# Patient Record
Sex: FEMALE | Race: WHITE | NOT HISPANIC OR LATINO | Employment: OTHER | URBAN - METROPOLITAN AREA
[De-identification: names, ages, dates, MRNs, and addresses within clinical notes are randomized per-mention and may not be internally consistent; named-entity substitution may affect disease eponyms.]

---

## 2017-01-16 ENCOUNTER — GENERIC CONVERSION - ENCOUNTER (OUTPATIENT)
Dept: OTHER | Facility: OTHER | Age: 67
End: 2017-01-16

## 2017-01-20 ENCOUNTER — GENERIC CONVERSION - ENCOUNTER (OUTPATIENT)
Dept: OTHER | Facility: OTHER | Age: 67
End: 2017-01-20

## 2017-02-02 ENCOUNTER — TRANSCRIBE ORDERS (OUTPATIENT)
Dept: ADMINISTRATIVE | Facility: HOSPITAL | Age: 67
End: 2017-02-02

## 2017-02-02 DIAGNOSIS — M81.8 IDIOPATHIC OSTEOPOROSIS: ICD-10-CM

## 2017-02-02 DIAGNOSIS — E04.1 NONTOXIC UNINODULAR GOITER: Primary | ICD-10-CM

## 2017-02-02 DIAGNOSIS — E83.50 UNSPECIFIED DISORDERS OF CALCIUM METABOLISM: ICD-10-CM

## 2017-02-03 ENCOUNTER — HOSPITAL ENCOUNTER (OUTPATIENT)
Dept: GASTROENTEROLOGY | Facility: HOSPITAL | Age: 67
Discharge: HOME | End: 2017-02-03
Payer: MEDICARE

## 2017-02-03 VITALS
TEMPERATURE: 97.7 F | RESPIRATION RATE: 20 BRPM | SYSTOLIC BLOOD PRESSURE: 118 MMHG | BODY MASS INDEX: 28.7 KG/M2 | HEART RATE: 63 BPM | HEIGHT: 63 IN | WEIGHT: 162 LBS | OXYGEN SATURATION: 99 % | DIASTOLIC BLOOD PRESSURE: 72 MMHG

## 2017-02-03 PROCEDURE — 91122 HB ANAL PRESSURE RECORD: CPT

## 2017-02-03 RX ORDER — SODIUM PHOSPHATE, DIBASIC AND SODIUM PHOSPHATE, MONOBASIC 7; 19 G/133ML; G/133ML
1 ENEMA RECTAL ONCE
Status: COMPLETED | OUTPATIENT
Start: 2017-02-03 | End: 2017-02-03

## 2017-02-03 RX ADMIN — SODIUM PHOSPHATE 1 ENEMA: 7; 19 ENEMA RECTAL at 14:00

## 2017-02-06 ENCOUNTER — HOSPITAL ENCOUNTER (OUTPATIENT)
Dept: RADIOLOGY | Age: 67
Discharge: HOME | End: 2017-02-06
Payer: MEDICARE

## 2017-02-06 DIAGNOSIS — M81.8 IDIOPATHIC OSTEOPOROSIS: ICD-10-CM

## 2017-02-06 DIAGNOSIS — E04.1 NONTOXIC UNINODULAR GOITER: ICD-10-CM

## 2017-02-06 DIAGNOSIS — E83.50 UNSPECIFIED DISORDERS OF CALCIUM METABOLISM: ICD-10-CM

## 2017-02-06 PROCEDURE — 76536 US EXAM OF HEAD AND NECK: CPT

## 2017-02-07 ENCOUNTER — TRANSCRIBE ORDERS (OUTPATIENT)
Dept: ADMINISTRATIVE | Age: 67
End: 2017-02-07

## 2017-02-08 ENCOUNTER — APPOINTMENT (OUTPATIENT)
Dept: LAB | Age: 67
End: 2017-02-08
Payer: MEDICARE

## 2017-02-08 ENCOUNTER — TRANSCRIBE ORDERS (OUTPATIENT)
Dept: ADMINISTRATIVE | Age: 67
End: 2017-02-08

## 2017-02-08 DIAGNOSIS — E83.50 UNSPECIFIED DISORDERS OF CALCIUM METABOLISM: ICD-10-CM

## 2017-02-08 DIAGNOSIS — E04.1 NONTOXIC UNINODULAR GOITER: ICD-10-CM

## 2017-02-08 DIAGNOSIS — E04.1 NONTOXIC UNINODULAR GOITER: Primary | ICD-10-CM

## 2017-02-08 DIAGNOSIS — M81.8 IDIOPATHIC OSTEOPOROSIS: ICD-10-CM

## 2017-02-08 LAB
ALBUMIN SERPL BCP-MCNC: 3.3 G/DL (ref 3.5–5)
ALP SERPL-CCNC: 101 U/L (ref 46–116)
ALT SERPL W P-5'-P-CCNC: 22 U/L (ref 12–78)
ANION GAP SERPL CALCULATED.3IONS-SCNC: 5 MMOL/L (ref 4–13)
AST SERPL W P-5'-P-CCNC: 18 U/L (ref 5–45)
BACTERIA UR QL AUTO: NORMAL /HPF
BASOPHILS # BLD AUTO: 0.02 THOUSANDS/ΜL (ref 0–0.1)
BASOPHILS NFR BLD AUTO: 0 % (ref 0–1)
BILIRUB SERPL-MCNC: 0.36 MG/DL (ref 0.2–1)
BILIRUB UR QL STRIP: NEGATIVE
BUN SERPL-MCNC: 18 MG/DL (ref 5–25)
CALCIUM SERPL-MCNC: 8.7 MG/DL (ref 8.3–10.1)
CHLORIDE SERPL-SCNC: 110 MMOL/L (ref 100–108)
CLARITY UR: CLEAR
CO2 SERPL-SCNC: 29 MMOL/L (ref 21–32)
COLOR UR: YELLOW
CORTIS AM PEAK SERPL-MCNC: 18.3 UG/ML (ref 4.2–22.4)
CREAT SERPL-MCNC: 1.12 MG/DL (ref 0.6–1.3)
CREAT UR-MCNC: 100 MG/DL
EOSINOPHIL # BLD AUTO: 0.23 THOUSAND/ΜL (ref 0–0.61)
EOSINOPHIL NFR BLD AUTO: 4 % (ref 0–6)
ERYTHROCYTE [DISTWIDTH] IN BLOOD BY AUTOMATED COUNT: 12.2 % (ref 11.6–15.1)
EST. AVERAGE GLUCOSE BLD GHB EST-MCNC: 103 MG/DL
GFR SERPL CREATININE-BSD FRML MDRD: 48.2 ML/MIN/1.73SQ M
GLUCOSE SERPL-MCNC: 89 MG/DL (ref 65–140)
GLUCOSE UR STRIP-MCNC: NEGATIVE MG/DL
HBA1C MFR BLD: 5.2 % (ref 4.2–6.3)
HCT VFR BLD AUTO: 37.9 % (ref 34.8–46.1)
HGB BLD-MCNC: 12.4 G/DL (ref 11.5–15.4)
HGB UR QL STRIP.AUTO: NEGATIVE
HYALINE CASTS #/AREA URNS LPF: NORMAL /LPF
KETONES UR STRIP-MCNC: NEGATIVE MG/DL
LDH SERPL-CCNC: 162 U/L (ref 81–234)
LEUKOCYTE ESTERASE UR QL STRIP: NEGATIVE
LYMPHOCYTES # BLD AUTO: 2.41 THOUSANDS/ΜL (ref 0.6–4.47)
LYMPHOCYTES NFR BLD AUTO: 39 % (ref 14–44)
MAGNESIUM SERPL-MCNC: 2.2 MG/DL (ref 1.6–2.6)
MCH RBC QN AUTO: 31.6 PG (ref 26.8–34.3)
MCHC RBC AUTO-ENTMCNC: 32.7 G/DL (ref 31.4–37.4)
MCV RBC AUTO: 96 FL (ref 82–98)
MICROALBUMIN UR-MCNC: <5 MG/L (ref 0–20)
MICROALBUMIN/CREAT 24H UR: <5 MG/G CREATININE (ref 0–30)
MONOCYTES # BLD AUTO: 0.5 THOUSAND/ΜL (ref 0.17–1.22)
MONOCYTES NFR BLD AUTO: 8 % (ref 4–12)
NEUTROPHILS # BLD AUTO: 3.09 THOUSANDS/ΜL (ref 1.85–7.62)
NEUTS SEG NFR BLD AUTO: 49 % (ref 43–75)
NITRITE UR QL STRIP: NEGATIVE
NON-SQ EPI CELLS URNS QL MICRO: NORMAL /HPF
NRBC BLD AUTO-RTO: 0 /100 WBCS
PH UR STRIP.AUTO: 6.5 [PH] (ref 4.5–8)
PHOSPHATE SERPL-MCNC: 2.6 MG/DL (ref 2.3–4.1)
PLATELET # BLD AUTO: 238 THOUSANDS/UL (ref 149–390)
PMV BLD AUTO: 10.4 FL (ref 8.9–12.7)
POTASSIUM SERPL-SCNC: 4.4 MMOL/L (ref 3.5–5.3)
PROT SERPL-MCNC: 7.1 G/DL (ref 6.4–8.2)
PROT UR STRIP-MCNC: ABNORMAL MG/DL
PTH-INTACT SERPL-MCNC: 194.1 PG/ML (ref 14–72)
RBC # BLD AUTO: 3.93 MILLION/UL (ref 3.81–5.12)
RBC #/AREA URNS AUTO: NORMAL /HPF
SODIUM SERPL-SCNC: 144 MMOL/L (ref 136–145)
SP GR UR STRIP.AUTO: 1.02 (ref 1–1.03)
T4 FREE SERPL-MCNC: 0.78 NG/DL (ref 0.76–1.46)
TSH SERPL DL<=0.05 MIU/L-ACNC: 3.36 UIU/ML (ref 0.36–3.74)
UROBILINOGEN UR QL STRIP.AUTO: 1 E.U./DL
WBC # BLD AUTO: 6.26 THOUSAND/UL (ref 4.31–10.16)
WBC #/AREA URNS AUTO: NORMAL /HPF

## 2017-02-08 PROCEDURE — 82308 ASSAY OF CALCITONIN: CPT

## 2017-02-08 PROCEDURE — 85025 COMPLETE CBC W/AUTO DIFF WBC: CPT

## 2017-02-08 PROCEDURE — 82024 ASSAY OF ACTH: CPT

## 2017-02-08 PROCEDURE — 84439 ASSAY OF FREE THYROXINE: CPT

## 2017-02-08 PROCEDURE — 83835 ASSAY OF METANEPHRINES: CPT

## 2017-02-08 PROCEDURE — 81001 URINALYSIS AUTO W/SCOPE: CPT | Performed by: SPECIALIST

## 2017-02-08 PROCEDURE — 36415 COLL VENOUS BLD VENIPUNCTURE: CPT

## 2017-02-08 PROCEDURE — 84100 ASSAY OF PHOSPHORUS: CPT

## 2017-02-08 PROCEDURE — 80053 COMPREHEN METABOLIC PANEL: CPT

## 2017-02-08 PROCEDURE — 83735 ASSAY OF MAGNESIUM: CPT

## 2017-02-08 PROCEDURE — 83970 ASSAY OF PARATHORMONE: CPT

## 2017-02-08 PROCEDURE — 82533 TOTAL CORTISOL: CPT

## 2017-02-08 PROCEDURE — 82043 UR ALBUMIN QUANTITATIVE: CPT | Performed by: SPECIALIST

## 2017-02-08 PROCEDURE — 82570 ASSAY OF URINE CREATININE: CPT | Performed by: SPECIALIST

## 2017-02-08 PROCEDURE — 83615 LACTATE (LD) (LDH) ENZYME: CPT

## 2017-02-08 PROCEDURE — 83036 HEMOGLOBIN GLYCOSYLATED A1C: CPT

## 2017-02-08 PROCEDURE — 84443 ASSAY THYROID STIM HORMONE: CPT

## 2017-02-10 LAB
ACTH PLAS-MCNC: 20.1 PG/ML (ref 7.2–63.3)
CALCIT SERPL-MCNC: <2 PG/ML (ref 0–5)

## 2017-02-11 LAB
METANEPH FREE SERPL-MCNC: 19 PG/ML (ref 0–62)
NORMETANEPHRINE SERPL-MCNC: 113 PG/ML (ref 0–145)

## 2017-02-24 ENCOUNTER — GENERIC CONVERSION - ENCOUNTER (OUTPATIENT)
Dept: OTHER | Facility: OTHER | Age: 67
End: 2017-02-24

## 2017-02-24 ENCOUNTER — ANESTHESIA (OUTPATIENT)
Dept: GASTROENTEROLOGY | Facility: HOSPITAL | Age: 67
End: 2017-02-24
Payer: MEDICARE

## 2017-02-24 ENCOUNTER — HOSPITAL ENCOUNTER (OUTPATIENT)
Facility: HOSPITAL | Age: 67
Setting detail: OUTPATIENT SURGERY
Discharge: HOME | End: 2017-02-24
Attending: COLON & RECTAL SURGERY | Admitting: COLON & RECTAL SURGERY
Payer: MEDICARE

## 2017-02-24 ENCOUNTER — ANESTHESIA EVENT (OUTPATIENT)
Dept: GASTROENTEROLOGY | Facility: HOSPITAL | Age: 67
End: 2017-02-24
Payer: MEDICARE

## 2017-02-24 VITALS
TEMPERATURE: 97.4 F | RESPIRATION RATE: 18 BRPM | HEIGHT: 63 IN | OXYGEN SATURATION: 99 % | WEIGHT: 162 LBS | BODY MASS INDEX: 28.7 KG/M2 | SYSTOLIC BLOOD PRESSURE: 123 MMHG | HEART RATE: 53 BPM | DIASTOLIC BLOOD PRESSURE: 54 MMHG

## 2017-02-24 DIAGNOSIS — R15.9 FECAL INCONTINENCE: ICD-10-CM

## 2017-02-24 DIAGNOSIS — R19.7 DIARRHEA: ICD-10-CM

## 2017-02-24 PROCEDURE — 88305 TISSUE EXAM BY PATHOLOGIST: CPT | Performed by: INTERNAL MEDICINE

## 2017-02-24 RX ORDER — SODIUM CHLORIDE 9 MG/ML
50 INJECTION, SOLUTION INTRAVENOUS CONTINUOUS
Status: DISCONTINUED | OUTPATIENT
Start: 2017-02-24 | End: 2017-02-24 | Stop reason: HOSPADM

## 2017-02-24 RX ORDER — PROPOFOL 10 MG/ML
INJECTION, EMULSION INTRAVENOUS AS NEEDED
Status: DISCONTINUED | OUTPATIENT
Start: 2017-02-24 | End: 2017-02-24 | Stop reason: SURG

## 2017-02-24 RX ORDER — LIDOCAINE HYDROCHLORIDE 10 MG/ML
INJECTION, SOLUTION INFILTRATION; PERINEURAL AS NEEDED
Status: DISCONTINUED | OUTPATIENT
Start: 2017-02-24 | End: 2017-02-24 | Stop reason: SURG

## 2017-02-24 RX ORDER — PROPOFOL 10 MG/ML
INJECTION, EMULSION INTRAVENOUS CONTINUOUS PRN
Status: DISCONTINUED | OUTPATIENT
Start: 2017-02-24 | End: 2017-02-24 | Stop reason: SURG

## 2017-02-24 RX ADMIN — PROPOFOL 100 MG: 10 INJECTION, EMULSION INTRAVENOUS at 11:34

## 2017-02-24 RX ADMIN — PROPOFOL 80 MCG/KG/MIN: 10 INJECTION, EMULSION INTRAVENOUS at 11:34

## 2017-02-24 RX ADMIN — LIDOCAINE HYDROCHLORIDE 50 MG: 10 INJECTION, SOLUTION INFILTRATION; PERINEURAL at 11:34

## 2017-02-24 RX ADMIN — SODIUM CHLORIDE: 0.9 INJECTION, SOLUTION INTRAVENOUS at 11:34

## 2017-02-24 RX ADMIN — PROPOFOL 30 MG: 10 INJECTION, EMULSION INTRAVENOUS at 11:38

## 2017-02-24 RX ADMIN — SODIUM CHLORIDE 50 ML/HR: 0.9 INJECTION, SOLUTION INTRAVENOUS at 10:57

## 2017-03-06 ENCOUNTER — GENERIC CONVERSION - ENCOUNTER (OUTPATIENT)
Dept: OTHER | Facility: OTHER | Age: 67
End: 2017-03-06

## 2017-05-22 ENCOUNTER — TRANSCRIBE ORDERS (OUTPATIENT)
Dept: ADMINISTRATIVE | Age: 67
End: 2017-05-22

## 2017-05-22 ENCOUNTER — APPOINTMENT (OUTPATIENT)
Dept: LAB | Age: 67
End: 2017-05-22
Payer: MEDICARE

## 2017-05-22 DIAGNOSIS — M81.0 SENILE OSTEOPOROSIS: ICD-10-CM

## 2017-05-22 DIAGNOSIS — I10 ESSENTIAL HYPERTENSION, MALIGNANT: ICD-10-CM

## 2017-05-22 DIAGNOSIS — E78.5 OTHER AND UNSPECIFIED HYPERLIPIDEMIA: ICD-10-CM

## 2017-05-22 DIAGNOSIS — M81.8 IDIOPATHIC OSTEOPOROSIS: ICD-10-CM

## 2017-05-22 DIAGNOSIS — M81.0 SENILE OSTEOPOROSIS: Primary | ICD-10-CM

## 2017-05-22 DIAGNOSIS — E83.50 UNSPECIFIED DISORDERS OF CALCIUM METABOLISM: ICD-10-CM

## 2017-05-22 DIAGNOSIS — E04.0 GOITER, SPECIFIED AS SIMPLE: Primary | ICD-10-CM

## 2017-05-22 DIAGNOSIS — E78.5 OTHER AND UNSPECIFIED HYPERLIPIDEMIA: Primary | ICD-10-CM

## 2017-05-22 LAB
25(OH)D3 SERPL-MCNC: 34.7 NG/ML (ref 30–100)
ALBUMIN SERPL BCP-MCNC: 3.6 G/DL (ref 3.5–5)
ALP SERPL-CCNC: 115 U/L (ref 46–116)
ALT SERPL W P-5'-P-CCNC: 26 U/L (ref 12–78)
ANION GAP SERPL CALCULATED.3IONS-SCNC: 8 MMOL/L (ref 4–13)
AST SERPL W P-5'-P-CCNC: 25 U/L (ref 5–45)
BACTERIA UR QL AUTO: ABNORMAL /HPF
BILIRUB SERPL-MCNC: 0.33 MG/DL (ref 0.2–1)
BILIRUB UR QL STRIP: ABNORMAL
BUN SERPL-MCNC: 29 MG/DL (ref 5–25)
CALCIUM SERPL-MCNC: 9.6 MG/DL (ref 8.3–10.1)
CHLORIDE SERPL-SCNC: 109 MMOL/L (ref 100–108)
CHOLEST SERPL-MCNC: 173 MG/DL (ref 50–200)
CLARITY UR: CLEAR
CO2 SERPL-SCNC: 26 MMOL/L (ref 21–32)
COLOR UR: YELLOW
CREAT SERPL-MCNC: 1.22 MG/DL (ref 0.6–1.3)
CREAT UR-MCNC: 217 MG/DL
ERYTHROCYTE [DISTWIDTH] IN BLOOD BY AUTOMATED COUNT: 12.5 % (ref 11.6–15.1)
GFR SERPL CREATININE-BSD FRML MDRD: 43.7 ML/MIN/1.73SQ M
GLUCOSE P FAST SERPL-MCNC: 74 MG/DL (ref 65–99)
GLUCOSE UR STRIP-MCNC: NEGATIVE MG/DL
HCT VFR BLD AUTO: 39.3 % (ref 34.8–46.1)
HDLC SERPL-MCNC: 63 MG/DL (ref 40–60)
HGB BLD-MCNC: 12.8 G/DL (ref 11.5–15.4)
HGB UR QL STRIP.AUTO: NEGATIVE
HYALINE CASTS #/AREA URNS LPF: ABNORMAL /LPF
KETONES UR STRIP-MCNC: NEGATIVE MG/DL
LDLC SERPL DIRECT ASSAY-MCNC: 92 MG/DL (ref 0–100)
LEUKOCYTE ESTERASE UR QL STRIP: NEGATIVE
MAGNESIUM SERPL-MCNC: 2.4 MG/DL (ref 1.6–2.6)
MCH RBC QN AUTO: 31.4 PG (ref 26.8–34.3)
MCHC RBC AUTO-ENTMCNC: 32.6 G/DL (ref 31.4–37.4)
MCV RBC AUTO: 97 FL (ref 82–98)
MICROALBUMIN UR-MCNC: 141 MG/L (ref 0–20)
MICROALBUMIN/CREAT 24H UR: 65 MG/G CREATININE (ref 0–30)
NITRITE UR QL STRIP: NEGATIVE
NON-SQ EPI CELLS URNS QL MICRO: ABNORMAL /HPF
PH UR STRIP.AUTO: 6 [PH] (ref 4.5–8)
PHOSPHATE SERPL-MCNC: 3.3 MG/DL (ref 2.3–4.1)
PLATELET # BLD AUTO: 211 THOUSANDS/UL (ref 149–390)
PMV BLD AUTO: 10.6 FL (ref 8.9–12.7)
POTASSIUM SERPL-SCNC: 4.1 MMOL/L (ref 3.5–5.3)
PROT SERPL-MCNC: 7.5 G/DL (ref 6.4–8.2)
PROT UR STRIP-MCNC: ABNORMAL MG/DL
PTH-INTACT SERPL-MCNC: 61.9 PG/ML (ref 14–72)
RBC # BLD AUTO: 4.07 MILLION/UL (ref 3.81–5.12)
RBC #/AREA URNS AUTO: ABNORMAL /HPF
SODIUM SERPL-SCNC: 143 MMOL/L (ref 136–145)
SP GR UR STRIP.AUTO: 1.03 (ref 1–1.03)
T4 FREE SERPL-MCNC: 0.78 NG/DL (ref 0.76–1.46)
TRIGL SERPL-MCNC: 90 MG/DL
TSH SERPL DL<=0.05 MIU/L-ACNC: 1.71 UIU/ML (ref 0.36–3.74)
UROBILINOGEN UR QL STRIP.AUTO: 0.2 E.U./DL
WBC # BLD AUTO: 6.69 THOUSAND/UL (ref 4.31–10.16)
WBC #/AREA URNS AUTO: ABNORMAL /HPF

## 2017-05-22 PROCEDURE — 84443 ASSAY THYROID STIM HORMONE: CPT

## 2017-05-22 PROCEDURE — 80053 COMPREHEN METABOLIC PANEL: CPT

## 2017-05-22 PROCEDURE — 82306 VITAMIN D 25 HYDROXY: CPT

## 2017-05-22 PROCEDURE — 85027 COMPLETE CBC AUTOMATED: CPT

## 2017-05-22 PROCEDURE — 80061 LIPID PANEL: CPT | Performed by: INTERNAL MEDICINE

## 2017-05-22 PROCEDURE — 84165 PROTEIN E-PHORESIS SERUM: CPT

## 2017-05-22 PROCEDURE — 83735 ASSAY OF MAGNESIUM: CPT

## 2017-05-22 PROCEDURE — 82043 UR ALBUMIN QUANTITATIVE: CPT | Performed by: SPECIALIST

## 2017-05-22 PROCEDURE — 83721 ASSAY OF BLOOD LIPOPROTEIN: CPT

## 2017-05-22 PROCEDURE — 83970 ASSAY OF PARATHORMONE: CPT

## 2017-05-22 PROCEDURE — 84100 ASSAY OF PHOSPHORUS: CPT

## 2017-05-22 PROCEDURE — 82570 ASSAY OF URINE CREATININE: CPT | Performed by: SPECIALIST

## 2017-05-22 PROCEDURE — 36415 COLL VENOUS BLD VENIPUNCTURE: CPT

## 2017-05-22 PROCEDURE — 83883 ASSAY NEPHELOMETRY NOT SPEC: CPT

## 2017-05-22 PROCEDURE — 81001 URINALYSIS AUTO W/SCOPE: CPT | Performed by: SPECIALIST

## 2017-05-22 PROCEDURE — 84439 ASSAY OF FREE THYROXINE: CPT

## 2017-05-24 ENCOUNTER — APPOINTMENT (OUTPATIENT)
Dept: LAB | Age: 67
End: 2017-05-24
Payer: MEDICARE

## 2017-05-24 DIAGNOSIS — E83.50 UNSPECIFIED DISORDERS OF CALCIUM METABOLISM: ICD-10-CM

## 2017-05-24 DIAGNOSIS — E04.0 GOITER, SPECIFIED AS SIMPLE: ICD-10-CM

## 2017-05-24 DIAGNOSIS — M81.8 IDIOPATHIC OSTEOPOROSIS: ICD-10-CM

## 2017-05-24 LAB
ALBUMIN SERPL ELPH-MCNC: 3.71 G/DL (ref 3.5–5)
ALBUMIN SERPL ELPH-MCNC: 52.3 % (ref 52–65)
ALPHA1 GLOB SERPL ELPH-MCNC: 0.32 G/DL (ref 0.1–0.4)
ALPHA1 GLOB SERPL ELPH-MCNC: 4.5 % (ref 2.5–5)
ALPHA2 GLOB SERPL ELPH-MCNC: 0.87 G/DL (ref 0.4–1.2)
ALPHA2 GLOB SERPL ELPH-MCNC: 12.3 % (ref 7–13)
BETA GLOB ABNORMAL SERPL ELPH-MCNC: 0.44 G/DL (ref 0.4–0.8)
BETA1 GLOB SERPL ELPH-MCNC: 6.2 % (ref 5–13)
BETA2 GLOB SERPL ELPH-MCNC: 7.3 % (ref 2–8)
BETA2+GAMMA GLOB SERPL ELPH-MCNC: 0.52 G/DL (ref 0.2–0.5)
CALCIUM 24H UR-MCNC: 106.95 MG/24 HRS (ref 42–353)
CREAT 24H UR-MRATE: 0.7 G/24HR (ref 0.6–1.8)
CREAT CL 24H UR+SERPL-VRATE: 40.96 ML/MIN (ref 75–115)
CREAT UR-MCNC: 96.6 MG/DL
GAMMA GLOB ABNORMAL SERPL ELPH-MCNC: 1.24 G/DL (ref 0.5–1.6)
GAMMA GLOB SERPL ELPH-MCNC: 17.4 % (ref 12–22)
IGG/ALB SER: 1.1 {RATIO} (ref 1.1–1.8)
KAPPA LC FREE SER-MCNC: 42.25 MG/L (ref 3.3–19.4)
KAPPA LC FREE/LAMBDA FREE SER: 1.31 {RATIO} (ref 0.26–1.65)
LAMBDA LC FREE SERPL-MCNC: 32.32 MG/L (ref 5.71–26.3)
PROT PATTERN SERPL ELPH-IMP: ABNORMAL
PROT SERPL-MCNC: 7.1 G/DL (ref 6.4–8.2)
SPECIMEN VOL UR: 775 ML
SPECIMEN VOL UR: 775 ML

## 2017-05-24 PROCEDURE — 82340 ASSAY OF CALCIUM IN URINE: CPT

## 2017-05-24 PROCEDURE — 82575 CREATININE CLEARANCE TEST: CPT

## 2017-05-25 ENCOUNTER — HOSPITAL ENCOUNTER (OUTPATIENT)
Dept: INFUSION CENTER | Facility: CLINIC | Age: 67
Discharge: HOME | End: 2017-05-25
Payer: MEDICARE

## 2017-05-25 VITALS — TEMPERATURE: 97.4 F

## 2017-05-25 PROCEDURE — 96402 CHEMO HORMON ANTINEOPL SQ/IM: CPT

## 2017-05-25 RX ADMIN — DENOSUMAB 60 MG: 60 INJECTION SUBCUTANEOUS at 14:03

## 2017-05-25 NOTE — PROGRESS NOTES
Pt tolerated prolia injection well in right arm  pt confirmed taking calcium supplements at home  Labs reviewed and on chart  Pt refused AVS, new appointment made for 6 months

## 2017-05-30 ENCOUNTER — TRANSCRIBE ORDERS (OUTPATIENT)
Dept: ADMINISTRATIVE | Facility: HOSPITAL | Age: 67
End: 2017-05-30

## 2017-05-30 DIAGNOSIS — E83.50 UNSPECIFIED DISORDER OF CALCIUM METABOLISM: Primary | ICD-10-CM

## 2017-06-05 ENCOUNTER — HOSPITAL ENCOUNTER (OUTPATIENT)
Dept: RADIOLOGY | Age: 67
Discharge: HOME | End: 2017-06-05
Payer: MEDICARE

## 2017-06-05 DIAGNOSIS — E83.50 UNSPECIFIED DISORDER OF CALCIUM METABOLISM: ICD-10-CM

## 2017-06-05 PROCEDURE — 76770 US EXAM ABDO BACK WALL COMP: CPT

## 2017-11-13 ENCOUNTER — TRANSCRIBE ORDERS (OUTPATIENT)
Dept: ADMINISTRATIVE | Age: 67
End: 2017-11-13

## 2017-11-13 ENCOUNTER — APPOINTMENT (OUTPATIENT)
Dept: LAB | Age: 67
End: 2017-11-13
Payer: MEDICARE

## 2017-11-13 DIAGNOSIS — E04.1 NONTOXIC UNINODULAR GOITER: ICD-10-CM

## 2017-11-13 DIAGNOSIS — M81.8 IDIOPATHIC OSTEOPOROSIS: ICD-10-CM

## 2017-11-13 DIAGNOSIS — E78.49 FAMILIAL COMBINED HYPERLIPIDEMIA: ICD-10-CM

## 2017-11-13 DIAGNOSIS — E55.9 AVITAMINOSIS D: ICD-10-CM

## 2017-11-13 DIAGNOSIS — E21.2 OTHER HYPERPARATHYROIDISM (HCC): ICD-10-CM

## 2017-11-13 DIAGNOSIS — I10 ESSENTIAL HYPERTENSION, MALIGNANT: ICD-10-CM

## 2017-11-13 DIAGNOSIS — R53.83 FATIGUE, UNSPECIFIED TYPE: ICD-10-CM

## 2017-11-13 DIAGNOSIS — I10 ESSENTIAL HYPERTENSION, MALIGNANT: Primary | ICD-10-CM

## 2017-11-13 DIAGNOSIS — E21.2 OTHER HYPERPARATHYROIDISM (HCC): Primary | ICD-10-CM

## 2017-11-13 LAB
ALBUMIN SERPL BCP-MCNC: 3.2 G/DL (ref 3.5–5)
ALP SERPL-CCNC: 116 U/L (ref 46–116)
ALT SERPL W P-5'-P-CCNC: 27 U/L (ref 12–78)
ANION GAP SERPL CALCULATED.3IONS-SCNC: 6 MMOL/L (ref 4–13)
AST SERPL W P-5'-P-CCNC: 26 U/L (ref 5–45)
BACTERIA UR QL AUTO: ABNORMAL /HPF
BASOPHILS # BLD AUTO: 0.03 THOUSANDS/ΜL (ref 0–0.1)
BASOPHILS NFR BLD AUTO: 0 % (ref 0–1)
BILIRUB SERPL-MCNC: 0.3 MG/DL (ref 0.2–1)
BILIRUB UR QL STRIP: NEGATIVE
BUN SERPL-MCNC: 19 MG/DL (ref 5–25)
CALCIUM SERPL-MCNC: 9.4 MG/DL (ref 8.3–10.1)
CHLORIDE SERPL-SCNC: 107 MMOL/L (ref 100–108)
CHOLEST SERPL-MCNC: 195 MG/DL (ref 50–200)
CLARITY UR: CLEAR
CO2 SERPL-SCNC: 27 MMOL/L (ref 21–32)
COLOR UR: YELLOW
CREAT SERPL-MCNC: 1.2 MG/DL (ref 0.6–1.3)
EOSINOPHIL # BLD AUTO: 0.17 THOUSAND/ΜL (ref 0–0.61)
EOSINOPHIL NFR BLD AUTO: 3 % (ref 0–6)
ERYTHROCYTE [DISTWIDTH] IN BLOOD BY AUTOMATED COUNT: 12.5 % (ref 11.6–15.1)
EST. AVERAGE GLUCOSE BLD GHB EST-MCNC: 105 MG/DL
GFR SERPL CREATININE-BSD FRML MDRD: 46 ML/MIN/1.73SQ M
GLUCOSE P FAST SERPL-MCNC: 79 MG/DL (ref 65–99)
GLUCOSE UR STRIP-MCNC: NEGATIVE MG/DL
HBA1C MFR BLD: 5.3 % (ref 4.2–6.3)
HCT VFR BLD AUTO: 40.5 % (ref 34.8–46.1)
HDLC SERPL-MCNC: 64 MG/DL (ref 40–60)
HGB BLD-MCNC: 13.4 G/DL (ref 11.5–15.4)
HGB UR QL STRIP.AUTO: NEGATIVE
HYALINE CASTS #/AREA URNS LPF: ABNORMAL /LPF
KETONES UR STRIP-MCNC: NEGATIVE MG/DL
LDLC SERPL CALC-MCNC: 111 MG/DL (ref 0–100)
LEUKOCYTE ESTERASE UR QL STRIP: NEGATIVE
LYMPHOCYTES # BLD AUTO: 1.58 THOUSANDS/ΜL (ref 0.6–4.47)
LYMPHOCYTES NFR BLD AUTO: 24 % (ref 14–44)
MAGNESIUM SERPL-MCNC: 2.3 MG/DL (ref 1.6–2.6)
MCH RBC QN AUTO: 31.8 PG (ref 26.8–34.3)
MCHC RBC AUTO-ENTMCNC: 33.1 G/DL (ref 31.4–37.4)
MCV RBC AUTO: 96 FL (ref 82–98)
MONOCYTES # BLD AUTO: 0.38 THOUSAND/ΜL (ref 0.17–1.22)
MONOCYTES NFR BLD AUTO: 6 % (ref 4–12)
NEUTROPHILS # BLD AUTO: 4.53 THOUSANDS/ΜL (ref 1.85–7.62)
NEUTS SEG NFR BLD AUTO: 67 % (ref 43–75)
NITRITE UR QL STRIP: NEGATIVE
NON-SQ EPI CELLS URNS QL MICRO: ABNORMAL /HPF
NRBC BLD AUTO-RTO: 0 /100 WBCS
PH UR STRIP.AUTO: 6 [PH] (ref 4.5–8)
PHOSPHATE SERPL-MCNC: 3.3 MG/DL (ref 2.3–4.1)
PLATELET # BLD AUTO: 238 THOUSANDS/UL (ref 149–390)
PMV BLD AUTO: 11.1 FL (ref 8.9–12.7)
POTASSIUM SERPL-SCNC: 4.5 MMOL/L (ref 3.5–5.3)
PROT SERPL-MCNC: 7.7 G/DL (ref 6.4–8.2)
PROT UR STRIP-MCNC: ABNORMAL MG/DL
PTH-INTACT SERPL-MCNC: 71.1 PG/ML (ref 14–72)
RBC # BLD AUTO: 4.21 MILLION/UL (ref 3.81–5.12)
RBC #/AREA URNS AUTO: ABNORMAL /HPF
SODIUM SERPL-SCNC: 140 MMOL/L (ref 136–145)
SP GR UR STRIP.AUTO: 1.03 (ref 1–1.03)
T3 SERPL-MCNC: 0.8 NG/ML (ref 0.6–1.8)
T4 FREE SERPL-MCNC: 0.77 NG/DL (ref 0.76–1.46)
TRIGL SERPL-MCNC: 99 MG/DL
TSH SERPL DL<=0.05 MIU/L-ACNC: 1.54 UIU/ML (ref 0.36–3.74)
UROBILINOGEN UR QL STRIP.AUTO: 0.2 E.U./DL
WBC # BLD AUTO: 6.7 THOUSAND/UL (ref 4.31–10.16)
WBC #/AREA URNS AUTO: ABNORMAL /HPF

## 2017-11-13 PROCEDURE — 80053 COMPREHEN METABOLIC PANEL: CPT

## 2017-11-13 PROCEDURE — 85025 COMPLETE CBC W/AUTO DIFF WBC: CPT

## 2017-11-13 PROCEDURE — 36415 COLL VENOUS BLD VENIPUNCTURE: CPT

## 2017-11-13 PROCEDURE — 83735 ASSAY OF MAGNESIUM: CPT

## 2017-11-13 PROCEDURE — 84439 ASSAY OF FREE THYROXINE: CPT

## 2017-11-13 PROCEDURE — 83036 HEMOGLOBIN GLYCOSYLATED A1C: CPT

## 2017-11-13 PROCEDURE — 84443 ASSAY THYROID STIM HORMONE: CPT

## 2017-11-13 PROCEDURE — 84100 ASSAY OF PHOSPHORUS: CPT

## 2017-11-13 PROCEDURE — 84480 ASSAY TRIIODOTHYRONINE (T3): CPT

## 2017-11-13 PROCEDURE — 83970 ASSAY OF PARATHORMONE: CPT

## 2017-11-13 PROCEDURE — 80061 LIPID PANEL: CPT

## 2017-11-13 PROCEDURE — 81001 URINALYSIS AUTO W/SCOPE: CPT | Performed by: INTERNAL MEDICINE

## 2017-11-27 ENCOUNTER — HOSPITAL ENCOUNTER (OUTPATIENT)
Dept: INFUSION CENTER | Facility: CLINIC | Age: 67
Discharge: HOME | End: 2017-11-27
Payer: MEDICARE

## 2017-12-21 ENCOUNTER — ALLSCRIPTS OFFICE VISIT (OUTPATIENT)
Dept: OTHER | Facility: OTHER | Age: 67
End: 2017-12-21

## 2017-12-21 DIAGNOSIS — R76.8 OTHER SPECIFIED ABNORMAL IMMUNOLOGICAL FINDINGS IN SERUM: ICD-10-CM

## 2017-12-22 ENCOUNTER — GENERIC CONVERSION - ENCOUNTER (OUTPATIENT)
Dept: OTHER | Facility: OTHER | Age: 67
End: 2017-12-22

## 2017-12-22 ENCOUNTER — HOSPITAL ENCOUNTER (OUTPATIENT)
Dept: RADIOLOGY | Age: 67
Discharge: HOME | End: 2017-12-22
Payer: MEDICARE

## 2017-12-22 DIAGNOSIS — Z12.31 ENCOUNTER FOR SCREENING MAMMOGRAM FOR MALIGNANT NEOPLASM OF BREAST: ICD-10-CM

## 2017-12-22 PROCEDURE — G0202 SCR MAMMO BI INCL CAD: HCPCS

## 2017-12-25 NOTE — PROGRESS NOTES
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)   4  Elevated serum immunoglobulin free light chains (795 79) (R76 8)    Plan   Elevated serum immunoglobulin free light chains    · (1) B-2 MICROGLOBULIN; Status:Active; Requested for:84Rbg9241;    Perform:[facility]; IUK:81BRD2413; Ordered;For:Elevated serum immunoglobulin free light chains; Ordered By:Proothi, Shin;   · (1) FREE LIGHT CHAINS, SERUM; Status:Active; Requested for:14Eji9887;    Perform:[facility]; ISY:56QCX9034; Ordered;For:Elevated serum immunoglobulin free light chains; Ordered By:Proothi, Shin;   · (1) FREE LIGHT CHAINS, URINE; Status:Active; Requested for:34Knp0404;    Perform:[facility]; BVX:08RIU6253; Ordered;For:Elevated serum immunoglobulin free light chains; Ordered By:Proothi, Shin;   · (1) PROTEIN ELECTRO, SERUM; Status:Active; Requested for:36Cfx9061;    Perform:[facility]; PZC:71QRW2385; Ordered;For:Elevated serum immunoglobulin free light chains; Ordered By:Proothi, Shin;   · (1) PROTEIN ELECTRO, URINE; Status:Active; Requested for:33Sjb2006;    Perform:[facility]; CLX:57JBD9714; Ordered;For:Elevated serum immunoglobulin free light chains; Ordered By:Proothi, Shin;   · Follow-up visit in 1 month Evaluation and Treatment  Follow-up  Status: Complete  Done:    17OOL8418 01:45PM   Ordered; For: Elevated serum immunoglobulin free light chains; Ordered By: Uriel Story Performed:  Due: 47HDJ0244; Last Updated By: Amelia Austin; 12/21/2017 4:09:39 PM    Discussion/Summary   Discussion Summary:    Patient is 69 year of age  In 1999 patient was diagnosed to have hormone receptor negative stage I left breast cancer  We were not checking HER-2 at that time   Patient had left breast mastectomy and 4 cycles of adjuvant Adriamycin/Cytoxan followed by reconstruction surgery and mammoplasty of the right breast   She has remained in remission from breast cancer  of ITP in remission  medical problems like polymyalgia rheumatica, restless leg syndrome, pacemaker ,   Guillain-[place] syndrome, Sjogren's syndrome and osteoporosis  She follows with her primary physician, endocrinologist, breast surgeon and rheumatologist  Patient's endocrinologist noticed abnormal light chains, kappa light chain 42 25 and lambda light chain 32 32  Normal free light chain ratio  Increased microalbumin in the urine, 141  endocrinologist referred her back to our office for abnormal light chains  was being followed by our group member Dr Karie Jordan previously  feels tired  She states she sleeps 12 hour at night and still feels tired  examination and test results are as recorded and discussed  Free light chains are slightly high but ratio is normal probably of uncertain significance are no significance  These will be repeated and also she will have serum and urine protein electrophoresis and beta 2 microglobulin  has been in remission from breast cancer and she follows with her breast surgeon for examination of breasts and mammography  follows with her primary physician and other consultants for other medical problems  Patient states she is up-to-date with her medical checkups and health screening tests  and plan discussed  Questions answered  Counseling Documentation With Imm: The patient was counseled regarding diagnostic results,-- patient and family education,-- impressions  total time of encounter was 65 minutes-- and-- 35 minutes was spent counseling  Goals and Barriers: The patient has the current Goals: Cure from breast cancer  Workup for elevated free light chains  The patent has the current Barriers: No barriers  Patient's Capacity to Self-Care: Patient is able to Self-Care  Medication SE Review and Pt Understands Tx:  The treatment plan was reviewed with the patient/guardian  The patient/guardian understands and agrees with the treatment plan    Self Referrals:    Self Referrals: No      Chief Complaint   Chief Complaint Free Text Note Form: Consultation for elevated free light chain  History of Present Illness   Free Text HPI: Patient is 69 year of age  In 1999 patient was diagnosed to have hormone receptor negative stage I left breast cancer  We were not checking HER-2 at that time  Patient had left breast mastectomy and 4 cycles of adjuvant Adriamycin/Cytoxan followed by reconstruction surgery and mammoplasty of the right breast   She has remained in remission from breast cancer  of ITP in remission  medical problems like polymyalgia rheumatica, restless leg syndrome, pacemaker ,   Guillain-[place] syndrome, Sjogren's syndrome and osteoporosis  She follows with her primary physician, endocrinologist, breast surgeon and rheumatologist  Patient's endocrinologist noticed abnormal light chains, kappa light chain 42 25 and lambda light chain 32 32  Normal free light chain ratio  Increased microalbumin in the urine, 141  endocrinologist referred her back to our office for abnormal light chains  was being followed by our group member Dr Shant Marte previously  feels tired  She states she sleeps 12 hour at night and still feels tired  Review of Systems        No headaches, seizures, diplopia, dysphagia, hoarseness, angina pain, chest pain, palpitations, shortness of breath, cough, hemoptysis, abdominal pain, melena, or hematuria  No fever, chills, bleeding, bone pains, skin rash, weight loss, nausea, vomiting and no change in bowel habits  Appetite is fair  No night sweats  Patient has minor arthritic symptoms  No leg cramps  No swelling of the ankles  No swollen glands  Anxious  Not unusually sensitive to heat or cold  No recent or frequent infections  No GYN symptoms  Other symptoms as in HPI  Reviewed 14 systems  ROS Reviewed:    ROS reviewed        Active Problems   1  Anxiety (300 00) (F41 9)   2  Arthritis (716 90) (M19 90)   3  Atypical chest pain (786 59) (R07 89)   4  Benign familial tremor (333 1) (G25 0)   5  Complication Of Cardiac Pacemaker (996 72)   6  Depression (311) (F32 9)   7  Diarrhea (787 91) (R19 7)   8  Dry eye syndrome (375 15) (H04 129)   9  Encounter for other plastic or reconstructive surgery following medical procedure or     healed injury (V51 8) (Z42 8)   10  Fecal incontinence (787 60) (R15 9)   11  Fibromyalgia (729 1) (M79 7)   12  H/O left breast implant (V43 82) (Z98 82)   13  Heartburn (787 1) (R12)   14  History of breast cancer (V10 3) (Z85 3)   15  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)   16  Hypertension (401 9) (I10)   17  Idiopathic thrombocytopenic purpura (287 31) (D69 3)   18  Insomnia (780 52) (G47 00)   19  Irritable bowel syndrome with both constipation and diarrhea (564 1) (K58 2)   20  Irritable bowel syndrome with diarrhea (564 1) (K58 0)   21  Lymphedema (457 1) (I89 0)   22  Malignant neoplasm without specification of site (199 1) (C80 1)   23  Percutaneous Placement Of Gastrostomy Tube   24  Restless legs syndrome (333 94) (G25 81)   25  Sick sinus syndrome (427 81) (I49 5)   26  Sjogren's syndrome (710 2) (M35 00)   27  Tricuspid Valve Endocarditis (421 0)      Reviewed  Past Medical History   1  History of Cellulitis (682 9) (L03 90)   2  History of Closed Fracture Of The Distal End Of The Radius (813 42)   3  History of Closed Fracture Of The Radius (813 81)   4  Encounter for other plastic or reconstructive surgery following medical procedure or     healed injury (V51 8) (Z42 8)   5  History of adenomatous polyp of colon (V12 72) (Z86 010)   6  History of breast cancer (V10 3) (Z85 3)      Reviewed  Surgical History   1  History of Appendectomy   2  History of Breast Surgery Mastectomy   3  History of Breast Surgery Reconstruction   4  History of  Section   5   History of Cholecystectomy   6  History of Pacemaker Placement   7  Percutaneous Placement Of Gastrostomy Tube  Surgical History Reviewed: The surgical history was reviewed and updated today  Family History   Mother    1  Family history of Hypertension (V17 49)   2  Family history of Stroke Syndrome (V17 1)  Father    3  Family history of Benign Polyps Of The Large Intestine (V18 51)   4  Family history of Esophageal Cancer (V16 0)   5  Family history of Heart Disease (V17 49)  Family History    6  Family history of Cancer  Family History Reviewed: The family history was reviewed and updated today  Social History    · Alcohol Use (History)   · Caffeine Use   · Daily Cola Consumption (1  Cans/Day)   · Never A Smoker   · Occupation: Retired  Social History Reviewed: The social history was reviewed and updated today  Current Meds    1  Ambien TABS; TAKE 1 TABLET AT  BEDTIME AS NEEDED FOR INSOMNIA; Therapy: (Recorded:17Jun2016) to Recorded   2  Aspirin 325 MG Oral Tablet; TAKE 1 TABLET DAILY; Therapy: (Recorded:17Jun2016) to Recorded   3  Atenolol 25 MG Oral Tablet; Take 1 tablet daily; Therapy: (Allan Elvia) to Recorded   4  Benazepril HCl - 20 MG Oral Tablet; TAKE 1 TABLET DAILY; Therapy: (Allan Elvia) to Recorded   5  Dicyclomine HCl - 10 MG Oral Capsule; TAKE 1 CAPSULE EVERY 6 HOURS AS     NEEDED; Therapy: 85DCB5239 to (Evaluate:24Jan2017)  Requested for: 26Oct2016; Last     Rx:26Oct2016 Ordered   6  Diphenoxylate-Atropine 2 5-0 025 MG Oral Tablet; TAKE 1 TABLET 4 TIMES DAILY AS     NEEDED FOR DIARRHEA; Therapy: 69FDB3756 to (Evaluate:39Xpa8852); Last Rx:26Oct2016 Ordered   7  Diphenoxylate-Atropine 2 5-0 025 MG Oral Tablet; TAKE 1 TABLET 4 TIMES DAILY AS     NEEDED FOR DIARRHEA; Therapy: 74Fyf4084 to (Evaluate:60Axs7752); Last Rx:43Snk0784 Ordered   8  Evoxac 30 MG Oral Capsule; TAKE 1 CAPSULE DAILY; Therapy: (Recorded:11Nov2013) to Recorded   9   Imodium A-D 2 MG Oral Tablet; TAKE 2 TABLETS INITIALLY, FOLLOWED BY 1 TABLET     AFTER EACH LOOSE BOWEL MOVEMENT  DO NOT EXCEED 8 TABLETS/DAY; Therapy: 47UQD5868 to (Evaluate:05Nov2016); Last Rx:26Oct2016 Ordered   10  MiraLax Oral Powder; MIX 17 GM IN 8 OUNCES OF LIQUID AND DRINK 1 TO 2 TIMES      DAILY FOR CONSTIPATION; Therapy: 43LRQ3505 to (Evaluate:04Vwv9333); Last Rx:26Oct2016 Ordered   11  Multi Vitamin Daily TABS; TAKE 1 TABLET DAILY; Therapy: (Wesley Roberts) to Recorded   12  OLANZapine 5 MG Oral Tablet; TAKE 1 TABLET DAILY; Therapy: (Wesley Roberts) to Recorded   13  Plaquenil 200 MG Oral Tablet; TAKE 1 TABLET TWICE DAILY; Therapy: (Recorded:17Jun2016) to Recorded   14  Probiotic Daily Oral Capsule; USE AS DIRECTED; Therapy: 84MZY2512 to (Evaluate:25Nov2016); Last Rx:26Oct2016 Ordered   15  Restasis 0 05 % Ophthalmic Emulsion; USE AS DIRECTED; Therapy: (Recorded:17Jun2016) to Recorded   16  ROPINIRole HCl - 0 5 MG Oral Tablet; Take 1 tablet daily; Therapy: (Recorded:17Jun2016) to Recorded   17  Venlafaxine HCl ER 75 MG Oral Capsule Extended Release 24 Hour; TAKE 3 CAPSULES      DAILY; Therapy: (Wesley Roberts) to Recorded   18  Viberzi 75 MG Oral Tablet; Take 1 tablet twice daily; Therapy: 65OHP0413 to (Evaluate:01Mzn0550); Last Rx:26Oct2016 Ordered   19  Vitamin D 1000 UNIT Oral Tablet; TAKE 1 TABLET DAILY; Therapy: (Recorded:17Jun2016) to Recorded   20  Vytorin 10-20 MG Oral Tablet; TAKE 1 TABLET DAILY AS DIRECTED; Therapy: (Recorded:17Jun2016) to Recorded   21  Xanax 0 5 MG Oral Tablet; TAKE 1 TABLET DAILY AS NEEDED; Therapy: (Recorded:17Jun2016) to Recorded   22  Xifaxan 550 MG Oral Tablet; TAKE 1 TABLET 3 times daily; Therapy: 57GLY7276 to (Evaluate:33Yse7178)  Requested for: 82UFG4856; Last      Rx:17Jun2016 Ordered  Medication List Reviewed: The medication list was reviewed and updated today  Allergies   1   No Known Drug Allergies      Reviewed       Vitals   Vital Signs    Recorded: 09Nby4062 03:26PM   Temperature 98 1 F   Heart Rate 61   Respiration 16   Systolic 202   Diastolic 66   Height 5 ft 2 5 in   Weight 170 lb 8 oz   BMI Calculated 30 69   BSA Calculated 1 8   O2 Saturation 97   Pain Scale 0       Reviewed       Physical Exam        Patient is alert and oriented and not in any acute distress     Stable vital signs  No icterus  No oral thrush  No palpable neck mass  Lung fields are clear to percussion and auscultation  Heart rate is regular  Abdomen soft and nontender  No palpable abdominal mass  No ascites  No edema of ankles  No calf tenderness  No focal neurological deficit  No skin rash  No palpable lymphadenopathy  Good arterial pulses  No clubbing  Anxious  Performance status one  ECOG 1       Results/Data   Results Form:    Results      Lab Results See HPI  (1) CBC/PLT/DIFF 59ZRP0139 01:18PM EPIC, Provider   Test ordered by: Tavon Flores      Test Name Result Flag Reference   WBC COUNT 6 70 Thousand/uL  4 31-10 16   RBC COUNT 4 21 Million/uL  3 81-5 12   HEMOGLOBIN 13 4 g/dL  11 5-15 4   HEMATOCRIT 40 5 %  34 8-46  1   MCV 96 fL  82-98   MCH 31 8 pg  26 8-34 3   MCHC 33 1 g/dL  31 4-37 4   RDW 12 5 %  11 6-15 1   MPV 11 1 fL  8 9-12 7   PLATELET COUNT 486 Thousands/uL  149-390   nRBC AUTOMATED 0 /100 WBCs     NEUTROPHILS RELATIVE PERCENT 67 %  43-75   LYMPHOCYTES RELATIVE PERCENT 24 %  14-44   MONOCYTES RELATIVE PERCENT 6 %  4-12   EOSINOPHILS RELATIVE PERCENT 3 %  0-6   BASOPHILS RELATIVE PERCENT 0 %  0-1   NEUTROPHILS ABSOLUTE COUNT 4 53 Thousands/? ??L  1 85-7 62   LYMPHOCYTES ABSOLUTE COUNT 1 58 Thousands/? ??L  0 60-4 47   MONOCYTES ABSOLUTE COUNT 0 38 Thousand/? ??L  0 17-1 22   EOSINOPHILS ABSOLUTE COUNT 0 17 Thousand/? ??L  0 00-0 61   BASOPHILS ABSOLUTE COUNT 0 03 Thousands/? ??L  0 00-0 10   This is a patient instruction: This test is non-fasting   Please drink two glasses of water morning of bloodwork  (1) T4, FREE 88ACH3546 01:18PM EPIC, Provider   Test ordered by: Karishma Peacock      Test Name Result Flag Reference   T4,FREE 0 77 ng/dL  0 76-1 46   Specimen collection should occur prior to Sulfasalazine administration due to the potential for falsely elevated results  (1) COMPREHENSIVE METABOLIC PANEL 34LRF5440 14:96[facility], Provider   Test ordered by: Karishma Peacock      Test Name Result Flag Reference   SODIUM 140 mmol/L  136-145   POTASSIUM 4 5 mmol/L  3 5-5 3   Slightly Hemolyzed; Results May be Affected   CHLORIDE 107 mmol/L  100-108   CARBON DIOXIDE 27 mmol/L  21-32   ANION GAP (CALC) 6 mmol/L  4-13   BLOOD UREA NITROGEN 19 mg/dL  5-25   CREATININE 1 20 mg/dL  0 60-1 30   Standardized to IDMS reference method   CALCIUM 9 4 mg/dL  8 3-10 1   BILI, TOTAL 0 30 mg/dL  0 20-1 00   ALK PHOSPHATAS 116 U/L     ALT (SGPT) 27 U/L  12-78   Specimen collection should occur prior to Sulfasalazine and/or Sulfapyridine administration due to the potential for falsely depressed results  AST(SGOT) 26 U/L  5-45   Slightly Hemolyzed; Results May be Affected     Specimen collection should occur prior to Sulfasalazine administration due to the potential for falsely depressed results  ALBUMIN 3 2 g/dL L 3 5-5 0   TOTAL PROTEIN 7 7 g/dL  6 4-8 2   eGFR 46 ml/min/1 73sq m     [facility] Disease Education Program recommendations are as follows:     GFR calculation is accurate only with a steady state creatinine     Chronic Kidney disease less than 60 ml/min/1 73 sq  meters     Kidney failure less than 15 ml/min/1 73 sq  meters  GLUCOSE FASTING 79 mg/dL  65-99   Specimen collection should occur prior to Sulfasalazine administration due to the potential for falsely depressed results  Specimen collection should occur prior to Sulfapyridine administration due to the potential for falsely elevated results        (1) TSH 60XLG2925 01:18PM EPIC, Provider   Test ordered by: Karishma Peacock      Test Name Result Flag Reference   TSH 1 540 uIU/mL  0 358-3 740   This is a patient instruction: This test is non-fasting  Please drink two glasses of water morning of bloodwork  Patients undergoing fluorescein dye angiography may retain small amounts of fluorescein in the body for 48-72 hours post procedure  Samples containing fluorescein can produce falsely depressed TSH values  If the patient had this procedure,a specimen should be resubmitted post fluorescein clearance  The recommended reference ranges for TSH during pregnancy are as follows:     First trimester 0 1 to 2 5 uIU/mL     Second trimester  0 2 to 3 0 uIU/mL     Third trimester 0 3 to 3 0 uIU/m      (1) T3 TOTAL [id number] 01:18PM EPIC, Provider   Test ordered by: Johnson Holm      Test Name Result Flag Reference   T3 0 80 ng/mL  0 60-1 80      Health Management   Health Maintenance   COLONOSCOPY; every 5 years; Last 62OUP4732; Next Due: 92Jhz5944;  Active    Future Appointments      Date/Time Provider Specialty Site   01/25/2018 01:45 PM Ed MD [facility]     Signatures    Electronically signed by : Shara Everett MD; Dec 24 2017  1:38PM EST                       (Author)

## 2017-12-26 ENCOUNTER — GENERIC CONVERSION - ENCOUNTER (OUTPATIENT)
Dept: HEMATOLOGY ONCOLOGY | Facility: CLINIC | Age: 67
End: 2017-12-26

## 2018-01-09 NOTE — RESULT NOTES
Verified Results  (1) CELIAC DISEASE AB PROFILE [id number] 02:20PM Armando Villatoro Order Number: [id number]     Test Name Result Flag Reference   tTG IGG <2 U/mL  0 - 5   Negative        0 - 5                              Weak Positive   6 - 9                              Positive           >9   tTG IGA <2 U/mL  0 - 3   Negative        0 -  3                              Weak Positive   4 - 10                              Positive           >10 Tissue Transglutaminase (tTG) has been identified as the endomysial antigen  Studies have demonstr- ated that endomysial IgA antibodies have over 99% specificity for gluten sensitive enteropathy     GLIADA 8 units  0 - 19   Negative                   0 - 19                   Weak Positive             20 - 30                   Moderate to Strong Positive   >30   GLIADG 1 units  0 - 19   Negative                   0 - 19                   Weak Positive             20 - 30                   Moderate to Strong Positive   >30   ENDOMYSIAL AB IGA Negative  Negative   Performed at:  [address] [place] [address]  [id number]  : Linda Teran MD, Phone:  [phone]    mg/dL H 02 - 330

## 2018-01-11 NOTE — RESULT NOTES
Message   Please let patient know thyroid blood work was normal and stool test was negative  Thank you  Verified Results  (1) TSH WITH FT4 REFLEX 47Wze6009 02:20PM [employer] Order Number: [id number]   Order Number: [id number]     Test Name Result Flag Reference   TSH 2 110 uIU/mL  0 358-3 740   Patients undergoing fluorescein dye angiography may retain small amounts of fluorescein in the body for 48-72 hours post procedure  Samples containing fluorescein can produce falsely depressed TSH values  If the patient had this procedure,a specimen should be resubmitted post fluorescein clearance            The recommended reference ranges for TSH during pregnancy are as follows:  First trimester 0 1 to 2 5 uIU/mL  Second trimester  0 2 to 3 0 uIU/mL  Third trimester 0 3 to 3 0 uIU/m     (1) OVA AND PARASITES EXAM 81PPL1090 10:31AM [employer] Order Number: [id number]     Test Name Result Flag Reference   O&P CONC  EXAM      SEE WRITTEN REPORT FROM Halle Fernandes

## 2018-01-15 NOTE — RESULT NOTES
Verified Results  (1) TISSUE EXAM [id number] 11:46AM Davie Carson     Test Name Result Flag Reference   LAB AP CASE REPORT (Report)     Surgical Pathology Report             Case: [id number]                   Authorizing Provider: Adelfo Flowers MD       Collected:      02/24/2017 1146        Ordering Location:   [address] [place] [address]   Received:      02/24/2017 [facility]                               Pathologist:      Caterina Martinez DO                               Specimens:  A) - Colon, Random colon bx, r/o microscopic colitis                          B) - Polyp, Colorectal, Cold snare, transverse colon polyp   LAB AP FINAL DIAGNOSIS (Report)     A  Colon, random, biopsy:  - Colonic mucosa with no significant pathologic abnormalities  - No changes of chronic, active or microscopic colitis identified    - No dysplasia identified  B  Colon, transverse polyp, biopsy:  - Tubular adenoma  - Negative for high-grade dysplasia  Interpretation performed at [facility], [place] [address]  Electronically signed by Caterina Martinez DO on 2/28/2017 at 8:49 AM   LAB AP SURGICAL ADDITIONAL INFORMATION (Report)     These tests were developed and their performance characteristics   determined by [facility] or [facility]  They may not be cleared or approved by the [place]  Food and   Drug Administration  The FDA has determined that such clearance or   approval is not necessary  These tests are used for clinical purposes  They should not be regarded as investigational or for research  This   laboratory has been approved by CLIA 88, designated as a high-complexity   laboratory and is qualified to perform these tests  LAB AP GROSS DESCRIPTION (Report)     A   The specimen is received in formalin, labeled with the patient's name   and hospital number, and is designated random colon biopsy, rule out   microscopic colitis, are multiple irregularly shaped fragments of tan   soft tissue measuring 0 8 x 0 8 x 0 1 cm in aggregate  Entirely submitted  One cassette  B  The specimen is received in formalin, labeled with the patient's name   and hospital number, and is designated cold snare, transverse colon   polyp, is a single irregularly shaped fragment of tan soft tissue   measuring 0 3 cm in greatest dimension  Entirely submitted  One cassette  Note: The estimated total formalin fixation time based upon information   provided by the submitting clinician and the standard processing schedule   is 16 75 hours  RLR       Discussion/Summary   Colon biopsies were unremarkable  No evidence of inflammation was seen  There was a benign polyp called a tubular adenoma which is a precancerous polyp  This was removed  We would like to repeat the colonosocpy in 5 years

## 2018-01-15 NOTE — MISCELLANEOUS
Message  GI Reminder Recall [facility]:   Date: 01/18/2017   Dear Amanda Torres:     Review of our records shows you are due for the following: Follow Up Visit  Please call the following office to schedule your appointment:   [address], [address], [address] [facility] [phone]  We look forward to hearing from you!      Sincerely,     [facility]      Signatures   Electronically signed by : Jose Angel Watkins, ; Jan 18 2017 10:08AM EST                       (Author)

## 2018-01-16 ENCOUNTER — TRANSCRIBE ORDERS (OUTPATIENT)
Dept: ADMINISTRATIVE | Age: 68
End: 2018-01-16

## 2018-01-16 ENCOUNTER — APPOINTMENT (OUTPATIENT)
Dept: LAB | Age: 68
End: 2018-01-16
Payer: MEDICARE

## 2018-01-16 DIAGNOSIS — Z79.899 NEED FOR PROPHYLACTIC CHEMOTHERAPY: ICD-10-CM

## 2018-01-16 DIAGNOSIS — E78.5 HYPERLIPIDEMIA, UNSPECIFIED HYPERLIPIDEMIA TYPE: ICD-10-CM

## 2018-01-16 DIAGNOSIS — I25.119 ATHEROSCLEROSIS OF NATIVE CORONARY ARTERY OF NATIVE HEART WITH ANGINA PECTORIS (HCC): ICD-10-CM

## 2018-01-16 DIAGNOSIS — Z79.01 LONG-TERM (CURRENT) USE OF ANTICOAGULANTS: ICD-10-CM

## 2018-01-16 DIAGNOSIS — I25.119 ATHEROSCLEROSIS OF NATIVE CORONARY ARTERY OF NATIVE HEART WITH ANGINA PECTORIS (HCC): Primary | ICD-10-CM

## 2018-01-16 DIAGNOSIS — I10 ESSENTIAL HYPERTENSION, MALIGNANT: ICD-10-CM

## 2018-01-16 DIAGNOSIS — R76.8 OTHER SPECIFIED ABNORMAL IMMUNOLOGICAL FINDINGS IN SERUM: ICD-10-CM

## 2018-01-16 LAB
ALT SERPL W P-5'-P-CCNC: 24 U/L (ref 12–78)
ANION GAP SERPL CALCULATED.3IONS-SCNC: 7 MMOL/L (ref 4–13)
AST SERPL W P-5'-P-CCNC: 22 U/L (ref 5–45)
BUN SERPL-MCNC: 17 MG/DL (ref 5–25)
CALCIUM SERPL-MCNC: 9 MG/DL (ref 8.3–10.1)
CHLORIDE SERPL-SCNC: 108 MMOL/L (ref 100–108)
CHOLEST SERPL-MCNC: 189 MG/DL (ref 50–200)
CO2 SERPL-SCNC: 25 MMOL/L (ref 21–32)
CREAT SERPL-MCNC: 1.06 MG/DL (ref 0.6–1.3)
ERYTHROCYTE [DISTWIDTH] IN BLOOD BY AUTOMATED COUNT: 12.4 % (ref 11.6–15.1)
GFR SERPL CREATININE-BSD FRML MDRD: 54 ML/MIN/1.73SQ M
GLUCOSE P FAST SERPL-MCNC: 81 MG/DL (ref 65–99)
HCT VFR BLD AUTO: 38.4 % (ref 34.8–46.1)
HDLC SERPL-MCNC: 69 MG/DL (ref 40–60)
HGB BLD-MCNC: 12.6 G/DL (ref 11.5–15.4)
LDLC SERPL DIRECT ASSAY-MCNC: 122 MG/DL (ref 0–100)
MCH RBC QN AUTO: 31.6 PG (ref 26.8–34.3)
MCHC RBC AUTO-ENTMCNC: 32.8 G/DL (ref 31.4–37.4)
MCV RBC AUTO: 96 FL (ref 82–98)
PLATELET # BLD AUTO: 227 THOUSANDS/UL (ref 149–390)
PMV BLD AUTO: 10.3 FL (ref 8.9–12.7)
POTASSIUM SERPL-SCNC: 4.2 MMOL/L (ref 3.5–5.3)
RBC # BLD AUTO: 3.99 MILLION/UL (ref 3.81–5.12)
SODIUM SERPL-SCNC: 140 MMOL/L (ref 136–145)
TRIGL SERPL-MCNC: 83 MG/DL
WBC # BLD AUTO: 5.66 THOUSAND/UL (ref 4.31–10.16)

## 2018-01-16 PROCEDURE — 80061 LIPID PANEL: CPT

## 2018-01-16 PROCEDURE — 84450 TRANSFERASE (AST) (SGOT): CPT

## 2018-01-16 PROCEDURE — 85027 COMPLETE CBC AUTOMATED: CPT

## 2018-01-16 PROCEDURE — 84460 ALANINE AMINO (ALT) (SGPT): CPT

## 2018-01-16 PROCEDURE — 83721 ASSAY OF BLOOD LIPOPROTEIN: CPT

## 2018-01-16 PROCEDURE — 84166 PROTEIN E-PHORESIS/URINE/CSF: CPT

## 2018-01-16 PROCEDURE — 36415 COLL VENOUS BLD VENIPUNCTURE: CPT

## 2018-01-16 PROCEDURE — 83883 ASSAY NEPHELOMETRY NOT SPEC: CPT

## 2018-01-16 PROCEDURE — 84165 PROTEIN E-PHORESIS SERUM: CPT

## 2018-01-16 PROCEDURE — 80048 BASIC METABOLIC PNL TOTAL CA: CPT

## 2018-01-16 PROCEDURE — 82232 ASSAY OF BETA-2 PROTEIN: CPT

## 2018-01-17 LAB
ALBUMIN SERPL ELPH-MCNC: 3.64 G/DL (ref 3.5–5)
ALBUMIN SERPL ELPH-MCNC: 53.5 % (ref 52–65)
ALPHA1 GLOB SERPL ELPH-MCNC: 0.29 G/DL (ref 0.1–0.4)
ALPHA1 GLOB SERPL ELPH-MCNC: 4.3 % (ref 2.5–5)
ALPHA2 GLOB SERPL ELPH-MCNC: 0.83 G/DL (ref 0.4–1.2)
ALPHA2 GLOB SERPL ELPH-MCNC: 12.2 % (ref 7–13)
BETA GLOB ABNORMAL SERPL ELPH-MCNC: 0.41 G/DL (ref 0.4–0.8)
BETA1 GLOB SERPL ELPH-MCNC: 6.1 % (ref 5–13)
BETA2 GLOB SERPL ELPH-MCNC: 8 % (ref 2–8)
BETA2+GAMMA GLOB SERPL ELPH-MCNC: 0.54 G/DL (ref 0.2–0.5)
GAMMA GLOB ABNORMAL SERPL ELPH-MCNC: 1.08 G/DL (ref 0.5–1.6)
GAMMA GLOB SERPL ELPH-MCNC: 15.9 % (ref 12–22)
IGG/ALB SER: 1.15 {RATIO} (ref 1.1–1.8)
KAPPA LC FREE SER-MCNC: 33.1 MG/L (ref 3.3–19.4)
KAPPA LC FREE/LAMBDA FREE SER: 1.02 {RATIO} (ref 0.26–1.65)
LAMBDA LC FREE SERPL-MCNC: 32.3 MG/L (ref 5.7–26.3)
PROT PATTERN SERPL ELPH-IMP: ABNORMAL
PROT SERPL-MCNC: 6.8 G/DL (ref 6.4–8.2)

## 2018-01-18 LAB — B2 MICROGLOB SERPL-MCNC: 2.4 MG/L (ref 0.6–2.4)

## 2018-01-19 LAB
ALBUMIN UR ELPH-MCNC: 29.9 %
ALPHA1 GLOB MFR UR ELPH: 15.7 %
ALPHA2 GLOB MFR UR ELPH: 15.6 %
B-GLOBULIN MFR UR ELPH: 14.1 %
GAMMA GLOB MFR UR ELPH: 24.7 %
PROT PATTERN UR ELPH-IMP: NORMAL
PROT UR-MCNC: 6 MG/DL

## 2018-01-23 VITALS
WEIGHT: 170.5 LBS | DIASTOLIC BLOOD PRESSURE: 66 MMHG | HEART RATE: 61 BPM | OXYGEN SATURATION: 97 % | TEMPERATURE: 98.1 F | BODY MASS INDEX: 30.21 KG/M2 | RESPIRATION RATE: 16 BRPM | SYSTOLIC BLOOD PRESSURE: 112 MMHG | HEIGHT: 63 IN

## 2018-01-23 LAB
KAPPA LC UR-MCNC: 15 MG/L (ref 1.35–24.19)
KAPPA LC/LAMBDA UR: 13.89 {RATIO} (ref 2.04–10.37)
LAMBDA LC UR-MCNC: 1.08 MG/L (ref 0.24–6.66)

## 2018-01-25 ENCOUNTER — OFFICE VISIT (OUTPATIENT)
Dept: HEMATOLOGY ONCOLOGY | Facility: CLINIC | Age: 68
End: 2018-01-25
Payer: MEDICARE

## 2018-01-25 VITALS
BODY MASS INDEX: 31.73 KG/M2 | SYSTOLIC BLOOD PRESSURE: 116 MMHG | DIASTOLIC BLOOD PRESSURE: 72 MMHG | WEIGHT: 172.4 LBS | RESPIRATION RATE: 16 BRPM | HEIGHT: 62 IN | OXYGEN SATURATION: 97 % | TEMPERATURE: 96.2 F | HEART RATE: 54 BPM

## 2018-01-25 DIAGNOSIS — Z86.2 HISTORY OF ITP: ICD-10-CM

## 2018-01-25 DIAGNOSIS — Z85.3 PERSONAL HISTORY OF MALIGNANT NEOPLASM OF BREAST: ICD-10-CM

## 2018-01-25 DIAGNOSIS — D89.89 LIGHT CHAIN DISEASE (HCC): Primary | ICD-10-CM

## 2018-01-25 PROBLEM — D80.8 LIGHT CHAIN DISEASE (HCC): Status: ACTIVE | Noted: 2018-01-25

## 2018-01-25 PROCEDURE — 99214 OFFICE O/P EST MOD 30 MIN: CPT | Performed by: INTERNAL MEDICINE

## 2018-01-25 NOTE — PATIENT INSTRUCTIONS
Please keep yourself hydrated  Advised self-breast examination, eating healthy foods and exercises as tolerated  Please get blood and urine tests 2 weeks prior to your next visit

## 2018-01-25 NOTE — PROGRESS NOTES
HPI:   Mercy Yoder is a 69 y o  female with   Abnormal free light chain ratio, kappa lambda ratio in the urine but not in blood and no monoclonal spike on serum protein electrophoresis   and urine protein electrophoresis     Patient is under surveillance  In 1999 patient was diagnosed to have hormone receptor negative stage I left breast cancer  We were not checking HER-2 at that time  Patient had left breast mastectomy and 4 cycles of adjuvant Adriamycin/Cytoxan followed by reconstruction surgery and mammoplasty of the right breast   She has remained in remission from breast cancer  Lajean Cassette History of ITP in remission     Patient has other medical problems like polymyalgia rheumatica, restless leg syndrome, pacemaker ,   Guillain-[place] syndrome, Sjogren's syndrome and osteoporosis  She follows with her primary physician, endocrinologist, breast surgeon and rheumatologist  Patient's endocrinologist noticed abnormal light chains, kappa light chain 42 25 and lambda light chain 32 32  Normal free light chain ratio  Increased microalbumin in the urine, 141  's endocrinologist referred her back to our office for abnormal light chains     She feels tired  She states she sleeps 12 hour at night and still feels tired     She has arthritic symptoms especially in her hips   Meds/Allergies   Reviewed    ROS:  01/24/18 Reviewed 14 systems:  Presently no headaches, seizures, dizziness, diplopia, dysphagia, hoarseness, chest pain, palpitations, shortness of breath, cough, hemoptysis, abdominal pain, nausea, vomiting, change in bowel habits, melena, hematuria, fever, chills, bleeding, bone pains, skin rash, weight loss,  numbness,  weakness, claudication and gait problem  No frequent infections  No hot flashes  Not unusually sensitive to heat or cold  Patient is anxious     No GYN symptoms      Physical Exam:  Alert, oriented, not in distress, no icterus, no oral thrush, no palpable neck mass, clear lung foods, regular heart rate,   reconstructed left breast, no palpable mass in right breast, no lymphedema, abdomen  soft and non tender, no palpable abdominal mass, no ascites, no edema of ankles, no calf tenderness, no focal neurological deficit, no skin rash, no palpable lymphadenopathy, good arterial pulses, no clubbing  Patient is anxious  Performance status 1  IMAGING:   Not applicable  No orders to display       Lab Results  Reviewed with patient  Recent Results (from the past 672 hour(s))   Beta 2 microglobuline, serum    Collection Time: 01/16/18 12:30 PM   Result Value Ref Range    Beta-2 Microglobulin 2 4 0 6 - 2 4 mg/L   Immunoglobulin free LT chains blood    Collection Time: 01/16/18 12:30 PM   Result Value Ref Range    Ig Kappa Free Light Chain 33 1 (H) 3 3 - 19 4 mg/L    Ig Lambda Free Light Chain 32 3 (H) 5 7 - 26 3 mg/L    Kappa/Lambda FluidC Ratio 1 02 0 26 - 1 65   Protein electrophoresis, urine    Collection Time: 01/16/18 12:30 PM   Result Value Ref Range    Urine Protein 6 0 2 0 - 17 5 mg/dL    Albumin ELP, Urine 29 9 %    Alpha 1, Urine 15 7 %    Alpha 2, Urine 15 6 %    Beta, Urine 14 1 %    Gamma Globulin, Urine 24 7 %    UPEP Interp       The urine total protein and electrophoresis are within normal limits  No monoclonal bands noted  Reviewed by Ollie Leiva, ([id number])  **Electronic Signature**   Protein electrophoresis, serum    Collection Time: 01/16/18 12:30 PM   Result Value Ref Range    A/G Ratio 1 15 1 10 - 1 80    Albumin Electrophoresis 53 5 52 0 - 65 0 %    Albumin CONC 3 64 3 50 - 5 00 g/dl    Alpha 1 4 3 2 5 - 5 0 %    ALPHA 1 CONC 0 29 0 10 - 0 40 g/dL    Alpha 2 12 2 7 0 - 13 0 %    ALPHA 2 CONC 0 83 0 40 - 1 20 g/dL    Beta-1 6 1 5 0 - 13 0 %    BETA 1 CONC 0 41 0 40 - 0 80 g/dL    Beta-2 8 0 2 0 - 8 0 %    BETA 2 CONC 0 54 (H) 0 20 - 0 50 g/dL    Gamma Globulin 15 9 12 0 - 22 0 %    GAMMA CONC 1 08 0 50 - 1 60 g/dL    SPEP Interpretation       The serum total protein and albumin are within normal limits  The serum protein electrophoresis shows an increased beta-2 region  No monoclonal bands noted  Reviewed by: Parish Smallwood MD ([id number]) **Electronic Signature**    Total Protein 6 8 6 4 - 8 2 g/dL   Basic metabolic panel    Collection Time: 01/16/18 12:30 PM   Result Value Ref Range    Sodium 140 136 - 145 mmol/L    Potassium 4 2 3 5 - 5 3 mmol/L    Chloride 108 100 - 108 mmol/L    CO2 25 21 - 32 mmol/L    Anion Gap 7 4 - 13 mmol/L    BUN 17 5 - 25 mg/dL    Creatinine 1 06 0 60 - 1 30 mg/dL    Glucose, Fasting 81 65 - 99 mg/dL    Calcium 9 0 8 3 - 10 1 mg/dL    eGFR 54 ml/min/1 73sq m   Cholesterol, total    Collection Time: 01/16/18 12:30 PM   Result Value Ref Range    Cholesterol 189 50 - 200 mg/dL   LDL cholesterol, direct    Collection Time: 01/16/18 12:30 PM   Result Value Ref Range    LDL Direct 122 (H) 0 - 100 mg/dl   HDL cholesterol    Collection Time: 01/16/18 12:30 PM   Result Value Ref Range    HDL, Direct 69 (H) 40 - 60 mg/dL   ALT    Collection Time: 01/16/18 12:30 PM   Result Value Ref Range    ALT 24 12 - 78 U/L   AST    Collection Time: 01/16/18 12:30 PM   Result Value Ref Range    AST 22 5 - 45 U/L   CBC and Platelet    Collection Time: 01/16/18 12:30 PM   Result Value Ref Range    WBC 5 66 4 31 - 10 16 Thousand/uL    RBC 3 99 3 81 - 5 12 Million/uL    Hemoglobin 12 6 11 5 - 15 4 g/dL    Hematocrit 38 4 34 8 - 46 1 %    MCV 96 82 - 98 fL    MCH 31 6 26 8 - 34 3 pg    MCHC 32 8 31 4 - 37 4 g/dL    RDW 12 4 11 6 - 15 1 %    Platelets 428 996 - 768 Thousands/uL    MPV 10 3 8 9 - 12 7 fL   Triglycerides    Collection Time: 01/16/18 12:30 PM   Result Value Ref Range    Triglycerides 83 <=150 mg/dL   Kappa / lambda light chains, urine, 24 hour    Collection Time: 01/16/18  1:02 PM   Result Value Ref Range    Free [place] Lt Chains,Ur 15 00 1 35 - 24 19 mg/L    Free Lambda Lt Chains,Ur 1 08 0 24 - 6 66 mg/L    Kappa/Lambda Ratio,U 13 89 (H) 2 04 - 10 37         Assessment and plan:  Vandana Hawley is a 69 y o  female with  Abnormal free right chain ratio in the urine and no other parameters of a gammopathy  She remains under surveillance for this problem  Previous history of breast cancer as above and she has been in remission  She follows with her breast surgeon and goes for mammography on regular basis  Goal is cure from breast cancer and monitoring of free light chain ratio in the urine and renal functions  Discussed with patient and explained  Questions answered  Also discussed the importance of self-breast examination, eating healthy foods and exercises as tolerated  Patient voiced understanding and agreement in the discussion  He/She is aware to contact us with questions/symptoms in the interim  Counseling / Coordination of Care  Total floor / unit time spent today 30 minutes  Greater than 50% of total time was spent with the patient and / or family counseling and / or coordination of care

## 2018-01-25 NOTE — LETTER
January 25, 2018     Nasir Duarte MD  [place] [id number] [address] [facility]    Patient: Vivienne Leija   YOB: 1950   Date of Visit: 1/25/2018       Dear Dr Shira Norton: Thank you for referring Vivienne Leija to me for evaluation  Below are my notes for this consultation  If you have questions, please do not hesitate to call me  I look forward to following your patient along with you  Sincerely,        Jeannette Cha MD        CC: No Recipients  Jeannette Cha MD  1/24/2018 10:05 PM  Sign at close encounter    HPI:   Vivienne Leija is a 69 y o  female with Patient is 69 year of age  In 1999 patient was diagnosed to have hormone receptor negative stage I left breast cancer  We were not checking HER-2 at that time  Patient had left breast mastectomy and 4 cycles of adjuvant Adriamycin/Cytoxan followed by reconstruction surgery and mammoplasty of the right breast   She has remained in remission from breast cancer  of ITP in remission  medical problems like polymyalgia rheumatica, restless leg syndrome, pacemaker ,   Guillain-[place] syndrome, Sjogren's syndrome and osteoporosis  She follows with her primary physician, endocrinologist, breast surgeon and rheumatologist  Patient's endocrinologist noticed abnormal light chains, kappa light chain 42 25 and lambda light chain 32 32  Normal free light chain ratio  Increased microalbumin in the urine, 141  's endocrinologist referred her back to our office for abnormal light chains  was being followed by our group member Dr Bashir Hill previously  feels tired  She states she sleeps 12 hour at night and still feels tired         Meds/Allergies     ROS:  01/24/18 Reviewed 13 systems:  Presently no headaches, seizures, dizziness, diplopia, dysphagia, hoarseness, chest pain, palpitations, shortness of breath, cough, hemoptysis, abdominal pain, nausea, vomiting, change in bowel habits, melena, hematuria, fever, chills, bleeding, bone pains, skin rash, weight loss, arthritic symptoms, numbness, tiredness , weakness, claudication and gait problem  No frequent infections  No hot flashes  Not unusually sensitive to heat or cold  Patient is anxious  ***    Physical Exam:  There were no vitals filed for this visit  Alert, oriented, not in distress, no icterus, no oral thrush, no palpable neck mass, clear lung foods, regular heart rate, abdomen  soft and non tender, no palpable abdominal mass, no ascites, no edema of ankles, no calf tenderness, no focal neurological deficit, no skin rash, no palpable lymphadenopathy, good arterial pulses, no clubbing  Patient is anxious  Performance status 1  IMAGING:  No orders to display       Lab Results  Recent Results (from the past 672 hour(s))   Beta 2 microglobuline, serum    Collection Time: 01/16/18 12:30 PM   Result Value Ref Range    Beta-2 Microglobulin 2 4 0 6 - 2 4 mg/L   Immunoglobulin free LT chains blood    Collection Time: 01/16/18 12:30 PM   Result Value Ref Range    Ig Kappa Free Light Chain 33 1 (H) 3 3 - 19 4 mg/L    Ig Lambda Free Light Chain 32 3 (H) 5 7 - 26 3 mg/L    Kappa/Lambda FluidC Ratio 1 02 0 26 - 1 65   Protein electrophoresis, urine    Collection Time: 01/16/18 12:30 PM   Result Value Ref Range    Urine Protein 6 0 2 0 - 17 5 mg/dL    Albumin ELP, Urine 29 9 %    Alpha 1, Urine 15 7 %    Alpha 2, Urine 15 6 %    Beta, Urine 14 1 %    Gamma Globulin, Urine 24 7 %    UPEP Interp       The urine total protein and electrophoresis are within normal limits  No monoclonal bands noted  Reviewed by Mary Kay Leiva, ([id number])  **Electronic Signature**   Protein electrophoresis, serum    Collection Time: 01/16/18 12:30 PM   Result Value Ref Range    A/G Ratio 1 15 1 10 - 1 80    Albumin Electrophoresis 53 5 52 0 - 65 0 %    Albumin CONC 3 64 3 50 - 5 00 g/dl    Alpha 1 4 3 2 5 - 5 0 %    ALPHA 1 CONC 0 29 0 10 - 0 40 g/dL    Alpha 2 12 2 7 0 - 13 0 %    ALPHA 2 CONC 0 83 0 40 - 1 20 g/dL    Beta-1 6 1 5 0 - 13 0 %    BETA 1 CONC 0 41 0 40 - 0 80 g/dL    Beta-2 8 0 2 0 - 8 0 %    BETA 2 CONC 0 54 (H) 0 20 - 0 50 g/dL    Gamma Globulin 15 9 12 0 - 22 0 %    GAMMA CONC 1 08 0 50 - 1 60 g/dL    SPEP Interpretation       The serum total protein and albumin are within normal limits  The serum protein electrophoresis shows an increased beta-2 region  No monoclonal bands noted  Reviewed by: Bernice Higuera MD ([id number]) **Electronic Signature**    Total Protein 6 8 6 4 - 8 2 g/dL   Basic metabolic panel    Collection Time: 01/16/18 12:30 PM   Result Value Ref Range    Sodium 140 136 - 145 mmol/L    Potassium 4 2 3 5 - 5 3 mmol/L    Chloride 108 100 - 108 mmol/L    CO2 25 21 - 32 mmol/L    Anion Gap 7 4 - 13 mmol/L    BUN 17 5 - 25 mg/dL    Creatinine 1 06 0 60 - 1 30 mg/dL    Glucose, Fasting 81 65 - 99 mg/dL    Calcium 9 0 8 3 - 10 1 mg/dL    eGFR 54 ml/min/1 73sq m   Cholesterol, total    Collection Time: 01/16/18 12:30 PM   Result Value Ref Range    Cholesterol 189 50 - 200 mg/dL   LDL cholesterol, direct    Collection Time: 01/16/18 12:30 PM   Result Value Ref Range    LDL Direct 122 (H) 0 - 100 mg/dl   HDL cholesterol    Collection Time: 01/16/18 12:30 PM   Result Value Ref Range    HDL, Direct 69 (H) 40 - 60 mg/dL   ALT    Collection Time: 01/16/18 12:30 PM   Result Value Ref Range    ALT 24 12 - 78 U/L   AST    Collection Time: 01/16/18 12:30 PM   Result Value Ref Range    AST 22 5 - 45 U/L   CBC and Platelet    Collection Time: 01/16/18 12:30 PM   Result Value Ref Range    WBC 5 66 4 31 - 10 16 Thousand/uL    RBC 3 99 3 81 - 5 12 Million/uL    Hemoglobin 12 6 11 5 - 15 4 g/dL    Hematocrit 38 4 34 8 - 46 1 %    MCV 96 82 - 98 fL    MCH 31 6 26 8 - 34 3 pg    MCHC 32 8 31 4 - 37 4 g/dL    RDW 12 4 11 6 - 15 1 %    Platelets 587 959 - 305 Thousands/uL    MPV 10 3 8 9 - 12 7 fL   Triglycerides    Collection Time: 01/16/18 12:30 PM   Result Value Ref Range    Triglycerides 83 <=150 mg/dL   Kappa / lambda light chains, urine, 24 hour Collection Time: 01/16/18  1:02 PM   Result Value Ref Range    Free [place] Lt Chains,Ur 15 00 1 35 - 24 19 mg/L    Free Lambda Lt Chains,Ur 1 08 0 24 - 6 66 mg/L    Kappa/Lambda Ratio,U 13 89 (H) 2 04 - 10 37         Assessment and plan:  Heladio Johnson is a 69 y o  female with ***          Patient voiced understanding and agreement in the discussion  He/She is aware to contact us with questions/symptoms in the interim  [unfilled]      Counseling / Coordination of Care  Total floor / unit time spent today *** minutes  Greater than 50% of total time was spent with the patient and / or family counseling and / or coordination of care

## 2018-01-25 NOTE — LETTER
January 25, 2018     Kristal Paniagua MD  [place] [id number] [address] [facility]    Patient: Nyla Brock   YOB: 1950   Date of Visit: 1/25/2018       Dear Dr Masoud Marroquin: Thank you for referring Nyla Brock to me for evaluation  Below are my notes for this consultation  If you have questions, please do not hesitate to call me  I look forward to following your patient along with you  Sincerely,        Gena Treviño MD        CC: No Recipients  Gena Treviño MD  1/25/2018  7:12 PM  Sign at close encounter    HPI:   Nyla Brock is a 69 y o  female with   Abnormal free light chain ratio, kappa lambda ratio in the urine but not in blood and no monoclonal spike on serum protein electrophoresis   and urine protein electrophoresis     Patient is under surveillance  In 1999 patient was diagnosed to have hormone receptor negative stage I left breast cancer  We were not checking HER-2 at that time  Patient had left breast mastectomy and 4 cycles of adjuvant Adriamycin/Cytoxan followed by reconstruction surgery and mammoplasty of the right breast   She has remained in remission from breast cancer  Vanessa Leal History of ITP in remission     Patient has other medical problems like polymyalgia rheumatica, restless leg syndrome, pacemaker ,   Guillain-[place] syndrome, Sjogren's syndrome and osteoporosis  She follows with her primary physician, endocrinologist, breast surgeon and rheumatologist  Patient's endocrinologist noticed abnormal light chains, kappa light chain 42 25 and lambda light chain 32 32  Normal free light chain ratio  Increased microalbumin in the urine, 141  's endocrinologist referred her back to our office for abnormal light chains     She feels tired  She states she sleeps 12 hour at night and still feels tired     She has arthritic symptoms especially in her hips   Meds/Allergies     Reviewed    ROS:  01/24/18 Reviewed 14 systems:  Presently no headaches, seizures, dizziness, diplopia, dysphagia, hoarseness, chest pain, palpitations, shortness of breath, cough, hemoptysis, abdominal pain, nausea, vomiting, change in bowel habits, melena, hematuria, fever, chills, bleeding, bone pains, skin rash, weight loss,  numbness,  weakness, claudication and gait problem  No frequent infections  No hot flashes  Not unusually sensitive to heat or cold  Patient is anxious     No GYN symptoms  Physical Exam:  Alert, oriented, not in distress, no icterus, no oral thrush, no palpable neck mass, clear lung foods, regular heart rate,   reconstructed left breast, no palpable mass in right breast, no lymphedema, abdomen  soft and non tender, no palpable abdominal mass, no ascites, no edema of ankles, no calf tenderness, no focal neurological deficit, no skin rash, no palpable lymphadenopathy, good arterial pulses, no clubbing  Patient is anxious  Performance status 1  IMAGING:   Not applicable  No orders to display       Lab Results  Reviewed with patient  Recent Results (from the past 672 hour(s))   Beta 2 microglobuline, serum    Collection Time: 01/16/18 12:30 PM   Result Value Ref Range    Beta-2 Microglobulin 2 4 0 6 - 2 4 mg/L   Immunoglobulin free LT chains blood    Collection Time: 01/16/18 12:30 PM   Result Value Ref Range    Ig Kappa Free Light Chain 33 1 (H) 3 3 - 19 4 mg/L    Ig Lambda Free Light Chain 32 3 (H) 5 7 - 26 3 mg/L    Kappa/Lambda FluidC Ratio 1 02 0 26 - 1 65   Protein electrophoresis, urine    Collection Time: 01/16/18 12:30 PM   Result Value Ref Range    Urine Protein 6 0 2 0 - 17 5 mg/dL    Albumin ELP, Urine 29 9 %    Alpha 1, Urine 15 7 %    Alpha 2, Urine 15 6 %    Beta, Urine 14 1 %    Gamma Globulin, Urine 24 7 %    UPEP Interp       The urine total protein and electrophoresis are within normal limits  No monoclonal bands noted  Reviewed by Emerson Leiva, ([id number])  **Electronic Signature**   Protein electrophoresis, serum    Collection Time: 01/16/18 12:30 PM Result Value Ref Range    A/G Ratio 1 15 1 10 - 1 80    Albumin Electrophoresis 53 5 52 0 - 65 0 %    Albumin CONC 3 64 3 50 - 5 00 g/dl    Alpha 1 4 3 2 5 - 5 0 %    ALPHA 1 CONC 0 29 0 10 - 0 40 g/dL    Alpha 2 12 2 7 0 - 13 0 %    ALPHA 2 CONC 0 83 0 40 - 1 20 g/dL    Beta-1 6 1 5 0 - 13 0 %    BETA 1 CONC 0 41 0 40 - 0 80 g/dL    Beta-2 8 0 2 0 - 8 0 %    BETA 2 CONC 0 54 (H) 0 20 - 0 50 g/dL    Gamma Globulin 15 9 12 0 - 22 0 %    GAMMA CONC 1 08 0 50 - 1 60 g/dL    SPEP Interpretation       The serum total protein and albumin are within normal limits  The serum protein electrophoresis shows an increased beta-2 region  No monoclonal bands noted  Reviewed by: Dina Joyner MD ([id number]) **Electronic Signature**    Total Protein 6 8 6 4 - 8 2 g/dL   Basic metabolic panel    Collection Time: 01/16/18 12:30 PM   Result Value Ref Range    Sodium 140 136 - 145 mmol/L    Potassium 4 2 3 5 - 5 3 mmol/L    Chloride 108 100 - 108 mmol/L    CO2 25 21 - 32 mmol/L    Anion Gap 7 4 - 13 mmol/L    BUN 17 5 - 25 mg/dL    Creatinine 1 06 0 60 - 1 30 mg/dL    Glucose, Fasting 81 65 - 99 mg/dL    Calcium 9 0 8 3 - 10 1 mg/dL    eGFR 54 ml/min/1 73sq m   Cholesterol, total    Collection Time: 01/16/18 12:30 PM   Result Value Ref Range    Cholesterol 189 50 - 200 mg/dL   LDL cholesterol, direct    Collection Time: 01/16/18 12:30 PM   Result Value Ref Range    LDL Direct 122 (H) 0 - 100 mg/dl   HDL cholesterol    Collection Time: 01/16/18 12:30 PM   Result Value Ref Range    HDL, Direct 69 (H) 40 - 60 mg/dL   ALT    Collection Time: 01/16/18 12:30 PM   Result Value Ref Range    ALT 24 12 - 78 U/L   AST    Collection Time: 01/16/18 12:30 PM   Result Value Ref Range    AST 22 5 - 45 U/L   CBC and Platelet    Collection Time: 01/16/18 12:30 PM   Result Value Ref Range    WBC 5 66 4 31 - 10 16 Thousand/uL    RBC 3 99 3 81 - 5 12 Million/uL    Hemoglobin 12 6 11 5 - 15 4 g/dL    Hematocrit 38 4 34 8 - 46 1 %    MCV 96 82 - 98 fL MCH 31 6 26 8 - 34 3 pg    MCHC 32 8 31 4 - 37 4 g/dL    RDW 12 4 11 6 - 15 1 %    Platelets 718 691 - 469 Thousands/uL    MPV 10 3 8 9 - 12 7 fL   Triglycerides    Collection Time: 01/16/18 12:30 PM   Result Value Ref Range    Triglycerides 83 <=150 mg/dL   Kappa / lambda light chains, urine, 24 hour    Collection Time: 01/16/18  1:02 PM   Result Value Ref Range    Free [place] Lt Chains,Ur 15 00 1 35 - 24 19 mg/L    Free Lambda Lt Chains,Ur 1 08 0 24 - 6 66 mg/L    Kappa/Lambda Ratio,U 13 89 (H) 2 04 - 10 37         Assessment and plan:  Estelle Esquivel is a 69 y o  female with  Abnormal free right chain ratio in the urine and no other parameters of a gammopathy  She remains under surveillance for this problem  Previous history of breast cancer as above and she has been in remission  She follows with her breast surgeon and goes for mammography on regular basis  Goal is cure from breast cancer and monitoring of free light chain ratio in the urine and renal functions  Discussed with patient and explained  Questions answered  Also discussed the importance of self-breast examination, eating healthy foods and exercises as tolerated  Patient voiced understanding and agreement in the discussion  He/She is aware to contact us with questions/symptoms in the interim  Counseling / Coordination of Care  Total floor / unit time spent today 30 minutes  Greater than 50% of total time was spent with the patient and / or family counseling and / or coordination of care

## 2018-02-05 ENCOUNTER — HOSPITAL ENCOUNTER (OUTPATIENT)
Dept: INFUSION CENTER | Facility: CLINIC | Age: 68
Discharge: HOME | End: 2018-02-05
Payer: MEDICARE

## 2018-02-05 PROCEDURE — 96401 CHEMO ANTI-NEOPL SQ/IM: CPT

## 2018-02-05 RX ADMIN — DENOSUMAB 60 MG: 60 INJECTION SUBCUTANEOUS at 11:14

## 2018-05-08 ENCOUNTER — TRANSCRIBE ORDERS (OUTPATIENT)
Dept: ADMINISTRATIVE | Age: 68
End: 2018-05-08

## 2018-05-08 ENCOUNTER — APPOINTMENT (OUTPATIENT)
Dept: LAB | Age: 68
End: 2018-05-08
Payer: MEDICARE

## 2018-05-08 DIAGNOSIS — M81.8 IDIOPATHIC OSTEOPOROSIS: ICD-10-CM

## 2018-05-08 DIAGNOSIS — E21.0 PRIMARY HYPERPARATHYROIDISM (HCC): ICD-10-CM

## 2018-05-08 DIAGNOSIS — E04.1 NONTOXIC UNINODULAR GOITER: ICD-10-CM

## 2018-05-08 DIAGNOSIS — D89.89 LIGHT CHAIN DISEASE (HCC): ICD-10-CM

## 2018-05-08 DIAGNOSIS — E83.50 DISORDER OF CALCIUM METABOLISM: ICD-10-CM

## 2018-05-08 DIAGNOSIS — E83.50 DISORDER OF CALCIUM METABOLISM: Primary | ICD-10-CM

## 2018-05-08 LAB
ALBUMIN SERPL BCP-MCNC: 3.4 G/DL (ref 3.5–5)
ALP SERPL-CCNC: 114 U/L (ref 46–116)
ALT SERPL W P-5'-P-CCNC: 27 U/L (ref 12–78)
ANION GAP SERPL CALCULATED.3IONS-SCNC: 9 MMOL/L (ref 4–13)
AST SERPL W P-5'-P-CCNC: 25 U/L (ref 5–45)
BASOPHILS # BLD AUTO: 0.02 THOUSANDS/ΜL (ref 0–0.1)
BASOPHILS NFR BLD AUTO: 0 % (ref 0–1)
BILIRUB SERPL-MCNC: 0.53 MG/DL (ref 0.2–1)
BUN SERPL-MCNC: 17 MG/DL (ref 5–25)
CALCIUM SERPL-MCNC: 9.2 MG/DL (ref 8.3–10.1)
CHLORIDE SERPL-SCNC: 103 MMOL/L (ref 100–108)
CO2 SERPL-SCNC: 25 MMOL/L (ref 21–32)
CREAT SERPL-MCNC: 1.12 MG/DL (ref 0.6–1.3)
EOSINOPHIL # BLD AUTO: 0.17 THOUSAND/ΜL (ref 0–0.61)
EOSINOPHIL NFR BLD AUTO: 3 % (ref 0–6)
ERYTHROCYTE [DISTWIDTH] IN BLOOD BY AUTOMATED COUNT: 12.5 % (ref 11.6–15.1)
ERYTHROCYTE [SEDIMENTATION RATE] IN BLOOD: 29 MM/HOUR (ref 0–20)
GFR SERPL CREATININE-BSD FRML MDRD: 50 ML/MIN/1.73SQ M
GLUCOSE P FAST SERPL-MCNC: 85 MG/DL (ref 65–99)
HCT VFR BLD AUTO: 42 % (ref 34.8–46.1)
HGB BLD-MCNC: 13.6 G/DL (ref 11.5–15.4)
LDH SERPL-CCNC: 187 U/L (ref 81–234)
LYMPHOCYTES # BLD AUTO: 1.78 THOUSANDS/ΜL (ref 0.6–4.47)
LYMPHOCYTES NFR BLD AUTO: 28 % (ref 14–44)
MAGNESIUM SERPL-MCNC: 2.1 MG/DL (ref 1.6–2.6)
MCH RBC QN AUTO: 31.6 PG (ref 26.8–34.3)
MCHC RBC AUTO-ENTMCNC: 32.4 G/DL (ref 31.4–37.4)
MCV RBC AUTO: 98 FL (ref 82–98)
MONOCYTES # BLD AUTO: 0.39 THOUSAND/ΜL (ref 0.17–1.22)
MONOCYTES NFR BLD AUTO: 6 % (ref 4–12)
NEUTROPHILS # BLD AUTO: 4.09 THOUSANDS/ΜL (ref 1.85–7.62)
NEUTS SEG NFR BLD AUTO: 63 % (ref 43–75)
NRBC BLD AUTO-RTO: 0 /100 WBCS
PHOSPHATE SERPL-MCNC: 3.4 MG/DL (ref 2.3–4.1)
PLATELET # BLD AUTO: 233 THOUSANDS/UL (ref 149–390)
PMV BLD AUTO: 10.7 FL (ref 8.9–12.7)
POTASSIUM SERPL-SCNC: 4.4 MMOL/L (ref 3.5–5.3)
PROT SERPL-MCNC: 7.6 G/DL (ref 6.4–8.2)
RBC # BLD AUTO: 4.3 MILLION/UL (ref 3.81–5.12)
SODIUM SERPL-SCNC: 137 MMOL/L (ref 136–145)
WBC # BLD AUTO: 6.46 THOUSAND/UL (ref 4.31–10.16)

## 2018-05-08 PROCEDURE — 84166 PROTEIN E-PHORESIS/URINE/CSF: CPT | Performed by: INTERNAL MEDICINE

## 2018-05-08 PROCEDURE — 36415 COLL VENOUS BLD VENIPUNCTURE: CPT

## 2018-05-08 PROCEDURE — 84165 PROTEIN E-PHORESIS SERUM: CPT | Performed by: PATHOLOGY

## 2018-05-08 PROCEDURE — 84100 ASSAY OF PHOSPHORUS: CPT

## 2018-05-08 PROCEDURE — 82232 ASSAY OF BETA-2 PROTEIN: CPT

## 2018-05-08 PROCEDURE — 83835 ASSAY OF METANEPHRINES: CPT

## 2018-05-08 PROCEDURE — 85652 RBC SED RATE AUTOMATED: CPT

## 2018-05-08 PROCEDURE — 85025 COMPLETE CBC W/AUTO DIFF WBC: CPT

## 2018-05-08 PROCEDURE — 83615 LACTATE (LD) (LDH) ENZYME: CPT

## 2018-05-08 PROCEDURE — 83735 ASSAY OF MAGNESIUM: CPT

## 2018-05-08 PROCEDURE — 80053 COMPREHEN METABOLIC PANEL: CPT

## 2018-05-08 PROCEDURE — 83883 ASSAY NEPHELOMETRY NOT SPEC: CPT

## 2018-05-08 PROCEDURE — 84166 PROTEIN E-PHORESIS/URINE/CSF: CPT | Performed by: PATHOLOGY

## 2018-05-08 PROCEDURE — 84165 PROTEIN E-PHORESIS SERUM: CPT

## 2018-05-09 LAB
ALBUMIN SERPL ELPH-MCNC: 3.79 G/DL (ref 3.5–5)
ALBUMIN SERPL ELPH-MCNC: 52.7 % (ref 52–65)
ALPHA1 GLOB SERPL ELPH-MCNC: 0.32 G/DL (ref 0.1–0.4)
ALPHA1 GLOB SERPL ELPH-MCNC: 4.5 % (ref 2.5–5)
ALPHA2 GLOB SERPL ELPH-MCNC: 0.87 G/DL (ref 0.4–1.2)
ALPHA2 GLOB SERPL ELPH-MCNC: 12.1 % (ref 7–13)
B2 MICROGLOB SERPL-MCNC: 2.6 MG/L (ref 0.6–2.4)
BETA GLOB ABNORMAL SERPL ELPH-MCNC: 0.45 G/DL (ref 0.4–0.8)
BETA1 GLOB SERPL ELPH-MCNC: 6.3 % (ref 5–13)
BETA2 GLOB SERPL ELPH-MCNC: 7.9 % (ref 2–8)
BETA2+GAMMA GLOB SERPL ELPH-MCNC: 0.57 G/DL (ref 0.2–0.5)
GAMMA GLOB ABNORMAL SERPL ELPH-MCNC: 1.19 G/DL (ref 0.5–1.6)
GAMMA GLOB SERPL ELPH-MCNC: 16.5 % (ref 12–22)
IGG/ALB SER: 1.11 {RATIO} (ref 1.1–1.8)
KAPPA LC FREE SER-MCNC: 33.6 MG/L (ref 3.3–19.4)
KAPPA LC FREE/LAMBDA FREE SER: 1.11 {RATIO} (ref 0.26–1.65)
LAMBDA LC FREE SERPL-MCNC: 30.4 MG/L (ref 5.7–26.3)
PROT PATTERN SERPL ELPH-IMP: ABNORMAL
PROT SERPL-MCNC: 7.2 G/DL (ref 6.4–8.2)

## 2018-05-11 ENCOUNTER — APPOINTMENT (OUTPATIENT)
Dept: LAB | Age: 68
End: 2018-05-11
Payer: MEDICARE

## 2018-05-11 DIAGNOSIS — E04.1 NONTOXIC UNINODULAR GOITER: ICD-10-CM

## 2018-05-11 DIAGNOSIS — E21.0 PRIMARY HYPERPARATHYROIDISM (HCC): ICD-10-CM

## 2018-05-11 DIAGNOSIS — M81.8 IDIOPATHIC OSTEOPOROSIS: ICD-10-CM

## 2018-05-11 DIAGNOSIS — E83.50 DISORDER OF CALCIUM METABOLISM: ICD-10-CM

## 2018-05-11 LAB
ALBUMIN UR ELPH-MCNC: 100 %
ALPHA1 GLOB MFR UR ELPH: 0 %
ALPHA2 GLOB MFR UR ELPH: 0 %
B-GLOBULIN MFR UR ELPH: 0 %
CORTIS AM PEAK SERPL-MCNC: 10.6 UG/DL (ref 4.2–22.4)
GAMMA GLOB MFR UR ELPH: 0 %
METANEPH FREE SERPL-MCNC: 27 PG/ML (ref 0–62)
NORMETANEPHRINE SERPL-MCNC: 89 PG/ML (ref 0–145)
PROT PATTERN UR ELPH-IMP: NORMAL
PROT UR-MCNC: 6 MG/DL

## 2018-05-11 PROCEDURE — 82533 TOTAL CORTISOL: CPT

## 2018-05-11 PROCEDURE — 36415 COLL VENOUS BLD VENIPUNCTURE: CPT

## 2018-05-11 PROCEDURE — 82024 ASSAY OF ACTH: CPT

## 2018-05-12 LAB — ACTH PLAS-MCNC: 8.6 PG/ML (ref 7.2–63.3)

## 2018-06-08 ENCOUNTER — APPOINTMENT (OUTPATIENT)
Dept: RADIOLOGY | Age: 68
End: 2018-06-08
Payer: MEDICARE

## 2018-06-08 ENCOUNTER — TRANSCRIBE ORDERS (OUTPATIENT)
Dept: ADMINISTRATIVE | Age: 68
End: 2018-06-08

## 2018-06-08 DIAGNOSIS — E83.50 DISORDER OF CALCIUM METABOLISM: ICD-10-CM

## 2018-06-08 DIAGNOSIS — E04.1 NONTOXIC UNINODULAR GOITER: Primary | ICD-10-CM

## 2018-06-08 DIAGNOSIS — M81.8 IDIOPATHIC OSTEOPOROSIS: ICD-10-CM

## 2018-06-08 DIAGNOSIS — E04.1 NONTOXIC UNINODULAR GOITER: ICD-10-CM

## 2018-06-08 PROCEDURE — 73521 X-RAY EXAM HIPS BI 2 VIEWS: CPT

## 2018-07-06 ENCOUNTER — TELEPHONE (OUTPATIENT)
Dept: HEMATOLOGY ONCOLOGY | Facility: CLINIC | Age: 68
End: 2018-07-06

## 2018-07-06 NOTE — TELEPHONE ENCOUNTER
Called patient and left a message to let her know her appointment time has changed from 11:20 to 12:40 on 7/13/18  Office number was left in case patient can't keep that appointment time

## 2018-07-13 ENCOUNTER — OFFICE VISIT (OUTPATIENT)
Dept: HEMATOLOGY ONCOLOGY | Facility: CLINIC | Age: 68
End: 2018-07-13
Payer: MEDICARE

## 2018-07-13 VITALS
SYSTOLIC BLOOD PRESSURE: 126 MMHG | TEMPERATURE: 98.2 F | RESPIRATION RATE: 17 BRPM | HEIGHT: 62 IN | WEIGHT: 180.4 LBS | DIASTOLIC BLOOD PRESSURE: 78 MMHG | HEART RATE: 60 BPM | OXYGEN SATURATION: 94 % | BODY MASS INDEX: 33.2 KG/M2

## 2018-07-13 DIAGNOSIS — D89.89 LIGHT CHAIN DISEASE (HCC): Primary | ICD-10-CM

## 2018-07-13 DIAGNOSIS — Z85.3 PERSONAL HISTORY OF MALIGNANT NEOPLASM OF BREAST: ICD-10-CM

## 2018-07-13 PROCEDURE — 99214 OFFICE O/P EST MOD 30 MIN: CPT | Performed by: INTERNAL MEDICINE

## 2018-07-13 NOTE — PROGRESS NOTES
HPI:   Patient is here with her   This is a follow-up visit for 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  Patient has some tiredness and arthritic symptoms  Previous history of left breast cancer in 1999 and she had left mastectomy, reconstruction surgery and chemotherapy  No hormonal therapy  Reconstructed left breast got infected  She remains under surveillance for gammopathy     Allergies   Allergen Reactions    Lamotrigine Rash     Other reaction(s): Possible rash, mouth sores (questionable reaction)        No history exists  ROS:  07/13/18 Reviewed 13 systems:  Presently no headaches, seizures, dizziness, diplopia, dysphagia, hoarseness, chest pain, palpitations, shortness of breath, cough, hemoptysis, abdominal pain, nausea, vomiting, change in bowel habits, melena, hematuria, fever, chills, bleeding, bone pains, skin rash, weight loss,   weakness, numbness,  claudication and gait problem  No frequent infections  Not unusually sensitive to heat or cold  No swelling of the ankles  No swollen glands  Patient is anxious  No GYN symptoms Other symptoms are in HPI        /78 (BP Location: Right arm, Cuff Size: Adult)   Pulse 60   Temp 98 2 °F (36 8 °C) (Tympanic)   Resp 17   Ht 5' 2" (1 575 m)   Wt 81 8 kg (180 lb 6 4 oz)   LMP  (LMP Unknown)   SpO2 94%   BMI 33 00 kg/m²     Physical Exam:  Alert, oriented, not in distress, no icterus, no oral thrush, no palpable neck mass, clear lung fields, regular heart rate, abdomen  soft and non tender, no palpable abdominal mass, no ascites, no edema of ankles, no calf tenderness, no focal neurological deficit, no skin rash, no palpable lymphadenopathy, good arterial pulses, no clubbing  Patient is anxious  Performance status 1  No palpable breast mass  No lymphedema  Medical student and physician assistant were with me in the room during examination      IMAGING:      LABS:  Results for orders placed or performed in visit on 05/11/18   Cortisol Level, AM Specimen   Result Value Ref Range    Cortisol - AM 10 6 4 2 - 22 4 ug/dL     Labs, Imaging, & Other studies:   All pertinent labs and imaging studies were personally reviewed    Lab Results   Component Value Date     05/08/2018    K 4 4 05/08/2018     05/08/2018    CO2 25 05/08/2018    ANIONGAP 9 05/08/2018    BUN 17 05/08/2018    CREATININE 1 12 05/08/2018    GLUCOSE 89 02/08/2017    GLUF 85 05/08/2018    CALCIUM 9 2 05/08/2018    AST 25 05/08/2018    ALT 27 05/08/2018    ALKPHOS 114 05/08/2018    PROT 7 6 05/08/2018    PROT 7 2 05/08/2018    BILITOT 0 53 05/08/2018    EGFR 50 05/08/2018     Lab Results   Component Value Date    WBC 6 46 05/08/2018    HGB 13 6 05/08/2018    HCT 42 0 05/08/2018    MCV 98 05/08/2018     05/08/2018     Beta 2 microglobulin 2 6  Sedimentation rate 29  Normal or negative LDH, SPEP and UPEP  Normal free light chain ratio in the blood  Slightly 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 13 89  Reviewed and discussed with patient  Assessment and plan:  Patient is here with her   This is a follow-up visit for 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  Patient has some tiredness and arthritic symptoms  Previous history of left breast cancer in 1999 and she had left mastectomy, reconstruction surgery and chemotherapy  No hormonal therapy  Reconstructed left breast got infected  She remains under surveillance for gammopathy and breast cancer  Breast cancer is in remission  She follows with her breast surgeon and goes for mammography on regular basis  Goal is cure from breast cancer and monitoring of free light chain ratio in the urine and renal functions  Slightly 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 he is of uncertain significance, probably of no clinical significance in her case by looking at all the gammopathy parameters  She would like to come back in 1 year  Discussed with patient and explained  Questions answered  Also discussed the importance of self-breast examination, eating healthy foods and exercises as tolerated and health screening tests  Patient voiced understanding and agreement in the discussion  He/She is aware to contact us with questions/symptoms in the interim  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[address] )    - Beta 2 microglobulin, serum; Future  - CBC and differential; Future  - Comprehensive metabolic panel; Future  - Immunoglobulin free LT chains blood; Future  - LD,Blood; Future  - Protein electrophoresis, urine; Future  - Protein electrophoresis, serum; Future  - Sedimentation rate, automated; Future  - [place]/Lambda Light Chains Free With Ratio,Urine; Future    2  Personal history of malignant neoplasm of breast            Patient voiced understanding and agreement in the discussion  Counseling / Coordination of Care   Greater than 50% of total time was spent with the patient and / or family counseling and / or coordination of care

## 2018-07-13 NOTE — LETTER
July 13, 2018     Alexsandra Deal MD  [place] [address] [address] [employer]    Patient: Martin Hernandez   YOB: 1950   Date of Visit: 7/13/2018       Dear Dr Suzanne Lawson: Thank you for referring Martin Hernandez to me for evaluation  Below are my notes for this consultation  If you have questions, please do not hesitate to call me  I look forward to following your patient along with you  Sincerely,        Emani Powell MD        CC: MD Emani Claros MD  7/13/2018  2:15 PM  Sign at close encounter    HPI:   Patient is here with her   This is a follow-up visit for 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  Patient has some tiredness and arthritic symptoms  Previous history of left breast cancer in 1999 and she had left mastectomy, reconstruction surgery and chemotherapy  No hormonal therapy  Reconstructed left breast got infected  She remains under surveillance for gammopathy     Allergies   Allergen Reactions    Lamotrigine Rash     Other reaction(s): Possible rash, mouth sores (questionable reaction)        No history exists  ROS:  07/13/18 Reviewed 13 systems:  Presently no headaches, seizures, dizziness, diplopia, dysphagia, hoarseness, chest pain, palpitations, shortness of breath, cough, hemoptysis, abdominal pain, nausea, vomiting, change in bowel habits, melena, hematuria, fever, chills, bleeding, bone pains, skin rash, weight loss,   weakness, numbness,  claudication and gait problem  No frequent infections  Not unusually sensitive to heat or cold  No swelling of the ankles  No swollen glands  Patient is anxious    No GYN symptoms Other symptoms are in HPI        /78 (BP Location: Right arm, Cuff Size: Adult)   Pulse 60   Temp 98 2 °F (36 8 °C) (Tympanic)   Resp 17   Ht 5' 2" (1 575 m)   Wt 81 8 kg (180 lb 6 4 oz)   LMP  (LMP Unknown)   SpO2 94%   BMI 33 00 kg/m²      Physical Exam:  Alert, oriented, not in distress, no icterus, no oral thrush, no palpable neck mass, clear lung fields, regular heart rate, abdomen  soft and non tender, no palpable abdominal mass, no ascites, no edema of ankles, no calf tenderness, no focal neurological deficit, no skin rash, no palpable lymphadenopathy, good arterial pulses, no clubbing  Patient is anxious  Performance status 1  No palpable breast mass  No lymphedema  Medical student and physician assistant were with me in the room during examination  IMAGING:      LABS:  Results for orders placed or performed in visit on 05/11/18   Cortisol Level, AM Specimen   Result Value Ref Range    Cortisol - AM 10 6 4 2 - 22 4 ug/dL     Labs, Imaging, & Other studies:   All pertinent labs and imaging studies were personally reviewed    Lab Results   Component Value Date     05/08/2018    K 4 4 05/08/2018     05/08/2018    CO2 25 05/08/2018    ANIONGAP 9 05/08/2018    BUN 17 05/08/2018    CREATININE 1 12 05/08/2018    GLUCOSE 89 02/08/2017    GLUF 85 05/08/2018    CALCIUM 9 2 05/08/2018    AST 25 05/08/2018    ALT 27 05/08/2018    ALKPHOS 114 05/08/2018    PROT 7 6 05/08/2018    PROT 7 2 05/08/2018    BILITOT 0 53 05/08/2018    EGFR 50 05/08/2018     Lab Results   Component Value Date    WBC 6 46 05/08/2018    HGB 13 6 05/08/2018    HCT 42 0 05/08/2018    MCV 98 05/08/2018     05/08/2018     Beta 2 microglobulin 2 6  Sedimentation rate 29  Normal or negative LDH, SPEP and UPEP  Normal free light chain ratio in the blood  Slightly 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 13 89  Reviewed and discussed with patient  Assessment and plan:  Patient is here with her   This is a follow-up visit for 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  Patient has some tiredness and arthritic symptoms  Previous history of left breast cancer in 1999 and she had left mastectomy, reconstruction surgery and chemotherapy  No hormonal therapy  Reconstructed left breast got infected    She remains under surveillance for gammopathy and breast cancer  Breast cancer is in remission  She follows with her breast surgeon and goes for mammography on regular basis  Goal is cure from breast cancer and monitoring of free light chain ratio in the urine and renal functions  Slightly 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 he is of uncertain significance, probably of no clinical significance in her case by looking at all the gammopathy parameters  She would like to come back in 1 year  Discussed with patient and explained  Questions answered  Also discussed the importance of self-breast examination, eating healthy foods and exercises as tolerated and health screening tests  Patient voiced understanding and agreement in the discussion  He/She is aware to contact us with questions/symptoms in the interim  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[address] )    - Beta 2 microglobulin, serum; Future  - CBC and differential; Future  - Comprehensive metabolic panel; Future  - Immunoglobulin free LT chains blood; Future  - LD,Blood; Future  - Protein electrophoresis, urine; Future  - Protein electrophoresis, serum; Future  - Sedimentation rate, automated; Future  - [place]/Lambda Light Chains Free With Ratio,Urine; Future    2  Personal history of malignant neoplasm of breast            Patient voiced understanding and agreement in the discussion  Counseling / Coordination of Care   Greater than 50% of total time was spent with the patient and / or family counseling and / or coordination of care

## 2018-08-10 ENCOUNTER — TRANSCRIBE ORDERS (OUTPATIENT)
Dept: ADMINISTRATIVE | Age: 68
End: 2018-08-10

## 2018-08-10 ENCOUNTER — APPOINTMENT (OUTPATIENT)
Dept: LAB | Age: 68
End: 2018-08-10
Payer: MEDICARE

## 2018-08-10 DIAGNOSIS — M81.0 SENILE OSTEOPOROSIS: ICD-10-CM

## 2018-08-10 DIAGNOSIS — Z79.899 NEED FOR PROPHYLACTIC CHEMOTHERAPY: ICD-10-CM

## 2018-08-10 DIAGNOSIS — E55.9 AVITAMINOSIS D: ICD-10-CM

## 2018-08-10 DIAGNOSIS — Z79.899 NEED FOR PROPHYLACTIC CHEMOTHERAPY: Primary | ICD-10-CM

## 2018-08-10 LAB
25(OH)D3 SERPL-MCNC: 44.4 NG/ML (ref 30–100)
CALCIUM SERPL-MCNC: 9.4 MG/DL (ref 8.3–10.1)
CREAT SERPL-MCNC: 1.15 MG/DL (ref 0.6–1.3)
GFR SERPL CREATININE-BSD FRML MDRD: 48 ML/MIN/1.73SQ M

## 2018-08-10 PROCEDURE — 36415 COLL VENOUS BLD VENIPUNCTURE: CPT

## 2018-08-10 PROCEDURE — 82306 VITAMIN D 25 HYDROXY: CPT

## 2018-08-10 PROCEDURE — 82565 ASSAY OF CREATININE: CPT

## 2018-08-10 PROCEDURE — 82310 ASSAY OF CALCIUM: CPT

## 2018-08-17 ENCOUNTER — HOSPITAL ENCOUNTER (OUTPATIENT)
Dept: INFUSION CENTER | Facility: CLINIC | Age: 68
Discharge: HOME | End: 2018-08-17
Payer: MEDICARE

## 2018-08-17 PROCEDURE — 96402 CHEMO HORMON ANTINEOPL SQ/IM: CPT

## 2018-08-17 RX ADMIN — DENOSUMAB 60 MG: 60 INJECTION SUBCUTANEOUS at 14:05

## 2018-08-17 NOTE — PROGRESS NOTES
Pt resting with no complaints  Prolia given in [facility] without issue  Declined AVS  Pt aware of next appt

## 2018-12-27 ENCOUNTER — HOSPITAL ENCOUNTER (OUTPATIENT)
Dept: RADIOLOGY | Age: 68
Discharge: HOME | End: 2018-12-27
Payer: MEDICARE

## 2018-12-27 VITALS — HEIGHT: 63 IN | WEIGHT: 180 LBS | BODY MASS INDEX: 31.89 KG/M2

## 2018-12-27 DIAGNOSIS — Z12.31 ENCOUNTER FOR SCREENING MAMMOGRAM FOR MALIGNANT NEOPLASM OF BREAST: ICD-10-CM

## 2018-12-27 PROCEDURE — 77067 SCR MAMMO BI INCL CAD: CPT

## 2019-02-05 ENCOUNTER — TRANSCRIBE ORDERS (OUTPATIENT)
Dept: ADMINISTRATIVE | Age: 69
End: 2019-02-05

## 2019-02-05 ENCOUNTER — APPOINTMENT (OUTPATIENT)
Dept: LAB | Age: 69
End: 2019-02-05
Payer: COMMERCIAL

## 2019-02-05 DIAGNOSIS — I10 HYPERTENSION, ESSENTIAL: ICD-10-CM

## 2019-02-05 DIAGNOSIS — R53.83 FATIGUE, UNSPECIFIED TYPE: ICD-10-CM

## 2019-02-05 DIAGNOSIS — E78.2 MIXED HYPERLIPIDEMIA: Primary | ICD-10-CM

## 2019-02-05 DIAGNOSIS — E78.41 ELEVATED LIPOPROTEIN A LEVEL: ICD-10-CM

## 2019-02-05 DIAGNOSIS — I10 ESSENTIAL HYPERTENSION, MALIGNANT: ICD-10-CM

## 2019-02-05 DIAGNOSIS — E78.41 ELEVATED LIPOPROTEIN A LEVEL: Primary | ICD-10-CM

## 2019-02-05 DIAGNOSIS — G47.37 CENTRAL SLEEP APNEA DUE TO MEDICAL CONDITION: ICD-10-CM

## 2019-02-05 LAB
ALBUMIN SERPL BCP-MCNC: 3.2 G/DL (ref 3.5–5)
ALP SERPL-CCNC: 132 U/L (ref 46–116)
ALT SERPL W P-5'-P-CCNC: 24 U/L (ref 12–78)
ANION GAP SERPL CALCULATED.3IONS-SCNC: 6 MMOL/L (ref 4–13)
AST SERPL W P-5'-P-CCNC: 21 U/L (ref 5–45)
BASOPHILS # BLD AUTO: 0.04 THOUSANDS/ΜL (ref 0–0.1)
BASOPHILS NFR BLD AUTO: 1 % (ref 0–1)
BILIRUB SERPL-MCNC: 0.38 MG/DL (ref 0.2–1)
BILIRUB UR QL STRIP: NEGATIVE
BUN SERPL-MCNC: 15 MG/DL (ref 5–25)
CALCIUM SERPL-MCNC: 8.9 MG/DL (ref 8.3–10.1)
CHLORIDE SERPL-SCNC: 104 MMOL/L (ref 100–108)
CHOLEST SERPL-MCNC: 168 MG/DL (ref 50–200)
CLARITY UR: CLEAR
CO2 SERPL-SCNC: 26 MMOL/L (ref 21–32)
COLOR UR: YELLOW
CREAT SERPL-MCNC: 1.07 MG/DL (ref 0.6–1.3)
EOSINOPHIL # BLD AUTO: 0.01 THOUSAND/ΜL (ref 0–0.61)
EOSINOPHIL NFR BLD AUTO: 0 % (ref 0–6)
ERYTHROCYTE [DISTWIDTH] IN BLOOD BY AUTOMATED COUNT: 11.9 % (ref 11.6–15.1)
GFR SERPL CREATININE-BSD FRML MDRD: 52 ML/MIN/1.73SQ M
GLUCOSE P FAST SERPL-MCNC: 85 MG/DL (ref 65–99)
GLUCOSE UR STRIP-MCNC: NEGATIVE MG/DL
HCT VFR BLD AUTO: 40.9 % (ref 34.8–46.1)
HDLC SERPL-MCNC: 66 MG/DL (ref 40–60)
HGB BLD-MCNC: 13 G/DL (ref 11.5–15.4)
HGB UR QL STRIP.AUTO: NEGATIVE
IMM GRANULOCYTES # BLD AUTO: 0.01 THOUSAND/UL (ref 0–0.2)
IMM GRANULOCYTES NFR BLD AUTO: 0 % (ref 0–2)
KETONES UR STRIP-MCNC: NEGATIVE MG/DL
LDLC SERPL CALC-MCNC: 81 MG/DL (ref 0–100)
LEUKOCYTE ESTERASE UR QL STRIP: NEGATIVE
LYMPHOCYTES # BLD AUTO: 1.61 THOUSANDS/ΜL (ref 0.6–4.47)
LYMPHOCYTES NFR BLD AUTO: 24 % (ref 14–44)
MAGNESIUM SERPL-MCNC: 2 MG/DL (ref 1.6–2.6)
MCH RBC QN AUTO: 31.8 PG (ref 26.8–34.3)
MCHC RBC AUTO-ENTMCNC: 31.8 G/DL (ref 31.4–37.4)
MCV RBC AUTO: 100 FL (ref 82–98)
MONOCYTES # BLD AUTO: 0.48 THOUSAND/ΜL (ref 0.17–1.22)
MONOCYTES NFR BLD AUTO: 7 % (ref 4–12)
NEUTROPHILS # BLD AUTO: 4.68 THOUSANDS/ΜL (ref 1.85–7.62)
NEUTS SEG NFR BLD AUTO: 68 % (ref 43–75)
NITRITE UR QL STRIP: NEGATIVE
NRBC BLD AUTO-RTO: 0 /100 WBCS
PH UR STRIP.AUTO: 6.5 [PH] (ref 4.5–8)
PLATELET # BLD AUTO: 189 THOUSANDS/UL (ref 149–390)
PMV BLD AUTO: 10.4 FL (ref 8.9–12.7)
POTASSIUM SERPL-SCNC: 4.3 MMOL/L (ref 3.5–5.3)
PROT SERPL-MCNC: 7.4 G/DL (ref 6.4–8.2)
PROT UR STRIP-MCNC: NEGATIVE MG/DL
RBC # BLD AUTO: 4.09 MILLION/UL (ref 3.81–5.12)
SODIUM SERPL-SCNC: 136 MMOL/L (ref 136–145)
SP GR UR STRIP.AUTO: 1.01 (ref 1–1.03)
T3 SERPL-MCNC: 1 NG/ML (ref 0.6–1.8)
T4 FREE SERPL-MCNC: 0.84 NG/DL (ref 0.76–1.46)
TRIGL SERPL-MCNC: 105 MG/DL
TSH SERPL DL<=0.05 MIU/L-ACNC: 2.29 UIU/ML (ref 0.36–3.74)
UROBILINOGEN UR QL STRIP.AUTO: 0.2 E.U./DL
WBC # BLD AUTO: 6.83 THOUSAND/UL (ref 4.31–10.16)

## 2019-02-05 PROCEDURE — 84439 ASSAY OF FREE THYROXINE: CPT

## 2019-02-05 PROCEDURE — 81003 URINALYSIS AUTO W/O SCOPE: CPT | Performed by: INTERNAL MEDICINE

## 2019-02-05 PROCEDURE — 36415 COLL VENOUS BLD VENIPUNCTURE: CPT

## 2019-02-05 PROCEDURE — 84480 ASSAY TRIIODOTHYRONINE (T3): CPT

## 2019-02-05 PROCEDURE — 84443 ASSAY THYROID STIM HORMONE: CPT

## 2019-02-05 PROCEDURE — 85025 COMPLETE CBC W/AUTO DIFF WBC: CPT

## 2019-02-05 PROCEDURE — 80053 COMPREHEN METABOLIC PANEL: CPT

## 2019-02-05 PROCEDURE — 80061 LIPID PANEL: CPT

## 2019-02-05 PROCEDURE — 83735 ASSAY OF MAGNESIUM: CPT

## 2019-02-21 ENCOUNTER — TELEPHONE (OUTPATIENT)
Dept: SLEEP CENTER | Facility: CLINIC | Age: 69
End: 2019-02-21

## 2019-02-21 NOTE — TELEPHONE ENCOUNTER
Patient called, states she had seen Dr Attila Raymond in the past and was ordered to have CPAP, but has not used it for a while  Patient last office visit in sleep clinic 2/24/16  Patient states she did see Dr Attila Raymond in his pulmonary office  I advised she should call that office to schedule visit before resuming CPAP  Phone # provided

## 2019-02-22 ENCOUNTER — APPOINTMENT (OUTPATIENT)
Dept: LAB | Age: 69
End: 2019-02-22
Payer: COMMERCIAL

## 2019-02-22 DIAGNOSIS — E78.2 MIXED HYPERLIPIDEMIA: ICD-10-CM

## 2019-02-22 LAB
CHOLEST SERPL-MCNC: 172 MG/DL (ref 50–200)
HDLC SERPL-MCNC: 63 MG/DL (ref 40–60)
LDLC SERPL CALC-MCNC: 90 MG/DL (ref 0–100)
NONHDLC SERPL-MCNC: 109 MG/DL
TRIGL SERPL-MCNC: 96 MG/DL

## 2019-02-22 PROCEDURE — 80061 LIPID PANEL: CPT

## 2019-02-22 PROCEDURE — 36415 COLL VENOUS BLD VENIPUNCTURE: CPT

## 2019-03-26 ENCOUNTER — OFFICE VISIT (OUTPATIENT)
Dept: SLEEP CENTER | Facility: CLINIC | Age: 69
End: 2019-03-26
Payer: COMMERCIAL

## 2019-03-26 VITALS
BODY MASS INDEX: 32.78 KG/M2 | DIASTOLIC BLOOD PRESSURE: 70 MMHG | WEIGHT: 185 LBS | HEIGHT: 63 IN | SYSTOLIC BLOOD PRESSURE: 110 MMHG

## 2019-03-26 DIAGNOSIS — G47.10 HYPERSOMNIA: Primary | ICD-10-CM

## 2019-03-26 DIAGNOSIS — G47.37 CENTRAL SLEEP APNEA DUE TO MEDICAL CONDITION: ICD-10-CM

## 2019-03-26 DIAGNOSIS — G47.33 OSA (OBSTRUCTIVE SLEEP APNEA): ICD-10-CM

## 2019-03-26 PROCEDURE — 99214 OFFICE O/P EST MOD 30 MIN: CPT | Performed by: INTERNAL MEDICINE

## 2019-03-26 RX ORDER — MODAFINIL 200 MG/1
TABLET ORAL
Qty: 30 TABLET | Refills: 5 | Status: SHIPPED | OUTPATIENT
Start: 2019-03-26 | End: 2019-05-13 | Stop reason: SDUPTHER

## 2019-03-26 NOTE — PROGRESS NOTES
Progress Note - Sleep Center   Vandana Hawley LJI:[birth year] MRN: [id number]      Reason for Visit:    68 y  o female with obstructive sleep apnea, insomnia, who was poorly compliant with CPAP    Assessment:  The patient is struggling to use her machine on a regular basis  She may do better with APAP  Plan:  Initiate APAP 10 to 20 cm  Start Provigil 100 to 200 mg by mouth every morning  Follow up:  Compliance check    History of Present Illness:  74 y  o female with history of restless leg syndrome and obstructive sleep apnea returns for follow-up  I had seen her in the past for dyspnea, but the workup was negative for an identifiable cause  She has severe insomnia for which she uses Ambien as needed  She is barely compliant with her CPAP  She is extremely drowsy, presumably as a result of her DENIZ  Her diagnostic sleep study in 2011 demonstrated AHI = 14 0  The patient has a past history of Guillain-McNabb and felt that her fatigue and sleepiness have not improved since then            Review of Systems      Genitourinary post menopausal (no peroids)   Cardiology none   Gastrointestinal none   Neurology none   Constitutional fatigue   Integumentary none   Psychiatry anxiety and depression   Musculoskeletal muscle aches   Pulmonary shortness of breath with activity   ENT none   Endocrine none   Hematological none           I have reviewed and updated the review of systems as necessary     Historical Information    Past Medical History:   Past Medical History:   Diagnosis Date    Anxiety     Arthritis     Atopic dermatitis     Breast CA (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 [address] )     Osteoporosis     PE (pulmonary thromboembolism) ([facility] [address] )     Peptic ulcer disease     Psychiatric disorder     Sjogren's syndrome ([facility] [address] )     Sleep apnea     does not use cpap         Past Surgical History:   Past Surgical History: Procedure Laterality Date    ABDOMINAL SURGERY      APPENDECTOMY      CHOLECYSTECTOMY      COLONOSCOPY W/ ENDOSCOPIC US N/A 2/24/2017    Procedure: ANAL ENDOSCOPIC U/S;  Surgeon: Jessie Ge MD;  Location: [facility]; Service:     GASTRECTOMY      HYSTERECTOMY      age 26    MASTECTOMY Left     age 44    AL COLONOSCOPY FLX DX W/COLLJ [address] PFRMD N/A 2/24/2017    Procedure: COLONOSCOPY;  Surgeon: Melissa Centeno MD;  Location: [facility]; Service: Gastroenterology         Social History - see chart  History   Alcohol use: Not on file       History   Smoking Status    Not on file   Smokeless Tobacco    Not on file     Family History:   Family History   Problem Relation Age of Onset    Hypertension Mother     Heart failure Father     Esophageal cancer Father     Breast cancer Paternal Aunt 50    Breast cancer Cousin 48        paternal    Breast cancer Cousin 48        paternal    Ovarian cancer Maternal Aunt 58       Medications/Allergies:      Current Outpatient Medications:     ALPRAZolam (XANAX) 0 5 mg tablet, Take 0 5 mg by mouth daily at bedtime as needed for anxiety  , Disp: , Rfl:     amLODIPine-benazepril (LOTREL 5-20) 5-20 MG per capsule, Take 1 capsule by mouth daily  , Disp: , Rfl:     aspirin 325 mg tablet, Take 325 mg by mouth daily  , Disp: , Rfl:     atenolol (TENORMIN) 100 mg tablet, Take 100 mg by mouth daily  , Disp: , Rfl:     cevimeline (EVOXAC) 30 MG capsule, Take 30 mg by mouth daily  , Disp: , Rfl:     cholecalciferol (VITAMIN D3) 1,000 units tablet, Take 1,000 Units by mouth daily, Disp: , Rfl:     cycloSPORINE (RESTASIS) 0 05 % ophthalmic emulsion, Administer 1 drop to both eyes 2 (two) times a day , Disp: , Rfl:     hydroxychloroquine (PLAQUENIL) 200 mg tablet, Take 200 mg by mouth 2 (two) times a day, Disp: , Rfl:     OLANZapine (ZyPREXA) 2 5 mg tablet, Take 2 5 mg by mouth daily at bedtime  , Disp: , Rfl:     rOPINIRole (REQUIP) 0 5 mg tablet, Take 0 5 mg by mouth daily, Disp: , Rfl:     venlafaxine (EFFEXOR XR) 150 mg 24 hr capsule, Take 150 mg by mouth daily  , Disp: , Rfl:     zolpidem (AMBIEN) 10 mg tablet, Take 10 mg by mouth daily at bedtime  , Disp: , Rfl:       Objective    Vital Signs:   Vitals:    03/26/19 1300   BP: 110/70   Weight: 83 9 kg (185 lb)   Height: 5' 3" (1 6 m)     Durant Sleepiness Scale: Total score: 12    Physical Exam:    General: Alert, appropriate, cooperative, overweight    Head: NC/AT    Skin: Warm, dry    Neuro: No motor abnormalities, cranial nerves appear intact    Psych: Normal affect            TIFFANIE Brice    Board Certified Sleep Specialist

## 2019-04-01 ENCOUNTER — TELEPHONE (OUTPATIENT)
Dept: SLEEP CENTER | Facility: CLINIC | Age: 69
End: 2019-04-01

## 2019-04-08 ENCOUNTER — TELEPHONE (OUTPATIENT)
Dept: SLEEP CENTER | Facility: CLINIC | Age: 69
End: 2019-04-08

## 2019-05-13 DIAGNOSIS — G47.10 HYPERSOMNIA: ICD-10-CM

## 2019-05-13 RX ORDER — MODAFINIL 200 MG/1
TABLET ORAL
Qty: 30 TABLET | Refills: 5 | Status: SHIPPED | OUTPATIENT
Start: 2019-05-13 | End: 2020-09-17 | Stop reason: ALTCHOICE

## 2019-06-19 ENCOUNTER — TELEPHONE (OUTPATIENT)
Dept: SLEEP CENTER | Facility: CLINIC | Age: 69
End: 2019-06-19

## 2019-06-19 ENCOUNTER — OFFICE VISIT (OUTPATIENT)
Dept: SLEEP CENTER | Facility: CLINIC | Age: 69
End: 2019-06-19
Payer: COMMERCIAL

## 2019-06-19 VITALS
HEART RATE: 64 BPM | WEIGHT: 180 LBS | SYSTOLIC BLOOD PRESSURE: 124 MMHG | DIASTOLIC BLOOD PRESSURE: 76 MMHG | BODY MASS INDEX: 31.89 KG/M2 | HEIGHT: 63 IN

## 2019-06-19 DIAGNOSIS — G47.33 OSA (OBSTRUCTIVE SLEEP APNEA): Primary | ICD-10-CM

## 2019-06-19 DIAGNOSIS — G47.10 HYPERSOMNIA: ICD-10-CM

## 2019-06-19 PROCEDURE — 99214 OFFICE O/P EST MOD 30 MIN: CPT | Performed by: INTERNAL MEDICINE

## 2019-06-19 RX ORDER — ARMODAFINIL 150 MG/1
150 TABLET ORAL DAILY
Qty: 30 TABLET | Refills: 5 | Status: SHIPPED | OUTPATIENT
Start: 2019-06-19 | End: 2019-11-26 | Stop reason: SDUPTHER

## 2019-07-19 ENCOUNTER — TRANSCRIBE ORDERS (OUTPATIENT)
Dept: ADMINISTRATIVE | Age: 69
End: 2019-07-19

## 2019-07-19 ENCOUNTER — TELEPHONE (OUTPATIENT)
Dept: HEMATOLOGY ONCOLOGY | Facility: CLINIC | Age: 69
End: 2019-07-19

## 2019-07-19 ENCOUNTER — APPOINTMENT (OUTPATIENT)
Dept: LAB | Age: 69
End: 2019-07-19
Payer: COMMERCIAL

## 2019-07-19 DIAGNOSIS — D89.89 LIGHT CHAIN DISEASE (HCC): Primary | ICD-10-CM

## 2019-07-19 DIAGNOSIS — D89.89 LIGHT CHAIN DISEASE (HCC): ICD-10-CM

## 2019-07-19 DIAGNOSIS — D89.89 RADIATION CHIMERA (HCC): Primary | ICD-10-CM

## 2019-07-19 LAB
ALBUMIN SERPL BCP-MCNC: 3.3 G/DL (ref 3.5–5)
ALP SERPL-CCNC: 141 U/L (ref 46–116)
ALT SERPL W P-5'-P-CCNC: 20 U/L (ref 12–78)
ANION GAP SERPL CALCULATED.3IONS-SCNC: 6 MMOL/L (ref 4–13)
AST SERPL W P-5'-P-CCNC: 23 U/L (ref 5–45)
BASOPHILS # BLD AUTO: 0.03 THOUSANDS/ΜL (ref 0–0.1)
BASOPHILS NFR BLD AUTO: 1 % (ref 0–1)
BILIRUB SERPL-MCNC: 0.37 MG/DL (ref 0.2–1)
BUN SERPL-MCNC: 14 MG/DL (ref 5–25)
CALCIUM SERPL-MCNC: 9 MG/DL (ref 8.3–10.1)
CHLORIDE SERPL-SCNC: 105 MMOL/L (ref 100–108)
CO2 SERPL-SCNC: 27 MMOL/L (ref 21–32)
CREAT SERPL-MCNC: 0.99 MG/DL (ref 0.6–1.3)
EOSINOPHIL # BLD AUTO: 0 THOUSAND/ΜL (ref 0–0.61)
EOSINOPHIL NFR BLD AUTO: 0 % (ref 0–6)
ERYTHROCYTE [DISTWIDTH] IN BLOOD BY AUTOMATED COUNT: 12.7 % (ref 11.6–15.1)
ERYTHROCYTE [SEDIMENTATION RATE] IN BLOOD: 53 MM/HOUR (ref 0–20)
GFR SERPL CREATININE-BSD FRML MDRD: 58 ML/MIN/1.73SQ M
GLUCOSE P FAST SERPL-MCNC: 79 MG/DL (ref 65–99)
HCT VFR BLD AUTO: 38.5 % (ref 34.8–46.1)
HGB BLD-MCNC: 12.2 G/DL (ref 11.5–15.4)
IMM GRANULOCYTES # BLD AUTO: 0.01 THOUSAND/UL (ref 0–0.2)
IMM GRANULOCYTES NFR BLD AUTO: 0 % (ref 0–2)
LDH SERPL-CCNC: 173 U/L (ref 81–234)
LYMPHOCYTES # BLD AUTO: 1.54 THOUSANDS/ΜL (ref 0.6–4.47)
LYMPHOCYTES NFR BLD AUTO: 25 % (ref 14–44)
MCH RBC QN AUTO: 31.6 PG (ref 26.8–34.3)
MCHC RBC AUTO-ENTMCNC: 31.7 G/DL (ref 31.4–37.4)
MCV RBC AUTO: 100 FL (ref 82–98)
MONOCYTES # BLD AUTO: 0.47 THOUSAND/ΜL (ref 0.17–1.22)
MONOCYTES NFR BLD AUTO: 8 % (ref 4–12)
NEUTROPHILS # BLD AUTO: 4.17 THOUSANDS/ΜL (ref 1.85–7.62)
NEUTS SEG NFR BLD AUTO: 66 % (ref 43–75)
NRBC BLD AUTO-RTO: 0 /100 WBCS
PLATELET # BLD AUTO: 192 THOUSANDS/UL (ref 149–390)
PMV BLD AUTO: 11.1 FL (ref 8.9–12.7)
POTASSIUM SERPL-SCNC: 4 MMOL/L (ref 3.5–5.3)
PROT SERPL-MCNC: 7.1 G/DL (ref 6.4–8.2)
RBC # BLD AUTO: 3.86 MILLION/UL (ref 3.81–5.12)
SODIUM SERPL-SCNC: 138 MMOL/L (ref 136–145)
WBC # BLD AUTO: 6.22 THOUSAND/UL (ref 4.31–10.16)

## 2019-07-19 PROCEDURE — 84165 PROTEIN E-PHORESIS SERUM: CPT | Performed by: PATHOLOGY

## 2019-07-19 PROCEDURE — 83883 ASSAY NEPHELOMETRY NOT SPEC: CPT | Performed by: INTERNAL MEDICINE

## 2019-07-19 PROCEDURE — 80053 COMPREHEN METABOLIC PANEL: CPT

## 2019-07-19 PROCEDURE — 84166 PROTEIN E-PHORESIS/URINE/CSF: CPT

## 2019-07-19 PROCEDURE — 84165 PROTEIN E-PHORESIS SERUM: CPT

## 2019-07-19 PROCEDURE — 85025 COMPLETE CBC W/AUTO DIFF WBC: CPT

## 2019-07-19 PROCEDURE — 83883 ASSAY NEPHELOMETRY NOT SPEC: CPT

## 2019-07-19 PROCEDURE — 84166 PROTEIN E-PHORESIS/URINE/CSF: CPT | Performed by: PATHOLOGY

## 2019-07-19 PROCEDURE — 85652 RBC SED RATE AUTOMATED: CPT

## 2019-07-19 PROCEDURE — 36415 COLL VENOUS BLD VENIPUNCTURE: CPT

## 2019-07-19 PROCEDURE — 82232 ASSAY OF BETA-2 PROTEIN: CPT

## 2019-07-19 PROCEDURE — 83615 LACTATE (LD) (LDH) ENZYME: CPT

## 2019-07-19 NOTE — TELEPHONE ENCOUNTER
Ada from [employer] called stating patient is currently at the lab and needs new orders  Stated ongoing orders from 18 have   Please review and fax orders as needed to [phone]   Call back [phone]

## 2019-07-20 LAB
KAPPA LC FREE SER-MCNC: 44.1 MG/L (ref 3.3–19.4)
KAPPA LC FREE/LAMBDA FREE SER: 1.45 {RATIO} (ref 0.26–1.65)
LAMBDA LC FREE SERPL-MCNC: 30.5 MG/L (ref 5.7–26.3)

## 2019-07-21 LAB — B2 MICROGLOB SERPL-MCNC: 2.2 MG/L (ref 0.6–2.4)

## 2019-07-23 LAB
ALBUMIN SERPL ELPH-MCNC: 3.47 G/DL (ref 3.5–5)
ALBUMIN SERPL ELPH-MCNC: 49.5 % (ref 52–65)
ALBUMIN UR ELPH-MCNC: 100 %
ALPHA1 GLOB MFR UR ELPH: 0 %
ALPHA1 GLOB SERPL ELPH-MCNC: 0.36 G/DL (ref 0.1–0.4)
ALPHA1 GLOB SERPL ELPH-MCNC: 5.1 % (ref 2.5–5)
ALPHA2 GLOB MFR UR ELPH: 0 %
ALPHA2 GLOB SERPL ELPH-MCNC: 0.93 G/DL (ref 0.4–1.2)
ALPHA2 GLOB SERPL ELPH-MCNC: 13.3 % (ref 7–13)
B-GLOBULIN MFR UR ELPH: 0 %
BETA GLOB ABNORMAL SERPL ELPH-MCNC: 0.46 G/DL (ref 0.4–0.8)
BETA1 GLOB SERPL ELPH-MCNC: 6.5 % (ref 5–13)
BETA2 GLOB SERPL ELPH-MCNC: 8.1 % (ref 2–8)
BETA2+GAMMA GLOB SERPL ELPH-MCNC: 0.57 G/DL (ref 0.2–0.5)
GAMMA GLOB ABNORMAL SERPL ELPH-MCNC: 1.23 G/DL (ref 0.5–1.6)
GAMMA GLOB MFR UR ELPH: 0 %
GAMMA GLOB SERPL ELPH-MCNC: 17.5 % (ref 12–22)
IGG/ALB SER: 0.98 {RATIO} (ref 1.1–1.8)
KAPPA LC UR-MCNC: 2.5 MG/L (ref 1.35–24.19)
KAPPA LC/LAMBDA UR: 14.71 {RATIO} (ref 2.04–10.37)
LAMBDA LC UR-MCNC: 0.17 MG/L (ref 0.24–6.66)
PROT PATTERN SERPL ELPH-IMP: ABNORMAL
PROT PATTERN UR ELPH-IMP: NORMAL
PROT SERPL-MCNC: 7 G/DL (ref 6.4–8.2)
PROT UR-MCNC: 6 MG/DL

## 2019-07-25 ENCOUNTER — OFFICE VISIT (OUTPATIENT)
Dept: HEMATOLOGY ONCOLOGY | Facility: CLINIC | Age: 69
End: 2019-07-25
Payer: COMMERCIAL

## 2019-07-25 VITALS
SYSTOLIC BLOOD PRESSURE: 148 MMHG | HEART RATE: 64 BPM | WEIGHT: 179.8 LBS | RESPIRATION RATE: 18 BRPM | DIASTOLIC BLOOD PRESSURE: 80 MMHG | HEIGHT: 62 IN | TEMPERATURE: 97.9 F | OXYGEN SATURATION: 97 % | BODY MASS INDEX: 33.09 KG/M2

## 2019-07-25 DIAGNOSIS — R74.8 ALKALINE PHOSPHATASE ELEVATION: ICD-10-CM

## 2019-07-25 DIAGNOSIS — Z85.3 PERSONAL HISTORY OF MALIGNANT NEOPLASM OF BREAST: ICD-10-CM

## 2019-07-25 DIAGNOSIS — D89.89 LIGHT CHAIN DISEASE (HCC): Primary | ICD-10-CM

## 2019-07-25 PROCEDURE — 99214 OFFICE O/P EST MOD 30 MIN: CPT | Performed by: INTERNAL MEDICINE

## 2019-07-25 NOTE — LETTER
July 25, 2019     [facility] [address] [facility]    Patient: Silvano Pierce   YOB: 1950   Date of Visit: 7/25/2019       Dear Dr Jocelynn Cotton: Thank you for referring Silvano Pierce to me for evaluation  Below are my notes for this consultation  If you have questions, please do not hesitate to call me  I look forward to following your patient along with you  Sincerely,        Wilfrido Callaway MD        CC: MD Wilfrido Bower MD  7/25/2019  1:58 PM  Sign at close encounter    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 and that is being monitored in addition to gammopathy parameters  No gammopathy on serum and urine protein electrophoresis  Normal renal function tests  Remote past history of left breast cancer in 1999 and she had left mastectomy, reconstruction surgery and chemotherapy  No hormonal therapy  Reconstructed left breast got infected  Patient follows with her breast surgeon for examination and mammography  History of increased alkaline phosphatase  No history of diabetes mellitus  She is on cholesterol-lowering medication  No history of gallbladder disease  Blake Martínez Has some tiredness and arthritic symptoms    Current Outpatient Medications:     ALPRAZolam (XANAX) 0 5 mg tablet, Take 0 5 mg by mouth daily at bedtime as needed for anxiety  , Disp: , Rfl:     amLODIPine-benazepril (LOTREL 5-20) 5-20 MG per capsule, Take 1 capsule by mouth daily  , Disp: , Rfl:     Armodafinil 150 MG tablet, Take 1 tablet (150 mg total) by mouth daily for 90 days, Disp: 30 tablet, Rfl: 5    aspirin 325 mg tablet, Take 325 mg by mouth daily  , Disp: , Rfl:     atenolol (TENORMIN) 100 mg tablet, Take 100 mg by mouth daily  , Disp: , Rfl:     cevimeline (EVOXAC) 30 MG capsule, Take 30 mg by mouth daily  , Disp: , Rfl:     cholecalciferol (VITAMIN D3) 1,000 units tablet, Take 1,000 Units by mouth daily, Disp: , Rfl:     cycloSPORINE (RESTASIS) 0 05 % ophthalmic emulsion, Administer 1 drop to both eyes 2 (two) times a day , Disp: , Rfl:     hydroxychloroquine (PLAQUENIL) 200 mg tablet, Take 200 mg by mouth 2 (two) times a day, Disp: , Rfl:     modafinil (PROVIGIL) 200 MG tablet, 1/2 to 1 po q AM, Disp: 30 tablet, Rfl: 5    OLANZapine (ZyPREXA) 2 5 mg tablet, Take 2 5 mg by mouth daily at bedtime  , Disp: , Rfl:     rOPINIRole (REQUIP) 0 5 mg tablet, Take 0 5 mg by mouth daily, Disp: , Rfl:     venlafaxine (EFFEXOR XR) 150 mg 24 hr capsule, Take 150 mg by mouth daily  , Disp: , Rfl:     zolpidem (AMBIEN) 10 mg tablet, Take 10 mg by mouth daily at bedtime  , Disp: , Rfl:     Allergies   Allergen Reactions    Lamotrigine Rash     Other reaction(s): Possible rash, mouth sores (questionable reaction)        No history exists  ROS:  07/25/19 Reviewed 13 systems:  Presently no neurological, cardiac, pulmonary, GI and  symptoms  No other symptoms like   fever, chills, bleeding, bone pains, skin rash, weight loss,  weakness, numbness,  claudication and gait problem  No frequent infections  Not unusually sensitive to heat or cold  No swelling of the ankles  No swollen glands  Patient is anxious  Other symptoms are in HPI        /80 (BP Location: Right arm, Patient Position: Sitting, Cuff Size: Adult)   Pulse 64   Temp 97 9 °F (36 6 °C)   Resp 18   Ht 5' 2" (1 575 m)   Wt 81 6 kg (179 lb 12 8 oz)   LMP  (LMP Unknown)   SpO2 97%   BMI 32 89 kg/m²      Physical Exam:    Patient is alert and oriented  She is not in distress  Vital signs are as above  There is no scleral icterus,   no oral thrush, no palpable neck mass, clear lung fields, regular heart rate, abdomen  soft and non tender, no palpable abdominal mass, no ascites, no edema of ankles, no calf tenderness, no focal neurological deficit, no skin rash, no palpable lymphadenopathy in the neck and axillary areas, good arterial pulses, no clubbing  Patient is anxious  Performance status 0  No lymphedema    IMAGING:      LABS:  Results for orders placed or performed in visit on 07/19/19   LD,Blood   Result Value Ref Range     81 - 234 U/L     Labs, Imaging, & Other studies:   All pertinent labs and imaging studies were personally reviewed    Lab Results   Component Value Date     07/20/2015    K 4 0 07/19/2019     07/19/2019    CO2 27 07/19/2019    ANIONGAP 12 07/20/2015    BUN 14 07/19/2019    CREATININE 0 99 07/19/2019    GLUCOSE 98 07/20/2015    GLUF 79 07/19/2019    CALCIUM 9 0 07/19/2019    AST 23 07/19/2019    ALT 20 07/19/2019    ALKPHOS 141 (H) 07/19/2019    PROT 7 4 05/07/2015    BILITOT 0 2 05/07/2015    EGFR 58 07/19/2019     Lab Results   Component Value Date    WBC 6 22 07/19/2019    HGB 12 2 07/19/2019    HCT 38 5 07/19/2019     (H) 07/19/2019     07/19/2019     Normal differential count  Urine free light chain ratio 14 71  No monoclonal spike on urine protein electrophoresis  No monoclonal band on serum protein electrophoresis  Normal free light chain ratio in blood  Sedimentation rate 53  Normal beta 2 microglobulin and LDH  Reviewed and discussed with patient  Assessment and plan:  Patient has history of 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 and that is being monitored in addition to gammopathy parameters  No gammopathy on serum and urine protein electrophoresis  Normal renal function tests  Remote past history of left breast cancer in 1999 and she had left mastectomy, reconstruction surgery and chemotherapy  No hormonal therapy  Reconstructed left breast got infected  Patient follows with her breast surgeon for examination and mammography  History of increased alkaline phosphatase  No history of diabetes mellitus  She is on cholesterol-lowering medication  History of tiredness and arthritic symptoms    Physical examination and test results are as recorded and discussed in detail with the patient and test results are stable  Alkaline phosphatase remains slightly high  She will have alkaline phosphatase  isoenzyme test and ultrasound of right upper quadrant of abdomen  Other blood and urine tests prior to next visit in 1 year  I explained all this to patient in detail  Questions answered  She remains under surveillance for gammopathy and breast cancer  Breast cancer is in remission    She follows with her breast surgeon and goes for mammography on regular basis   Goal is cure from breast cancer and monitoring of free light chain ratio in the urine and renal functions  Slightly 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 he is of uncertain significance, probably of no clinical significance in her case by looking at all the gammopathy parameters  She would like to come back in 1 year    Discussed with patient and explained   Questions answered  [place] Cowden discussed the importance of self-breast examination, eating healthy foods and exercises as tolerated and health screening tests     Patient is capable of self-care  Patient voiced understanding and agreement in the discussion  He/She is aware to contact us with questions/symptoms in the interim  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

## 2019-07-25 NOTE — PROGRESS NOTES
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 and that is being monitored in addition to gammopathy parameters  No gammopathy on serum and urine protein electrophoresis  Normal renal function tests  Remote past history of left breast cancer in 1999 and she had left mastectomy, reconstruction surgery and chemotherapy  No hormonal therapy  Reconstructed left breast got infected  Patient follows with her breast surgeon for examination and mammography  History of increased alkaline phosphatase  No history of diabetes mellitus  She is on cholesterol-lowering medication  No history of gallbladder disease  Dorathy Infield Has some tiredness and arthritic symptoms    Current Outpatient Medications:     ALPRAZolam (XANAX) 0 5 mg tablet, Take 0 5 mg by mouth daily at bedtime as needed for anxiety  , Disp: , Rfl:     amLODIPine-benazepril (LOTREL 5-20) 5-20 MG per capsule, Take 1 capsule by mouth daily  , Disp: , Rfl:     Armodafinil 150 MG tablet, Take 1 tablet (150 mg total) by mouth daily for 90 days, Disp: 30 tablet, Rfl: 5    aspirin 325 mg tablet, Take 325 mg by mouth daily  , Disp: , Rfl:     atenolol (TENORMIN) 100 mg tablet, Take 100 mg by mouth daily  , Disp: , Rfl:     cevimeline (EVOXAC) 30 MG capsule, Take 30 mg by mouth daily  , Disp: , Rfl:     cholecalciferol (VITAMIN D3) 1,000 units tablet, Take 1,000 Units by mouth daily, Disp: , Rfl:     cycloSPORINE (RESTASIS) 0 05 % ophthalmic emulsion, Administer 1 drop to both eyes 2 (two) times a day , Disp: , Rfl:     hydroxychloroquine (PLAQUENIL) 200 mg tablet, Take 200 mg by mouth 2 (two) times a day, Disp: , Rfl:     modafinil (PROVIGIL) 200 MG tablet, 1/2 to 1 po q AM, Disp: 30 tablet, Rfl: 5    OLANZapine (ZyPREXA) 2 5 mg tablet, Take 2 5 mg by mouth daily at bedtime  , Disp: , Rfl:     rOPINIRole (REQUIP) 0 5 mg tablet, Take 0 5 mg by mouth daily, Disp: , Rfl:     venlafaxine (EFFEXOR XR) 150 mg 24 hr capsule, Take 150 mg by mouth daily  , Disp: , Rfl:     zolpidem (AMBIEN) 10 mg tablet, Take 10 mg by mouth daily at bedtime  , Disp: , Rfl:     Allergies   Allergen Reactions    Lamotrigine Rash     Other reaction(s): Possible rash, mouth sores (questionable reaction)        No history exists  ROS:  07/25/19 Reviewed 13 systems:  Presently no neurological, cardiac, pulmonary, GI and  symptoms  No other symptoms like   fever, chills, bleeding, bone pains, skin rash, weight loss,  weakness, numbness,  claudication and gait problem  No frequent infections  Not unusually sensitive to heat or cold  No swelling of the ankles  No swollen glands  Patient is anxious  Other symptoms are in HPI        /80 (BP Location: Right arm, Patient Position: Sitting, Cuff Size: Adult)   Pulse 64   Temp 97 9 °F (36 6 °C)   Resp 18   Ht 5' 2" (1 575 m)   Wt 81 6 kg (179 lb 12 8 oz)   LMP  (LMP Unknown)   SpO2 97%   BMI 32 89 kg/m²     Physical Exam:    Patient is alert and oriented  She is not in distress  Vital signs are as above  There is no scleral icterus,   no oral thrush, no palpable neck mass, clear lung fields, regular heart rate, abdomen  soft and non tender, no palpable abdominal mass, no ascites, no edema of ankles, no calf tenderness, no focal neurological deficit, no skin rash, no palpable lymphadenopathy in the neck and axillary areas, good arterial pulses, no clubbing  Patient is anxious  Performance status 0   No lymphedema    IMAGING:      LABS:  Results for orders placed or performed in visit on 07/19/19   LD,Blood   Result Value Ref Range     81 - 234 U/L     Labs, Imaging, & Other studies:   All pertinent labs and imaging studies were personally reviewed    Lab Results   Component Value Date     07/20/2015    K 4 0 07/19/2019     07/19/2019    CO2 27 07/19/2019    ANIONGAP 12 07/20/2015    BUN 14 07/19/2019    CREATININE 0 99 07/19/2019    GLUCOSE 98 07/20/2015    GLUF 79 07/19/2019    CALCIUM 9 0 07/19/2019    AST 23 07/19/2019    ALT 20 07/19/2019    ALKPHOS 141 (H) 07/19/2019    PROT 7 4 05/07/2015    BILITOT 0 2 05/07/2015    EGFR 58 07/19/2019     Lab Results   Component Value Date    WBC 6 22 07/19/2019    HGB 12 2 07/19/2019    HCT 38 5 07/19/2019     (H) 07/19/2019     07/19/2019     Normal differential count  Urine free light chain ratio 14 71  No monoclonal spike on urine protein electrophoresis  No monoclonal band on serum protein electrophoresis  Normal free light chain ratio in blood  Sedimentation rate 53  Normal beta 2 microglobulin and LDH  Reviewed and discussed with patient  Assessment and plan:  Patient has history of 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 and that is being monitored in addition to gammopathy parameters  No gammopathy on serum and urine protein electrophoresis  Normal renal function tests  Remote past history of left breast cancer in 1999 and she had left mastectomy, reconstruction surgery and chemotherapy  No hormonal therapy  Reconstructed left breast got infected  Patient follows with her breast surgeon for examination and mammography  History of increased alkaline phosphatase  No history of diabetes mellitus  She is on cholesterol-lowering medication  History of tiredness and arthritic symptoms  Physical examination and test results are as recorded and discussed in detail with the patient and test results are stable  Alkaline phosphatase remains slightly high  She will have alkaline phosphatase  isoenzyme test and ultrasound of right upper quadrant of abdomen  Other blood and urine tests prior to next visit in 1 year  I explained all this to patient in detail  Questions answered  She remains under surveillance for gammopathy and breast cancer    Breast cancer is in remission    She follows with her breast surgeon and goes for mammography on regular basis   Goal is cure from breast cancer and monitoring of free light chain ratio in the urine and renal functions  Slightly 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 he is of uncertain significance, probably of no clinical significance in her case by looking at all the gammopathy parameters  She would like to come back in 1 year    Discussed with patient and explained   Questions answered  Enrico Wang discussed the importance of self-breast examination, eating healthy foods and exercises as tolerated and health screening tests     Patient is capable of self-care  Patient voiced understanding and agreement in the discussion  He/She is aware to contact us with questions/symptoms in the interim  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

## 2019-07-25 NOTE — PATIENT INSTRUCTIONS
Ultrasound of liver this month  Alkaline phos stays isoenzyme in 3 months  Other blood tests and urine tests prior to next visit in 1 year

## 2019-08-05 ENCOUNTER — HOSPITAL ENCOUNTER (OUTPATIENT)
Dept: RADIOLOGY | Facility: HOSPITAL | Age: 69
Discharge: HOME | End: 2019-08-05
Attending: INTERNAL MEDICINE
Payer: COMMERCIAL

## 2019-08-05 DIAGNOSIS — Z85.3 PERSONAL HISTORY OF MALIGNANT NEOPLASM OF BREAST: ICD-10-CM

## 2019-08-05 DIAGNOSIS — R74.8 ALKALINE PHOSPHATASE ELEVATION: ICD-10-CM

## 2019-08-05 PROCEDURE — 76705 ECHO EXAM OF ABDOMEN: CPT

## 2019-11-26 DIAGNOSIS — G47.10 HYPERSOMNIA: ICD-10-CM

## 2019-11-26 DIAGNOSIS — G47.33 OSA (OBSTRUCTIVE SLEEP APNEA): ICD-10-CM

## 2019-11-26 RX ORDER — ARMODAFINIL 150 MG/1
150 TABLET ORAL DAILY
Qty: 90 TABLET | Refills: 1 | Status: SHIPPED | OUTPATIENT
Start: 2019-11-26 | End: 2020-09-17 | Stop reason: ALTCHOICE

## 2019-11-26 NOTE — TELEPHONE ENCOUNTER
Patient called, requesting refill on Armodafinil 150 mg, 90 day supply to [facility]    Patient has about 3 weeks of current script left      Patient has follow up scheduled 6/2020

## 2020-02-13 ENCOUNTER — HOSPITAL ENCOUNTER (OUTPATIENT)
Dept: RADIOLOGY | Age: 70
Discharge: HOME | End: 2020-02-13
Payer: COMMERCIAL

## 2020-02-13 VITALS — HEIGHT: 62 IN | WEIGHT: 173 LBS | BODY MASS INDEX: 31.83 KG/M2

## 2020-02-13 DIAGNOSIS — Z12.31 ENCOUNTER FOR SCREENING MAMMOGRAM FOR MALIGNANT NEOPLASM OF BREAST: ICD-10-CM

## 2020-02-13 PROCEDURE — 77067 SCR MAMMO BI INCL CAD: CPT

## 2020-07-21 ENCOUNTER — TELEPHONE (OUTPATIENT)
Dept: HEMATOLOGY ONCOLOGY | Facility: CLINIC | Age: 70
End: 2020-07-21

## 2020-07-21 NOTE — TELEPHONE ENCOUNTER
Patient called to reschedule her 7/27/2020 follow up with Dr Pennington Sensing to 9/28/2020 2:20 pm at the [facility]

## 2020-08-12 ENCOUNTER — TELEPHONE (OUTPATIENT)
Dept: HEMATOLOGY ONCOLOGY | Facility: CLINIC | Age: 70
End: 2020-08-12

## 2020-08-12 ENCOUNTER — TRANSCRIBE ORDERS (OUTPATIENT)
Dept: ADMINISTRATIVE | Age: 70
End: 2020-08-12

## 2020-08-12 ENCOUNTER — APPOINTMENT (OUTPATIENT)
Dept: LAB | Age: 70
End: 2020-08-12
Payer: COMMERCIAL

## 2020-08-12 DIAGNOSIS — G47.33 OBSTRUCTIVE SLEEP APNEA: ICD-10-CM

## 2020-08-12 DIAGNOSIS — Z86.69 PERSONAL HISTORY OF OTHER DISORDERS OF NERVOUS SYSTEM AND SENSE ORGANS: ICD-10-CM

## 2020-08-12 DIAGNOSIS — E78.5 HYPERLIPIDEMIA, UNSPECIFIED HYPERLIPIDEMIA TYPE: ICD-10-CM

## 2020-08-12 DIAGNOSIS — I10 ESSENTIAL HYPERTENSION, MALIGNANT: ICD-10-CM

## 2020-08-12 DIAGNOSIS — R74.8 ALKALINE PHOSPHATASE ELEVATION: ICD-10-CM

## 2020-08-12 DIAGNOSIS — I10 ESSENTIAL HYPERTENSION, MALIGNANT: Primary | ICD-10-CM

## 2020-08-12 DIAGNOSIS — Z85.3 PERSONAL HISTORY OF MALIGNANT NEOPLASM OF BREAST: Primary | ICD-10-CM

## 2020-08-12 DIAGNOSIS — M81.0 SENILE OSTEOPOROSIS: Primary | ICD-10-CM

## 2020-08-12 DIAGNOSIS — Z85.3 PERSONAL HISTORY OF MALIGNANT NEOPLASM OF BREAST: ICD-10-CM

## 2020-08-12 DIAGNOSIS — M81.0 SENILE OSTEOPOROSIS: ICD-10-CM

## 2020-08-12 LAB
25(OH)D3 SERPL-MCNC: 64.1 NG/ML (ref 30–100)
ALBUMIN SERPL BCP-MCNC: 3.2 G/DL (ref 3.5–5)
ALP SERPL-CCNC: 102 U/L (ref 46–116)
ALT SERPL W P-5'-P-CCNC: 25 U/L (ref 12–78)
ANION GAP SERPL CALCULATED.3IONS-SCNC: 8 MMOL/L (ref 4–13)
AST SERPL W P-5'-P-CCNC: 21 U/L (ref 5–45)
BASOPHILS # BLD AUTO: 0.04 THOUSANDS/ΜL (ref 0–0.1)
BASOPHILS NFR BLD AUTO: 1 % (ref 0–1)
BILIRUB DIRECT SERPL-MCNC: 0.13 MG/DL (ref 0–0.2)
BILIRUB SERPL-MCNC: 0.42 MG/DL (ref 0.2–1)
BUN SERPL-MCNC: 18 MG/DL (ref 5–25)
CALCIUM SERPL-MCNC: 9.1 MG/DL (ref 8.3–10.1)
CHLORIDE SERPL-SCNC: 107 MMOL/L (ref 100–108)
CHOLEST SERPL-MCNC: 160 MG/DL (ref 50–200)
CO2 SERPL-SCNC: 25 MMOL/L (ref 21–32)
CREAT SERPL-MCNC: 1.07 MG/DL (ref 0.6–1.3)
EOSINOPHIL # BLD AUTO: 0 THOUSAND/ΜL (ref 0–0.61)
EOSINOPHIL NFR BLD AUTO: 0 % (ref 0–6)
ERYTHROCYTE [DISTWIDTH] IN BLOOD BY AUTOMATED COUNT: 12.7 % (ref 11.6–15.1)
GFR SERPL CREATININE-BSD FRML MDRD: 52 ML/MIN/1.73SQ M
GLUCOSE P FAST SERPL-MCNC: 84 MG/DL (ref 65–99)
HCT VFR BLD AUTO: 41.2 % (ref 34.8–46.1)
HDLC SERPL-MCNC: 61 MG/DL
HGB BLD-MCNC: 13.2 G/DL (ref 11.5–15.4)
IMM GRANULOCYTES # BLD AUTO: 0.04 THOUSAND/UL (ref 0–0.2)
IMM GRANULOCYTES NFR BLD AUTO: 1 % (ref 0–2)
LDLC SERPL CALC-MCNC: 86 MG/DL (ref 0–100)
LYMPHOCYTES # BLD AUTO: 1.74 THOUSANDS/ΜL (ref 0.6–4.47)
LYMPHOCYTES NFR BLD AUTO: 28 % (ref 14–44)
MCH RBC QN AUTO: 32.5 PG (ref 26.8–34.3)
MCHC RBC AUTO-ENTMCNC: 32 G/DL (ref 31.4–37.4)
MCV RBC AUTO: 102 FL (ref 82–98)
MONOCYTES # BLD AUTO: 0.51 THOUSAND/ΜL (ref 0.17–1.22)
MONOCYTES NFR BLD AUTO: 8 % (ref 4–12)
NEUTROPHILS # BLD AUTO: 3.79 THOUSANDS/ΜL (ref 1.85–7.62)
NEUTS SEG NFR BLD AUTO: 62 % (ref 43–75)
NONHDLC SERPL-MCNC: 99 MG/DL
NRBC BLD AUTO-RTO: 0 /100 WBCS
PLATELET # BLD AUTO: 178 THOUSANDS/UL (ref 149–390)
PMV BLD AUTO: 11.2 FL (ref 8.9–12.7)
POTASSIUM SERPL-SCNC: 4.3 MMOL/L (ref 3.5–5.3)
PROT SERPL-MCNC: 7.4 G/DL (ref 6.4–8.2)
RBC # BLD AUTO: 4.06 MILLION/UL (ref 3.81–5.12)
SODIUM SERPL-SCNC: 140 MMOL/L (ref 136–145)
TRIGL SERPL-MCNC: 67 MG/DL
WBC # BLD AUTO: 6.12 THOUSAND/UL (ref 4.31–10.16)

## 2020-08-12 PROCEDURE — 80053 COMPREHEN METABOLIC PANEL: CPT

## 2020-08-12 PROCEDURE — 84075 ASSAY ALKALINE PHOSPHATASE: CPT

## 2020-08-12 PROCEDURE — 85025 COMPLETE CBC W/AUTO DIFF WBC: CPT

## 2020-08-12 PROCEDURE — 84080 ASSAY ALKALINE PHOSPHATASES: CPT

## 2020-08-12 PROCEDURE — 83883 ASSAY NEPHELOMETRY NOT SPEC: CPT

## 2020-08-12 PROCEDURE — 82248 BILIRUBIN DIRECT: CPT

## 2020-08-12 PROCEDURE — 36415 COLL VENOUS BLD VENIPUNCTURE: CPT

## 2020-08-12 PROCEDURE — 80061 LIPID PANEL: CPT

## 2020-08-12 PROCEDURE — 82306 VITAMIN D 25 HYDROXY: CPT

## 2020-08-14 LAB
KAPPA LC FREE SER-MCNC: 38.3 MG/L (ref 3.3–19.4)
KAPPA LC FREE/LAMBDA FREE SER: 1.26 {RATIO} (ref 0.26–1.65)
KAPPA LC UR-MCNC: 6.63 MG/L (ref 0.63–113.79)
KAPPA LC/LAMBDA UR: 9.47 {RATIO} (ref 1.03–31.76)
LAMBDA LC FREE SERPL-MCNC: 30.3 MG/L (ref 5.7–26.3)
LAMBDA LC UR-MCNC: 0.7 MG/L (ref 0.47–11.77)

## 2020-08-15 LAB
ALP BONE CFR SERPL: 21 % (ref 14–68)
ALP INTEST CFR SERPL: 4 % (ref 0–18)
ALP LIVER CFR SERPL: 75 % (ref 18–85)
ALP SERPL-CCNC: 98 IU/L (ref 39–117)

## 2020-09-11 ENCOUNTER — TELEPHONE (OUTPATIENT)
Dept: HEMATOLOGY ONCOLOGY | Facility: CLINIC | Age: 70
End: 2020-09-11

## 2020-09-11 NOTE — TELEPHONE ENCOUNTER
Called and spoke to  Soumya Hilliard in regards to needing to r/s [facility]'s appt with Dr Garrick Donnelly that was originally scheduled for 9/28/20  Soumya Hilliard stated that he would have Federica Waters call us back to r/s

## 2020-09-15 ENCOUNTER — TELEPHONE (OUTPATIENT)
Dept: HEMATOLOGY ONCOLOGY | Facility: CLINIC | Age: 70
End: 2020-09-15

## 2020-09-17 ENCOUNTER — OFFICE VISIT (OUTPATIENT)
Dept: SLEEP CENTER | Facility: CLINIC | Age: 70
End: 2020-09-17
Payer: COMMERCIAL

## 2020-09-17 VITALS
BODY MASS INDEX: 32.42 KG/M2 | DIASTOLIC BLOOD PRESSURE: 64 MMHG | WEIGHT: 175.8 LBS | SYSTOLIC BLOOD PRESSURE: 112 MMHG | HEART RATE: 50 BPM

## 2020-09-17 DIAGNOSIS — G47.10 HYPERSOMNIA: Primary | ICD-10-CM

## 2020-09-17 PROCEDURE — 99214 OFFICE O/P EST MOD 30 MIN: CPT | Performed by: INTERNAL MEDICINE

## 2020-09-17 RX ORDER — DEXTROAMPHETAMINE SACCHARATE, AMPHETAMINE ASPARTATE MONOHYDRATE, DEXTROAMPHETAMINE SULFATE AND AMPHETAMINE SULFATE 2.5; 2.5; 2.5; 2.5 MG/1; MG/1; MG/1; MG/1
10 CAPSULE, EXTENDED RELEASE ORAL EVERY MORNING
Qty: 30 CAPSULE | Refills: 0 | Status: SHIPPED | OUTPATIENT
Start: 2020-09-17 | End: 2020-09-30 | Stop reason: SDUPTHER

## 2020-09-17 NOTE — PROGRESS NOTES
Progress Note - Sleep Center   Estelle Hansendarren DRD:[id number] MRN: [id number]      Reason for Visit:  65 y  o female here for annual follow-up    Assessment:  Doing well on current therapy of APAP 4 to 20 cm for mild DENIZ (AHI = 14 0)  Her only problem is keeping the mask on at night  The patient remained awake on armodafinil, but has severe diarrhea  I will try her on Adderall XL 10 mg by mouth every morning    Plan:  Continue same    Follow up: One year    History of Present Illness:  History of DENIZ on PAP therapy  Fully compliant and deriving benefit  Review of Systems      Genitourinary post menopausal (no peroids)   Cardiology none   Gastrointestinal none   Neurology none   Constitutional fatigue   Integumentary none   Psychiatry anxiety and depression   Musculoskeletal joint pain and none   Pulmonary none   ENT none   Endocrine excessive thirst   Hematological none         I have reviewed and updated the review of systems as necessary      Historical Information    Past Medical History:   Past Medical History:   Diagnosis Date    Anxiety     Arthritis     Atopic dermatitis     Breast CA (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 )     Osteoporosis     PE (pulmonary thromboembolism) ([facility] )     Peptic ulcer disease     Psychiatric disorder     Sjogren's syndrome ([facility] )     Sleep apnea     does not use cpap         Past Surgical History:   Past Surgical History:   Procedure Laterality Date    ABDOMINAL SURGERY      APPENDECTOMY      BREAST BIOPSY Right     prior to breast cancer    CHOLECYSTECTOMY      COLONOSCOPY W/ ENDOSCOPIC US N/A 2/24/2017    Procedure: ANAL ENDOSCOPIC U/S;  Surgeon: Emi Arciniega MD;  Location: [facility];   Service:     GASTRECTOMY      HYSTERECTOMY      age 26    MASTECTOMY Left     age 44    OK COLONOSCOPY FLX DX W/COLLJ SPEC WHEN PFRMD N/A 2/24/2017    Procedure: COLONOSCOPY;  Surgeon: Mandi Wisdom MD;  Location: [facility];   Service: Gastroenterology       Social History:   Social History     Socioeconomic History    Marital status: /Civil Union     Spouse name: Not on file    Number of children: Not on file    Years of education: Not on file    Highest education level: Not on file   Occupational History    Not on file   Social Needs    Financial resource strain: Not on file    Food insecurity     Worry: Not on file     Inability: Not on file   [employer] needs     Medical: Not on file     Non-medical: Not on file   Tobacco Use    Smoking status: Never Smoker    Smokeless tobacco: Never Used   Substance and Sexual Activity    Alcohol use: Yes     Comment: socially    Drug use: No    Sexual activity: Not on file   Lifestyle    Physical activity     Days per week: Not on file     Minutes per session: Not on file    Stress: Not on file   Relationships    Social connections     Talks on phone: Not on file     Gets together: Not on file     Attends Yazidi service: Not on file     Active member of club or organization: Not on file     Attends meetings of clubs or organizations: Not on file     Relationship status: Not on file    Intimate partner violence     Fear of current or ex partner: Not on file     Emotionally abused: Not on file     Physically abused: Not on file     Forced sexual activity: Not on file   Other Topics Concern    Not on file   Social History Narrative    Not on file       Family History:   Family History   Problem Relation Age of Onset    Hypertension Mother     Heart failure Father     Esophageal cancer Father 66    Breast cancer Paternal Aunt 50    Breast cancer Cousin 48        paternal    Breast cancer Cousin 48        paternal    Ovarian cancer Maternal Aunt 62    No Known Problems Sister     No Known Problems Maternal Grandmother     No Known Problems Maternal Grandfather     No Known Problems Paternal Grandmother     No Known Problems Paternal Grandfather     No Known Problems Son     No Known Problems Son     No Known Problems Maternal Aunt     No Known Problems Maternal Aunt     No Known Problems Paternal Aunt        Medications/Allergies:      Current Outpatient Medications:     ALPRAZolam (XANAX) 0 5 mg tablet, Take 0 5 mg by mouth daily at bedtime as needed for anxiety  , Disp: , Rfl:     amLODIPine-benazepril (LOTREL 5-20) 5-20 MG per capsule, Take 1 capsule by mouth daily  , Disp: , Rfl:     amphetamine-dextroamphetamine (ADDERALL XR) 10 MG 24 hr capsule, Take 1 capsule (10 mg total) by mouth every morningMax Daily Amount: 10 mg, Disp: 30 capsule, Rfl: 0    aspirin 325 mg tablet, Take 325 mg by mouth daily  , Disp: , Rfl:     atenolol (TENORMIN) 100 mg tablet, Take 100 mg by mouth daily  , Disp: , Rfl:     cevimeline (EVOXAC) 30 MG capsule, Take 30 mg by mouth daily  , Disp: , Rfl:     cholecalciferol (VITAMIN D3) 1,000 units tablet, Take 1,000 Units by mouth daily, Disp: , Rfl:     cycloSPORINE (RESTASIS) 0 05 % ophthalmic emulsion, Administer 1 drop to both eyes 2 (two) times a day , Disp: , Rfl:     hydroxychloroquine (PLAQUENIL) 200 mg tablet, Take 200 mg by mouth 2 (two) times a day, Disp: , Rfl:     OLANZapine (ZyPREXA) 2 5 mg tablet, Take 2 5 mg by mouth daily at bedtime  , Disp: , Rfl:     rOPINIRole (REQUIP) 0 5 mg tablet, Take 0 5 mg by mouth daily, Disp: , Rfl:     venlafaxine (EFFEXOR XR) 150 mg 24 hr capsule, Take 150 mg by mouth daily  , Disp: , Rfl:     zolpidem (AMBIEN) 10 mg tablet, Take 10 mg by mouth daily at bedtime  , Disp: , Rfl:           Objective      Vital Signs:   Vitals:    09/17/20 1300   BP: 112/64   Pulse: (!) 50     [place] Sleepiness Scale:  Total score: 13        Physical Exam:    General: Alert, appropriate, cooperative, overweight    Head: NC/AT    Skin: Warm, dry    Neuro: No motor abnormalities, cranial nerves appear intact    Extremity: No clubbing, cyanosis      DME Provider: [facility] [employer] [facility]        Counseling / Coordination of Care   I have spent 15 minutes with the patient today in which greater than 50% of this time was spent in counseling/coordination of care regarding: equipment and compliance  Board Certified Sleep Specialist    Portions of the record may have been created with voice recognition software  Occasional wrong word or "sound a like" substitutions may have occurred due to the inherent limitations of voice recognition software  Read the chart carefully and recognize, using context, where substitutions have occurred

## 2020-09-23 ENCOUNTER — TELEPHONE (OUTPATIENT)
Dept: SLEEP CENTER | Facility: CLINIC | Age: 70
End: 2020-09-23

## 2020-09-23 NOTE — TELEPHONE ENCOUNTER
Patient called and said the amphetamine-dextroamphetamine (ADDERALL XR) 10 MG 24 hr capsule is making a difference, but she thinks the dose should be increased

## 2020-09-23 NOTE — TELEPHONE ENCOUNTER
Have her try 2 x 10 mg and see if it is more effective  If so, I will send in a prescription for that dose

## 2020-09-24 NOTE — TELEPHONE ENCOUNTER
Advised patient Dr Nazario Pham recommendation for increasing dose trial  Patient agreeable and will call back on Monday with update

## 2020-09-30 DIAGNOSIS — G47.10 HYPERSOMNIA: ICD-10-CM

## 2020-10-01 ENCOUNTER — OFFICE VISIT (OUTPATIENT)
Dept: HEMATOLOGY ONCOLOGY | Facility: CLINIC | Age: 70
End: 2020-10-01
Payer: COMMERCIAL

## 2020-10-01 VITALS
OXYGEN SATURATION: 98 % | BODY MASS INDEX: 31.01 KG/M2 | RESPIRATION RATE: 18 BRPM | TEMPERATURE: 97.9 F | SYSTOLIC BLOOD PRESSURE: 130 MMHG | DIASTOLIC BLOOD PRESSURE: 76 MMHG | HEART RATE: 56 BPM | HEIGHT: 63 IN | WEIGHT: 175 LBS

## 2020-10-01 DIAGNOSIS — Z85.3 PERSONAL HISTORY OF MALIGNANT NEOPLASM OF BREAST: ICD-10-CM

## 2020-10-01 DIAGNOSIS — D75.89 MACROCYTOSIS WITHOUT ANEMIA: ICD-10-CM

## 2020-10-01 DIAGNOSIS — D89.89 LIGHT CHAIN DISEASE (HCC): Primary | ICD-10-CM

## 2020-10-01 DIAGNOSIS — E63.9 NUTRITIONAL DEFICIENCY: ICD-10-CM

## 2020-10-01 PROBLEM — R74.8 ALKALINE PHOSPHATASE ELEVATION: Status: RESOLVED | Noted: 2019-07-25 | Resolved: 2020-10-01

## 2020-10-01 PROCEDURE — 99213 OFFICE O/P EST LOW 20 MIN: CPT | Performed by: INTERNAL MEDICINE

## 2020-10-04 RX ORDER — DEXTROAMPHETAMINE SACCHARATE, AMPHETAMINE ASPARTATE MONOHYDRATE, DEXTROAMPHETAMINE SULFATE AND AMPHETAMINE SULFATE 2.5; 2.5; 2.5; 2.5 MG/1; MG/1; MG/1; MG/1
10 CAPSULE, EXTENDED RELEASE ORAL EVERY MORNING
Qty: 30 CAPSULE | Refills: 0 | Status: SHIPPED | OUTPATIENT
Start: 2020-10-04 | End: 2020-10-19 | Stop reason: SDUPTHER

## 2020-10-07 ENCOUNTER — TELEPHONE (OUTPATIENT)
Dept: SLEEP CENTER | Facility: CLINIC | Age: 70
End: 2020-10-07

## 2020-10-14 ENCOUNTER — TELEPHONE (OUTPATIENT)
Dept: SLEEP CENTER | Facility: CLINIC | Age: 70
End: 2020-10-14

## 2020-10-15 ENCOUNTER — TELEPHONE (OUTPATIENT)
Dept: SLEEP CENTER | Facility: CLINIC | Age: 70
End: 2020-10-15

## 2020-10-16 DIAGNOSIS — G47.10 HYPERSOMNIA: ICD-10-CM

## 2020-10-16 RX ORDER — DEXTROAMPHETAMINE SACCHARATE, AMPHETAMINE ASPARTATE MONOHYDRATE, DEXTROAMPHETAMINE SULFATE AND AMPHETAMINE SULFATE 2.5; 2.5; 2.5; 2.5 MG/1; MG/1; MG/1; MG/1
10 CAPSULE, EXTENDED RELEASE ORAL EVERY MORNING
Qty: 30 CAPSULE | Refills: 0 | Status: CANCELLED | OUTPATIENT
Start: 2020-10-16

## 2020-10-19 ENCOUNTER — LAB (OUTPATIENT)
Dept: LAB | Age: 70
End: 2020-10-19
Payer: COMMERCIAL

## 2020-10-19 DIAGNOSIS — G47.10 HYPERSOMNIA: ICD-10-CM

## 2020-10-19 DIAGNOSIS — E63.9 NUTRITIONAL DEFICIENCY: ICD-10-CM

## 2020-10-19 DIAGNOSIS — D75.89 MACROCYTOSIS WITHOUT ANEMIA: ICD-10-CM

## 2020-10-19 LAB
FOLATE SERPL-MCNC: >20 NG/ML (ref 3.1–17.5)
VIT B12 SERPL-MCNC: 468 PG/ML (ref 100–900)

## 2020-10-19 PROCEDURE — 36415 COLL VENOUS BLD VENIPUNCTURE: CPT

## 2020-10-19 PROCEDURE — 82607 VITAMIN B-12: CPT

## 2020-10-19 PROCEDURE — 82746 ASSAY OF FOLIC ACID SERUM: CPT

## 2020-10-19 RX ORDER — DEXTROAMPHETAMINE SACCHARATE, AMPHETAMINE ASPARTATE MONOHYDRATE, DEXTROAMPHETAMINE SULFATE AND AMPHETAMINE SULFATE 2.5; 2.5; 2.5; 2.5 MG/1; MG/1; MG/1; MG/1
10 CAPSULE, EXTENDED RELEASE ORAL EVERY MORNING
Qty: 30 CAPSULE | Refills: 0 | Status: SHIPPED | OUTPATIENT
Start: 2020-10-19 | End: 2020-11-12 | Stop reason: SDUPTHER

## 2020-11-12 DIAGNOSIS — G47.10 HYPERSOMNIA: ICD-10-CM

## 2020-11-15 RX ORDER — DEXTROAMPHETAMINE SACCHARATE, AMPHETAMINE ASPARTATE MONOHYDRATE, DEXTROAMPHETAMINE SULFATE AND AMPHETAMINE SULFATE 2.5; 2.5; 2.5; 2.5 MG/1; MG/1; MG/1; MG/1
10 CAPSULE, EXTENDED RELEASE ORAL EVERY MORNING
Qty: 30 CAPSULE | Refills: 0 | Status: SHIPPED | OUTPATIENT
Start: 2020-11-15 | End: 2020-12-22 | Stop reason: SDUPTHER

## 2020-11-20 ENCOUNTER — TELEPHONE (OUTPATIENT)
Dept: SLEEP CENTER | Facility: CLINIC | Age: 70
End: 2020-11-20

## 2020-12-22 DIAGNOSIS — G47.10 HYPERSOMNIA: ICD-10-CM

## 2020-12-23 RX ORDER — DEXTROAMPHETAMINE SACCHARATE, AMPHETAMINE ASPARTATE MONOHYDRATE, DEXTROAMPHETAMINE SULFATE AND AMPHETAMINE SULFATE 2.5; 2.5; 2.5; 2.5 MG/1; MG/1; MG/1; MG/1
10 CAPSULE, EXTENDED RELEASE ORAL EVERY MORNING
Qty: 30 CAPSULE | Refills: 0 | Status: SHIPPED | OUTPATIENT
Start: 2020-12-23 | End: 2021-01-25 | Stop reason: SDUPTHER

## 2021-01-25 DIAGNOSIS — G47.10 HYPERSOMNIA: ICD-10-CM

## 2021-01-26 RX ORDER — DEXTROAMPHETAMINE SACCHARATE, AMPHETAMINE ASPARTATE MONOHYDRATE, DEXTROAMPHETAMINE SULFATE AND AMPHETAMINE SULFATE 2.5; 2.5; 2.5; 2.5 MG/1; MG/1; MG/1; MG/1
10 CAPSULE, EXTENDED RELEASE ORAL EVERY MORNING
Qty: 30 CAPSULE | Refills: 0 | Status: SHIPPED | OUTPATIENT
Start: 2021-01-26 | End: 2021-02-22 | Stop reason: SDUPTHER

## 2021-01-28 ENCOUNTER — TELEPHONE (OUTPATIENT)
Dept: SLEEP CENTER | Facility: CLINIC | Age: 71
End: 2021-01-28

## 2021-01-28 NOTE — TELEPHONE ENCOUNTER
Received notification that amphentam/dextroamphe needs prior auth  October 2020, insurance denied, patient was using savings card       Left message for patient to call, will need to verify that she is still using the savings card

## 2021-02-17 ENCOUNTER — HOSPITAL ENCOUNTER (OUTPATIENT)
Dept: RADIOLOGY | Age: 71
Discharge: HOME | End: 2021-02-17
Payer: COMMERCIAL

## 2021-02-17 VITALS — WEIGHT: 165 LBS | HEIGHT: 63 IN | BODY MASS INDEX: 29.23 KG/M2

## 2021-02-17 DIAGNOSIS — Z12.31 ENCOUNTER FOR SCREENING MAMMOGRAM FOR MALIGNANT NEOPLASM OF BREAST: ICD-10-CM

## 2021-02-17 DIAGNOSIS — Z85.3 PERSONAL HISTORY OF MALIGNANT NEOPLASM OF BREAST: ICD-10-CM

## 2021-02-17 PROCEDURE — 77063 BREAST TOMOSYNTHESIS BI: CPT

## 2021-02-17 PROCEDURE — 77067 SCR MAMMO BI INCL CAD: CPT

## 2021-02-22 DIAGNOSIS — G47.10 HYPERSOMNIA: ICD-10-CM

## 2021-02-23 RX ORDER — DEXTROAMPHETAMINE SACCHARATE, AMPHETAMINE ASPARTATE MONOHYDRATE, DEXTROAMPHETAMINE SULFATE AND AMPHETAMINE SULFATE 2.5; 2.5; 2.5; 2.5 MG/1; MG/1; MG/1; MG/1
10 CAPSULE, EXTENDED RELEASE ORAL EVERY MORNING
Qty: 30 CAPSULE | Refills: 0 | Status: SHIPPED | OUTPATIENT
Start: 2021-02-23 | End: 2021-03-25 | Stop reason: SDUPTHER

## 2021-02-25 ENCOUNTER — LAB (OUTPATIENT)
Dept: LAB | Age: 71
End: 2021-02-25
Payer: COMMERCIAL

## 2021-02-25 ENCOUNTER — TRANSCRIBE ORDERS (OUTPATIENT)
Dept: ADMINISTRATIVE | Age: 71
End: 2021-02-25

## 2021-02-25 DIAGNOSIS — M81.0 SENILE OSTEOPOROSIS: Primary | ICD-10-CM

## 2021-02-25 DIAGNOSIS — M81.0 SENILE OSTEOPOROSIS: ICD-10-CM

## 2021-02-25 LAB — CALCIUM SERPL-MCNC: 9.5 MG/DL (ref 8.3–10.1)

## 2021-02-25 PROCEDURE — 36415 COLL VENOUS BLD VENIPUNCTURE: CPT

## 2021-02-25 PROCEDURE — 82310 ASSAY OF CALCIUM: CPT

## 2021-03-01 ENCOUNTER — OFFICE VISIT (OUTPATIENT)
Dept: SURGERY | Facility: CLINIC | Age: 71
End: 2021-03-01
Payer: COMMERCIAL

## 2021-03-01 VITALS — TEMPERATURE: 96.7 F

## 2021-03-01 DIAGNOSIS — Z85.3 PERSONAL HISTORY OF MALIGNANT NEOPLASM OF BREAST: Primary | ICD-10-CM

## 2021-03-01 PROCEDURE — 99213 OFFICE O/P EST LOW 20 MIN: CPT | Performed by: SURGERY

## 2021-03-01 PROCEDURE — 1160F RVW MEDS BY RX/DR IN RCRD: CPT | Performed by: SURGERY

## 2021-03-01 PROCEDURE — 1036F TOBACCO NON-USER: CPT | Performed by: SURGERY

## 2021-03-01 NOTE — PROGRESS NOTES
Assessment/Plan:Mrs Jose Manuel Root has a history for fibrocystic breast changes in several bilateral breast biopsies  In 1999 she underwent a left modified radical mastectomy and sentinel lymph node biopsy for cancer  Lymph nodes were negative as well as hormone receptors  She completed 4 cycles of adjuvant Adriamycin and Cytoxan  She then underwent a tram reconstruction  She has a chronic ulcer in the median aspect of the left breast which was felt to be secondary to neuro dermatitis  The ulcer has since healed well  Returns today for follow-up visit  She feels well  She has no breast complaints  Updated mammogram was unremarkable  There is no evidence for dominant masses on her tram flap over the left chest   There is scar tissue from dermatitis  No open ulcers  No evidence for adenopathy  The right breast is nontender  No masses are noted  No adenopathy present  Follow-up visit 1 years requested after an updated right mammogram     Diagnoses and all orders for this visit:    Personal history of malignant neoplasm of breast        Subjective:      Patient ID: Wu Rodriguez is a 66 y o  female  Patient presents for yearly breast exam   Denies pain/problems/changes  Mammogram 2/17/2021  The following portions of the patient's history were reviewed and updated as appropriate:     She  has a past medical history of Anxiety, Arthritis, Atopic dermatitis, Breast CA ([address] ) (1999), Depression, Fracture, 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[address] ), History of chemotherapy, Infective polyneuritis, acute ([address] ), Osteoporosis, PE (pulmonary thromboembolism) ([address] ), Peptic ulcer disease, Psychiatric disorder, Sjogren's syndrome ([address] ), and Sleep apnea  She  has a past surgical history that includes Appendectomy; Abdominal surgery; Gastrectomy; Cholecystectomy; pr colonoscopy flx dx w/collj spec when pfrmd (N/A, 2/24/2017); Colonoscopy w/ endoscopic US (N/A, 2/24/2017); Breast biopsy (Right);  Hysterectomy; and Mastectomy (Left, 1999)  Her family history includes Breast cancer (age of onset: 50) in her paternal aunt; Breast cancer (age of onset: 48) in her cousin and cousin; Esophageal cancer (age of onset: 66) in her father; Heart failure in her father; Hypertension in her mother; No Known Problems in her maternal aunt, maternal aunt, maternal grandfather, maternal grandmother, paternal aunt, paternal grandfather, paternal grandmother, sister, son, and son; Ovarian cancer (age of onset: 62) in her maternal aunt  She  reports that she has never smoked  She has never used smokeless tobacco  She reports current alcohol use  She reports that she does not use drugs  Current Outpatient Medications   Medication Sig Dispense Refill    ALPRAZolam (XANAX) 0 5 mg tablet Take 0 5 mg by mouth daily at bedtime as needed for anxiety   amLODIPine-benazepril (LOTREL 5-20) 5-20 MG per capsule Take 1 capsule by mouth daily   amphetamine-dextroamphetamine (ADDERALL XR) 10 MG 24 hr capsule Take 1 capsule (10 mg total) by mouth every morningMax Daily Amount: 10 mg 30 capsule 0    aspirin 325 mg tablet Take 325 mg by mouth daily   atenolol (TENORMIN) 100 mg tablet Take 100 mg by mouth daily   cevimeline (EVOXAC) 30 MG capsule Take 30 mg by mouth daily   cholecalciferol (VITAMIN D3) 1,000 units tablet Take 1,000 Units by mouth daily      cycloSPORINE (RESTASIS) 0 05 % ophthalmic emulsion Administer 1 drop to both eyes 2 (two) times a day   hydroxychloroquine (PLAQUENIL) 200 mg tablet Take 200 mg by mouth 2 (two) times a day      OLANZapine (ZyPREXA) 2 5 mg tablet Take 2 5 mg by mouth daily at bedtime   rOPINIRole (REQUIP) 0 5 mg tablet Take 0 5 mg by mouth daily      venlafaxine (EFFEXOR XR) 150 mg 24 hr capsule Take 150 mg by mouth daily   zolpidem (AMBIEN) 10 mg tablet Take 10 mg by mouth daily at bedtime  No current facility-administered medications for this visit        She is allergic to lamotrigine       Review of Systems   Constitutional: Positive for fatigue  Negative for activity change  HENT: Negative  Eyes: Negative  Respiratory: Negative  Cardiovascular: Negative  Gastrointestinal: Negative  Endocrine: Negative  Genitourinary: Negative  Musculoskeletal: Negative  Skin: Negative  Allergic/Immunologic: Negative  Neurological: Negative  Negative for dizziness and seizures  Psychiatric/Behavioral: Negative for agitation, behavioral problems and confusion  The patient is not nervous/anxious  Objective:      Temp (!) 96 7 °F (35 9 °C)   LMP  (LMP Unknown)          Physical Exam  Constitutional:       Appearance: Normal appearance  She is well-developed  HENT:      Head: Normocephalic and atraumatic  Nose: Nose normal    Eyes:      Extraocular Movements: Extraocular movements intact  Conjunctiva/sclera: Conjunctivae normal    Neck:      Musculoskeletal: Normal range of motion  Cardiovascular:      Rate and Rhythm: Normal rate and regular rhythm  Heart sounds: Normal heart sounds  Pulmonary:      Effort: Pulmonary effort is normal       Breath sounds: Normal breath sounds  Chest:       Abdominal:      General: Abdomen is flat  Musculoskeletal: Normal range of motion  Skin:     General: Skin is warm and dry  Neurological:      Mental Status: She is alert and oriented to person, place, and time     Psychiatric:         Mood and Affect: Mood normal

## 2021-03-02 DIAGNOSIS — Z23 ENCOUNTER FOR IMMUNIZATION: ICD-10-CM

## 2021-03-05 ENCOUNTER — IMMUNIZATIONS (OUTPATIENT)
Dept: FAMILY MEDICINE CLINIC | Facility: HOSPITAL | Age: 71
End: 2021-03-05

## 2021-03-05 DIAGNOSIS — Z23 ENCOUNTER FOR IMMUNIZATION: Primary | ICD-10-CM

## 2021-03-05 PROCEDURE — 0001A SARS-COV-2 / COVID-19 MRNA VACCINE (PFIZER-BIONTECH) 30 MCG: CPT

## 2021-03-05 PROCEDURE — 91300 SARS-COV-2 / COVID-19 MRNA VACCINE (PFIZER-BIONTECH) 30 MCG: CPT

## 2021-03-24 ENCOUNTER — IMMUNIZATIONS (OUTPATIENT)
Dept: FAMILY MEDICINE CLINIC | Facility: HOSPITAL | Age: 71
End: 2021-03-24

## 2021-03-24 DIAGNOSIS — Z23 ENCOUNTER FOR IMMUNIZATION: Primary | ICD-10-CM

## 2021-03-24 PROCEDURE — 0002A SARS-COV-2 / COVID-19 MRNA VACCINE (PFIZER-BIONTECH) 30 MCG: CPT

## 2021-03-24 PROCEDURE — 91300 SARS-COV-2 / COVID-19 MRNA VACCINE (PFIZER-BIONTECH) 30 MCG: CPT

## 2021-03-25 DIAGNOSIS — G47.10 HYPERSOMNIA: ICD-10-CM

## 2021-03-25 RX ORDER — DEXTROAMPHETAMINE SACCHARATE, AMPHETAMINE ASPARTATE MONOHYDRATE, DEXTROAMPHETAMINE SULFATE AND AMPHETAMINE SULFATE 2.5; 2.5; 2.5; 2.5 MG/1; MG/1; MG/1; MG/1
10 CAPSULE, EXTENDED RELEASE ORAL EVERY MORNING
Qty: 30 CAPSULE | Refills: 0 | Status: SHIPPED | OUTPATIENT
Start: 2021-03-25 | End: 2021-04-23 | Stop reason: SDUPTHER

## 2021-03-25 NOTE — TELEPHONE ENCOUNTER
Next appointment 9/21/2021    Patient asking for 90 day supply     If you agree you will need to change dispense quantity to 90

## 2021-04-23 DIAGNOSIS — G47.10 HYPERSOMNIA: ICD-10-CM

## 2021-04-23 NOTE — TELEPHONE ENCOUNTER
Patient left message requesting refill of amphetamine-dextroamphetamine (ADDERALL XR) 10 MG 24 hr capsule  Send to [address] in [place]  Next appointment 9/21/21  Dr Michelle Redmond please sign if appropriate

## 2021-04-29 RX ORDER — DEXTROAMPHETAMINE SACCHARATE, AMPHETAMINE ASPARTATE MONOHYDRATE, DEXTROAMPHETAMINE SULFATE AND AMPHETAMINE SULFATE 2.5; 2.5; 2.5; 2.5 MG/1; MG/1; MG/1; MG/1
10 CAPSULE, EXTENDED RELEASE ORAL EVERY MORNING
Qty: 30 CAPSULE | Refills: 0 | Status: SHIPPED | OUTPATIENT
Start: 2021-04-29 | End: 2021-05-24 | Stop reason: SDUPTHER

## 2021-05-07 ENCOUNTER — TELEPHONE (OUTPATIENT)
Dept: PSYCHIATRY | Facility: CLINIC | Age: 71
End: 2021-05-07

## 2021-05-07 NOTE — TELEPHONE ENCOUNTER
Pt called looking to set up w/ a psychiatrist  I informed pt  call MILLER Mcallister to put in a referral in the sytem   Once in I will forward information to Intake

## 2021-05-20 ENCOUNTER — APPOINTMENT (OUTPATIENT)
Dept: LAB | Age: 71
End: 2021-05-20
Payer: COMMERCIAL

## 2021-05-20 ENCOUNTER — TRANSCRIBE ORDERS (OUTPATIENT)
Dept: ADMINISTRATIVE | Age: 71
End: 2021-05-20

## 2021-05-20 DIAGNOSIS — E78.5 HYPERLIPIDEMIA, UNSPECIFIED HYPERLIPIDEMIA TYPE: ICD-10-CM

## 2021-05-20 DIAGNOSIS — I10 ESSENTIAL HYPERTENSION, MALIGNANT: Primary | ICD-10-CM

## 2021-05-20 DIAGNOSIS — Z87.898 PERSONAL HISTORY OF OTHER LYMPHATIC AND HEMATOPOIETIC NEOPLASM: ICD-10-CM

## 2021-05-20 DIAGNOSIS — I10 ESSENTIAL HYPERTENSION, MALIGNANT: ICD-10-CM

## 2021-05-20 DIAGNOSIS — G47.33 OBSTRUCTIVE SLEEP APNEA (ADULT) (PEDIATRIC): ICD-10-CM

## 2021-05-20 LAB
ALBUMIN SERPL BCP-MCNC: 3.1 G/DL (ref 3.5–5)
ALP SERPL-CCNC: 105 U/L (ref 46–116)
ALT SERPL W P-5'-P-CCNC: 32 U/L (ref 12–78)
ANION GAP SERPL CALCULATED.3IONS-SCNC: 8 MMOL/L (ref 4–13)
AST SERPL W P-5'-P-CCNC: 35 U/L (ref 5–45)
BASOPHILS # BLD AUTO: 0.04 THOUSANDS/ΜL (ref 0–0.1)
BASOPHILS NFR BLD AUTO: 1 % (ref 0–1)
BILIRUB DIRECT SERPL-MCNC: 0.13 MG/DL (ref 0–0.2)
BILIRUB SERPL-MCNC: 0.39 MG/DL (ref 0.2–1)
BUN SERPL-MCNC: 14 MG/DL (ref 5–25)
CALCIUM SERPL-MCNC: 9 MG/DL (ref 8.3–10.1)
CHLORIDE SERPL-SCNC: 106 MMOL/L (ref 100–108)
CHOLEST SERPL-MCNC: 158 MG/DL (ref 50–200)
CO2 SERPL-SCNC: 26 MMOL/L (ref 21–32)
CREAT SERPL-MCNC: 1.06 MG/DL (ref 0.6–1.3)
EOSINOPHIL # BLD AUTO: 0.06 THOUSAND/ΜL (ref 0–0.61)
EOSINOPHIL NFR BLD AUTO: 1 % (ref 0–6)
ERYTHROCYTE [DISTWIDTH] IN BLOOD BY AUTOMATED COUNT: 12.5 % (ref 11.6–15.1)
EST. AVERAGE GLUCOSE BLD GHB EST-MCNC: 94 MG/DL
GFR SERPL CREATININE-BSD FRML MDRD: 52 ML/MIN/1.73SQ M
GLUCOSE P FAST SERPL-MCNC: 86 MG/DL (ref 65–99)
HBA1C MFR BLD: 4.9 %
HCT VFR BLD AUTO: 42.4 % (ref 34.8–46.1)
HDLC SERPL-MCNC: 63 MG/DL
HGB BLD-MCNC: 13.7 G/DL (ref 11.5–15.4)
IMM GRANULOCYTES # BLD AUTO: 0.01 THOUSAND/UL (ref 0–0.2)
IMM GRANULOCYTES NFR BLD AUTO: 0 % (ref 0–2)
LDLC SERPL CALC-MCNC: 82 MG/DL (ref 0–100)
LYMPHOCYTES # BLD AUTO: 1.76 THOUSANDS/ΜL (ref 0.6–4.47)
LYMPHOCYTES NFR BLD AUTO: 28 % (ref 14–44)
MAGNESIUM SERPL-MCNC: 2.3 MG/DL (ref 1.6–2.6)
MCH RBC QN AUTO: 32.1 PG (ref 26.8–34.3)
MCHC RBC AUTO-ENTMCNC: 32.3 G/DL (ref 31.4–37.4)
MCV RBC AUTO: 99 FL (ref 82–98)
MONOCYTES # BLD AUTO: 0.48 THOUSAND/ΜL (ref 0.17–1.22)
MONOCYTES NFR BLD AUTO: 8 % (ref 4–12)
NEUTROPHILS # BLD AUTO: 3.93 THOUSANDS/ΜL (ref 1.85–7.62)
NEUTS SEG NFR BLD AUTO: 62 % (ref 43–75)
NONHDLC SERPL-MCNC: 95 MG/DL
NRBC BLD AUTO-RTO: 0 /100 WBCS
PLATELET # BLD AUTO: 145 THOUSANDS/UL (ref 149–390)
PMV BLD AUTO: 11.3 FL (ref 8.9–12.7)
POTASSIUM SERPL-SCNC: 4.4 MMOL/L (ref 3.5–5.3)
PROT SERPL-MCNC: 7 G/DL (ref 6.4–8.2)
RBC # BLD AUTO: 4.27 MILLION/UL (ref 3.81–5.12)
SODIUM SERPL-SCNC: 140 MMOL/L (ref 136–145)
TRIGL SERPL-MCNC: 65 MG/DL
TSH SERPL DL<=0.05 MIU/L-ACNC: 1.7 UIU/ML (ref 0.36–3.74)
WBC # BLD AUTO: 6.28 THOUSAND/UL (ref 4.31–10.16)

## 2021-05-20 PROCEDURE — 84443 ASSAY THYROID STIM HORMONE: CPT

## 2021-05-20 PROCEDURE — 83735 ASSAY OF MAGNESIUM: CPT

## 2021-05-20 PROCEDURE — 80048 BASIC METABOLIC PNL TOTAL CA: CPT

## 2021-05-20 PROCEDURE — 85025 COMPLETE CBC W/AUTO DIFF WBC: CPT

## 2021-05-20 PROCEDURE — 80076 HEPATIC FUNCTION PANEL: CPT

## 2021-05-20 PROCEDURE — 36415 COLL VENOUS BLD VENIPUNCTURE: CPT

## 2021-05-20 PROCEDURE — 80061 LIPID PANEL: CPT

## 2021-05-20 PROCEDURE — 83036 HEMOGLOBIN GLYCOSYLATED A1C: CPT

## 2021-05-24 DIAGNOSIS — G47.10 HYPERSOMNIA: ICD-10-CM

## 2021-05-24 NOTE — TELEPHONE ENCOUNTER
Patient called requesting refill of amphetamine-dextroamphetamine (ADDERALL XR) 10 MG 24 hr capsule  [facility] to [facility]  Next appointment 9/21/21

## 2021-05-26 RX ORDER — DEXTROAMPHETAMINE SACCHARATE, AMPHETAMINE ASPARTATE MONOHYDRATE, DEXTROAMPHETAMINE SULFATE AND AMPHETAMINE SULFATE 2.5; 2.5; 2.5; 2.5 MG/1; MG/1; MG/1; MG/1
10 CAPSULE, EXTENDED RELEASE ORAL EVERY MORNING
Qty: 30 CAPSULE | Refills: 0 | Status: SHIPPED | OUTPATIENT
Start: 2021-05-26 | End: 2021-06-23 | Stop reason: SDUPTHER

## 2021-06-23 DIAGNOSIS — G47.10 HYPERSOMNIA: ICD-10-CM

## 2021-06-23 NOTE — TELEPHONE ENCOUNTER
Patient left mvoice message requesting refill of amphetamine-dextroamphetamine (ADDERALL XR) 10 MG 24 hr capsule  Send to [address] in [place]  Dr Bere Moreno please review Rx and sign if appropriate

## 2021-06-24 RX ORDER — DEXTROAMPHETAMINE SACCHARATE, AMPHETAMINE ASPARTATE MONOHYDRATE, DEXTROAMPHETAMINE SULFATE AND AMPHETAMINE SULFATE 2.5; 2.5; 2.5; 2.5 MG/1; MG/1; MG/1; MG/1
10 CAPSULE, EXTENDED RELEASE ORAL EVERY MORNING
Qty: 30 CAPSULE | Refills: 0 | Status: SHIPPED | OUTPATIENT
Start: 2021-06-24 | End: 2021-07-20 | Stop reason: SDUPTHER

## 2021-07-20 DIAGNOSIS — G47.10 HYPERSOMNIA: ICD-10-CM

## 2021-07-22 RX ORDER — DEXTROAMPHETAMINE SACCHARATE, AMPHETAMINE ASPARTATE MONOHYDRATE, DEXTROAMPHETAMINE SULFATE AND AMPHETAMINE SULFATE 2.5; 2.5; 2.5; 2.5 MG/1; MG/1; MG/1; MG/1
10 CAPSULE, EXTENDED RELEASE ORAL EVERY MORNING
Qty: 30 CAPSULE | Refills: 0 | Status: SHIPPED | OUTPATIENT
Start: 2021-07-22 | End: 2021-08-17 | Stop reason: SDUPTHER

## 2021-08-17 DIAGNOSIS — G47.10 HYPERSOMNIA: ICD-10-CM

## 2021-08-17 RX ORDER — DEXTROAMPHETAMINE SACCHARATE, AMPHETAMINE ASPARTATE MONOHYDRATE, DEXTROAMPHETAMINE SULFATE AND AMPHETAMINE SULFATE 2.5; 2.5; 2.5; 2.5 MG/1; MG/1; MG/1; MG/1
10 CAPSULE, EXTENDED RELEASE ORAL EVERY MORNING
Qty: 30 CAPSULE | Refills: 0 | Status: SHIPPED | OUTPATIENT
Start: 2021-08-17 | End: 2021-09-21 | Stop reason: SDUPTHER

## 2021-09-08 ENCOUNTER — APPOINTMENT (OUTPATIENT)
Dept: LAB | Age: 71
End: 2021-09-08
Payer: COMMERCIAL

## 2021-09-08 DIAGNOSIS — I10 ESSENTIAL HYPERTENSION, MALIGNANT: ICD-10-CM

## 2021-09-08 DIAGNOSIS — E78.49 OTHER HYPERLIPIDEMIA: ICD-10-CM

## 2021-09-08 DIAGNOSIS — R39.0 EXTRAVASATION OF URINE: ICD-10-CM

## 2021-09-08 DIAGNOSIS — M81.0 SENILE OSTEOPOROSIS: ICD-10-CM

## 2021-09-08 LAB
25(OH)D3 SERPL-MCNC: 86.6 NG/ML (ref 30–100)
ALBUMIN SERPL BCP-MCNC: 3 G/DL (ref 3.5–5)
ALP SERPL-CCNC: 104 U/L (ref 46–116)
ALT SERPL W P-5'-P-CCNC: 27 U/L (ref 12–78)
ANION GAP SERPL CALCULATED.3IONS-SCNC: 6 MMOL/L (ref 4–13)
AST SERPL W P-5'-P-CCNC: 29 U/L (ref 5–45)
BASOPHILS # BLD AUTO: 0.02 THOUSANDS/ΜL (ref 0–0.1)
BASOPHILS NFR BLD AUTO: 0 % (ref 0–1)
BILIRUB SERPL-MCNC: 0.53 MG/DL (ref 0.2–1)
BUN SERPL-MCNC: 15 MG/DL (ref 5–25)
CALCIUM ALBUM COR SERPL-MCNC: 10 MG/DL (ref 8.3–10.1)
CALCIUM SERPL-MCNC: 9.2 MG/DL (ref 8.3–10.1)
CHLORIDE SERPL-SCNC: 105 MMOL/L (ref 100–108)
CHOLEST SERPL-MCNC: 162 MG/DL (ref 50–200)
CO2 SERPL-SCNC: 25 MMOL/L (ref 21–32)
CREAT SERPL-MCNC: 0.97 MG/DL (ref 0.6–1.3)
EOSINOPHIL # BLD AUTO: 0.17 THOUSAND/ΜL (ref 0–0.61)
EOSINOPHIL NFR BLD AUTO: 3 % (ref 0–6)
ERYTHROCYTE [DISTWIDTH] IN BLOOD BY AUTOMATED COUNT: 12.2 % (ref 11.6–15.1)
GFR SERPL CREATININE-BSD FRML MDRD: 58 ML/MIN/1.73SQ M
GLUCOSE P FAST SERPL-MCNC: 76 MG/DL (ref 65–99)
HCT VFR BLD AUTO: 40.8 % (ref 34.8–46.1)
HDLC SERPL-MCNC: 59 MG/DL
HGB BLD-MCNC: 13.6 G/DL (ref 11.5–15.4)
IMM GRANULOCYTES # BLD AUTO: 0.02 THOUSAND/UL (ref 0–0.2)
IMM GRANULOCYTES NFR BLD AUTO: 0 % (ref 0–2)
LDLC SERPL CALC-MCNC: 90 MG/DL (ref 0–100)
LYMPHOCYTES # BLD AUTO: 1.92 THOUSANDS/ΜL (ref 0.6–4.47)
LYMPHOCYTES NFR BLD AUTO: 30 % (ref 14–44)
MCH RBC QN AUTO: 33.3 PG (ref 26.8–34.3)
MCHC RBC AUTO-ENTMCNC: 33.3 G/DL (ref 31.4–37.4)
MCV RBC AUTO: 100 FL (ref 82–98)
MONOCYTES # BLD AUTO: 0.45 THOUSAND/ΜL (ref 0.17–1.22)
MONOCYTES NFR BLD AUTO: 7 % (ref 4–12)
NEUTROPHILS # BLD AUTO: 3.9 THOUSANDS/ΜL (ref 1.85–7.62)
NEUTS SEG NFR BLD AUTO: 60 % (ref 43–75)
NONHDLC SERPL-MCNC: 103 MG/DL
NRBC BLD AUTO-RTO: 0 /100 WBCS
PLATELET # BLD AUTO: 195 THOUSANDS/UL (ref 149–390)
PMV BLD AUTO: 11.1 FL (ref 8.9–12.7)
POTASSIUM SERPL-SCNC: 3.9 MMOL/L (ref 3.5–5.3)
PROT SERPL-MCNC: 7.1 G/DL (ref 6.4–8.2)
RBC # BLD AUTO: 4.09 MILLION/UL (ref 3.81–5.12)
SODIUM SERPL-SCNC: 136 MMOL/L (ref 136–145)
T3 SERPL-MCNC: 0.6 NG/ML (ref 0.6–1.8)
T4 FREE SERPL-MCNC: 0.87 NG/DL (ref 0.76–1.46)
TRIGL SERPL-MCNC: 67 MG/DL
TSH SERPL DL<=0.05 MIU/L-ACNC: 1.71 UIU/ML (ref 0.36–3.74)
WBC # BLD AUTO: 6.48 THOUSAND/UL (ref 4.31–10.16)

## 2021-09-08 PROCEDURE — 82306 VITAMIN D 25 HYDROXY: CPT

## 2021-09-08 PROCEDURE — 84443 ASSAY THYROID STIM HORMONE: CPT

## 2021-09-08 PROCEDURE — 80053 COMPREHEN METABOLIC PANEL: CPT

## 2021-09-08 PROCEDURE — 84439 ASSAY OF FREE THYROXINE: CPT

## 2021-09-08 PROCEDURE — 80061 LIPID PANEL: CPT

## 2021-09-08 PROCEDURE — 84480 ASSAY TRIIODOTHYRONINE (T3): CPT

## 2021-09-08 PROCEDURE — 36415 COLL VENOUS BLD VENIPUNCTURE: CPT

## 2021-09-08 PROCEDURE — 85025 COMPLETE CBC W/AUTO DIFF WBC: CPT

## 2021-09-21 ENCOUNTER — OFFICE VISIT (OUTPATIENT)
Dept: SLEEP CENTER | Facility: CLINIC | Age: 71
End: 2021-09-21
Payer: COMMERCIAL

## 2021-09-21 VITALS
DIASTOLIC BLOOD PRESSURE: 70 MMHG | HEART RATE: 50 BPM | SYSTOLIC BLOOD PRESSURE: 114 MMHG | WEIGHT: 154.8 LBS | BODY MASS INDEX: 27.43 KG/M2 | HEIGHT: 63 IN

## 2021-09-21 DIAGNOSIS — G47.10 HYPERSOMNIA: ICD-10-CM

## 2021-09-21 PROCEDURE — 99213 OFFICE O/P EST LOW 20 MIN: CPT | Performed by: INTERNAL MEDICINE

## 2021-09-21 RX ORDER — DEXTROAMPHETAMINE SACCHARATE, AMPHETAMINE ASPARTATE MONOHYDRATE, DEXTROAMPHETAMINE SULFATE AND AMPHETAMINE SULFATE 2.5; 2.5; 2.5; 2.5 MG/1; MG/1; MG/1; MG/1
10 CAPSULE, EXTENDED RELEASE ORAL EVERY MORNING
Qty: 30 CAPSULE | Refills: 0 | Status: SHIPPED | OUTPATIENT
Start: 2021-09-21

## 2021-09-21 NOTE — PROGRESS NOTES
Review of Systems      Genitourinary hot flashes at night and post menopausal (no peroids)   Cardiology none   Gastrointestinal none   Neurology forgetfulness and poor concentration or confusion,    Constitutional fatigue and excessive sweating at night   Integumentary none   Psychiatry anxiety   Musculoskeletal none   Pulmonary none   ENT none   Endocrine excessive thirst   Hematological none

## 2021-09-21 NOTE — PROGRESS NOTES
Progress Note - Sleep Center   Mila Oconnell KUK:[birth year] MRN: [id number]      Reason for Visit:  66 y  o female here for annual follow-up    Assessment:  Doing well on current therapy of APAP 4 to 20 cm for previously diagnosed mild obstructive sleep apnea (AHI = 14 0)  The patient is doing well with Adderall every morning for treatment of hypersomnia  Other medications were not effective  Plan:  Continue same    Follow up: One year    History of Present Illness:  History of DENIZ on PAP therapy  Fully compliant and deriving benefit  Hot flashes, forgetfulness, fatigue, sweating at night, anxiety, thirst  I have reviewed and updated the review of systems as necessary      Historical Information    Past Medical History:   Past Medical History:   Diagnosis Date    Anxiety     Arthritis     Atopic dermatitis     Breast CA ([facility] ) 1999    left  age 44    Depression     Fracture     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[facility] )     2013    History of chemotherapy     breast cancer    Infective polyneuritis, acute ([place] [address] )     Osteoporosis     PE (pulmonary thromboembolism) ([place] [address] )     Peptic ulcer disease     Psychiatric disorder     Sjogren's syndrome ([place] [address] )     Sleep apnea     does not use cpap         Past Surgical History:   Past Surgical History:   Procedure Laterality Date    ABDOMINAL SURGERY      APPENDECTOMY      BREAST BIOPSY Right     prior to breast cancer    CHOLECYSTECTOMY      COLONOSCOPY W/ ENDOSCOPIC US N/A 2/24/2017    Procedure: ANAL ENDOSCOPIC U/S;  Surgeon: Ирина Stanford MD;  [facility]; Service:     GASTRECTOMY      HYSTERECTOMY      age 26    MASTECTOMY Left 1999    age 44    RI COLONOSCOPY FLX DX W/ERIN Ceron 1978 PFRMD N/A 2/24/2017    Procedure: COLONOSCOPY;  Surgeon: Jenn Roblero MD;  [facility];   Service: Gastroenterology       Social History:   Social History     Socioeconomic History    Marital status: /Civil Union     Spouse name: None    Number of children: None    Years of education: None    Highest education level: None   Occupational History    None   Tobacco Use    Smoking status: Never Smoker    Smokeless tobacco: Never Used   Substance and Sexual Activity    Alcohol use: Yes     Comment: socially    Drug use: No    Sexual activity: None   Other Topics Concern    None   Social History Narrative    None     Social Determinants of Health     Financial Resource Strain:     Difficulty of Paying Living Expenses:    Food Insecurity:     Worried About Running Out of Food in the Last Year:     Ran Out of Food in the Last Year:    Transportation Needs:     Lack of Transportation (Medical):      Lack of Transportation (Non-Medical):    Physical Activity:     Days of Exercise per Week:     Minutes of Exercise per Session:    Stress:     Feeling of Stress :    Social Connections:     Frequency of Communication with Friends and Family:     Frequency of Social Gatherings with Friends and Family:     Attends Christian Services:     Active Member of Clubs or Organizations:     Attends Club or Organization Meetings:     Marital Status:    Intimate Partner Violence:     Fear of Current or Ex-Partner:     Emotionally Abused:     Physically Abused:     Sexually Abused:        Family History:   Family History   Problem Relation Age of Onset    Hypertension Mother     Heart failure Father     Esophageal cancer Father 66    Breast cancer Paternal Aunt 50    Breast cancer Cousin 48        paternal    Breast cancer Cousin 48        paternal    Ovarian cancer Maternal Aunt 62    No Known Problems Sister     No Known Problems Maternal Grandmother     No Known Problems Maternal Grandfather     No Known Problems Paternal Grandmother     No Known Problems Paternal Grandfather     No Known Problems Son     No Known Problems Son     No Known Problems Maternal Aunt     No Known Problems Maternal Aunt     No Known Problems Paternal Aunt Medications/Allergies:      Current Outpatient Medications:     ALPRAZolam (XANAX) 0 5 mg tablet, Take 0 5 mg by mouth daily at bedtime as needed for anxiety  , Disp: , Rfl:     amLODIPine-benazepril (LOTREL 5-20) 5-20 MG per capsule, Take 1 capsule by mouth daily  , Disp: , Rfl:     amphetamine-dextroamphetamine (ADDERALL XR) 10 MG 24 hr capsule, Take 1 capsule (10 mg total) by mouth every morningMax Daily Amount: 10 mg, Disp: 30 capsule, Rfl: 0    aspirin 325 mg tablet, Take 325 mg by mouth daily  , Disp: , Rfl:     atenolol (TENORMIN) 100 mg tablet, Take 100 mg by mouth daily  , Disp: , Rfl:     cevimeline (EVOXAC) 30 MG capsule, Take 30 mg by mouth daily  , Disp: , Rfl:     cholecalciferol (VITAMIN D3) 1,000 units tablet, Take 1,000 Units by mouth daily, Disp: , Rfl:     cycloSPORINE (RESTASIS) 0 05 % ophthalmic emulsion, Administer 1 drop to both eyes 2 (two) times a day , Disp: , Rfl:     hydroxychloroquine (PLAQUENIL) 200 mg tablet, Take 200 mg by mouth 2 (two) times a day, Disp: , Rfl:     OLANZapine (ZyPREXA) 2 5 mg tablet, Take 2 5 mg by mouth daily at bedtime  , Disp: , Rfl:     rOPINIRole (REQUIP) 0 5 mg tablet, Take 0 5 mg by mouth daily, Disp: , Rfl:     venlafaxine (EFFEXOR XR) 150 mg 24 hr capsule, Take 150 mg by mouth daily  , Disp: , Rfl:     zolpidem (AMBIEN) 10 mg tablet, Take 10 mg by mouth daily at bedtime  , Disp: , Rfl:           Objective      Vital Signs:   Vitals:    09/21/21 1315   BP: 114/70   Pulse: (!) 50     [place] Sleepiness Scale: Total score: 12        Physical Exam:    General: Alert, appropriate, cooperative, overweight    Head: NC/AT    Skin: Warm, dry    Neuro: No motor abnormalities, cranial nerves appear intact    Extremity: No clubbing, cyanosis      DME Provider:   [facility]        Counseling / Coordination of Care   I have spent 15 minutes with the patient today in which greater than 50% of this time was spent in counseling/coordination of care regarding: equipment and compliance  Board Certified Sleep Specialist    Portions of the record may have been created with voice recognition software  Occasional wrong word or "sound a like" substitutions may have occurred due to the inherent limitations of voice recognition software  Read the chart carefully and recognize, using context, where substitutions have occurred

## 2021-11-23 ENCOUNTER — APPOINTMENT (OUTPATIENT)
Dept: LAB | Age: 71
End: 2021-11-23
Payer: COMMERCIAL

## 2021-11-23 DIAGNOSIS — E78.2 MIXED HYPERLIPIDEMIA: ICD-10-CM

## 2021-11-23 DIAGNOSIS — G47.33 OBSTRUCTIVE SLEEP APNEA: ICD-10-CM

## 2021-11-23 DIAGNOSIS — I10 ESSENTIAL HYPERTENSION, BENIGN: ICD-10-CM

## 2021-11-23 DIAGNOSIS — R00.1 BRADYCARDIA, SINUS: ICD-10-CM

## 2021-11-23 LAB
ALBUMIN SERPL BCP-MCNC: 3.2 G/DL (ref 3.5–5)
ALP SERPL-CCNC: 96 U/L (ref 46–116)
ALT SERPL W P-5'-P-CCNC: 24 U/L (ref 12–78)
ANION GAP SERPL CALCULATED.3IONS-SCNC: 5 MMOL/L (ref 4–13)
AST SERPL W P-5'-P-CCNC: 23 U/L (ref 5–45)
BILIRUB DIRECT SERPL-MCNC: 0.16 MG/DL (ref 0–0.2)
BILIRUB SERPL-MCNC: 0.71 MG/DL (ref 0.2–1)
BUN SERPL-MCNC: 15 MG/DL (ref 5–25)
CALCIUM SERPL-MCNC: 9.7 MG/DL (ref 8.3–10.1)
CHLORIDE SERPL-SCNC: 107 MMOL/L (ref 100–108)
CHOLEST SERPL-MCNC: 164 MG/DL
CO2 SERPL-SCNC: 27 MMOL/L (ref 21–32)
CREAT SERPL-MCNC: 0.99 MG/DL (ref 0.6–1.3)
ERYTHROCYTE [DISTWIDTH] IN BLOOD BY AUTOMATED COUNT: 12 % (ref 11.6–15.1)
EST. AVERAGE GLUCOSE BLD GHB EST-MCNC: 105 MG/DL
GFR SERPL CREATININE-BSD FRML MDRD: 57 ML/MIN/1.73SQ M
GLUCOSE P FAST SERPL-MCNC: 80 MG/DL (ref 65–99)
HBA1C MFR BLD: 5.3 %
HCT VFR BLD AUTO: 42.2 % (ref 34.8–46.1)
HDLC SERPL-MCNC: 57 MG/DL
HGB BLD-MCNC: 13.7 G/DL (ref 11.5–15.4)
LDLC SERPL CALC-MCNC: 94 MG/DL (ref 0–100)
MAGNESIUM SERPL-MCNC: 2 MG/DL (ref 1.6–2.6)
MCH RBC QN AUTO: 32.5 PG (ref 26.8–34.3)
MCHC RBC AUTO-ENTMCNC: 32.5 G/DL (ref 31.4–37.4)
MCV RBC AUTO: 100 FL (ref 82–98)
NONHDLC SERPL-MCNC: 107 MG/DL
PLATELET # BLD AUTO: 154 THOUSANDS/UL (ref 149–390)
PMV BLD AUTO: 10.5 FL (ref 8.9–12.7)
POTASSIUM SERPL-SCNC: 3.9 MMOL/L (ref 3.5–5.3)
PROT SERPL-MCNC: 7.1 G/DL (ref 6.4–8.2)
RBC # BLD AUTO: 4.21 MILLION/UL (ref 3.81–5.12)
SODIUM SERPL-SCNC: 139 MMOL/L (ref 136–145)
TRIGL SERPL-MCNC: 65 MG/DL
TSH SERPL DL<=0.05 MIU/L-ACNC: 1.64 UIU/ML (ref 0.36–3.74)
WBC # BLD AUTO: 6.77 THOUSAND/UL (ref 4.31–10.16)

## 2021-11-23 PROCEDURE — 83735 ASSAY OF MAGNESIUM: CPT

## 2021-11-23 PROCEDURE — 84443 ASSAY THYROID STIM HORMONE: CPT

## 2021-11-23 PROCEDURE — 83036 HEMOGLOBIN GLYCOSYLATED A1C: CPT

## 2021-11-23 PROCEDURE — 80048 BASIC METABOLIC PNL TOTAL CA: CPT

## 2021-11-23 PROCEDURE — 85027 COMPLETE CBC AUTOMATED: CPT

## 2021-11-23 PROCEDURE — 80061 LIPID PANEL: CPT

## 2021-11-23 PROCEDURE — 80076 HEPATIC FUNCTION PANEL: CPT

## 2021-11-23 PROCEDURE — 36415 COLL VENOUS BLD VENIPUNCTURE: CPT

## 2022-02-22 ENCOUNTER — HOSPITAL ENCOUNTER (OUTPATIENT)
Dept: RADIOLOGY | Age: 72
Discharge: HOME | End: 2022-02-22
Payer: COMMERCIAL

## 2022-02-22 VITALS — BODY MASS INDEX: 28 KG/M2 | HEIGHT: 63 IN | WEIGHT: 158 LBS

## 2022-02-22 DIAGNOSIS — Z12.31 ENCOUNTER FOR SCREENING MAMMOGRAM FOR MALIGNANT NEOPLASM OF BREAST: ICD-10-CM

## 2022-02-22 PROCEDURE — 77067 SCR MAMMO BI INCL CAD: CPT

## 2022-02-22 PROCEDURE — 77063 BREAST TOMOSYNTHESIS BI: CPT

## 2022-03-07 ENCOUNTER — OFFICE VISIT (OUTPATIENT)
Dept: SURGERY | Facility: CLINIC | Age: 72
End: 2022-03-07
Payer: COMMERCIAL

## 2022-03-07 VITALS — BODY MASS INDEX: 29.83 KG/M2 | WEIGHT: 158 LBS | HEIGHT: 61 IN

## 2022-03-07 DIAGNOSIS — Z85.3 PERSONAL HISTORY OF MALIGNANT NEOPLASM OF BREAST: Primary | ICD-10-CM

## 2022-03-07 PROCEDURE — 99213 OFFICE O/P EST LOW 20 MIN: CPT | Performed by: SURGERY

## 2022-03-07 NOTE — PROGRESS NOTES
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  She returns today for follow-up visit  She feels well  She has no complaints  There is no dominant masses on breast exam     I no longer the to complete yearly exams, as long as her family physician is willing to completely yearly mammogram and breast exam   Patient will discuss this with her primary physician  Diagnoses and all orders for this visit:    Personal history of malignant neoplasm of breast  -     Mammo screening right w 3d & cad; Future        Subjective:      Patient ID: Nyla Brock is a 74 y o  female  Patient presents for yearly breast exam   Denies pain/problems/changes  Mammogram 2/22/2022  The following portions of the patient's history were reviewed and updated as appropriate:     She  has a past medical history of Anxiety, Arthritis, Atopic dermatitis, Breast CA ([address] ) (1999), Depression, Fracture, 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[address] ), History of chemotherapy, Infective polyneuritis, acute ([address] ), Osteoporosis, PE (pulmonary thromboembolism) ([address] ), Peptic ulcer disease, Psychiatric disorder, Sjogren's syndrome ([address] ), and Sleep apnea  She  has a past surgical history that includes Appendectomy; Abdominal surgery; Gastrectomy; Cholecystectomy; pr colonoscopy flx dx w/collj spec when pfrmd (N/A, 2/24/2017); Colonoscopy w/ endoscopic US (N/A, 2/24/2017); Mastectomy (Left, 1999); Breast biopsy (Right); and Hysterectomy    Her family history includes Breast cancer (age of onset: 50) in her paternal aunt; Breast cancer (age of onset: 48) in her cousin and cousin; Esophageal cancer (age of onset: 66) in her father; Heart failure in her father; Hypertension in her mother; No Known Problems in her maternal aunt, maternal aunt, maternal grandfather, maternal grandmother, paternal aunt, paternal grandfather, paternal grandmother, sister, son, and son; Ovarian cancer (age of onset: 62) in her maternal aunt  She  reports that she has never smoked  She has never used smokeless tobacco  She reports current alcohol use  She reports that she does not use drugs  Current Outpatient Medications   Medication Sig Dispense Refill    ALPRAZolam (XANAX) 0 5 mg tablet Take 0 5 mg by mouth daily at bedtime as needed for anxiety   amLODIPine-benazepril (LOTREL 5-20) 5-20 MG per capsule Take 1 capsule by mouth daily   amphetamine-dextroamphetamine (ADDERALL XR) 10 MG 24 hr capsule Take 1 capsule (10 mg total) by mouth every morningMax Daily Amount: 10 mg 30 capsule 0    aspirin 325 mg tablet Take 325 mg by mouth daily   atenolol (TENORMIN) 100 mg tablet Take 100 mg by mouth daily   cevimeline (EVOXAC) 30 MG capsule Take 30 mg by mouth daily   cholecalciferol (VITAMIN D3) 1,000 units tablet Take 1,000 Units by mouth daily      cycloSPORINE (RESTASIS) 0 05 % ophthalmic emulsion Administer 1 drop to both eyes 2 (two) times a day   hydroxychloroquine (PLAQUENIL) 200 mg tablet Take 200 mg by mouth 2 (two) times a day      OLANZapine (ZyPREXA) 2 5 mg tablet Take 2 5 mg by mouth daily at bedtime   rOPINIRole (REQUIP) 0 5 mg tablet Take 0 5 mg by mouth daily      venlafaxine (EFFEXOR XR) 150 mg 24 hr capsule Take 150 mg by mouth daily   zolpidem (AMBIEN) 10 mg tablet Take 10 mg by mouth daily at bedtime  No current facility-administered medications for this visit  She is allergic to lamotrigine       Review of Systems   Constitutional: Positive for fatigue  Negative for activity change     HENT: Negative  Eyes: Negative  Respiratory: Negative  Cardiovascular: Negative  Gastrointestinal: Negative  Endocrine: Negative  Genitourinary: Negative  Musculoskeletal: Negative  Skin: Negative  Allergic/Immunologic: Negative  Neurological: Negative  Negative for dizziness and seizures  Psychiatric/Behavioral: Negative for agitation, behavioral problems and confusion  The patient is not nervous/anxious  Objective:      Ht 5' 1" (1 549 m)   Wt 71 7 kg (158 lb)   LMP  (LMP Unknown)   BMI 29 85 kg/m²          Physical Exam  Constitutional:       Appearance: Normal appearance  She is well-developed  HENT:      Head: Normocephalic and atraumatic  Nose: Nose normal    Eyes:      Extraocular Movements: Extraocular movements intact  Conjunctiva/sclera: Conjunctivae normal    Cardiovascular:      Rate and Rhythm: Normal rate and regular rhythm  Heart sounds: Normal heart sounds  Pulmonary:      Effort: Pulmonary effort is normal       Breath sounds: Normal breath sounds  Chest:       Musculoskeletal:         General: Normal range of motion  Cervical back: Normal range of motion  Skin:     General: Skin is warm and dry  Neurological:      Mental Status: She is alert and oriented to person, place, and time     Psychiatric:         Mood and Affect: Mood normal

## 2022-03-14 ENCOUNTER — APPOINTMENT (OUTPATIENT)
Dept: LAB | Age: 72
End: 2022-03-14
Payer: COMMERCIAL

## 2022-03-14 DIAGNOSIS — E78.5 HYPERLIPIDEMIA, UNSPECIFIED HYPERLIPIDEMIA TYPE: ICD-10-CM

## 2022-03-14 DIAGNOSIS — R53.83 FATIGUE, UNSPECIFIED TYPE: ICD-10-CM

## 2022-03-14 DIAGNOSIS — M81.0 SENILE OSTEOPOROSIS: ICD-10-CM

## 2022-03-14 LAB
ALBUMIN SERPL BCP-MCNC: 3.5 G/DL (ref 3.5–5)
ALP SERPL-CCNC: 102 U/L (ref 46–116)
ALT SERPL W P-5'-P-CCNC: 26 U/L (ref 12–78)
ANION GAP SERPL CALCULATED.3IONS-SCNC: 4 MMOL/L (ref 4–13)
AST SERPL W P-5'-P-CCNC: 25 U/L (ref 5–45)
BASOPHILS # BLD AUTO: 0.03 THOUSANDS/ΜL (ref 0–0.1)
BASOPHILS NFR BLD AUTO: 0 % (ref 0–1)
BILIRUB SERPL-MCNC: 0.47 MG/DL (ref 0.2–1)
BUN SERPL-MCNC: 18 MG/DL (ref 5–25)
CALCIUM SERPL-MCNC: 9.7 MG/DL (ref 8.3–10.1)
CHLORIDE SERPL-SCNC: 107 MMOL/L (ref 100–108)
CHOLEST SERPL-MCNC: 189 MG/DL
CO2 SERPL-SCNC: 28 MMOL/L (ref 21–32)
CREAT SERPL-MCNC: 1.06 MG/DL (ref 0.6–1.3)
EOSINOPHIL # BLD AUTO: 0.27 THOUSAND/ΜL (ref 0–0.61)
EOSINOPHIL NFR BLD AUTO: 4 % (ref 0–6)
ERYTHROCYTE [DISTWIDTH] IN BLOOD BY AUTOMATED COUNT: 11.9 % (ref 11.6–15.1)
GFR SERPL CREATININE-BSD FRML MDRD: 51 ML/MIN/1.73SQ M
GLUCOSE P FAST SERPL-MCNC: 87 MG/DL (ref 65–99)
HCT VFR BLD AUTO: 41.3 % (ref 34.8–46.1)
HDLC SERPL-MCNC: 55 MG/DL
HGB BLD-MCNC: 14 G/DL (ref 11.5–15.4)
IMM GRANULOCYTES # BLD AUTO: 0.01 THOUSAND/UL (ref 0–0.2)
IMM GRANULOCYTES NFR BLD AUTO: 0 % (ref 0–2)
LDLC SERPL CALC-MCNC: 115 MG/DL (ref 0–100)
LYMPHOCYTES # BLD AUTO: 2.2 THOUSANDS/ΜL (ref 0.6–4.47)
LYMPHOCYTES NFR BLD AUTO: 31 % (ref 14–44)
MCH RBC QN AUTO: 31.8 PG (ref 26.8–34.3)
MCHC RBC AUTO-ENTMCNC: 33.9 G/DL (ref 31.4–37.4)
MCV RBC AUTO: 94 FL (ref 82–98)
MONOCYTES # BLD AUTO: 0.61 THOUSAND/ΜL (ref 0.17–1.22)
MONOCYTES NFR BLD AUTO: 9 % (ref 4–12)
NEUTROPHILS # BLD AUTO: 4.05 THOUSANDS/ΜL (ref 1.85–7.62)
NEUTS SEG NFR BLD AUTO: 56 % (ref 43–75)
NONHDLC SERPL-MCNC: 134 MG/DL
NRBC BLD AUTO-RTO: 0 /100 WBCS
PLATELET # BLD AUTO: 190 THOUSANDS/UL (ref 149–390)
PMV BLD AUTO: 11.4 FL (ref 8.9–12.7)
POTASSIUM SERPL-SCNC: 4.4 MMOL/L (ref 3.5–5.3)
PROT SERPL-MCNC: 7.4 G/DL (ref 6.4–8.2)
RBC # BLD AUTO: 4.4 MILLION/UL (ref 3.81–5.12)
SODIUM SERPL-SCNC: 139 MMOL/L (ref 136–145)
T3 SERPL-MCNC: 0.6 NG/ML (ref 0.6–1.8)
T4 FREE SERPL-MCNC: 0.83 NG/DL (ref 0.76–1.46)
TRIGL SERPL-MCNC: 96 MG/DL
TSH SERPL DL<=0.05 MIU/L-ACNC: 1.8 UIU/ML (ref 0.36–3.74)
WBC # BLD AUTO: 7.17 THOUSAND/UL (ref 4.31–10.16)

## 2022-03-14 PROCEDURE — 80053 COMPREHEN METABOLIC PANEL: CPT

## 2022-03-14 PROCEDURE — 85025 COMPLETE CBC W/AUTO DIFF WBC: CPT

## 2022-03-14 PROCEDURE — 36415 COLL VENOUS BLD VENIPUNCTURE: CPT

## 2022-03-14 PROCEDURE — 84443 ASSAY THYROID STIM HORMONE: CPT

## 2022-03-14 PROCEDURE — 84480 ASSAY TRIIODOTHYRONINE (T3): CPT

## 2022-03-14 PROCEDURE — 84439 ASSAY OF FREE THYROXINE: CPT

## 2022-03-14 PROCEDURE — 80061 LIPID PANEL: CPT

## 2022-03-16 ENCOUNTER — TELEPHONE (OUTPATIENT)
Dept: PSYCHIATRY | Facility: CLINIC | Age: 72
End: 2022-03-16

## 2022-05-17 ENCOUNTER — APPOINTMENT (OUTPATIENT)
Dept: LAB | Age: 72
End: 2022-05-17
Payer: COMMERCIAL

## 2022-05-17 DIAGNOSIS — I10 ESSENTIAL HYPERTENSION: ICD-10-CM

## 2022-05-17 DIAGNOSIS — E78.2 MIXED HYPERLIPIDEMIA: ICD-10-CM

## 2022-05-17 LAB
ALBUMIN SERPL BCP-MCNC: 3.3 G/DL (ref 3.5–5)
ALP SERPL-CCNC: 98 U/L (ref 46–116)
ALT SERPL W P-5'-P-CCNC: 24 U/L (ref 12–78)
ANION GAP SERPL CALCULATED.3IONS-SCNC: 3 MMOL/L (ref 4–13)
AST SERPL W P-5'-P-CCNC: 25 U/L (ref 5–45)
BILIRUB DIRECT SERPL-MCNC: 0.15 MG/DL (ref 0–0.2)
BILIRUB SERPL-MCNC: 0.61 MG/DL (ref 0.2–1)
BUN SERPL-MCNC: 13 MG/DL (ref 5–25)
CALCIUM SERPL-MCNC: 8.5 MG/DL (ref 8.3–10.1)
CHLORIDE SERPL-SCNC: 109 MMOL/L (ref 100–108)
CHOLEST SERPL-MCNC: 171 MG/DL
CO2 SERPL-SCNC: 26 MMOL/L (ref 21–32)
CREAT SERPL-MCNC: 1.05 MG/DL (ref 0.6–1.3)
ERYTHROCYTE [DISTWIDTH] IN BLOOD BY AUTOMATED COUNT: 12.4 % (ref 11.6–15.1)
GFR SERPL CREATININE-BSD FRML MDRD: 52 ML/MIN/1.73SQ M
GLUCOSE P FAST SERPL-MCNC: 91 MG/DL (ref 65–99)
HCT VFR BLD AUTO: 43.3 % (ref 34.8–46.1)
HDLC SERPL-MCNC: 65 MG/DL
HGB BLD-MCNC: 14 G/DL (ref 11.5–15.4)
LDLC SERPL CALC-MCNC: 94 MG/DL (ref 0–100)
MAGNESIUM SERPL-MCNC: 1.9 MG/DL (ref 1.6–2.6)
MCH RBC QN AUTO: 31.9 PG (ref 26.8–34.3)
MCHC RBC AUTO-ENTMCNC: 32.3 G/DL (ref 31.4–37.4)
MCV RBC AUTO: 99 FL (ref 82–98)
NONHDLC SERPL-MCNC: 106 MG/DL
PLATELET # BLD AUTO: 177 THOUSANDS/UL (ref 149–390)
PMV BLD AUTO: 10.9 FL (ref 8.9–12.7)
POTASSIUM SERPL-SCNC: 4.8 MMOL/L (ref 3.5–5.3)
PROT SERPL-MCNC: 7 G/DL (ref 6.4–8.2)
RBC # BLD AUTO: 4.39 MILLION/UL (ref 3.81–5.12)
SODIUM SERPL-SCNC: 138 MMOL/L (ref 136–145)
TRIGL SERPL-MCNC: 62 MG/DL
WBC # BLD AUTO: 6.38 THOUSAND/UL (ref 4.31–10.16)

## 2022-05-17 PROCEDURE — 80076 HEPATIC FUNCTION PANEL: CPT

## 2022-05-17 PROCEDURE — 80048 BASIC METABOLIC PNL TOTAL CA: CPT

## 2022-05-17 PROCEDURE — 83735 ASSAY OF MAGNESIUM: CPT

## 2022-05-17 PROCEDURE — 80061 LIPID PANEL: CPT

## 2022-05-17 PROCEDURE — 85027 COMPLETE CBC AUTOMATED: CPT

## 2022-05-17 PROCEDURE — 36415 COLL VENOUS BLD VENIPUNCTURE: CPT

## 2022-09-21 ENCOUNTER — OFFICE VISIT (OUTPATIENT)
Dept: SLEEP CENTER | Facility: CLINIC | Age: 72
End: 2022-09-21
Payer: COMMERCIAL

## 2022-09-21 VITALS
DIASTOLIC BLOOD PRESSURE: 56 MMHG | SYSTOLIC BLOOD PRESSURE: 120 MMHG | WEIGHT: 160 LBS | HEIGHT: 63 IN | BODY MASS INDEX: 28.35 KG/M2

## 2022-09-21 DIAGNOSIS — G47.419 PRIMARY NARCOLEPSY WITHOUT CATAPLEXY: Primary | ICD-10-CM

## 2022-09-21 DIAGNOSIS — G47.33 OSA (OBSTRUCTIVE SLEEP APNEA): ICD-10-CM

## 2022-09-21 DIAGNOSIS — G47.10 HYPERSOMNIA: ICD-10-CM

## 2022-09-21 PROCEDURE — 99214 OFFICE O/P EST MOD 30 MIN: CPT | Performed by: INTERNAL MEDICINE

## 2022-09-21 RX ORDER — PITOLISANT HYDROCHLORIDE 4.45 MG/1
1 TABLET, FILM COATED ORAL EVERY MORNING
Qty: 30 TABLET | Refills: 3 | Status: CANCELLED | OUTPATIENT
Start: 2022-09-21

## 2022-09-21 NOTE — PROGRESS NOTES
Progress Note - Sleep Center   Rosemarie Johnson RSI:[birth year] MRN: [id number]      Reason for Visit:  74 y  o female here for annual follow-up    Assessment:  Doing well on current therapy of APAP 4 to 20 cm for mild obstructive sleep apnea (AHI = 14 0)  Although the patient denies cataplexy hypnagogic hallucinations or sleep paralysis, she may have a component of narcolepsy or idiopathic hypersomnia  She sleeps up to 14 hours per day and feels on refreshed  Depression is in the differential diagnosis  For this reason I will obtain multiple sleep latency testing to determine objectively whether not her drowsiness is physiological or psychological     She has failed modafinil and armodafinil due to severe side effects  Adderall was effective initially, but caused severe mouth discomfort and she discontinued it  I will try Wakix, which I hope will be better tolerated  The patient is stop the medication 1 week prior to the CPAP titration study/MSLT  Plan:  Continue same pressure range    CPAP titration/MSLT    Start Wakix    Follow up: One year    History of Present Illness:  History of DENIZ on PAP therapy  Fully compliant and deriving benefit      Review of Systems      Genitourinary none   Cardiology none   Gastrointestinal none   Neurology none   Constitutional fatigue   Integumentary none   Psychiatry anxiety and depression   Musculoskeletal none   Pulmonary none   ENT none   Endocrine none   Hematological none       I have reviewed and updated the review of systems as necessary      Historical Information    Past Medical History:   Past Medical History:   Diagnosis Date    Anxiety     Arthritis     Atopic dermatitis     Breast CA ([facility] ) 1999    left  age 44    Depression     Fracture     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[facility] )     2013    History of chemotherapy     breast cancer    Infective polyneuritis, acute ([facility] )     Osteoporosis     PE (pulmonary thromboembolism) ([facility] )     Peptic ulcer disease     Psychiatric disorder     [facility]     Sleep apnea     does not use cpap         Past Surgical History:   Past Surgical History:   Procedure Laterality Date    ABDOMINAL SURGERY      APPENDECTOMY      BREAST BIOPSY Right     prior to breast cancer    CHOLECYSTECTOMY      COLONOSCOPY W/ ENDOSCOPIC US N/A 2/24/2017    Procedure: ANAL ENDOSCOPIC U/S;  Surgeon: Nicholas Jordan MD;  [facility]; Service:     GASTRECTOMY      HYSTERECTOMY      age 26; ovaries intact    MASTECTOMY Left 1999    age 44    MO COLONOSCOPY FLX DX W/COLLJ SPEC WHEN PFRMD N/A 2/24/2017    Procedure: COLONOSCOPY;  Surgeon: Marina Vázquez MD;  [facility];   Service: Gastroenterology       Social History:   Social History     Socioeconomic History    Marital status: /Civil Union     Spouse name: Not on file    Number of children: Not on file    Years of education: Not on file    Highest education level: Not on file   Occupational History    Not on file   Tobacco Use    Smoking status: Never Smoker    Smokeless tobacco: Never Used   Substance and Sexual Activity    Alcohol use: Yes     Comment: socially    Drug use: No    Sexual activity: Not on file   Other Topics Concern    Not on file   Social History Narrative    Not on file     Social Determinants of Health     Financial Resource Strain: Not on file   Food Insecurity: Not on file   Transportation Needs: Not on file   Physical Activity: Not on file   Stress: Not on file   Social Connections: Not on file   Intimate Partner Violence: Not on file   Housing Stability: Not on file       Family History:   Family History   Problem Relation Age of Onset    Hypertension Mother     Heart failure Father     Esophageal cancer Father 66    Breast cancer Paternal Aunt 50    Breast cancer Cousin 48        paternal    Breast cancer Cousin 48        paternal    Ovarian cancer Maternal Aunt 62    No Known Problems Sister     No Known Problems Maternal Grandmother     No Known Problems Maternal Grandfather     No Known Problems Paternal Grandmother     No Known Problems Paternal Grandfather     No Known Problems Son     No Known Problems Son     No Known Problems Maternal Aunt     No Known Problems Maternal Aunt     No Known Problems Paternal Aunt        Medications/Allergies:      Current Outpatient Medications:     ALPRAZolam (XANAX) 0 5 mg tablet, Take 0 5 mg by mouth daily at bedtime as needed for anxiety  , Disp: , Rfl:     amLODIPine-benazepril (LOTREL 5-20) 5-20 MG per capsule, Take 1 capsule by mouth daily  , Disp: , Rfl:     amphetamine-dextroamphetamine (ADDERALL XR) 10 MG 24 hr capsule, Take 1 capsule (10 mg total) by mouth every morningMax Daily Amount: 10 mg, Disp: 30 capsule, Rfl: 0    aspirin 325 mg tablet, Take 325 mg by mouth daily  , Disp: , Rfl:     atenolol (TENORMIN) 100 mg tablet, Take 100 mg by mouth daily  , Disp: , Rfl:     cevimeline (EVOXAC) 30 MG capsule, Take 30 mg by mouth daily  , Disp: , Rfl:     cholecalciferol (VITAMIN D3) 1,000 units tablet, Take 1,000 Units by mouth daily, Disp: , Rfl:     cycloSPORINE (RESTASIS) 0 05 % ophthalmic emulsion, Administer 1 drop to both eyes 2 (two) times a day , Disp: , Rfl:     hydroxychloroquine (PLAQUENIL) 200 mg tablet, Take 200 mg by mouth 2 (two) times a day, Disp: , Rfl:     OLANZapine (ZyPREXA) 2 5 mg tablet, Take 2 5 mg by mouth daily at bedtime  , Disp: , Rfl:     rOPINIRole (REQUIP) 0 5 mg tablet, Take 0 5 mg by mouth daily, Disp: , Rfl:     venlafaxine (EFFEXOR-XR) 150 mg 24 hr capsule, Take 150 mg by mouth daily  , Disp: , Rfl:     zolpidem (AMBIEN) 10 mg tablet, Take 10 mg by mouth daily at bedtime  , Disp: , Rfl:           Objective      Vital Signs:   Vitals:    09/21/22 1357   BP: 120/56     [place] Sleepiness Scale:  Total score: 0        Physical Exam:    General: Alert, appropriate, cooperative, overweight    Head: NC/AT    Skin: Warm, dry    Neuro: No motor abnormalities, cranial nerves appear intact    Extremity: No clubbing, cyanosis      DME Provider: [facility]        Counseling / Coordination of Care   I have spent 20 minutes with the patient today in which greater than 50% of this time was spent in counseling/coordination of care regarding: equipment and compliance  Board Certified Sleep Specialist    Portions of the record may have been created with voice recognition software  Occasional wrong word or "sound a like" substitutions may have occurred due to the inherent limitations of voice recognition software  Read the chart carefully and recognize, using context, where substitutions have occurred

## 2022-09-22 ENCOUNTER — TELEPHONE (OUTPATIENT)
Dept: SLEEP CENTER | Facility: CLINIC | Age: 72
End: 2022-09-22

## 2022-09-26 ENCOUNTER — TELEPHONE (OUTPATIENT)
Dept: SLEEP CENTER | Facility: CLINIC | Age: 72
End: 2022-09-26

## 2022-09-26 NOTE — TELEPHONE ENCOUNTER
Returned the patient's call  She reports she tried to get her new medicine at the pharmacy  Left her a message to call back   Will need to explain the Wakix process to her

## 2022-09-27 ENCOUNTER — TELEPHONE (OUTPATIENT)
Dept: SLEEP CENTER | Facility: CLINIC | Age: 72
End: 2022-09-27

## 2022-09-27 NOTE — TELEPHONE ENCOUNTER
Prior authorization for [facility] via completed forms to [employer] [phone]    Scanned to [place] [place]

## 2022-09-28 NOTE — TELEPHONE ENCOUNTER
Received approval for Wakix   Coverage from 9/27/2022 to 9/27/2023   Approval faxed to [facility]

## 2022-09-30 ENCOUNTER — TELEPHONE (OUTPATIENT)
Dept: SLEEP CENTER | Facility: CLINIC | Age: 72
End: 2022-09-30

## 2022-09-30 NOTE — TELEPHONE ENCOUNTER
Left message for the patient that [facility] has been trying to contact her about her [facility]     Provided the pharmacy phone number in call back message

## 2022-10-10 ENCOUNTER — APPOINTMENT (OUTPATIENT)
Dept: LAB | Age: 72
End: 2022-10-10
Payer: COMMERCIAL

## 2022-10-10 DIAGNOSIS — M81.0 SENILE OSTEOPOROSIS: ICD-10-CM

## 2022-10-10 LAB
25(OH)D3 SERPL-MCNC: 39.1 NG/ML (ref 30–100)
CALCIUM SERPL-MCNC: 10.1 MG/DL (ref 8.3–10.1)

## 2022-10-10 PROCEDURE — 82310 ASSAY OF CALCIUM: CPT

## 2022-10-10 PROCEDURE — 36415 COLL VENOUS BLD VENIPUNCTURE: CPT

## 2022-10-10 PROCEDURE — 82306 VITAMIN D 25 HYDROXY: CPT

## 2022-11-04 ENCOUNTER — TELEPHONE (OUTPATIENT)
Dept: PSYCHIATRY | Facility: CLINIC | Age: 72
End: 2022-11-04

## 2022-11-29 ENCOUNTER — APPOINTMENT (OUTPATIENT)
Dept: LAB | Age: 72
End: 2022-11-29

## 2022-11-29 DIAGNOSIS — I10 ESSENTIAL HYPERTENSION: ICD-10-CM

## 2022-11-29 DIAGNOSIS — Z87.898 HISTORY OF BRADYCARDIA: ICD-10-CM

## 2022-11-29 DIAGNOSIS — G47.33 OBSTRUCTIVE SLEEP APNEA (ADULT) (PEDIATRIC): ICD-10-CM

## 2022-11-29 DIAGNOSIS — R00.1 SEVERE SINUS BRADYCARDIA: ICD-10-CM

## 2022-11-29 DIAGNOSIS — E78.2 MIXED HYPERLIPIDEMIA: ICD-10-CM

## 2022-11-29 LAB
ALBUMIN SERPL BCP-MCNC: 3.3 G/DL (ref 3.5–5)
ALP SERPL-CCNC: 108 U/L (ref 46–116)
ALT SERPL W P-5'-P-CCNC: 27 U/L (ref 12–78)
ANION GAP SERPL CALCULATED.3IONS-SCNC: 4 MMOL/L (ref 4–13)
AST SERPL W P-5'-P-CCNC: 26 U/L (ref 5–45)
BILIRUB DIRECT SERPL-MCNC: 0.11 MG/DL (ref 0–0.2)
BILIRUB SERPL-MCNC: 0.46 MG/DL (ref 0.2–1)
BUN SERPL-MCNC: 14 MG/DL (ref 5–25)
CALCIUM SERPL-MCNC: 9.1 MG/DL (ref 8.3–10.1)
CHLORIDE SERPL-SCNC: 107 MMOL/L (ref 96–108)
CHOLEST SERPL-MCNC: 203 MG/DL
CO2 SERPL-SCNC: 26 MMOL/L (ref 21–32)
CREAT SERPL-MCNC: 1.02 MG/DL (ref 0.6–1.3)
ERYTHROCYTE [DISTWIDTH] IN BLOOD BY AUTOMATED COUNT: 11.9 % (ref 11.6–15.1)
GFR SERPL CREATININE-BSD FRML MDRD: 54 ML/MIN/1.73SQ M
GLUCOSE P FAST SERPL-MCNC: 91 MG/DL (ref 65–99)
HCT VFR BLD AUTO: 46.1 % (ref 34.8–46.1)
HDLC SERPL-MCNC: 74 MG/DL
HGB BLD-MCNC: 14.5 G/DL (ref 11.5–15.4)
LDLC SERPL CALC-MCNC: 111 MG/DL (ref 0–100)
MAGNESIUM SERPL-MCNC: 2.1 MG/DL (ref 1.6–2.6)
MCH RBC QN AUTO: 31.2 PG (ref 26.8–34.3)
MCHC RBC AUTO-ENTMCNC: 31.5 G/DL (ref 31.4–37.4)
MCV RBC AUTO: 99 FL (ref 82–98)
NONHDLC SERPL-MCNC: 129 MG/DL
PLATELET # BLD AUTO: 198 THOUSANDS/UL (ref 149–390)
PMV BLD AUTO: 11.3 FL (ref 8.9–12.7)
POTASSIUM SERPL-SCNC: 4.9 MMOL/L (ref 3.5–5.3)
PROT SERPL-MCNC: 7.9 G/DL (ref 6.4–8.4)
RBC # BLD AUTO: 4.65 MILLION/UL (ref 3.81–5.12)
SODIUM SERPL-SCNC: 137 MMOL/L (ref 135–147)
TRIGL SERPL-MCNC: 91 MG/DL
TSH SERPL DL<=0.05 MIU/L-ACNC: 2.14 UIU/ML (ref 0.45–4.5)
WBC # BLD AUTO: 8.17 THOUSAND/UL (ref 4.31–10.16)

## 2022-11-30 LAB
EST. AVERAGE GLUCOSE BLD GHB EST-MCNC: 91 MG/DL
HBA1C MFR BLD: 4.8 %

## 2023-03-21 ENCOUNTER — OFFICE VISIT (OUTPATIENT)
Dept: SLEEP CENTER | Facility: CLINIC | Age: 73
End: 2023-03-21

## 2023-03-21 VITALS
SYSTOLIC BLOOD PRESSURE: 108 MMHG | BODY MASS INDEX: 29.59 KG/M2 | HEIGHT: 63 IN | DIASTOLIC BLOOD PRESSURE: 70 MMHG | WEIGHT: 167 LBS

## 2023-03-21 DIAGNOSIS — F51.04 PSYCHOPHYSIOLOGICAL INSOMNIA: Primary | ICD-10-CM

## 2023-03-21 RX ORDER — EZETIMIBE AND SIMVASTATIN 10; 20 MG/1; MG/1
TABLET ORAL
COMMUNITY
Start: 2023-02-12

## 2023-03-21 RX ORDER — PILOCARPINE HYDROCHLORIDE 5 MG/1
TABLET, FILM COATED ORAL
COMMUNITY
Start: 2023-01-29 | End: 2023-03-21

## 2023-03-21 RX ORDER — ESZOPICLONE 2 MG/1
2 TABLET, FILM COATED ORAL
Qty: 90 TABLET | Refills: 0 | Status: SHIPPED | OUTPATIENT
Start: 2023-03-21

## 2023-03-21 NOTE — PROGRESS NOTES
Progress Note - Sleep Center   [facility] [place][birth year] MRN: [id number]      Reason for Visit:    74 y  o female with severe daytime sleepiness    Assessment:  The cause of the patient's hypersomnia may be depression; she is asleep for 12 to 14 hours during the day  She also has sleep initiation insomnia  Plan:  Try Lunesta 2 mg at bedtime    Follow up:  3 months  I am interested in the opinion of the patient's next sleep specialist here at sleep clinic  History of Present Illness:  Doing well on current therapy of APAP 4 to 20 cm for mild obstructive sleep apnea (AHI = 14 0)  Although the patient denies cataplexy, hypnagogic hallucinations or sleep paralysis, she may have a component of narcolepsy or idiopathic hypersomnia  She sleeps up to 14 hours per day and feels unrefreshed  Depression is in the differential diagnosis  For this reason I had ordered multiple sleep latency testing to determine objectively whether not her drowsiness is physiological or psychological   Unfortunately, the patient had a distressing experience with hospital technician in the past, and felt uncomfortable coming in for a sleep study      She has failed modafinil and armodafinil due to severe side effects  Adderall was effective initially, but caused severe mouth discomfort and she discontinued it  Wakix was ordered, but was not covered  Ambien 5 mg is no longer effective  I will switch her to Lunesta 2 mg           Historical Information    Past Medical History:   Diagnosis Date   • Anxiety    • Arthritis    • Atopic dermatitis    • Breast CA ([facility] ) 1999    left  age 44   • Depression    • Fracture    • 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   • History of chemotherapy     breast cancer   • Infective polyneuritis, acute (HCC)    • Osteoporosis    • PE (pulmonary thromboembolism) ([facility] )    • Peptic ulcer disease    • Psychiatric disorder    • Sjogren's syndrome ([facility] )    • Sleep apnea     does not use cpap Past Surgical History:   Procedure Laterality Date   • ABDOMINAL SURGERY     • APPENDECTOMY     • BREAST BIOPSY Right     prior to breast cancer   • CHOLECYSTECTOMY     • COLONOSCOPY W/ ENDOSCOPIC US N/A 2/24/2017    Procedure: ANAL ENDOSCOPIC U/S;  Surgeon: Jenae Castellanos MD;  Location: [facility]; Service:    • GASTRECTOMY     • HYSTERECTOMY      age 26; ovaries intact   • MASTECTOMY Left 1999    age 44   • VA COLONOSCOPY FLX DX W/COLLJ SPEC WHEN PFRMD N/A 2/24/2017    Procedure: COLONOSCOPY;  Surgeon: Tito Crespo MD;  Location: [facility];   Service: Gastroenterology         Social History     Socioeconomic History   • Marital status: /Civil Union     Spouse name: Not on file   • Number of children: Not on file   • Years of education: Not on file   • Highest education level: Not on file   Occupational History   • Not on file   Tobacco Use   • Smoking status: Never   • Smokeless tobacco: Never   Substance and Sexual Activity   • Alcohol use: Yes     Comment: socially   • Drug use: No   • Sexual activity: Not on file   Other Topics Concern   • Not on file   Social History Narrative   • Not on file     Social Determinants of Health     Financial Resource Strain: Not on file   Food Insecurity: Not on file   Transportation Needs: Not on file   Physical Activity: Not on file   Stress: Not on file   Social Connections: Not on file   Intimate Partner Violence: Not on file   Housing Stability: Not on file           History   Alcohol use: Not on file       History   Smoking Status   • Not on file   Smokeless Tobacco   • Not on file       Family History:   Family History   Problem Relation Age of Onset   • Hypertension Mother    • Heart failure Father    • Esophageal cancer Father 66   • Breast cancer Paternal Aunt 50   • Breast cancer Cousin 48        paternal   • Breast cancer Cousin 48        paternal   • Ovarian cancer Maternal Aunt 62   • No Known Problems Sister    • No Known Problems Maternal Grandmother    • No Known Problems Maternal Grandfather    • No Known Problems Paternal Grandmother    • No Known Problems Paternal Grandfather    • No Known Problems Son    • No Known Problems Son    • No Known Problems Maternal Aunt    • No Known Problems Maternal Aunt    • No Known Problems Paternal Aunt        Medications/Allergies:      Current Outpatient Medications:   •  ALPRAZolam (XANAX) 0 5 mg tablet, Take 0 5 mg by mouth daily at bedtime as needed for anxiety  , Disp: , Rfl:   •  amLODIPine-benazepril (LOTREL 5-20) 5-20 MG per capsule, Take 1 capsule by mouth daily  , Disp: , Rfl:   •  aspirin 325 mg tablet, Take 325 mg by mouth daily  , Disp: , Rfl:   •  atenolol (TENORMIN) 100 mg tablet, Take 100 mg by mouth daily  , Disp: , Rfl:   •  cholecalciferol (VITAMIN D3) 1,000 units tablet, Take 1,000 Units by mouth daily, Disp: , Rfl:   •  cycloSPORINE (RESTASIS) 0 05 % ophthalmic emulsion, Administer 1 drop to both eyes 2 (two) times a day , Disp: , Rfl:   •  ezetimibe-simvastatin (VYTORIN) 10-20 mg per tablet, , Disp: , Rfl:   •  hydroxychloroquine (PLAQUENIL) 200 mg tablet, Take 200 mg by mouth 2 (two) times a day, Disp: , Rfl:   •  OLANZapine (ZyPREXA) 2 5 mg tablet, Take 2 5 mg by mouth daily at bedtime  , Disp: , Rfl:   •  rOPINIRole (REQUIP) 0 5 mg tablet, Take 0 5 mg by mouth daily, Disp: , Rfl:   •  venlafaxine (EFFEXOR-XR) 150 mg 24 hr capsule, Take 150 mg by mouth daily  , Disp: , Rfl:   •  zolpidem (AMBIEN) 10 mg tablet, Take 10 mg by mouth daily at bedtime  , Disp: , Rfl:   •  amphetamine-dextroamphetamine (ADDERALL XR) 10 MG 24 hr capsule, Take 1 capsule (10 mg total) by mouth every morningMax Daily Amount: 10 mg (Patient not taking: Reported on 3/21/2023), Disp: 30 capsule, Rfl: 0  •  cevimeline (EVOXAC) 30 MG capsule, Take 30 mg by mouth daily   (Patient not taking: Reported on 3/21/2023), Disp: , Rfl:       Objective    Vital Signs:   Vitals:    03/21/23 1327   BP: 108/70   Weight: 75 8 kg (167 lb) Height: 5' 3" (1 6 m)     [place] Sleepiness Scale: Total score: 3    Physical Exam:    General: Alert, appropriate, cooperative    Head: NC/AT    Skin: Warm, dry    Neuro: No motor abnormalities, cranial nerves appear intact    Psych: Normal affect            TIFFANIE Calvin    Board Certified Sleep Specialist

## 2023-03-22 ENCOUNTER — TELEPHONE (OUTPATIENT)
Dept: SLEEP CENTER | Facility: CLINIC | Age: 73
End: 2023-03-22

## 2023-03-22 NOTE — TELEPHONE ENCOUNTER
Spoke to Upplication, pharmacist at [facility]  States they received script for Elner Leopard but wanted to make doctor aware that patient filled a 90-day script for Ambien last month  I advised Brisa that per 3/21/23 office note:  Ambien 5 mg is no longer effective  I will switch her to Lunesta 2 mg  Brisa also states patient had a 30-day script for alprazolam 0 5mg tablets filled on 2/24/23 by Diana Hahn MD     Upplication states she needs confirmation from Dr Hailey Mack that he is agreeable with patient taking Lunesta concurrently with alprazolam before she can fill Elner Leopard script  Dr Hailey Mack, please advise

## 2023-03-24 NOTE — TELEPHONE ENCOUNTER
[address] [facility] [phone] and was placed on hold for several minutes  Called back and left message for pharmacist advising that per Guillermo De Guzman to take [facility] with alprazolam    Provided nurse line number to call back if any questions or concerns

## 2023-03-28 ENCOUNTER — TELEPHONE (OUTPATIENT)
Dept: SLEEP CENTER | Facility: CLINIC | Age: 73
End: 2023-03-28

## 2023-03-28 NOTE — TELEPHONE ENCOUNTER
Patient left message on the nurse line stating the new medication is not effective and would like to discontinue use  Patient requesting zolpidem-10mg, as this has been effective in the past     Returned call to patient, clarified that new medication is Lunesta-2mg and that zolpidem was listed as ineffective and therefore discontinued during last office visit 3/21/2023  Patient states zolpidem-5mg is ineffective but 10mg is effective  Last office visit 3/21/2023 with Dr Arthur Boo  Next office visit 6/30/2023 with SHAZIA Anthony  Per chart review, zolpidem-10mg was dispensed 2/27/2023, quantity of 90 through [facility]  Dr Arthur Boo, Please review and advise  Thank you

## 2023-04-03 NOTE — TELEPHONE ENCOUNTER
Dr Gaviota Baca would like patient to increase dose of Lunesta to 3mg  Called patient and left call back message to discuss

## 2023-04-04 NOTE — TELEPHONE ENCOUNTER
Returned the patient's call  Advised her that Dr Dee Elizabeth want her to try taking 1 5 tablets of Lunesta  Patient agreeable

## 2023-04-06 ENCOUNTER — TELEPHONE (OUTPATIENT)
Dept: SLEEP CENTER | Facility: CLINIC | Age: 73
End: 2023-04-06

## 2023-04-06 NOTE — TELEPHONE ENCOUNTER
Returned the patient's call  She tried increasing the Lunesta 2 mg dose to 1 5 tablets but she reports it did nothing      Per the patient previously she was on Ambien 5 mg that stopped working bit Ambien 10 mg works     Dr Kash Morris please advise

## 2023-04-07 NOTE — TELEPHONE ENCOUNTER
Returned a call from Keno at [facility] advised Lunesta discontinued     Called the patient advised Ambien ordered

## 2023-05-30 ENCOUNTER — APPOINTMENT (OUTPATIENT)
Dept: LAB | Age: 73
End: 2023-05-30

## 2023-05-30 DIAGNOSIS — E78.2 MIXED HYPERLIPIDEMIA: ICD-10-CM

## 2023-05-30 DIAGNOSIS — I10 HYPERTENSION, ESSENTIAL: ICD-10-CM

## 2023-05-30 LAB
ALBUMIN SERPL BCP-MCNC: 3.3 G/DL (ref 3.5–5)
ALP SERPL-CCNC: 95 U/L (ref 46–116)
ALT SERPL W P-5'-P-CCNC: 24 U/L (ref 12–78)
ANION GAP SERPL CALCULATED.3IONS-SCNC: 3 MMOL/L (ref 4–13)
AST SERPL W P-5'-P-CCNC: 27 U/L (ref 5–45)
BILIRUB DIRECT SERPL-MCNC: 0.12 MG/DL (ref 0–0.2)
BILIRUB SERPL-MCNC: 0.72 MG/DL (ref 0.2–1)
BUN SERPL-MCNC: 14 MG/DL (ref 5–25)
CALCIUM SERPL-MCNC: 8.4 MG/DL (ref 8.3–10.1)
CHLORIDE SERPL-SCNC: 111 MMOL/L (ref 96–108)
CHOLEST SERPL-MCNC: 195 MG/DL
CO2 SERPL-SCNC: 24 MMOL/L (ref 21–32)
CREAT SERPL-MCNC: 0.96 MG/DL (ref 0.6–1.3)
ERYTHROCYTE [DISTWIDTH] IN BLOOD BY AUTOMATED COUNT: 12.5 % (ref 11.6–15.1)
EST. AVERAGE GLUCOSE BLD GHB EST-MCNC: 94 MG/DL
GFR SERPL CREATININE-BSD FRML MDRD: 58 ML/MIN/1.73SQ M
GLUCOSE P FAST SERPL-MCNC: 90 MG/DL (ref 65–99)
HBA1C MFR BLD: 4.9 %
HCT VFR BLD AUTO: 40.7 % (ref 34.8–46.1)
HDLC SERPL-MCNC: 66 MG/DL
HGB BLD-MCNC: 13.4 G/DL (ref 11.5–15.4)
LDLC SERPL CALC-MCNC: 113 MG/DL (ref 0–100)
MAGNESIUM SERPL-MCNC: 2.5 MG/DL (ref 1.6–2.6)
MCH RBC QN AUTO: 33 PG (ref 26.8–34.3)
MCHC RBC AUTO-ENTMCNC: 32.9 G/DL (ref 31.4–37.4)
MCV RBC AUTO: 100 FL (ref 82–98)
NONHDLC SERPL-MCNC: 129 MG/DL
PLATELET # BLD AUTO: 192 THOUSANDS/UL (ref 149–390)
PMV BLD AUTO: 11.6 FL (ref 8.9–12.7)
POTASSIUM SERPL-SCNC: 4.2 MMOL/L (ref 3.5–5.3)
PROT SERPL-MCNC: 7.2 G/DL (ref 6.4–8.4)
RBC # BLD AUTO: 4.06 MILLION/UL (ref 3.81–5.12)
SODIUM SERPL-SCNC: 138 MMOL/L (ref 135–147)
TRIGL SERPL-MCNC: 80 MG/DL
TSH SERPL DL<=0.05 MIU/L-ACNC: 0.99 UIU/ML (ref 0.45–4.5)
WBC # BLD AUTO: 6.23 THOUSAND/UL (ref 4.31–10.16)

## 2023-06-30 ENCOUNTER — OFFICE VISIT (OUTPATIENT)
Dept: SLEEP CENTER | Facility: CLINIC | Age: 73
End: 2023-06-30
Payer: COMMERCIAL

## 2023-06-30 DIAGNOSIS — G47.33 OBSTRUCTIVE SLEEP APNEA: ICD-10-CM

## 2023-06-30 DIAGNOSIS — F51.04 PSYCHOPHYSIOLOGICAL INSOMNIA: ICD-10-CM

## 2023-06-30 DIAGNOSIS — F32.9 MAJOR DEPRESSIVE DISORDER WITH CURRENT ACTIVE EPISODE, UNSPECIFIED DEPRESSION EPISODE SEVERITY, UNSPECIFIED WHETHER RECURRENT: ICD-10-CM

## 2023-06-30 DIAGNOSIS — G47.20 CIRCADIAN RHYTHM SLEEP DISORDER: Primary | ICD-10-CM

## 2023-06-30 DIAGNOSIS — F41.9 ANXIETY: ICD-10-CM

## 2023-06-30 PROCEDURE — 99214 OFFICE O/P EST MOD 30 MIN: CPT | Performed by: NURSE PRACTITIONER

## 2023-06-30 RX ORDER — VENLAFAXINE HYDROCHLORIDE 75 MG/1
75 CAPSULE, EXTENDED RELEASE ORAL DAILY
COMMUNITY

## 2023-06-30 RX ORDER — PILOCARPINE HYDROCHLORIDE 5 MG/1
TABLET, FILM COATED ORAL
COMMUNITY
Start: 2023-06-29
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
## 2023-06-30 NOTE — PATIENT INSTRUCTIONS
Decrease ropinirole to 1/2 tablet at 4:00pm.  Monitor for any restless leg symptoms. Have Hasmukh start waking you up at 9:00 am every day. Don't take ambien at a specific time. Try to stay awake until midnight or until Priscella Bone gets up. If you feel very sleepy, go to bed. At that time you could take the Angel or see how you feel, if you can sleep, then don't take it. Call with an update next week and we may have you start melatonin. You will need instructions. Schedule an appointment with Esther Crooks to bring your CPAP in to have a download done. Schedule follow up appointment 3-4 months          Nursing Support:  When: Monday through Friday 7A-5PM except holidays  Where: Our direct line is [phone]. If you are having a true emergency please call 911. In the event that the line is busy or it is after hours please leave a voice message and we will return your call. Please speak clearly, leaving your full name, birth date, best number to reach you and the reason for your call. Medication refills: We will need the name of the medication, the dosage, the ordering provider, whether you get a 30 or 90 day refill, and the pharmacy name and address. Medications will be ordered by the provider only. Nurses cannot call in prescriptions. Please allow 7 days for medication refills. Physician requested updates: If your provider requested that you call with an update after starting medication, please be ready to provide us the medication and dosage, what time you take your medication, the time you attempt to fall asleep, time you fall asleep, when you wake up, and what time you get out of bed. Sleep Study Results: We will contact you with sleep study results and/or next steps after the physician has reviewed your testing.

## 2023-06-30 NOTE — Clinical Note
Dr. Jenn Aranda - for your review, per your last note  Nurses -  Please make sure [employer] rep has the pressure change order to make change manually when the patient brings her equipment in for the compliance download. I don't see the appointment with Esther Crooks scheduled, please be sure she brings her equipment in for the compliance report and pressure change. Also, she needs to be added to the cancellation list for 3-4 month follow up please.

## 2023-07-02 VITALS
WEIGHT: 169 LBS | HEIGHT: 63 IN | HEART RATE: 52 BPM | BODY MASS INDEX: 29.95 KG/M2 | SYSTOLIC BLOOD PRESSURE: 120 MMHG | DIASTOLIC BLOOD PRESSURE: 80 MMHG

## 2023-07-02 PROBLEM — F32.9 MAJOR DEPRESSIVE DISORDER WITH CURRENT ACTIVE EPISODE: Status: ACTIVE | Noted: 2023-07-02

## 2023-07-02 PROBLEM — G47.20 CIRCADIAN RHYTHM SLEEP DISORDER: Status: ACTIVE | Noted: 2023-07-02

## 2023-07-02 PROBLEM — F51.04 PSYCHOPHYSIOLOGICAL INSOMNIA: Status: ACTIVE | Noted: 2023-07-02

## 2023-07-03 ENCOUNTER — TELEPHONE (OUTPATIENT)
Dept: SLEEP CENTER | Facility: CLINIC | Age: 73
End: 2023-07-03

## 2023-07-03 ENCOUNTER — TELEPHONE (OUTPATIENT)
Dept: PSYCHIATRY | Facility: CLINIC | Age: 73
End: 2023-07-03

## 2023-07-03 NOTE — TELEPHONE ENCOUNTER
Patient has been added to the Medication Management wait list with a referral.    Insurance: [address] [place] [address] Type:    Commercial []   Medicaid []   [place] (if applicable)   Medicare [x]  Location Preference: any   Provider Preference: any  Virtual: Yes [] No [x]

## 2023-07-03 NOTE — TELEPHONE ENCOUNTER
Rx for pressure change and PAP supplies sent to [facility] via [place].

## 2023-07-05 ENCOUNTER — TELEPHONE (OUTPATIENT)
Dept: PSYCHIATRY | Facility: CLINIC | Age: 73
End: 2023-07-05

## 2023-07-05 LAB

## 2023-07-05 NOTE — TELEPHONE ENCOUNTER
Behavioral Health Outpatient Intake Questions    Referred By   : self    Please advise interviewee that they need to answer all questions truthfully to allow for best care, and any misrepresentations of information may affect their ability to be seen at this clinic   => Was this discussed? Yes     If Minor Child (under age 25)    Who is/are the legal guardian(s) of the child? Is there a custody agreement? No     • If "YES"- Custody orders must be obtained prior to scheduling the first appointment  • In addition, Consent to Treatment must be signed by all legal guardians prior to scheduling the first appointment    • If "NO"- Consent to Treatment must be signed by all legal guardians prior to scheduling the first appointment      [address] History -     Presenting Problem (in patient's own words): medication     Are there any communication barriers for this patient? no                                              If yes, please describe barriers: none  • If there is a unique situation, please refer to [address] for final determination. Are you taking any psychiatric medications? ambien xanax  •   If "YES" -What are they PCP  •   If "YES" -Who prescribes? Has the Patient previously received outpatient Talk Therapy or Medication Management from [facility]     •    If "YES"- When, Where and with Whom? •    If "NO" -Has Patient received these services elsewhere? •   If "YES" -When, Where, and with Whom? Has the Patient abused alcohol or other substances in the last 6 months ? No  No concerns of substance abuse are reported. •  If "YES" -What substance, How much, How often? •  If illegal substance: [facility] •  If Alcohol in excess of 10 drinks per week:  Refer to [place] [facility] (for [facility]) or [facility] History-     Is this treatment court ordered?  No   If "yes "send to :  • Talk Therapy : Send to [address] [place] [address] for final determination   • Med Management: Send to Dr Adeola Franks for final determination     Has the Patient been convicted of a felony? No   If "Yes" send to -When, What? • Talk Therapy : Send to [address] [place] [address] for final determination   • Med Management: Send to Dr Adeola Franks for final determination     ACCEPTED as a patient Yes  • If "Yes" Appointment Date: 9/6 @ 1 w/ Dr. Jennie Anand 2    Referred Elsewhere? No  • If “Yes” - (Where? Ex: [facility], [address] [place] [address], etc.)       Name of [address] ID#[id number]  Insurance Phone #  If ins is primary or secondary? If patient is a minor, parents information such as Name, D. O.B of guarantor.

## 2023-07-05 NOTE — TELEPHONE ENCOUNTER
A message was left for the patient to return my call. The patient will be scheduled with Dr. Tika Monet at that time.

## 2023-09-06 ENCOUNTER — OFFICE VISIT (OUTPATIENT)
Dept: PSYCHIATRY | Facility: CLINIC | Age: 73
End: 2023-09-06
Payer: COMMERCIAL

## 2023-09-06 DIAGNOSIS — F31.81 BIPOLAR 2 DISORDER (HCC): ICD-10-CM

## 2023-09-06 DIAGNOSIS — F41.0 PANIC DISORDER WITHOUT AGORAPHOBIA WITH MODERATE PANIC ATTACKS: Primary | ICD-10-CM

## 2023-09-06 DIAGNOSIS — F51.04 PSYCHOPHYSIOLOGICAL INSOMNIA: ICD-10-CM

## 2023-09-06 PROCEDURE — 99204 OFFICE O/P NEW MOD 45 MIN: CPT | Performed by: PSYCHIATRY & NEUROLOGY

## 2023-09-06 RX ORDER — ZOLPIDEM TARTRATE 10 MG/1
10 TABLET ORAL
Qty: 90 TABLET | Refills: 1 | Status: SHIPPED | OUTPATIENT
Start: 2023-09-06

## 2023-09-06 RX ORDER — ALPRAZOLAM 0.5 MG/1
0.5 TABLET ORAL 3 TIMES DAILY
Qty: 90 TABLET | Refills: 1 | Status: SHIPPED | OUTPATIENT
Start: 2023-09-06

## 2023-09-06 RX ORDER — VENLAFAXINE HYDROCHLORIDE 150 MG/1
150 CAPSULE, EXTENDED RELEASE ORAL DAILY
Qty: 90 CAPSULE | Refills: 1 | Status: SHIPPED | OUTPATIENT
Start: 2023-09-06

## 2023-09-06 NOTE — PSYCH
Psychiatric Evaluation - Behavioral Health     Identification Lia Givens 74 y.o. female MRN: [id number]      Chief Complaint:   Panic attacks  History of present illness:    Patient is a 75 y/o   female presents with episodes of shortness of breath,palpitation,numbness and tingling in fingertips and arms. She gets scared and panic stricken. These episode happen once a day usually when alone. No chest pain. Symptoms started about a year ago coinciding with stress related to her son being unemployed and need financial help. Her mood has been depressed and she sometimes cries thinking about her son.  is not much sympathetic and doesn't believe her symptoms are serious. Poor sleep probably secondary to sleep apnea. Normal appetite. Never had suicidal thoughts. She has been taking venlafaxine 75 mg daily and Ambien 10 mg at hs x 23 years after she was diagnosed with breast cancer. Psychiatric Review Of Systems:  Change in sleep: yes  Appetite changes: no  Weight changes: no  Change in energy/anergy: yes  Change in interest/pleasure/anhedonia: no  Somatic symptoms: yes  Anxiety/panic: yes  Manic symptoms: no  Guilt feelings:no  Hopeless: no  Self injurious behavior/risky behavior: no    Historical Information     Past Psychiatric History:     Saw Reta MCCRAY several years ago for depression. [facility]  No suicidal behavior. No psychosis or manic features. History of good response to venlafaxine at high doses. It was reduced as she got better  Substance Abuse History:  Occasional alcohol    Family Psychiatric History:   None reported    Social History:  Developmental:Uneventful childhood.  Second oldest of 4  Education: high school diploma/GED  Marital history:   Living arrangement, social support:   Occupational History: retired at age 23 to raise her 2 boys  Access to firearms:No    Traumatic History:   Abuse:none is reported  Other Traumatic Events: None    Past Medical History:   Diagnosis Date   • Anxiety    • Arthritis    • Atopic dermatitis    • Breast CA ([address]) 1999    left  age 44   • Depression    • Fracture    • 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[address])     2013   • History of chemotherapy     breast cancer   • Infective polyneuritis, acute (HCC)    • Osteoporosis    • PE (pulmonary thromboembolism) ([address])    • Peptic ulcer disease    • Psychiatric disorder    • Sjogren's syndrome ([address])    • Sleep apnea     does not use cpap       Medical Review Of Systems:  Pertinent items are noted in HPI. Meds/Allergies   all current active meds have been reviewed  Allergies   Allergen Reactions   • Adderall [Amphetamine-Dextroamphetamine] Tongue Swelling     Tongue swelling   • Lamotrigine Rash     Other reaction(s): Possible rash, mouth sores (questionable reaction)     Objective      Mental Status Evaluation:  Appearance:  {Adequate hygiene and grooming and Good eye contact   Behavior:  calm and cooperative   Speech:   Language Normal volume and Normal rate  No overt abnormality   Mood:  Depressed and Anxious   Affect: Thought process appropriate  Goal directed and coherent   Associations: Tightly connected   Thought Content:  Does not verbalize delusional material   Perceptual Disturbances: Denies hallucinations and does not appear to be responding to internal stimuli   Risk Potential: No suicidal or homicidal ideation   Orientation  Oriented x 3   Memory grossly intact   Attention/Concentration attention span and concentration were age appropriate   Fund of knowledge vocabulary Average   Insight:  Good insight   Judgment: Good judgment   Gait/Station: normal gait/station and normal balance   Motor Activity: No abnormal movement noted        Diagnosis ICD-10-CM Associated Orders   1. Panic disorder without agoraphobia with moderate panic attacks  F41.0 ALPRAZolam (XANAX) 0.5 mg tablet     venlafaxine (EFFEXOR-XR) 150 mg 24 hr capsule      2.  Psychophysiological insomnia F51.04 zolpidem (AMBIEN) 10 mg tablet      3. Bipolar 2 disorder ([facility])  F31.81            Assessment/Plan     Active Problems: There are no active Hospital Problems. Plan: We will increase venlafaxine to 150 mg daily. We will start alprazolam 0.5 mg 3 times daily to alleviate intense anxiety and panic attacks. We will continue with Ambien. Follow-up in 2 months  Risks, benefits and possible side effects of Medications:   Risks, benefits, and possible side effects of medications explained to patient and patient verbalizes understanding. TREATMENT PLAN (Medication Management Only)        [facility]    Name/Date of Birth/MRN:  Karly Cloud 74 y.o. 1950 MRN: [id number]  Date of Treatment Plan: September 6, 2023  Diagnosis/Diagnoses:   1. Panic disorder without agoraphobia with moderate panic attacks    2. Psychophysiological insomnia    3. Bipolar 2 disorder [facility])      Strengths/Personal Resources for Self-Care: taking medications as prescribed, ability to communicate needs, ability to communicate well, ability to understand psychiatric illness, average or above intelligence, family ties. Area/Areas of need (in own words): anxiety symptoms, sleep problems. 1. Long Term Goal:   maintain acceptable anxiety level, improve sleep  Target Date: 6 weeks - 10/18/2023  Person/Persons responsible for completion of goal: Paris Herrera  2. Short Term Objective (s) - How will we reach this goal?:   A. Provider new recommended medication/dosage changes and/or continue medication(s): continue current medications as prescribed. B.  N/A.  C.  N/A.   Target Date: 6 weeks - 10/18/2023  Person/Persons Responsible for Completion of Goal: Paris Herrera   Progress Towards Goals: continuing treatment  Treatment Modality: medication management every 8 weeks  Review due 180 days from date of this plan: 6 months - 3/6/2024  Expected length of service: ongoing treatment unless revised  My Physician/PA/NP and I have developed this plan together and I agree to work on the goals and objectives. I understand the treatment goals that were developed for my treatment.   Electronic Signatures: on file (unless signed below)    Yany Yi MD 09/06/23

## 2023-09-12 ENCOUNTER — TELEPHONE (OUTPATIENT)
Dept: PSYCHIATRY | Facility: CLINIC | Age: 73
End: 2023-09-12

## 2023-09-12 NOTE — TELEPHONE ENCOUNTER
Received a fax from [facility] noting lorazepam 0.5 mg was filled locally per the PDMOP and patient is also prescribed Xanax 0.5 mg, which is a duplicate therapy. Should Shayne Yoder be on both medications? If continueing both medications, please provide rationale as a prior authorization will probably be needed. Clinical can call [facility] when clarified. [phone].

## 2023-09-13 NOTE — TELEPHONE ENCOUNTER
Since lorazepam was dispensed locally, do you want Xanax dispensed as well?  Or is there a date to fill in place of lorazepam?

## 2023-09-13 NOTE — TELEPHONE ENCOUNTER
Diogo Estevez MD  to Me    FS    9/13/23  8:58 AM  This patient should not be on both. Jt Handing only medication that I had approved was alprazolam 0.5 mg 3 times daily as needed.  Both prescriptions were given by primary care physician

## 2023-09-14 ENCOUNTER — TELEPHONE (OUTPATIENT)
Dept: PSYCHIATRY | Facility: CLINIC | Age: 73
End: 2023-09-14

## 2023-09-14 NOTE — TELEPHONE ENCOUNTER
Patient left a voicemail at the office requesting to speak with her provider or a nurse in regards to medication concerns. Patient stated in the voicemail she been taking Effexor-XR and been experiencing some side effects. Writer attempted to contact the patient to inquire further information. Left a voicemail for the patient to contact the nursing department for assistance.

## 2023-09-14 NOTE — TELEPHONE ENCOUNTER
Spoke with Bettie Arthur. She reports since the extra 75 mg increase of Venlafaxine she has racing thoughts, can't sleep and has diarrhea. She also feels jittery. She did not feel this was on the lower dose. She takes the Venlafaxine in the morning.        Please review

## 2023-09-14 NOTE — TELEPHONE ENCOUNTER
Leesa Ordoñez MD  to Me    FS    9/13/23 11:55 AM  She should not be taking lorazepam at all. Me  to Leesa Ordoñez MD        9/13/23  1:15 PM  I can call [facility] and review lorazepam should be stopped/discarded. Okay to dispense Xanax?    Leesa Ordoñez MD  to Me    FS    9/13/23  3:20 PM  yes

## 2023-09-14 NOTE — TELEPHONE ENCOUNTER
Spoke with the pharmacist at [facility]. Reviewed Xanax was prescribed at her appointment 9/6/23 and Silvano Amor will be educated/counselled to stop taking/discard lorazepam. The pharmacist was going to note same and proceed with processing the prescription. Spoke with Silvano Amor. Reviewed information above. Schaeferdarren Amor said she already stopped taking lorazepam. She appreciated the call.

## 2023-09-18 NOTE — TELEPHONE ENCOUNTER
Lizeth Jett  requested a call back to speak with Dr Milad Garza. She was returning the voicemail she received from the provider. .    They can be reached at P# [phone]      Thank you.

## 2023-09-19 NOTE — TELEPHONE ENCOUNTER
Santy Root left message she was returning Dr. Dereck Cantu call again. Called Santy Root and informed that he has called and left her messages. Best thing to do is schedule an appt. informed her I will have the  call her for appt.

## 2023-09-19 NOTE — TELEPHONE ENCOUNTER
Bettie Jerson left a VM that she's been playing phone tag all week. Can call her on her mobile number.     [phone]    Or home  [phone]

## 2023-09-20 NOTE — TELEPHONE ENCOUNTER
Called and spoke with Paris Herrera. Scheduled soonest appt 10/9 12pm. Will call if earlier cancellation.

## 2023-09-26 ENCOUNTER — APPOINTMENT (OUTPATIENT)
Dept: LAB | Age: 73
End: 2023-09-26
Payer: COMMERCIAL

## 2023-09-26 DIAGNOSIS — M81.0 SENILE OSTEOPOROSIS: ICD-10-CM

## 2023-09-26 DIAGNOSIS — R53.83 OTHER FATIGUE: ICD-10-CM

## 2023-09-26 DIAGNOSIS — R35.0 URINARY FREQUENCY: ICD-10-CM

## 2023-09-26 DIAGNOSIS — E78.5 HYPERLIPIDEMIA, UNSPECIFIED HYPERLIPIDEMIA TYPE: ICD-10-CM

## 2023-09-26 LAB
25(OH)D3 SERPL-MCNC: 37.6 NG/ML (ref 30–100)
ALBUMIN SERPL BCP-MCNC: 3.6 G/DL (ref 3.5–5)
ALP SERPL-CCNC: 88 U/L (ref 34–104)
ALT SERPL W P-5'-P-CCNC: 15 U/L (ref 7–52)
ANION GAP SERPL CALCULATED.3IONS-SCNC: 8 MMOL/L
AST SERPL W P-5'-P-CCNC: 24 U/L (ref 13–39)
BASOPHILS # BLD AUTO: 0.04 THOUSANDS/ÂΜL (ref 0–0.1)
BASOPHILS NFR BLD AUTO: 1 % (ref 0–1)
BILIRUB SERPL-MCNC: 0.41 MG/DL (ref 0.2–1)
BUN SERPL-MCNC: 14 MG/DL (ref 5–25)
CALCIUM SERPL-MCNC: 9.4 MG/DL (ref 8.4–10.2)
CHLORIDE SERPL-SCNC: 105 MMOL/L (ref 96–108)
CHOLEST SERPL-MCNC: 171 MG/DL
CO2 SERPL-SCNC: 24 MMOL/L (ref 21–32)
CREAT SERPL-MCNC: 1 MG/DL (ref 0.6–1.3)
EOSINOPHIL # BLD AUTO: 0.18 THOUSAND/ÂΜL (ref 0–0.61)
EOSINOPHIL NFR BLD AUTO: 3 % (ref 0–6)
ERYTHROCYTE [DISTWIDTH] IN BLOOD BY AUTOMATED COUNT: 12.3 % (ref 11.6–15.1)
GFR SERPL CREATININE-BSD FRML MDRD: 55 ML/MIN/1.73SQ M
GLUCOSE P FAST SERPL-MCNC: 86 MG/DL (ref 65–99)
HCT VFR BLD AUTO: 41.9 % (ref 34.8–46.1)
HDLC SERPL-MCNC: 50 MG/DL
HGB BLD-MCNC: 14 G/DL (ref 11.5–15.4)
IMM GRANULOCYTES # BLD AUTO: 0.02 THOUSAND/UL (ref 0–0.2)
IMM GRANULOCYTES NFR BLD AUTO: 0 % (ref 0–2)
LDLC SERPL CALC-MCNC: 104 MG/DL (ref 0–100)
LYMPHOCYTES # BLD AUTO: 1.81 THOUSANDS/ÂΜL (ref 0.6–4.47)
LYMPHOCYTES NFR BLD AUTO: 25 % (ref 14–44)
MCH RBC QN AUTO: 32.6 PG (ref 26.8–34.3)
MCHC RBC AUTO-ENTMCNC: 33.4 G/DL (ref 31.4–37.4)
MCV RBC AUTO: 98 FL (ref 82–98)
MONOCYTES # BLD AUTO: 0.46 THOUSAND/ÂΜL (ref 0.17–1.22)
MONOCYTES NFR BLD AUTO: 6 % (ref 4–12)
NEUTROPHILS # BLD AUTO: 4.83 THOUSANDS/ÂΜL (ref 1.85–7.62)
NEUTS SEG NFR BLD AUTO: 65 % (ref 43–75)
NONHDLC SERPL-MCNC: 121 MG/DL
NRBC BLD AUTO-RTO: 0 /100 WBCS
PLATELET # BLD AUTO: 208 THOUSANDS/UL (ref 149–390)
PMV BLD AUTO: 10.8 FL (ref 8.9–12.7)
POTASSIUM SERPL-SCNC: 3.9 MMOL/L (ref 3.5–5.3)
PROT SERPL-MCNC: 7 G/DL (ref 6.4–8.4)
RBC # BLD AUTO: 4.29 MILLION/UL (ref 3.81–5.12)
SODIUM SERPL-SCNC: 137 MMOL/L (ref 135–147)
T3 SERPL-MCNC: 0.7 NG/ML
T4 FREE SERPL-MCNC: 0.79 NG/DL (ref 0.61–1.12)
TRIGL SERPL-MCNC: 83 MG/DL
TSH SERPL DL<=0.05 MIU/L-ACNC: 1.01 UIU/ML (ref 0.45–4.5)
WBC # BLD AUTO: 7.34 THOUSAND/UL (ref 4.31–10.16)

## 2023-09-26 PROCEDURE — 84439 ASSAY OF FREE THYROXINE: CPT

## 2023-09-26 PROCEDURE — 80061 LIPID PANEL: CPT

## 2023-09-26 PROCEDURE — 82306 VITAMIN D 25 HYDROXY: CPT

## 2023-09-26 PROCEDURE — 80053 COMPREHEN METABOLIC PANEL: CPT

## 2023-09-26 PROCEDURE — 84480 ASSAY TRIIODOTHYRONINE (T3): CPT

## 2023-09-26 PROCEDURE — 85025 COMPLETE CBC W/AUTO DIFF WBC: CPT

## 2023-09-26 PROCEDURE — 84443 ASSAY THYROID STIM HORMONE: CPT

## 2023-09-26 PROCEDURE — 36415 COLL VENOUS BLD VENIPUNCTURE: CPT

## 2023-10-09 ENCOUNTER — OFFICE VISIT (OUTPATIENT)
Dept: PSYCHIATRY | Facility: CLINIC | Age: 73
End: 2023-10-09
Payer: COMMERCIAL

## 2023-10-09 DIAGNOSIS — F41.0 PANIC DISORDER WITHOUT AGORAPHOBIA WITH MODERATE PANIC ATTACKS: Primary | ICD-10-CM

## 2023-10-09 DIAGNOSIS — F51.04 PSYCHOPHYSIOLOGICAL INSOMNIA: ICD-10-CM

## 2023-10-09 PROCEDURE — 99213 OFFICE O/P EST LOW 20 MIN: CPT | Performed by: PSYCHIATRY & NEUROLOGY

## 2023-10-09 RX ORDER — ALPRAZOLAM 0.5 MG/1
0.5 TABLET ORAL DAILY PRN
Qty: 90 TABLET | Refills: 1
Start: 2023-10-09

## 2023-10-09 RX ORDER — ZOLPIDEM TARTRATE 10 MG/1
10 TABLET ORAL
Qty: 90 TABLET | Refills: 1
Start: 2023-10-09

## 2023-10-09 RX ORDER — PAROXETINE HYDROCHLORIDE 20 MG/1
TABLET, FILM COATED ORAL
Qty: 30 TABLET | Refills: 1 | Status: SHIPPED | OUTPATIENT
Start: 2023-10-09

## 2023-10-09 RX ORDER — OLANZAPINE 2.5 MG/1
2.5 TABLET ORAL
Qty: 30 TABLET | Refills: 1 | Status: SHIPPED | OUTPATIENT
Start: 2023-10-09

## 2023-10-09 NOTE — PSYCH
Jessee \[facility]\"" 1950 [id number]     The patient was seen for continuing care and pharmacotherapy. Panic attacks have not improved. Her mood remains unchanged. Some nights she sleeps a lot. Other nights she sleeps poorly. Appetite is normal.  remains indifferent towards her suffering. She does the cooking and grocery shopping. No interest and has anhedonia. She stopped venlafaxine due to sweating,insomnia,poor concentration and more panic attacks. 2 days after that all those symptoms subsided but panic attacks came back. She states that she wants to beat this problem on her own without medication. ROS:  Anxious mood-panic attacks  Current Mental Status Evaluation:  Appearance:  Adequate hygiene and grooming and Good eye contact   Behavior:  Calm and Cooperative   Mood:  Anxious and Dysphoric   Affect: constricted, appropriate and mood-congruent   Speech: Normal volume and Normal rate   Thought Process:  Goal directed and coherent   Thought Content:  Does not verbalize delusional material   Perceptual Disturbances: Denies hallucinations and does not appear to be responding to internal stimuli   Risk Potential: No suicidal or homicidal ideation   Orientation:         Diagnosis ICD-10-CM Associated Orders   1. Panic disorder without agoraphobia with moderate panic attacks  F41.0 ALPRAZolam (XANAX) 0.5 mg tablet     PARoxetine (PAXIL) 20 mg tablet     OLANZapine (ZyPREXA) 2.5 mg tablet      2.  Psychophysiological insomnia  F51.04 zolpidem (AMBIEN) 10 mg tablet             Current Outpatient Medications   Medication Instructions   • ALPRAZolam (XANAX) 0.5 mg, Oral, Daily PRN   • amLODIPine-benazepril (LOTREL 5-20) 5-20 MG per capsule 1 capsule, Oral, Daily   • aspirin 325 mg, Oral, Daily   • atenolol (TENORMIN) 100 mg, Oral, Daily   • cevimeline (EVOXAC) 30 mg, Daily   • cholecalciferol (VITAMIN D3) 1,000 Units, Oral, Daily   • cycloSPORINE (RESTASIS) 0.05 % ophthalmic emulsion 1 drop, Both Eyes, 2 times daily   • ezetimibe-simvastatin (VYTORIN) 10-20 mg per tablet No dose, route, or frequency recorded. • hydroxychloroquine (PLAQUENIL) 200 mg, Oral, 2 times daily   • OLANZapine (ZYPREXA) 2.5 mg, Oral, Daily at bedtime   • PARoxetine (PAXIL) 20 mg tablet 0.5 tablet daily for 7 days then one tablet daily   • pilocarpine (SALAGEN) 5 mg tablet No dose, route, or frequency recorded. • rOPINIRole (REQUIP) 0.5 mg, Oral, Daily   • zolpidem (AMBIEN) 10 mg, Oral, Daily at bedtime          Treatment Recommendations- Risks Benefits    The patient agrees to take paroxetine for treatment of panic attacks. She will start 10 mg daily for 7 days and then increase to 20 mg daily. For extreme cases of anxiety, she will take Xanax daily. We will continue with same dose of olanzapine and also zolpidem. Follow-up in 1 month  Risks, Benefits And Possible Side Effects Of Medications:  Risks, benefits, and possible side effects of medications explained to patient and patient verbalizes understanding    VIRTUAL VISIT DISCLAIMER    Jessee Guzman verbally agrees to participate in [employer]. Pt is aware that [employer] could be limited without vital signs or the ability to perform a full hands-on physical exam. Jessee Karolinas understands she or the provider may request at any time to terminate the video visit and request the patient to seek care or treatment in person.      Jules Conway MD 10/09/23

## 2023-10-25 NOTE — RESULT NOTES
Patient will be cleared by me if appropriate when seen   Verified Results  (1) C  DIFFICILE TOXIN BY PCR [id number] 10:31AM Leroy Herrera Order Number: [id number]     Test Name Result Flag Reference   C  DIFFICILE TOXIN BY PCR   NEGATIVE for C difficle toxin by PCR  NEGATIVE for C difficle toxin by PCR      (1) STOOL CULTURE [id number] 10:31AM Leroy Herrera Order Number: [id number]     Test Name Result Flag Reference   CLINICAL REPORT (Report)     Test:        Stool culture  Specimen Source:  Rectum  Specimen Type:   Stool  Specimen Date:   6/18/2016 10:31 AM  Result Date:    6/20/2016 10:08 AM  Result Status:   Final result  Resulting Lab:   [facility] [address] [place] [address]            Tel: [phone]      CULTURE                                       ------------------                                   No Salmonella, Shigella or Campylobacter isolated      Shiga Toxin 1 NOT detected, Shiga Toxin 2 NOT detected

## 2023-11-01 ENCOUNTER — OFFICE VISIT (OUTPATIENT)
Dept: PSYCHIATRY | Facility: CLINIC | Age: 73
End: 2023-11-01
Payer: COMMERCIAL

## 2023-11-01 DIAGNOSIS — F51.04 PSYCHOPHYSIOLOGICAL INSOMNIA: ICD-10-CM

## 2023-11-01 DIAGNOSIS — F41.0 PANIC DISORDER WITHOUT AGORAPHOBIA WITH MODERATE PANIC ATTACKS: ICD-10-CM

## 2023-11-01 PROCEDURE — 99213 OFFICE O/P EST LOW 20 MIN: CPT | Performed by: PSYCHIATRY & NEUROLOGY

## 2023-11-01 RX ORDER — ALPRAZOLAM 0.5 MG/1
0.5 TABLET ORAL DAILY PRN
Qty: 90 TABLET | Refills: 1
Start: 2023-11-01

## 2023-11-01 RX ORDER — PAROXETINE 30 MG/1
TABLET, FILM COATED ORAL
Qty: 90 TABLET | Refills: 0 | Status: SHIPPED | OUTPATIENT
Start: 2023-11-01

## 2023-11-01 RX ORDER — ZOLPIDEM TARTRATE 10 MG/1
10 TABLET ORAL
Qty: 90 TABLET | Refills: 0 | Status: SHIPPED | OUTPATIENT
Start: 2023-11-01

## 2023-11-01 NOTE — PSYCH
Teresa Ring 1950 [id number]     The patient was seen for continuing care and pharmacotherapy. She has had 2 panic attacks since last visit. They lasted for a minute or two. She occasionally feels depressed and seldom cries. Her overall level of interest and energy are good. Relationship with  is stable although she does not share with him her feelings. She seems to have tolerated Paxil without difficulty. She is not reporting any anticholinergic side effects. Her appetite has a slightly diminished which might be related to the effects of SSRI. She seldom takes alprazolam but takes zolpidem every night. She believes that she needs between 12 to 14 hours of sleep every night to feel good the next day. .  ROS:  Slightly decreased appetite  Current Mental Status Evaluation:  Appearance:  Adequate hygiene and grooming and Good eye contact   Behavior:  Calm and Cooperative   Mood:  Still mildly depressed. Affect: constricted and appropriate   Speech: Normal volume and Normal rate   Thought Process:  Goal directed and coherent   Thought Content:  Does not verbalize delusional material   Perceptual Disturbances: Denies hallucinations and does not appear to be responding to internal stimuli   Risk Potential: No suicidal or homicidal ideation   Orientation:        No diagnosis found. Current Outpatient Medications   Medication Instructions    ALPRAZolam (XANAX) 0.5 mg, Oral, Daily PRN    amLODIPine-benazepril (LOTREL 5-20) 5-20 MG per capsule 1 capsule, Oral, Daily    aspirin 325 mg, Oral, Daily    atenolol (TENORMIN) 100 mg, Oral, Daily    cevimeline (EVOXAC) 30 mg, Daily    cholecalciferol (VITAMIN D3) 1,000 Units, Oral, Daily    cycloSPORINE (RESTASIS) 0.05 % ophthalmic emulsion 1 drop, Both Eyes, 2 times daily    ezetimibe-simvastatin (VYTORIN) 10-20 mg per tablet No dose, route, or frequency recorded.     hydroxychloroquine (PLAQUENIL) 200 mg, Oral, 2 times daily    OLANZapine (ZYPREXA) 2.5 mg, Oral, Daily at bedtime    PARoxetine (PAXIL) 20 mg tablet 0.5 tablet daily for 7 days then one tablet daily    pilocarpine (SALAGEN) 5 mg tablet No dose, route, or frequency recorded. rOPINIRole (REQUIP) 0.5 mg, Oral, Daily    zolpidem (AMBIEN) 10 mg, Oral, Daily at bedtime          Treatment Recommendations- Risks Benefits    We will increase Paxil to 30 mg daily. Continue with zolpidem and as needed Xanax  Risks, Benefits And Possible Side Effects Of Medications:  Risks, benefits, and possible side effects of medications explained to patient and patient verbalizes understanding    VIRTUAL VISIT DISCLAIMER    Melissa Garcia verbally agrees to participate in [employer]. Pt is aware that [employer] could be limited without vital signs or the ability to perform a full hands-on physical exam. Melissa Garcia understands she or the provider may request at any time to terminate the video visit and request the patient to seek care or treatment in person.      Kamala Medrano MD 11/01/23

## 2023-11-14 ENCOUNTER — TELEPHONE (OUTPATIENT)
Dept: PSYCHIATRY | Facility: CLINIC | Age: 73
End: 2023-11-14

## 2023-11-14 NOTE — TELEPHONE ENCOUNTER
Called and left message for patient to inform 11/29 3pm appt was cancelled due to provider schedule change. Requested return call to reschedule. Please schedule upon return call.

## 2023-11-16 NOTE — TELEPHONE ENCOUNTER
The patient contacted the office to reschedule her appt on 11/29/23 at 3:00 pm. The patient is now scheduled for 11/27/23 at 12:30 pm.

## 2023-11-27 ENCOUNTER — OFFICE VISIT (OUTPATIENT)
Dept: PSYCHIATRY | Facility: CLINIC | Age: 73
End: 2023-11-27
Payer: COMMERCIAL

## 2023-11-27 DIAGNOSIS — F33.0 MILD EPISODE OF RECURRENT MAJOR DEPRESSIVE DISORDER (HCC): Primary | ICD-10-CM

## 2023-11-27 DIAGNOSIS — F41.0 PANIC DISORDER WITHOUT AGORAPHOBIA WITH MODERATE PANIC ATTACKS: ICD-10-CM

## 2023-11-27 DIAGNOSIS — F51.04 PSYCHOPHYSIOLOGICAL INSOMNIA: ICD-10-CM

## 2023-11-27 PROCEDURE — 99213 OFFICE O/P EST LOW 20 MIN: CPT | Performed by: PSYCHIATRY & NEUROLOGY

## 2023-11-27 RX ORDER — ESCITALOPRAM OXALATE 10 MG/1
10 TABLET ORAL DAILY
Qty: 30 TABLET | Refills: 0 | Status: SHIPPED | OUTPATIENT
Start: 2023-11-27 | End: 2023-11-29 | Stop reason: SDUPTHER

## 2023-11-27 RX ORDER — ZOLPIDEM TARTRATE 10 MG/1
10 TABLET ORAL
Qty: 90 TABLET | Refills: 0
Start: 2023-11-27

## 2023-11-27 RX ORDER — OLANZAPINE 2.5 MG/1
2.5 TABLET, FILM COATED ORAL
Qty: 30 TABLET | Refills: 1 | Status: SHIPPED | OUTPATIENT
Start: 2023-11-27

## 2023-11-27 RX ORDER — ALPRAZOLAM 0.5 MG/1
0.5 TABLET ORAL 3 TIMES DAILY PRN
Qty: 135 TABLET | Refills: 1 | Status: SHIPPED | OUTPATIENT
Start: 2023-11-27

## 2023-11-27 NOTE — PSYCH
[facility] 1950 [id number]     The patient was seen for continuing care and pharmacotherapy. She had a bout of diarrhea and believing Paxil was the cause, she stopped it. Feels it was helping with panic attacks. Since stopping Paxil she hasn't had any panic attacks but short-lived episodes of anxiety. She has interest to do things with  and family. She has had some crying spells and feels depressed often. She has a history of positive response to Lexapro      ROS:  Fatigue, poor appetite and sleeping a lot  Current Mental Status Evaluation:  Appearance:  Adequate hygiene and grooming and Good eye contact   Behavior:  Calm and Cooperative   Mood:  Depressed and Anxious   Affect: constricted and appropriate   Speech: Normal volume and Normal rate   Thought Process:  Goal directed and coherent   Thought Content:  Does not verbalize delusional material   Perceptual Disturbances: Denies hallucinations and does not appear to be responding to internal stimuli   Risk Potential: No suicidal or homicidal ideation   Orientation:         Diagnosis ICD-10-CM Associated Orders   1. Mild episode of recurrent major depressive disorder (HCC)  F33.0 escitalopram (Lexapro) 10 mg tablet      2. Panic disorder without agoraphobia with moderate panic attacks  F41.0 ALPRAZolam (XANAX) 0.5 mg tablet     escitalopram (Lexapro) 10 mg tablet     OLANZapine (ZyPREXA) 2.5 mg tablet      3.  Psychophysiological insomnia  F51.04 zolpidem (AMBIEN) 10 mg tablet             Current Outpatient Medications   Medication Instructions    ALPRAZolam (XANAX) 0.5 mg, Oral, 3 times daily PRN    amLODIPine-benazepril (LOTREL 5-20) 5-20 MG per capsule 1 capsule, Oral, Daily    aspirin 325 mg, Oral, Daily    atenolol (TENORMIN) 100 mg, Oral, Daily    cevimeline (EVOXAC) 30 mg, Daily    cholecalciferol (VITAMIN D3) 1,000 Units, Oral, Daily    cycloSPORINE (RESTASIS) 0.05 % ophthalmic emulsion 1 drop, Both Eyes, 2 times daily    escitalopram (LEXAPRO) 10 mg, Oral, Daily    ezetimibe-simvastatin (VYTORIN) 10-20 mg per tablet No dose, route, or frequency recorded. hydroxychloroquine (PLAQUENIL) 200 mg, Oral, 2 times daily    OLANZapine (ZYPREXA) 2.5 mg, Oral, Daily at bedtime    pilocarpine (SALAGEN) 5 mg tablet No dose, route, or frequency recorded. rOPINIRole (REQUIP) 0.5 mg, Oral, Daily    zolpidem (AMBIEN) 10 mg, Oral, Daily at bedtime          Treatment Recommendations- Risks Benefits    We will start Lexapro 10 mg daily. She was authorized to take an extra dose of Xanax for intense anxiety. Follow-up in 1 month  Risks, Benefits And Possible Side Effects Of Medications:  Risks, benefits, and possible side effects of medications explained to patient and patient verbalizes understanding    VIRTUAL VISIT DISCLAIMER    Cassandra Chandra verbally agrees to participate in [employer]. Pt is aware that [employer] could be limited without vital signs or the ability to perform a full hands-on physical exam. Cassandra Prateek understands she or the provider may request at any time to terminate the video visit and request the patient to seek care or treatment in person.      Surya Pierre MD 11/27/23

## 2023-11-29 DIAGNOSIS — F33.0 MILD EPISODE OF RECURRENT MAJOR DEPRESSIVE DISORDER (HCC): ICD-10-CM

## 2023-11-29 DIAGNOSIS — F41.0 PANIC DISORDER WITHOUT AGORAPHOBIA WITH MODERATE PANIC ATTACKS: ICD-10-CM

## 2023-11-29 RX ORDER — ESCITALOPRAM OXALATE 10 MG/1
10 TABLET ORAL DAILY
Qty: 30 TABLET | Refills: 0 | Status: SHIPPED | OUTPATIENT
Start: 2023-11-29

## 2023-11-29 NOTE — TELEPHONE ENCOUNTER
Patient shared provider was going to put request in after last appt and pharmacy hasn't received anything. Medication Refill Request     Name of Medication excitalopram (Lexapro) 10 mg tablet  Dose/Frequency 10 mg tablet/take 1 tablet by mouth daily  Quantity 10 mg tablet  Verified pharmacy   [x]  Verified ordering Provider   [x]  Does patient have enough for the next 3 days? Yes [x] No []  Does patient have a follow-up appointment scheduled?  Yes [x] No []   If so when is appointment: 12/27

## 2023-12-19 DIAGNOSIS — F41.0 PANIC DISORDER WITHOUT AGORAPHOBIA WITH MODERATE PANIC ATTACKS: ICD-10-CM

## 2023-12-19 RX ORDER — OLANZAPINE 2.5 MG/1
2.5 TABLET, FILM COATED ORAL
Qty: 30 TABLET | Refills: 11 | Status: SHIPPED | OUTPATIENT
Start: 2023-12-19

## 2023-12-20 ENCOUNTER — HOSPITAL ENCOUNTER (INPATIENT)
Facility: HOSPITAL | Age: 73
LOS: 2 days | Discharge: HOME | End: 2023-12-22
Attending: EMERGENCY MEDICINE | Admitting: HOSPITALIST
Payer: COMMERCIAL

## 2023-12-20 DIAGNOSIS — L03.211 FACIAL CELLULITIS: Primary | ICD-10-CM

## 2023-12-20 DIAGNOSIS — L03.211 CELLULITIS OF FACE: ICD-10-CM

## 2023-12-20 PROBLEM — R79.89 ELEVATED SERUM CREATININE: Status: ACTIVE | Noted: 2023-12-20

## 2023-12-20 PROBLEM — G47.00 INSOMNIA: Status: ACTIVE | Noted: 2023-07-02

## 2023-12-20 PROBLEM — M35.00 SJOGREN'S DISEASE (HCC): Status: ACTIVE | Noted: 2023-12-20

## 2023-12-20 LAB
ALBUMIN SERPL BCP-MCNC: 3.6 G/DL (ref 3.5–5)
ALP SERPL-CCNC: 108 U/L (ref 34–104)
ALT SERPL W P-5'-P-CCNC: 48 U/L (ref 7–52)
ANION GAP SERPL CALCULATED.3IONS-SCNC: 10 MMOL/L
AST SERPL W P-5'-P-CCNC: 55 U/L (ref 13–39)
BASOPHILS # BLD AUTO: 0.04 THOUSANDS/ÂΜL (ref 0–0.1)
BASOPHILS NFR BLD AUTO: 1 % (ref 0–1)
BILIRUB SERPL-MCNC: 0.28 MG/DL (ref 0.2–1)
BUN SERPL-MCNC: 20 MG/DL (ref 5–25)
CALCIUM SERPL-MCNC: 9.4 MG/DL (ref 8.4–10.2)
CHLORIDE SERPL-SCNC: 107 MMOL/L (ref 96–108)
CO2 SERPL-SCNC: 25 MMOL/L (ref 21–32)
CREAT SERPL-MCNC: 1.33 MG/DL (ref 0.6–1.3)
EOSINOPHIL # BLD AUTO: 0.13 THOUSAND/ÂΜL (ref 0–0.61)
EOSINOPHIL NFR BLD AUTO: 2 % (ref 0–6)
ERYTHROCYTE [DISTWIDTH] IN BLOOD BY AUTOMATED COUNT: 12.1 % (ref 11.6–15.1)
FLUAV RNA RESP QL NAA+PROBE: NEGATIVE
FLUBV RNA RESP QL NAA+PROBE: NEGATIVE
GFR SERPL CREATININE-BSD FRML MDRD: 39 ML/MIN/1.73SQ M
GLUCOSE SERPL-MCNC: 82 MG/DL (ref 65–140)
HCT VFR BLD AUTO: 43.3 % (ref 34.8–46.1)
HGB BLD-MCNC: 14 G/DL (ref 11.5–15.4)
IMM GRANULOCYTES # BLD AUTO: 0.02 THOUSAND/UL (ref 0–0.2)
IMM GRANULOCYTES NFR BLD AUTO: 0 % (ref 0–2)
LACTATE SERPL-SCNC: 1.3 MMOL/L (ref 0.5–2)
LYMPHOCYTES # BLD AUTO: 2.59 THOUSANDS/ÂΜL (ref 0.6–4.47)
LYMPHOCYTES NFR BLD AUTO: 31 % (ref 14–44)
MCH RBC QN AUTO: 31.8 PG (ref 26.8–34.3)
MCHC RBC AUTO-ENTMCNC: 32.3 G/DL (ref 31.4–37.4)
MCV RBC AUTO: 98 FL (ref 82–98)
MONOCYTES # BLD AUTO: 0.94 THOUSAND/ÂΜL (ref 0.17–1.22)
MONOCYTES NFR BLD AUTO: 11 % (ref 4–12)
NEUTROPHILS # BLD AUTO: 4.53 THOUSANDS/ÂΜL (ref 1.85–7.62)
NEUTS SEG NFR BLD AUTO: 55 % (ref 43–75)
NRBC BLD AUTO-RTO: 0 /100 WBCS
PLATELET # BLD AUTO: 198 THOUSANDS/UL (ref 149–390)
PMV BLD AUTO: 9.6 FL (ref 8.9–12.7)
POTASSIUM SERPL-SCNC: 3.7 MMOL/L (ref 3.5–5.3)
PROCALCITONIN SERPL-MCNC: 0.1 NG/ML
PROT SERPL-MCNC: 7.5 G/DL (ref 6.4–8.4)
RBC # BLD AUTO: 4.4 MILLION/UL (ref 3.81–5.12)
RSV RNA RESP QL NAA+PROBE: NEGATIVE
SARS-COV-2 RNA RESP QL NAA+PROBE: NEGATIVE
SODIUM SERPL-SCNC: 142 MMOL/L (ref 135–147)
WBC # BLD AUTO: 8.25 THOUSAND/UL (ref 4.31–10.16)

## 2023-12-20 PROCEDURE — 83605 ASSAY OF LACTIC ACID: CPT | Performed by: EMERGENCY MEDICINE

## 2023-12-20 PROCEDURE — 99284 EMERGENCY DEPT VISIT MOD MDM: CPT

## 2023-12-20 PROCEDURE — 85025 COMPLETE CBC W/AUTO DIFF WBC: CPT | Performed by: EMERGENCY MEDICINE

## 2023-12-20 PROCEDURE — 96365 THER/PROPH/DIAG IV INF INIT: CPT

## 2023-12-20 PROCEDURE — 80053 COMPREHEN METABOLIC PANEL: CPT | Performed by: EMERGENCY MEDICINE

## 2023-12-20 PROCEDURE — 87040 BLOOD CULTURE FOR BACTERIA: CPT | Performed by: EMERGENCY MEDICINE

## 2023-12-20 PROCEDURE — 99285 EMERGENCY DEPT VISIT HI MDM: CPT | Performed by: EMERGENCY MEDICINE

## 2023-12-20 PROCEDURE — 0241U HB NFCT DS VIR RESP RNA 4 TRGT: CPT | Performed by: EMERGENCY MEDICINE

## 2023-12-20 PROCEDURE — 36415 COLL VENOUS BLD VENIPUNCTURE: CPT | Performed by: EMERGENCY MEDICINE

## 2023-12-20 PROCEDURE — 96361 HYDRATE IV INFUSION ADD-ON: CPT

## 2023-12-20 PROCEDURE — 84145 PROCALCITONIN (PCT): CPT | Performed by: EMERGENCY MEDICINE

## 2023-12-20 PROCEDURE — 99223 1ST HOSP IP/OBS HIGH 75: CPT | Performed by: INTERNAL MEDICINE

## 2023-12-20 RX ORDER — PRAVASTATIN SODIUM 40 MG
40 TABLET ORAL
Status: DISCONTINUED | OUTPATIENT
Start: 2023-12-20 | End: 2023-12-22 | Stop reason: HOSPADM

## 2023-12-20 RX ORDER — HEPARIN SODIUM 5000 [USP'U]/ML
5000 INJECTION, SOLUTION INTRAVENOUS; SUBCUTANEOUS EVERY 8 HOURS SCHEDULED
Status: DISCONTINUED | OUTPATIENT
Start: 2023-12-20 | End: 2023-12-22 | Stop reason: HOSPADM

## 2023-12-20 RX ORDER — MELATONIN
1000 DAILY
Status: DISCONTINUED | OUTPATIENT
Start: 2023-12-21 | End: 2023-12-22 | Stop reason: HOSPADM

## 2023-12-20 RX ORDER — EZETIMIBE 10 MG/1
10 TABLET ORAL
Status: DISCONTINUED | OUTPATIENT
Start: 2023-12-20 | End: 2023-12-22 | Stop reason: HOSPADM

## 2023-12-20 RX ORDER — HYDROXYCHLOROQUINE SULFATE 200 MG/1
200 TABLET, FILM COATED ORAL 2 TIMES DAILY
Status: DISCONTINUED | OUTPATIENT
Start: 2023-12-20 | End: 2023-12-22 | Stop reason: HOSPADM

## 2023-12-20 RX ORDER — LISINOPRIL 10 MG/1
20 TABLET ORAL DAILY
Status: DISCONTINUED | OUTPATIENT
Start: 2023-12-21 | End: 2023-12-20

## 2023-12-20 RX ORDER — HEPARIN SODIUM 5000 [USP'U]/ML
5000 INJECTION, SOLUTION INTRAVENOUS; SUBCUTANEOUS EVERY 8 HOURS SCHEDULED
Status: DISCONTINUED | OUTPATIENT
Start: 2023-12-20 | End: 2023-12-20

## 2023-12-20 RX ORDER — AMLODIPINE BESYLATE 5 MG/1
5 TABLET ORAL DAILY
Status: DISCONTINUED | OUTPATIENT
Start: 2023-12-21 | End: 2023-12-22 | Stop reason: HOSPADM

## 2023-12-20 RX ORDER — OXYCODONE HYDROCHLORIDE 5 MG/1
5 TABLET ORAL EVERY 6 HOURS PRN
Status: DISCONTINUED | OUTPATIENT
Start: 2023-12-20 | End: 2023-12-22 | Stop reason: HOSPADM

## 2023-12-20 RX ORDER — ACETAMINOPHEN 325 MG/1
650 TABLET ORAL ONCE
Status: COMPLETED | OUTPATIENT
Start: 2023-12-20 | End: 2023-12-20

## 2023-12-20 RX ORDER — ESCITALOPRAM OXALATE 10 MG/1
10 TABLET ORAL DAILY
Status: DISCONTINUED | OUTPATIENT
Start: 2023-12-20 | End: 2023-12-22 | Stop reason: HOSPADM

## 2023-12-20 RX ORDER — ZOLPIDEM TARTRATE 5 MG/1
10 TABLET ORAL
Status: DISCONTINUED | OUTPATIENT
Start: 2023-12-20 | End: 2023-12-22 | Stop reason: HOSPADM

## 2023-12-20 RX ORDER — PILOCARPINE HYDROCHLORIDE 5 MG/1
5 TABLET, FILM COATED ORAL DAILY
Status: DISCONTINUED | OUTPATIENT
Start: 2023-12-20 | End: 2023-12-22 | Stop reason: HOSPADM

## 2023-12-20 RX ORDER — CEFAZOLIN SODIUM 1 G/50ML
1000 SOLUTION INTRAVENOUS EVERY 8 HOURS
Status: DISCONTINUED | OUTPATIENT
Start: 2023-12-21 | End: 2023-12-22

## 2023-12-20 RX ORDER — ACETAMINOPHEN 325 MG/1
975 TABLET ORAL EVERY 6 HOURS PRN
Status: DISCONTINUED | OUTPATIENT
Start: 2023-12-20 | End: 2023-12-22 | Stop reason: HOSPADM

## 2023-12-20 RX ADMIN — ESCITALOPRAM OXALATE 10 MG: 10 TABLET ORAL at 18:56

## 2023-12-20 RX ADMIN — SODIUM CHLORIDE 1000 ML: 0.9 INJECTION, SOLUTION INTRAVENOUS at 15:44

## 2023-12-20 RX ADMIN — OXYCODONE HYDROCHLORIDE 5 MG: 5 TABLET ORAL at 22:02

## 2023-12-20 RX ADMIN — PILOCARPINE HYDROCHLORIDE 5 MG: 5 TABLET, FILM COATED ORAL at 18:56

## 2023-12-20 RX ADMIN — VANCOMYCIN HYDROCHLORIDE 2000 MG: 1 INJECTION, POWDER, LYOPHILIZED, FOR SOLUTION INTRAVENOUS at 16:22

## 2023-12-20 RX ADMIN — HEPARIN SODIUM 5000 UNITS: 5000 INJECTION INTRAVENOUS; SUBCUTANEOUS at 22:02

## 2023-12-20 RX ADMIN — ZOLPIDEM TARTRATE 10 MG: 5 TABLET ORAL at 22:53

## 2023-12-20 RX ADMIN — EZETIMIBE 10 MG: 10 TABLET ORAL at 18:56

## 2023-12-20 RX ADMIN — ACETAMINOPHEN 650 MG: 325 TABLET, FILM COATED ORAL at 15:40

## 2023-12-20 RX ADMIN — PRAVASTATIN SODIUM 40 MG: 40 TABLET ORAL at 18:56

## 2023-12-20 NOTE — ASSESSMENT & PLAN NOTE
Cr 2021: 0.99, in 9/23 it was 1.00  She has Chronic History of Sjogren syndrome. Last flare was years ago. She has been on Pilocarpine 5 mg daily, Hydroxychloroquine 200 mg BID and Lisinopril 20 mg daily.  Unclear if her elevated cr is due to dehydration coz of Facial pain Vs Drug induced.  Recent Labs     12/20/23  1533 12/21/23  0436   CREATININE 1.33* 1.02   EGFR 39 53     Estimated Creatinine Clearance: 46.7 mL/min (by C-G formula based on SCr of 1.02 mg/dL).   Plan:  UA   BMP AM was down to 1.02. Patient not likely to have ROYAL at this time. Continue to monitor.

## 2023-12-20 NOTE — ASSESSMENT & PLAN NOTE
Pt had diffuse headache on weekend followed by fever of 102.2, itchy rash on Left side of her cheek and occasional ringing in the Ear.   Pain is radiating in the trigeminal distribution( All over her Left side scalp,face, and tonsils).  Per pt pain lasts for few secs. Triggered by superficial touch and eating food. She took Alieve and Tylenol with resulted in mild improvement in pain.  She did not have F/u/RSV/HSV vaccination due her h/o of GB syndrome.  She was seen by her Family Physician and was prescribed with Amoxyclav for possible cellulitis. She completed 2 days course and believes that these ABX didn't help her with rash and pain.   On P/E no herpetic rash noted. Ears/Nose/Throat Normal. Pt denies recent Viral infections/ exposure to sick contacts.  IN the ED, She received 2g Vanc.   Recent Labs     12/20/23  1533   PROCALCITONI 0.10        Plan:  Start IV Cefazolin 1g Every Q8H for total 5 days. First dose 12/21/23  Pain: Tylenol 975 mg Q6 PRN for mild pain, oxycodone 2.5 mg or 5 mg Q6 PRN, or dilaudid 0.2 mg Q3 PRN for moderate to severe pain

## 2023-12-20 NOTE — ASSESSMENT & PLAN NOTE
Cr 2021: 0.99  No recent Cr available to check if she has ROYAL/CKD.  She has Chronic History of Sjogren syndrome. Last flare was years ago. She has been on Pilocarpine 5 mg daily, Hydroxychloroquine 200 mg BID and Lisinopril 20 mg daily.  Unclear if her elevated cr is due to dehydration coz of Facial pain Vs Drug induced.  Recent Labs     12/20/23  1533   CREATININE 1.33*   EGFR 39     Estimated Creatinine Clearance: 35.8 mL/min (A) (by C-G formula based on SCr of 1.33 mg/dL (H)).   Plan:  UA   BMP AM

## 2023-12-20 NOTE — H&P
[facility]  H&P  Name: Flower Nunn 73 y.o. female I MRN: [id number]  Unit/Bed#: ED-35 I Date of Admission: 12/20/2023   Date of Service: 12/20/2023 I Hospital Day: 0      Assessment/Plan   * Cellulitis of face  Assessment & Plan  Pt had diffuse headache on weekend followed by fever of 102.2, itchy rash on Left side of her cheek and occasional ringing in the Ear.   Pain is radiating in the trigeminal distribution( All over her Left side scalp,face, and tonsils).  Per pt pain lasts for few secs. Triggered by superficial touch and eating food. She took Alieve and Tylenol with resulted in mild improvement in pain.  She did not have F/u/RSV/HSV vaccination due her h/o of GB syndrome.  She was seen by her Family Physician and was prescribed with Amoxyclav for possible cellulitis. She completed 2 days course and believes that these ABX didn't help her with rash and pain.   On P/E no herpetic rash noted. Ears/Nose/Throat Normal. Pt denies recent Viral infections/ exposure to sick contacts.  IN the ED, She received 2g Vanc.   Recent Labs     12/20/23  1533   PROCALCITONI 0.10        Plan:  Start IV Cefazolin 1g Every Q8H for total 5 days. First dose 12/21/23  Pain: Tylenol 975 mg Q6 PRN          Elevated serum creatinine  Assessment & Plan  Cr 2021: 0.99  No recent Cr available to check if she has ROYAL/CKD.  She has Chronic History of Sjogren syndrome. Last flare was years ago. She has been on Pilocarpine 5 mg daily, Hydroxychloroquine 200 mg BID and Lisinopril 20 mg daily.  Unclear if her elevated cr is due to dehydration coz of Facial pain Vs Drug induced.  Recent Labs     12/20/23  1533   CREATININE 1.33*   EGFR 39     Estimated Creatinine Clearance: 35.8 mL/min (A) (by C-G formula based on SCr of 1.33 mg/dL (H)).   Plan:  UA   BMP AM      Sjogren's syndrome (HCC)  Assessment & Plan  H/O Sjogrens syndrome. Last flare was Years ago.   Home meds : Pilocarpine 5 mg daily, Hydroxychloroquine 200 mg  BID.    Plan:  Cont Home meds.        Anxiety  Assessment & Plan  Seen by her Psychiatrist in Nov- Reccommended to stop Paroxetine. She was started on Lexapro.     Plan:  Cont Lexapro    Insomnia  Assessment & Plan  Takes Ambien 10 Mg at Bedtime.     Plan:  Cont home med.   Fall precautions           VTE Pharmacologic Prophylaxis:   Moderate Risk (Score 3-4) - Pharmacological DVT Prophylaxis Ordered: heparin.  Code Status: Level 3 - DNAR and DNI   Discussion with family: Updated  () at bedside.    Anticipated Length of Stay: Patient will be admitted on an inpatient basis with an anticipated length of stay of greater than 2 midnights secondary to Cellulitis of face.    Chief Complaint: Facial rash Left side    History of Present Illness:  Flower Nunn is a 73 y.o. female with a PMH of  Anxiety, Sjogrens syndrome, insomina, HTN, H/o Breast cancer who presents with Left sided facial rash since 4 days. She had diffuse headache on weekend followed by fever of 102.2, itchy rash on Left side of her cheek and occasional ringing in the Ear.   Pain is radiating in the trigeminal distribution( All over her Left side scalp,face, and tonsils).  Per pt pain lasts for few secs. Triggered by superficial touch and eating food. She took Alieve and Tylenol with resulted in mild improvement in pain.  She did not have F/u/RSV/HSV vaccination due her h/o of GB syndrome.  She was seen by her Family Physician and was prescribed with Amoxyclav for possible cellulitis. She completed 2 days course and believes that these ABX didn't help her with rash and pain.   On P/E no herpetic rash noted. Ears/Nose/Throat Normal. Pt denies recent Viral infections/ exposure to sick contacts.  Rash is Pink, distributed over her Left cheek. No visible ulcers/vesicles.   IN the ED, She received 2g Vanc.   Her LABS Procal 0.10, WBC 8.25, Cr 1.33, and she is currently afebrile.   Per ED triage, her rash had a rapid progression. Also due to  no improvement on Amoxyclav, we will admit her for IV Antibiotics for Facial cellulitis.     Review of Systems:  Review of Systems   Constitutional:  Positive for fever.   HENT:  Positive for ear pain, facial swelling, tinnitus and trouble swallowing. Negative for congestion, dental problem, ear discharge, mouth sores, nosebleeds, postnasal drip, rhinorrhea, sinus pressure, sinus pain, sneezing, sore throat and voice change.    Eyes: Negative.    Respiratory: Negative.     Cardiovascular: Negative.    Gastrointestinal: Negative.    Endocrine: Negative.    Genitourinary: Negative.    Musculoskeletal: Negative.    Allergic/Immunologic: Negative.    Neurological: Negative.    Hematological: Negative.    Psychiatric/Behavioral: Negative.         Past Medical and Surgical History:   Past Medical History:   Diagnosis Date    Anxiety     Arthritis     Atopic dermatitis     Breast CA (HCC) 1999    left  age 49    Depression     Fracture     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HCC)     2013    History of chemotherapy     breast cancer    Infective polyneuritis, acute (HCC)     Osteoporosis     PE (pulmonary thromboembolism) (HCC)     Peptic ulcer disease     Psychiatric disorder     Sjogren's syndrome (HCC)     Sleep apnea     does not use cpap       Past Surgical History:   Procedure Laterality Date    ABDOMINAL SURGERY      APPENDECTOMY      BREAST BIOPSY Right     prior to breast cancer    CHOLECYSTECTOMY      COLONOSCOPY W/ ENDOSCOPIC US N/A 2/24/2017    Procedure: ANAL ENDOSCOPIC U/S;  Surgeon: hCang Arellano MD;  Location: [facility];  Service:     GASTRECTOMY      HYSTERECTOMY      age 33; ovaries intact    MASTECTOMY Left 1999    age 49    ND COLONOSCOPY FLX DX W/COLLJ SPEC WHEN PFRMD N/A 2/24/2017    Procedure: COLONOSCOPY;  Surgeon: Neo Baxter MD;  Location: [facility];  Service: Gastroenterology       Meds/Allergies:  Prior to Admission medications    Medication Sig Start Date End Date Taking? Authorizing Provider    ALPRAZolam (XANAX) 0.5 mg tablet Take 1 tablet (0.5 mg total) by mouth 3 (three) times a day as needed for anxiety 11/27/23   Adam Evangelista MD   amLODIPine-benazepril (LOTREL 5-20) 5-20 MG per capsule Take 1 capsule by mouth daily.    Historical Provider, MD   aspirin 325 mg tablet Take 325 mg by mouth daily.    Historical Provider, MD   atenolol (TENORMIN) 100 mg tablet Take 100 mg by mouth daily.    Historical Provider, MD   cevimeline (EVOXAC) 30 MG capsule Take 30 mg by mouth daily.  Patient not taking: Reported on 3/21/2023    Historical Provider, MD   cholecalciferol (VITAMIN D3) 1,000 units tablet Take 1,000 Units by mouth daily    Historical Provider, MD   cycloSPORINE (RESTASIS) 0.05 % ophthalmic emulsion Administer 1 drop to both eyes 2 (two) times a day.    Historical Provider, MD   escitalopram (Lexapro) 10 mg tablet Take 1 tablet (10 mg total) by mouth daily 11/29/23   Adam Evangleista MD   ezetimibe-simvastatin (VYTORIN) 10-20 mg per tablet  2/12/23   Historical Provider, MD   hydroxychloroquine (PLAQUENIL) 200 mg tablet Take 200 mg by mouth 2 (two) times a day    Historical Provider, MD   OLANZapine (ZyPREXA) 2.5 mg tablet TAKE 1 TABLET BY MOUTH DAILY AT  BEDTIME 12/19/23   Adam Evangelista MD   pilocarpine (SALAGEN) 5 mg tablet  6/29/23   Historical Provider, MD   rOPINIRole (REQUIP) 0.5 mg tablet Take 0.5 mg by mouth daily    Historical Provider, MD   zolpidem (AMBIEN) 10 mg tablet Take 1 tablet (10 mg total) by mouth daily at bedtime 11/27/23   Adam Evangelista MD     I have reviewed home medications with patient personally.    Allergies:   Allergies   Allergen Reactions    Adderall [Amphetamine-Dextroamphetamine] Tongue Swelling     Tongue swelling    Lamotrigine Rash     Other reaction(s): Possible rash, mouth sores (questionable reaction)       Social History:  Marital Status: /Civil Union   Occupation:   Patient Pre-hospital Living Situation: Home  Patient Pre-hospital Level of  Mobility: walks  Patient Pre-hospital Diet Restrictions:   Substance Use History:   Social History     Substance and Sexual Activity   Alcohol Use Yes    Comment: socially     Social History     Tobacco Use   Smoking Status Never   Smokeless Tobacco Never     Social History     Substance and Sexual Activity   Drug Use No       Family History:  Family History   Problem Relation Age of Onset    Hypertension Mother     Heart failure Father     Esophageal cancer Father 78    Breast cancer Paternal Aunt 48    Breast cancer Cousin 50        paternal    Breast cancer Cousin 50        paternal    Ovarian cancer Maternal Aunt 58    No Known Problems Sister     No Known Problems Maternal Grandmother     No Known Problems Maternal Grandfather     No Known Problems Paternal Grandmother     No Known Problems Paternal Grandfather     No Known Problems Son     No Known Problems Son     No Known Problems Maternal Aunt     No Known Problems Maternal Aunt     No Known Problems Paternal Aunt        Physical Exam:     Vitals:   Blood Pressure: 158/72 (12/20/23 1627)  Pulse: 60 (12/20/23 1627)  Temperature: 98.2 °F (36.8 °C) (12/20/23 1358)  Respirations: 18 (12/20/23 1627)  SpO2: 99 % (12/20/23 1627)    Physical Exam  Vitals reviewed.   HENT:      Head: Normocephalic and atraumatic. No right periorbital erythema or left periorbital erythema.      Right Ear: Ear canal and external ear normal.      Left Ear: Ear canal and external ear normal.   Eyes:      General: Lids are normal. Vision grossly intact.      Extraocular Movements: Extraocular movements intact.   Cardiovascular:      Rate and Rhythm: Normal rate.      Pulses: Normal pulses.      Heart sounds: Normal heart sounds.   Pulmonary:      Effort: Pulmonary effort is normal.      Breath sounds: Normal breath sounds.   Abdominal:      General: Abdomen is flat. Bowel sounds are normal.      Palpations: Abdomen is soft.   Musculoskeletal:      Right shoulder: Normal.      Left  shoulder: Normal.      Right upper arm: Normal.      Left upper arm: Normal.      Cervical back: Normal range of motion. Spinous process tenderness and muscular tenderness present.      Right lower leg: No edema.      Left lower leg: No edema.   Skin:     General: Skin is warm.      Comments: Left Sided facial rash > Right.   Rash did not involve eyes, lower mandible.   No vesicles noted.    Neurological:      General: No focal deficit present.      Mental Status: She is alert.   Psychiatric:         Mood and Affect: Mood normal.          Additional Data:     Lab Results:  Results from last 7 days   Lab Units 12/20/23  1533   WBC Thousand/uL 8.25   HEMOGLOBIN g/dL 14.0   HEMATOCRIT % 43.3   PLATELETS Thousands/uL 198   NEUTROS PCT % 55   LYMPHS PCT % 31   MONOS PCT % 11   EOS PCT % 2     Results from last 7 days   Lab Units 12/20/23  1533   SODIUM mmol/L 142   POTASSIUM mmol/L 3.7   CHLORIDE mmol/L 107   CO2 mmol/L 25   BUN mg/dL 20   CREATININE mg/dL 1.33*   ANION GAP mmol/L 10   CALCIUM mg/dL 9.4   ALBUMIN g/dL 3.6   TOTAL BILIRUBIN mg/dL 0.28   ALK PHOS U/L 108*   ALT U/L 48   AST U/L 55*   GLUCOSE RANDOM mg/dL 82                 Results from last 7 days   Lab Units 12/20/23  1533   LACTIC ACID mmol/L 1.3   PROCALCITONIN ng/ml 0.10       Lines/Drains:  Invasive Devices       Peripheral Intravenous Line  Duration             Peripheral IV 12/20/23 Proximal;Right;Ventral (anterior) Forearm <1 day                        Imaging: Reviewed radiology reports from this admission including: No Imaging obtained POA  No orders to display       EKG and Other Studies Reviewed on Admission:   EKG: No EKG obtained.    ** Please Note: This note has been constructed using a voice recognition system. **

## 2023-12-20 NOTE — ASSESSMENT & PLAN NOTE
H/O Sjogrens syndrome. Last flare was Years ago.   Home meds : Pilocarpine 5 mg daily, Hydroxychloroquine 200 mg BID.    Plan:  Cont Home meds.

## 2023-12-20 NOTE — ASSESSMENT & PLAN NOTE
Seen by her Psychiatrist in Nov- Reccommended to stop Paroxetine. She was started on Lexapro.     Plan:  Cont Lexapro

## 2023-12-20 NOTE — ASSESSMENT & PLAN NOTE
Pt had diffuse headache on weekend followed by fever of 102.2, itchy rash on Left side of her cheek and occasional ringing in the Ear.   Pain is radiating in the trigeminal distribution( All over her Left side scalp,face, and tonsils).  Per pt pain lasts for few secs. Triggered by superficial touch and eating food. She took Alieve and Tylenol with resulted in mild improvement in pain.  She did not have F/u/RSV/HSV vaccination due her h/o of GB syndrome.  She was seen by her Family Physician and was prescribed with Amoxyclav for possible cellulitis. She completed 2 days course and believes that these ABX didn't help her with rash and pain.   On P/E no herpetic rash noted. Ears/Nose/Throat Normal. Pt denies recent Viral infections/ exposure to sick contacts.  IN the ED, She received 2g Vanc   Recent Labs     12/20/23  1533   PROCALCITONI 0.10        Plan:  Continue with IV Vanc for 5 days. First Dose on 12/20/23.  Pain: Tylenol 975 mg Q6 PRN

## 2023-12-21 PROBLEM — R03.0 ELEVATED BLOOD PRESSURE READING: Status: ACTIVE | Noted: 2023-12-21

## 2023-12-21 LAB
ANION GAP SERPL CALCULATED.3IONS-SCNC: 8 MMOL/L
BASOPHILS # BLD AUTO: 0.03 THOUSANDS/ÂΜL (ref 0–0.1)
BASOPHILS NFR BLD AUTO: 0 % (ref 0–1)
BUN SERPL-MCNC: 19 MG/DL (ref 5–25)
CALCIUM SERPL-MCNC: 8 MG/DL (ref 8.4–10.2)
CHLORIDE SERPL-SCNC: 112 MMOL/L (ref 96–108)
CO2 SERPL-SCNC: 22 MMOL/L (ref 21–32)
CREAT SERPL-MCNC: 1.02 MG/DL (ref 0.6–1.3)
EOSINOPHIL # BLD AUTO: 0.19 THOUSAND/ÂΜL (ref 0–0.61)
EOSINOPHIL NFR BLD AUTO: 3 % (ref 0–6)
ERYTHROCYTE [DISTWIDTH] IN BLOOD BY AUTOMATED COUNT: 12 % (ref 11.6–15.1)
GFR SERPL CREATININE-BSD FRML MDRD: 53 ML/MIN/1.73SQ M
GLUCOSE SERPL-MCNC: 75 MG/DL (ref 65–140)
HCT VFR BLD AUTO: 38 % (ref 34.8–46.1)
HGB BLD-MCNC: 12.4 G/DL (ref 11.5–15.4)
IMM GRANULOCYTES # BLD AUTO: 0.03 THOUSAND/UL (ref 0–0.2)
IMM GRANULOCYTES NFR BLD AUTO: 0 % (ref 0–2)
LYMPHOCYTES # BLD AUTO: 2.43 THOUSANDS/ÂΜL (ref 0.6–4.47)
LYMPHOCYTES NFR BLD AUTO: 32 % (ref 14–44)
MCH RBC QN AUTO: 32.1 PG (ref 26.8–34.3)
MCHC RBC AUTO-ENTMCNC: 32.6 G/DL (ref 31.4–37.4)
MCV RBC AUTO: 98 FL (ref 82–98)
MONOCYTES # BLD AUTO: 0.63 THOUSAND/ÂΜL (ref 0.17–1.22)
MONOCYTES NFR BLD AUTO: 8 % (ref 4–12)
NEUTROPHILS # BLD AUTO: 4.28 THOUSANDS/ÂΜL (ref 1.85–7.62)
NEUTS SEG NFR BLD AUTO: 57 % (ref 43–75)
NRBC BLD AUTO-RTO: 0 /100 WBCS
PLATELET # BLD AUTO: 166 THOUSANDS/UL (ref 149–390)
PMV BLD AUTO: 10 FL (ref 8.9–12.7)
POTASSIUM SERPL-SCNC: 3.8 MMOL/L (ref 3.5–5.3)
RBC # BLD AUTO: 3.86 MILLION/UL (ref 3.81–5.12)
SODIUM SERPL-SCNC: 142 MMOL/L (ref 135–147)
WBC # BLD AUTO: 7.59 THOUSAND/UL (ref 4.31–10.16)

## 2023-12-21 PROCEDURE — 99232 SBSQ HOSP IP/OBS MODERATE 35: CPT | Performed by: HOSPITALIST

## 2023-12-21 PROCEDURE — 80048 BASIC METABOLIC PNL TOTAL CA: CPT

## 2023-12-21 PROCEDURE — 85025 COMPLETE CBC W/AUTO DIFF WBC: CPT

## 2023-12-21 RX ORDER — LISINOPRIL 20 MG/1
20 TABLET ORAL DAILY
Status: DISCONTINUED | OUTPATIENT
Start: 2023-12-21 | End: 2023-12-22 | Stop reason: HOSPADM

## 2023-12-21 RX ORDER — HYDROMORPHONE HCL IN WATER/PF 6 MG/30 ML
0.2 PATIENT CONTROLLED ANALGESIA SYRINGE INTRAVENOUS
Status: DISCONTINUED | OUTPATIENT
Start: 2023-12-21 | End: 2023-12-22 | Stop reason: HOSPADM

## 2023-12-21 RX ORDER — HYDRALAZINE HYDROCHLORIDE 20 MG/ML
5 INJECTION INTRAMUSCULAR; INTRAVENOUS ONCE
Status: COMPLETED | OUTPATIENT
Start: 2023-12-21 | End: 2023-12-21

## 2023-12-21 RX ORDER — ALPRAZOLAM 0.5 MG/1
0.5 TABLET ORAL ONCE
Status: COMPLETED | OUTPATIENT
Start: 2023-12-21 | End: 2023-12-21

## 2023-12-21 RX ADMIN — ZOLPIDEM TARTRATE 10 MG: 5 TABLET ORAL at 21:15

## 2023-12-21 RX ADMIN — AMLODIPINE BESYLATE 5 MG: 5 TABLET ORAL at 08:09

## 2023-12-21 RX ADMIN — CEFAZOLIN SODIUM 1000 MG: 1 SOLUTION INTRAVENOUS at 16:40

## 2023-12-21 RX ADMIN — OXYCODONE HYDROCHLORIDE 5 MG: 5 TABLET ORAL at 03:58

## 2023-12-21 RX ADMIN — ESCITALOPRAM OXALATE 10 MG: 10 TABLET ORAL at 08:10

## 2023-12-21 RX ADMIN — HYDROXYCHLOROQUINE SULFATE 200 MG: 200 TABLET, FILM COATED ORAL at 17:03

## 2023-12-21 RX ADMIN — ALPRAZOLAM 0.5 MG: 0.5 TABLET ORAL at 18:50

## 2023-12-21 RX ADMIN — PRAVASTATIN SODIUM 40 MG: 40 TABLET ORAL at 17:03

## 2023-12-21 RX ADMIN — ACETAMINOPHEN 975 MG: 325 TABLET, FILM COATED ORAL at 17:46

## 2023-12-21 RX ADMIN — HEPARIN SODIUM 5000 UNITS: 5000 INJECTION INTRAVENOUS; SUBCUTANEOUS at 21:15

## 2023-12-21 RX ADMIN — CEFAZOLIN SODIUM 1000 MG: 1 SOLUTION INTRAVENOUS at 08:09

## 2023-12-21 RX ADMIN — PILOCARPINE HYDROCHLORIDE 5 MG: 5 TABLET, FILM COATED ORAL at 08:10

## 2023-12-21 RX ADMIN — LISINOPRIL 20 MG: 20 TABLET ORAL at 12:08

## 2023-12-21 RX ADMIN — HEPARIN SODIUM 5000 UNITS: 5000 INJECTION INTRAVENOUS; SUBCUTANEOUS at 05:07

## 2023-12-21 RX ADMIN — Medication 1000 UNITS: at 08:10

## 2023-12-21 RX ADMIN — CEFAZOLIN SODIUM 1000 MG: 1 SOLUTION INTRAVENOUS at 00:31

## 2023-12-21 RX ADMIN — EZETIMIBE 10 MG: 10 TABLET ORAL at 17:03

## 2023-12-21 RX ADMIN — OXYCODONE HYDROCHLORIDE 5 MG: 5 TABLET ORAL at 11:15

## 2023-12-21 RX ADMIN — HEPARIN SODIUM 5000 UNITS: 5000 INJECTION INTRAVENOUS; SUBCUTANEOUS at 13:24

## 2023-12-21 RX ADMIN — HYDRALAZINE HYDROCHLORIDE 5 MG: 20 INJECTION, SOLUTION INTRAMUSCULAR; INTRAVENOUS at 17:15

## 2023-12-21 RX ADMIN — HYDROXYCHLOROQUINE SULFATE 200 MG: 200 TABLET, FILM COATED ORAL at 08:10

## 2023-12-21 NOTE — ASSESSMENT & PLAN NOTE
Pt had diffuse headache on weekend followed by fever of 102.2, itchy rash on Left side of her cheek and occasional ringing in the Ear.   Pain is radiating in the trigeminal distribution( All over her Left side scalp,face, and tonsils).  Per pt pain lasts for few secs. Triggered by superficial touch and eating food. She took Alieve and Tylenol with resulted in mild improvement in pain.  She did not have F/u/RSV/HSV vaccination due her h/o of GB syndrome.  She was seen by her Family Physician and was prescribed with Amoxyclav for possible cellulitis. She completed 2 days course and believes that these ABX didn't help her with rash and pain.   On P/E no herpetic rash noted. Ears/Nose/Throat Normal. Pt denies recent Viral infections/ exposure to sick contacts.  IN the ED, She received 2g Vanc.   Recent Labs     12/20/23  1533   PROCALCITONI 0.10        Plan:  Start IV Cefazolin 1g Every Q8H for total 5 days. First dose 12/21/23  Pain: Tylenol 975 mg Q6 PRN for mild pain, oxycodone 2.5 mg or 5 mg Q6 PRN, or dilaudid 0.2 mg Q3 PRN for moderate to severe pain  Reassure patient, likely discharge tomorrow

## 2023-12-21 NOTE — PROGRESS NOTES
[facility]  Progress Note  Name: Flower Nunn I  MRN: [id number]  Unit/Bed#: -01 I Date of Admission: 12/20/2023   Date of Service: 12/21/2023 I Hospital Day: 1    Assessment/Plan      -Cellulitis of face:  Pt had diffuse headache on weekend followed by fever of 102.2, itchy rash on Left side of her cheek and occasional ringing in the Ear.   Pain is radiating in the trigeminal distribution( All over her Left side scalp,face, and tonsils).  Per pt pain lasts for few secs. Triggered by superficial touch and eating food. She took Alieve and Tylenol with resulted in mild improvement in pain.  She did not have F/u/RSV/HSV vaccination due her h/o of GB syndrome.  She was seen by her Family Physician and was prescribed with Amoxyclav for possible cellulitis. She completed 2 days course and believes that these ABX didn't help her with rash and pain.   On P/E no herpetic rash noted. Ears/Nose/Throat Normal. Pt denies recent Viral infections/ exposure to sick contacts.  IN the ED, She received 2g Vanc.       Recent Labs     12/20/23  1533   PROCALCITONI 0.10         Plan:  Start IV Cefazolin 1g Every Q8H for total 5 days. First dose 12/21/23  Pain: Tylenol 975 mg Q6 PRN for mild pain, oxycodone 2.5 mg or 5 mg Q6 PRN, or dilaudid 0.2 mg Q3 PRN for moderate to severe pain  Reassure patient, likely discharge tomorrow    - Elevated Blood Pressure Reading  Patient had two elevated blood pressure readings yesterday, 183/81 and 180/78.   Plan:  -She started amlodipine 5 mg daily at 8:00 am today, 12/21. New BP after was 168/72.  -Patient also on lisinopril at home, was held due to potential ROYAL with creatinine level at admission being 1.33, and above her baseline, but went down to 1.02. Will start home lisinopril today (20 mg 1x day) as ROYAL is unlikely.  -Will continue to monitor.    - Elevated Serum Creatinine  Cr 2021: 0.99, in 9/23 it was 1.00  She has Chronic History of Sjogren syndrome. Last  flare was years ago. She has been on Pilocarpine 5 mg daily, Hydroxychloroquine 200 mg BID and Lisinopril 20 mg daily.  Unclear if her elevated cr is due to dehydration coz of Facial pain Vs Drug induced.  Cr on admission was 1.33 but decreased to 1.02 today. ROYAL unlikely  Plan:  -follow UA result  -continue to monitor with AM BMP  -Sjogren's Syndrome   H/O Sjogrens syndrome. Last flare was Years ago.   Home meds : Pilocarpine 5 mg daily, Hydroxychloroquine 200 mg BID.     Plan:  Cont Home meds.  - Anxiety  Seen by her Psychiatrist in Nov- Reccommended to stop Paroxetine. She was started on Lexapro.      Plan:  Cont Lexapro  -Insomnia  Takes Ambien 10 Mg at Bedtime.      Plan:  Cont home med.   Fall precautions    VTE Pharmacologic Prophylaxis:   Moderate Risk (Score 3-4) - Pharmacological DVT Prophylaxis Ordered: heparin.    Mobility:   Basic Mobility Inpatient Raw Score: 24  JH-HLM Goal: 8: Walk 250 feet or more  JH-HLM Achieved: 8: Walk 250 feet ot more  HLM Goal achieved. Continue to encourage appropriate mobility.    Patient Centered Rounds: I performed bedside rounds with nursing staff today.  Discussions with Specialists or Other Care Team Provider: Nursing and attending    Education and Discussions with Family / Patient: Attempted to update  () via phone. Unable to contact. Did not leave message.    Current Length of Stay: 1 day(s)  Current Patient Status: Inpatient   Discharge Plan: Anticipate discharge in 24-48 hrs to home.    Code Status: Level 3 - DNAR and DNI    Subjective:   Patient was seen at bedside this morning. She states that she was surprised that her face swelling and pain seems worse today. She has been getting 5 mg oxycodone PRN as prescribed, but feels like she needs better pain control due to the worsening symptoms. She states her nose is tender to the touch and the mastoid area behind her left is is as well. Denies fever chills, n/v, tinnitus, headache, dizziness,  lightheadedness.     Objective:     Vitals:   Temp (24hrs), Av.5 °F (36.9 °C), Min:98.2 °F (36.8 °C), Max:98.8 °F (37.1 °C)    Temp:  [98.2 °F (36.8 °C)-98.8 °F (37.1 °C)] 98.8 °F (37.1 °C)  HR:  [55-82] 74  Resp:  [17-18] 18  BP: (158-183)/(72-92) 168/72  SpO2:  [92 %-99 %] 92 %  There is no height or weight on file to calculate BMI.     Input and Output Summary (last 24 hours):     Intake/Output Summary (Last 24 hours) at 2023 1113  Last data filed at 2023 1829  Gross per 24 hour   Intake 1500 ml   Output --   Net 1500 ml       Physical Exam:   Physical Exam  Constitutional:       Appearance: Normal appearance.   HENT:      Head: Normocephalic.      Nose:      Comments: Left side of nose has area of redness consistent with the rash also on her left cheek  Cardiovascular:      Rate and Rhythm: Normal rate and regular rhythm.      Heart sounds: Normal heart sounds.   Pulmonary:      Effort: Pulmonary effort is normal.      Breath sounds: Normal breath sounds.   Abdominal:      General: Abdomen is flat.      Palpations: Abdomen is soft.   Skin:     General: Skin is warm and dry.      Comments: Area of left cheek is bright red from rash. Does not feel warm compared to right side. Slightly tender to palpation.   Neurological:      General: No focal deficit present.      Mental Status: She is alert and oriented to person, place, and time.   Psychiatric:         Mood and Affect: Mood normal.         Behavior: Behavior normal.          Additional Data:     Labs:  Results from last 7 days   Lab Units 23  0436   WBC Thousand/uL 7.59   HEMOGLOBIN g/dL 12.4   HEMATOCRIT % 38.0   PLATELETS Thousands/uL 166   NEUTROS PCT % 57   LYMPHS PCT % 32   MONOS PCT % 8   EOS PCT % 3     Results from last 7 days   Lab Units 23  0436 23  1533   SODIUM mmol/L 142 142   POTASSIUM mmol/L 3.8 3.7   CHLORIDE mmol/L 112* 107   CO2 mmol/L 22 25   BUN mg/dL 19 20   CREATININE mg/dL 1.02 1.33*   ANION GAP mmol/L 8  10   CALCIUM mg/dL 8.0* 9.4   ALBUMIN g/dL  --  3.6   TOTAL BILIRUBIN mg/dL  --  0.28   ALK PHOS U/L  --  108*   ALT U/L  --  48   AST U/L  --  55*   GLUCOSE RANDOM mg/dL 75 82                 Results from last 7 days   Lab Units 12/20/23  1533   LACTIC ACID mmol/L 1.3   PROCALCITONIN ng/ml 0.10       Lines/Drains:  Invasive Devices       Peripheral Intravenous Line  Duration             Peripheral IV 12/20/23 Proximal;Right;Ventral (anterior) Forearm <1 day                          Imaging: No pertinent imaging reviewed.    Recent Cultures (last 7 days):   Results from last 7 days   Lab Units 12/20/23  1533   BLOOD CULTURE  Received in Microbiology Lab. Culture in Progress.  Received in Microbiology Lab. Culture in Progress.       Last 24 Hours Medication List:   Current Facility-Administered Medications   Medication Dose Route Frequency Provider Last Rate    acetaminophen  975 mg Oral Q6H PRN Jackelin Mcallister MD      amLODIPine  5 mg Oral Daily Jackelin Mcallister MD      cefazolin  1,000 mg Intravenous Q8H Jackelin Mcallister MD 1,000 mg (12/21/23 0809)    cholecalciferol  1,000 Units Oral Daily Jackelin Mcallsiter MD      escitalopram  10 mg Oral Daily Jackelin Mcallister MD      ezetimibe  10 mg Oral Daily With Dinner Jackelin Mcallister MD      And    pravastatin  40 mg Oral Daily With Dinner Jackelin Mcallister MD      heparin (porcine)  5,000 Units Subcutaneous Q8H [facility] Jeremy Krishnan DO      HYDROmorphone  0.2 mg Intravenous Q3H PRN Kerry Kovacs DO      hydroxychloroquine  200 mg Oral BID Jackelin Mcallister MD      oxyCODONE  5 mg Oral Q6H PRN Jeremy Krishnan DO      oxyCODONE  2.5 mg Oral Q6H PRN Jeremy Krishnan DO      pilocarpine  5 mg Oral Daily Jackelin Mcallister MD      zolpidem  10 mg Oral HS Jackelin Mcallister MD          Today, Patient Was Seen By: Kerry Kovacs DO    **Please Note: This note may have been  constructed using a voice recognition system.**

## 2023-12-21 NOTE — QUICK NOTE
Received msg from nurse- BP persistently high > 180/90s. No red flag S/S I.e. headache, dizziness, vision change, shortness of breath, chest pain.    - 5 mg IV hydralazine once given.  - Can increase amlodipine to 10 mg daily tomorrow.    -----------------------------------------    Anxiety- was on Xanax 0.5 mg TID PRN as outpatient along with Lexapro 10 mg daily. Xanax was not ordered during this hospitalization. Last take in last week.    - 1 dose 0.5 mg Xanax given.

## 2023-12-21 NOTE — UTILIZATION REVIEW
Initial Clinical Review    Admission: Date/Time/Statement:   Admission Orders (From admission, onward)       Ordered        12/20/23 1730  Inpatient Admission  Once            12/20/23 1727  INPATIENT ADMISSION  Once                          Orders Placed This Encounter   Procedures    INPATIENT ADMISSION     Standing Status:   Standing     Number of Occurrences:   1     Order Specific Question:   Level of Care     Answer:   Med Surg [16]     Order Specific Question:   Estimated length of stay     Answer:   More than 2 Midnights     Order Specific Question:   Certification     Answer:   I certify that inpatient services are medically necessary for this patient for a duration of greater than two midnights. See H&P and MD Progress Notes for additional information about the patient's course of treatment.    Inpatient Admission     Standing Status:   Standing     Number of Occurrences:   1     Order Specific Question:   Level of Care     Answer:   Med Surg [16]     Order Specific Question:   Estimated length of stay     Answer:   More than 2 Midnights     Order Specific Question:   Certification     Answer:   I certify that inpatient services are medically necessary for this patient for a duration of greater than two midnights. See H&P and MD Progress Notes for additional information about the patient's course of treatment.     ED Arrival Information       Expected   -    Arrival   12/20/2023 13:35    Acuity   Urgent              Means of arrival   Walk-In    Escorted by   Family Member    Service   Hospitalist    Admission type   Emergency              Arrival complaint   Facial Swelling             Chief Complaint   Patient presents with    Facial Pain     Left sided facial swelling, redness, and pain, was seen yesterday at PCP and given Augmentin.        Initial Presentation: 73 y.o. female  PMH of  Anxiety, Sjogrens syndrome, insomina, HTN,  Breast CA , Guillian [place] syndrome who presents to ED from home with Left  sided facial rash x 4 days. She had diffuse headache on weekend followed by fever of 102.2, itchy rash on Left side of her cheek and occasional ringing in the Ear. Pain is radiating in the trigeminal distribution( All over her Left side scalp,face, and tonsils) ,lasts for few secs,  triggered by superficial touch and eating food. She took Alieve and Tylenol with resulted in mild improvement in pain. Pts PCP prescribed Amoxyclav for possible cellulitis that pt has taken x 2 days w/o improvement . On exam, no herpetic rash noted . Rash is Pink, distributed over her Left cheek., ends inferior to eye  . No visible ulcers/vesicles. Mild erythema near the pinna of the left ear . Has cervical back spinous process tenderness and muscular tenderness  Labs - WBC 8.25, creat 1.33 from 0.99 in 2021.    Pt given IVF, IV  Vanco in ED . Admitted as inpatient with L facial cellulitis . Elevated serum creat. Plan - IV Ancef. Pain control . CBC, BMP in  am . F/U blood cx . Hold home lisinopril for now . Obtain UA .         Date: 12/21   Day 2:    Afebrile. WBC WNL .Pt feels her face swelling and pain are worse today.   Does appear consistent with cellulitis.  Patient denies eye pain.  Will continue IV Ancef.  Continue to monitor for clinical improvement  .  Per nsg , pain 7/10 - receiving prn Oxycodone, pt feels she needs better pain control.  Area of left cheek is bright red from rash. Does not feel warm compared to right side. Slightly tender to palpation. Left side of nose has area of redness consistent with the rash on L cheek.   States her nose is tender to the touch and the mastoid area behind her left is is as well.  Added prn IV dilaudid. CBC, BMP in am .     Date: 12/22    Day 3: Has surpassed a 2nd midnight with active treatments and services, which include :  Ongoing IV abx for facial cellulitis. Labs today . BP persistently elevated yesterday afternoon > 180/90s. No red flag S/S . Given IV Hydralazine 5 mg , 1 dose 0.5 mg  Xanax give with hx anxiety  . Plan to increase Amlodipine to 10 mg daily today .       ED Triage Vitals   Temperature Pulse Respirations Blood Pressure SpO2   12/20/23 1358 12/20/23 1358 12/20/23 1358 12/20/23 1358 12/20/23 1358   98.2 °F (36.8 °C) 55 18 162/80 98 %      Temp Source Heart Rate Source Patient Position - Orthostatic VS BP Location FiO2 (%)   12/21/23 0748 12/20/23 2021 12/20/23 2021 12/20/23 1358 --   Oral Monitor Sitting Right arm       Pain Score       12/20/23 1627       2          Wt Readings from Last 1 Encounters:   06/30/23 76.7 kg (169 lb)     Additional Vital Signs:   Date/Time Temp Pulse Resp BP MAP (mmHg) SpO2   12/22/23 06:49:54 98.4 °F (36.9 °C) 85 18 173/91 Abnormal  118 97 %   12/21/23 22:31:53 97.9 °F (36.6 °C) 83 17 165/81 109 94 %   12/21/23 18:29:39 98.7 °F (37.1 °C) 73 -- 167/85 112 94 %   12/21/23 18:28:13 98.7 °F (37.1 °C) 73 18 172/85 Abnormal  114 94 %   12/21/23 17:42:01 98.6 °F (37 °C) 77 -- 159/87 111 97 %   12/21/23 17:41:38 98.6 °F (37 °C) -- 18 159/87 111 --   12/21/23 1620 -- -- -- 180/90 Abnormal  -- --   12/21/23 1615 -- -- -- 185/90 Abnormal  129 97 %   12/21/23 1612 98.7 °F (37.1 °C) 73 21 200/85 Abnormal  122 97 %     Date/Time Temp Pulse Resp BP SpO2   12/21/23 0907 -- 74 -- 168/72 --   12/21/23 0748 98.8 °F (37.1 °C) 68 18 180/92 Abnormal  92 %   12/21/23 0100 -- -- -- -- 98 %   12/20/23 2115 -- -- -- 180/78 Abnormal  --   12/20/23 2021 -- 82 17 183/81 Abnormal  98 %   12/20/23 1627 -- 60 18 158/72 99 %     Pertinent Labs/Diagnostic Test Results:   No orders to display     Results from last 7 days   Lab Units 12/20/23  1533   SARS-COV-2  Negative     Results from last 7 days   Lab Units 12/21/23  0436 12/20/23  1533   WBC Thousand/uL 7.59 8.25   HEMOGLOBIN g/dL 12.4 14.0   HEMATOCRIT % 38.0 43.3   PLATELETS Thousands/uL 166 198   NEUTROS ABS Thousands/µL 4.28 4.53         Results from last 7 days   Lab Units 12/21/23  0436 12/20/23  1533   SODIUM mmol/L 142 142    POTASSIUM mmol/L 3.8 3.7   CHLORIDE mmol/L 112* 107   CO2 mmol/L 22 25   ANION GAP mmol/L 8 10   BUN mg/dL 19 20   CREATININE mg/dL 1.02 1.33*   EGFR ml/min/1.73sq m 53 39   CALCIUM mg/dL 8.0* 9.4     Results from last 7 days   Lab Units 12/20/23  1533   AST U/L 55*   ALT U/L 48   ALK PHOS U/L 108*   TOTAL PROTEIN g/dL 7.5   ALBUMIN g/dL 3.6   TOTAL BILIRUBIN mg/dL 0.28         Results from last 7 days   Lab Units 12/21/23  0436 12/20/23  1533   GLUCOSE RANDOM mg/dL 75 82               Results from last 7 days   Lab Units 12/20/23  1533   PROCALCITONIN ng/ml 0.10     Results from last 7 days   Lab Units 12/20/23  1533   LACTIC ACID mmol/L 1.3                       Results from last 7 days   Lab Units 12/20/23  1533   INFLUENZA A PCR  Negative   INFLUENZA B PCR  Negative   RSV PCR  Negative                             Results from last 7 days   Lab Units 12/20/23  1533   BLOOD CULTURE  Received in Microbiology Lab. Culture in Progress.  Received in Microbiology Lab. Culture in Progress.                   ED Treatment:   Medication Administration from 12/20/2023 1335 to 12/20/2023 2014         Date/Time Order Dose Route Action Comments     12/20/2023 1829 EST sodium chloride 0.9 % bolus 1,000 mL 0 mL Intravenous Stopped --     12/20/2023 1544 EST sodium chloride 0.9 % bolus 1,000 mL 1,000 mL Intravenous New Bag --     12/20/2023 1540 EST acetaminophen (TYLENOL) tablet 650 mg 650 mg Oral Given --     12/20/2023 1822 EST vancomycin (VANCOCIN) 2,000 mg in sodium chloride 0.9 % 500 mL IVPB 0 mg Intravenous Stopped --     12/20/2023 1622 EST vancomycin (VANCOCIN) 2,000 mg in sodium chloride 0.9 % 500 mL IVPB 2,000 mg Intravenous New Bag --     12/20/2023 1856 EST escitalopram (LEXAPRO) tablet 10 mg 10 mg Oral Given --     12/20/2023 1856 EST ezetimibe (ZETIA) tablet 10 mg 10 mg Oral Given --     12/20/2023 1856 EST pravastatin (PRAVACHOL) tablet 40 mg 40 mg Oral Given --     12/20/2023 1458 EST hydroxychloroquine  (PLAQUENIL) tablet 200 mg 200 mg Oral Not Given patient took this AM     12/20/2023 1856 EST pilocarpine (SALAGEN) tablet 5 mg 5 mg Oral Given --          Past Medical History:   Diagnosis Date    Anxiety     Arthritis     Atopic dermatitis     Breast CA (HCC) 1999    left  age 49    Depression     Fracture     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HCC)     2013    History of chemotherapy     breast cancer    Infective polyneuritis, acute (HCC)     Osteoporosis     PE (pulmonary thromboembolism) (HCC)     Peptic ulcer disease     Psychiatric disorder     Sjogren's syndrome (HCC)     Sleep apnea     does not use cpap     Present on Admission:   Anxiety   Insomnia      Admitting Diagnosis: Facial swelling [R22.0]  Facial cellulitis [L03.211]  Age/Sex: 73 y.o. female  Admission Orders:  Scheduled Medications:  amLODIPine, 5 mg, Oral, Daily  cefazolin, 1,000 mg, Intravenous, Q8H  cholecalciferol, 1,000 Units, Oral, Daily  escitalopram, 10 mg, Oral, Daily  ezetimibe, 10 mg, Oral, Daily With Dinner   And  pravastatin, 40 mg, Oral, Daily With Dinner  heparin (porcine), 5,000 Units, Subcutaneous, Q8H GERARDO  hydroxychloroquine, 200 mg, Oral, BID  pilocarpine, 5 mg, Oral, Daily  zolpidem, 10 mg, Oral, HS    ALPRAZolam (XANAX) tablet 0.5 mg  Dose: 0.5 mg  Freq: Once Route: PO  Start: 12/21/23 1845 End: 12/21/23 1850   hydrALAZINE (APRESOLINE) injection 5 mg  Dose: 5 mg  Freq: Once Route: IV  Start: 12/21/23 1715 End: 12/21/23 1715         Continuous IV Infusions:     PRN Meds:  acetaminophen, 975 mg, Oral, Q6H PRN x1 12/21  HYDROmorphone, 0.2 mg, Intravenous, Q3H PRN  Start: 12/21/23 0759  oxyCODONE, 5 mg, Oral, Q6H PRN x1 12/20. X2 12/21  oxyCODONE, 2.5 mg, Oral, Q6H PRN            Network Utilization Review Department  ATTENTION: Please call with any questions or concerns to [phone] and carefully listen to the prompts so that you are directed to the right person. All voicemails are confidential.   For Discharge needs, contact Care  Management DC Support Team at [phone] opt. 2  Send all requests for admission clinical reviews, approved or denied determinations and any other requests to dedicated fax number below belonging to the campus where the patient is receiving treatment. List of dedicated fax numbers for the Facilities:  FACILITY NAME UR FAX NUMBER   ADMISSION DENIALS (Administrative/Medical Necessity) [phone]   DISCHARGE SUPPORT TEAM (NETWORK) [phone]   PARENT CHILD HEALTH (Maternity/NICU/Pediatrics) [phone]   [facility] [phone]   [facility] [phone]   [facility] [phone]   [facility] [phone]   [facility] [phone]   [facility] [phone]   [facility] [phone]   [facility] [phone]   [facility] [phone]   [facility] [phone]   [facility] [phone]

## 2023-12-21 NOTE — PLAN OF CARE
Problem: PAIN - ADULT  Goal: Verbalizes/displays adequate comfort level or baseline comfort level  Description: Interventions:  - Encourage patient to monitor pain and request assistance  - Assess pain using appropriate pain scale  - Administer analgesics based on type and severity of pain and evaluate response  - Implement non-pharmacological measures as appropriate and evaluate response  - Consider cultural and social influences on pain and pain management  - Notify physician/advanced practitioner if interventions unsuccessful or patient reports new pain  Outcome: Progressing     Problem: INFECTION - ADULT  Goal: Absence of fever/infection during neutropenic period  Description: INTERVENTIONS:  - Monitor WBC    Outcome: Progressing

## 2023-12-21 NOTE — ASSESSMENT & PLAN NOTE
Cr 2021: 0.99, in 9/23 it was 1.00  She has Chronic History of Sjogren syndrome. Last flare was years ago. She has been on Pilocarpine 5 mg daily, Hydroxychloroquine 200 mg BID and Lisinopril 20 mg daily.  Unclear if her elevated cr is due to dehydration coz of Facial pain Vs Drug induced.  Cr on admission was 1.33 but decreased to 1.02 today. ROYAL unlikely  Plan:  -follow UA result  -continue to monitor with AM BMP

## 2023-12-21 NOTE — ASSESSMENT & PLAN NOTE
Patient had two elevated blood pressure readings yesterday, 183/81 and 180/78.   Plan:  -She started amlodipine 5 mg daily at 8:00 am today, 12/21. New BP after was 168/72.  -Patient also on lisinopril at home, was held due to potential ROYAL with creatinine level at admission being 1.33, and above her baseline, but went down to 1.02. Will start home lisinopril today (20 mg 1x day) as ROYAL is unlikely.  -Will continue to monitor.

## 2023-12-22 VITALS
SYSTOLIC BLOOD PRESSURE: 170 MMHG | OXYGEN SATURATION: 95 % | TEMPERATURE: 98.4 F | DIASTOLIC BLOOD PRESSURE: 88 MMHG | RESPIRATION RATE: 18 BRPM | HEART RATE: 86 BPM

## 2023-12-22 PROBLEM — R79.89 ELEVATED SERUM CREATININE: Status: RESOLVED | Noted: 2023-12-20 | Resolved: 2023-12-22

## 2023-12-22 LAB
ANION GAP SERPL CALCULATED.3IONS-SCNC: 4 MMOL/L
BASOPHILS # BLD AUTO: 0.04 THOUSANDS/ÂΜL (ref 0–0.1)
BASOPHILS NFR BLD AUTO: 1 % (ref 0–1)
BUN SERPL-MCNC: 13 MG/DL (ref 5–25)
CALCIUM SERPL-MCNC: 8.5 MG/DL (ref 8.4–10.2)
CHLORIDE SERPL-SCNC: 111 MMOL/L (ref 96–108)
CO2 SERPL-SCNC: 26 MMOL/L (ref 21–32)
CREAT SERPL-MCNC: 0.92 MG/DL (ref 0.6–1.3)
EOSINOPHIL # BLD AUTO: 0.22 THOUSAND/ÂΜL (ref 0–0.61)
EOSINOPHIL NFR BLD AUTO: 3 % (ref 0–6)
ERYTHROCYTE [DISTWIDTH] IN BLOOD BY AUTOMATED COUNT: 11.9 % (ref 11.6–15.1)
GFR SERPL CREATININE-BSD FRML MDRD: 61 ML/MIN/1.73SQ M
GLUCOSE SERPL-MCNC: 77 MG/DL (ref 65–140)
HCT VFR BLD AUTO: 37.9 % (ref 34.8–46.1)
HGB BLD-MCNC: 12.4 G/DL (ref 11.5–15.4)
IMM GRANULOCYTES # BLD AUTO: 0.02 THOUSAND/UL (ref 0–0.2)
IMM GRANULOCYTES NFR BLD AUTO: 0 % (ref 0–2)
LYMPHOCYTES # BLD AUTO: 2.61 THOUSANDS/ÂΜL (ref 0.6–4.47)
LYMPHOCYTES NFR BLD AUTO: 36 % (ref 14–44)
MCH RBC QN AUTO: 31.6 PG (ref 26.8–34.3)
MCHC RBC AUTO-ENTMCNC: 32.7 G/DL (ref 31.4–37.4)
MCV RBC AUTO: 96 FL (ref 82–98)
MONOCYTES # BLD AUTO: 0.52 THOUSAND/ÂΜL (ref 0.17–1.22)
MONOCYTES NFR BLD AUTO: 7 % (ref 4–12)
NEUTROPHILS # BLD AUTO: 3.81 THOUSANDS/ÂΜL (ref 1.85–7.62)
NEUTS SEG NFR BLD AUTO: 53 % (ref 43–75)
NRBC BLD AUTO-RTO: 0 /100 WBCS
PLATELET # BLD AUTO: 166 THOUSANDS/UL (ref 149–390)
PMV BLD AUTO: 9.4 FL (ref 8.9–12.7)
POTASSIUM SERPL-SCNC: 3.7 MMOL/L (ref 3.5–5.3)
RBC # BLD AUTO: 3.93 MILLION/UL (ref 3.81–5.12)
SODIUM SERPL-SCNC: 141 MMOL/L (ref 135–147)
WBC # BLD AUTO: 7.22 THOUSAND/UL (ref 4.31–10.16)

## 2023-12-22 PROCEDURE — 80048 BASIC METABOLIC PNL TOTAL CA: CPT

## 2023-12-22 PROCEDURE — 85025 COMPLETE CBC W/AUTO DIFF WBC: CPT

## 2023-12-22 PROCEDURE — 99238 HOSP IP/OBS DSCHRG MGMT 30/<: CPT | Performed by: INTERNAL MEDICINE

## 2023-12-22 RX ORDER — ATENOLOL 50 MG/1
100 TABLET ORAL DAILY
Status: DISCONTINUED | OUTPATIENT
Start: 2023-12-22 | End: 2023-12-22 | Stop reason: HOSPADM

## 2023-12-22 RX ORDER — CEPHALEXIN 500 MG/1
500 CAPSULE ORAL EVERY 6 HOURS SCHEDULED
Status: DISCONTINUED | OUTPATIENT
Start: 2023-12-22 | End: 2023-12-22 | Stop reason: HOSPADM

## 2023-12-22 RX ORDER — CEPHALEXIN 500 MG/1
500 CAPSULE ORAL EVERY 6 HOURS SCHEDULED
Qty: 32 CAPSULE | Refills: 0 | Status: SHIPPED | OUTPATIENT
Start: 2023-12-23 | End: 2023-12-31

## 2023-12-22 RX ADMIN — Medication 1000 UNITS: at 09:16

## 2023-12-22 RX ADMIN — PILOCARPINE HYDROCHLORIDE 5 MG: 5 TABLET, FILM COATED ORAL at 09:17

## 2023-12-22 RX ADMIN — CEPHALEXIN 500 MG: 500 CAPSULE ORAL at 11:11

## 2023-12-22 RX ADMIN — CEFAZOLIN SODIUM 1000 MG: 1 SOLUTION INTRAVENOUS at 01:26

## 2023-12-22 RX ADMIN — ESCITALOPRAM OXALATE 10 MG: 10 TABLET ORAL at 09:16

## 2023-12-22 RX ADMIN — AMLODIPINE BESYLATE 5 MG: 5 TABLET ORAL at 09:16

## 2023-12-22 RX ADMIN — LISINOPRIL 20 MG: 20 TABLET ORAL at 09:16

## 2023-12-22 RX ADMIN — ATENOLOL 100 MG: 50 TABLET ORAL at 11:11

## 2023-12-22 RX ADMIN — ACETAMINOPHEN 975 MG: 325 TABLET, FILM COATED ORAL at 05:38

## 2023-12-22 RX ADMIN — HYDROXYCHLOROQUINE SULFATE 200 MG: 200 TABLET, FILM COATED ORAL at 09:16

## 2023-12-22 NOTE — DISCHARGE SUMMARY
[facility]  Discharge- Flower Nunn 1950, 73 y.o. female MRN: [id number]  Unit/Bed#: S -01 Encounter: [id number]  Primary Care Provider: Justen Mills MD   Date and time admitted to hospital: 12/20/2023  3:06 PM    * Cellulitis of face  Assessment & Plan  Pt had diffuse headache on weekend followed by fever of 102.2, itchy rash on Left side of her cheek and occasional ringing in the Ear.   Pain is radiating in the trigeminal distribution( All over her Left side scalp,face, and tonsils).  Per pt pain lasts for few secs. Triggered by superficial touch and eating food. She took Alieve and Tylenol with resulted in mild improvement in pain.  She did not have F/u/RSV/HSV vaccination due her h/o of GB syndrome.  She was seen by her Family Physician and was prescribed with Amoxyclav for possible cellulitis. She completed 2 days course and believes that these ABX didn't help her with rash and pain.   On P/E no herpetic rash noted. Ears/Nose/Throat Normal. Pt denies recent Viral infections/ exposure to sick contacts.  IN the ED, She received 2g Vanc.   Recent Labs     12/20/23  1533   PROCALCITONI 0.10          Plan:  Continue IV Cefazolin 1g Every Q8H for total 5 days. First dose 12/21/23  Pain: Tylenol 975 mg Q6 PRN for mild pain, oxycodone 2.5 mg or 5 mg Q6 PRN, or dilaudid 0.2 mg Q3 PRN for moderate to severe pain          Elevated blood pressure reading  Assessment & Plan  Patient having elevated blood pressure readings during hospital stay ranging from 172-200 sys/78-92. Call team had to give patient one time dose of hydralazine 5 mg IV last night.   Plan:  -Continue amlodipine 5 mg a day, consider increasing to 10 mg.  -Patient also on lisinopril (20 mg 1x day) at home, was held at admsison due to potential ROYAL but once ruled out it was restarted. -Will continue to monitor.     Elevated serum creatinine  Assessment & Plan  Cr 2021: 0.99, in 9/23 it was 1.00  She has Chronic History  of Sjogren syndrome. Last flare was years ago. She has been on Pilocarpine 5 mg daily, Hydroxychloroquine 200 mg BID and Lisinopril 20 mg daily.  Unclear if her elevated cr is due to dehydration coz of Facial pain Vs Drug induced.  Recent Labs     12/20/23  1533 12/21/23  0436 12/22/23  0000   CREATININE 1.33* 1.02 0.92   EGFR 39 53 61     Estimated Creatinine Clearance: 51.8 mL/min (by C-G formula based on SCr of 0.92 mg/dL).   Plan:  UA   BMP AM was down to 0.92. Patient not likely to have ROYAL at this time. Continue to monitor.      Sjogren's syndrome (HCC)  Assessment & Plan  H/O Sjogrens syndrome. Last flare was Years ago.   Home meds : Pilocarpine 5 mg daily, Hydroxychloroquine 200 mg BID.    Plan:  Cont Home meds.        Anxiety  Assessment & Plan  [facility]. She was started on Lexapro.   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  Plan:  Cont Lexapro  Monitor anxiety    Insomnia  Assessment & Plan  Takes Ambien 10 Mg at Bedtime.     Plan:  Cont home med.   Fall precautions        Medical Problems       Resolved Problems  Date Reviewed: 12/22/2023   None       Discharging Resident: Kerry Kovacs DO  Discharging Attending: Urmila Graves MD  PCP: Jutsen Mills MD  Admission Date:   Admission Orders (From admission, onward)       Ordered        12/20/23 1730  Inpatient Admission  Once            12/20/23 1727  INPATIENT ADMISSION  Once                          Discharge Date: 12/22/23    Consultations During Hospital Stay:  None    Procedures Performed:   None     Significant Findings / Test Results:   Cr 1.33>>1.02  Bps elevated throughout visit ranging from 172-200 systolic/  diastolic    Incidental Findings:   None       Test Results Pending at Discharge (will require follow up):  None      Outpatient Tests Requested:  None    Complications:  Patient had elevated blood pressure requiring PRN  hydralazine one time on 12/21.    Reason for Admission: Cellulitis of face    Hospital Course:   Flower Nunn is a 73 y.o. female patient who originally presented to the hospital on 12/20/2023 due to left facial cellulitis for two days. She had redness, swelling and pain on her left cheek and also pain behind her left ear. ED gave her 2 g vancomycin. She had Cr of 1.33. Patient already was given amoxyclav by PCP which was not helping, so she was admitted for treatment of facial cellulitis. She was then switched to IV Cefazolin with initial plan for 5 days. Creatinine normalized next day. Patient started having elevated blood pressure readings with systolics in the 1802 up to the 200s during the course of her stay. To control her blood pressure, she was started on amlodipine 5mg similar to her home med Lotral. Home med lisinopril was started later after ROYAL was ruled out. On 12/22, patient needed a PRN dose of hydralazine due to readings of >180/90s. Patient also needed a dose of home Xanax at that time for anxiety. On 12/22, cellulitis was seen to be improving and patient was stable for discharge. She was switched from IV keflex to oral, and total time for keflex was changed from 5 days to 10. Prescribed keflex 500 mg Q6 to [facility]. Also gave a dose of home med atenolol before discharge.     Please see above list of diagnoses and related plan for additional information.     Condition at Discharge: good    Discharge Day Visit / Exam:   Subjective:  Patient was seen at bedside today. She states that she is feeling much better with both the cellulitis and her pain being well controlled. She no longer feels her left cheek or behind her left ear is tender to the touch. Denies fever, chills, N/V, pain, dizziness, headache, or light headedness.   Vitals: Blood Pressure: 165/81 (12/21/23 2231)  Pulse: 83 (12/21/23 2231)  Temperature: 97.9 °F (36.6 °C) (12/21/23 2231)  Temp Source: Oral (12/21/23  1612)  Respirations: 17 (12/21/23 2231)  SpO2: 94 % (12/21/23 2231)  Exam:   Physical Exam  Constitutional:       Appearance: Normal appearance.   HENT:      Head: Normocephalic.   Cardiovascular:      Rate and Rhythm: Normal rate and regular rhythm.   Pulmonary:      Effort: Pulmonary effort is normal.      Breath sounds: Normal breath sounds.   Skin:     General: Skin is warm and dry.      Findings: Rash present.      Comments: Cellulitis appears to be improving on the left face. Appears less erythematous and is less tender to palpation than yesterday. No tenderness behind the left ear anymore.   Neurological:      General: No focal deficit present.      Mental Status: She is alert and oriented to person, place, and time.   Psychiatric:         Mood and Affect: Mood normal.         Behavior: Behavior normal.          Discussion with Family: Updated  () at bedside.    Discharge instructions/Information to patient and family:   See after visit summary for information provided to patient and family.      Provisions for Follow-Up Care:  See after visit summary for information related to follow-up care and any pertinent home health orders.      Mobility at time of Discharge:   Basic Mobility Inpatient Raw Score: 24  JH-HLM Goal: 8: Walk 250 feet or more  JH-HLM Achieved: 7: Walk 25 feet or more  HLM Goal achieved. Continue to encourage appropriate mobility.     Disposition:   Home    Planned Readmission: No    Discharge Medications:  See after visit summary for reconciled discharge medications provided to patient and/or family.      **Please Note: This note may have been constructed using a voice recognition system**

## 2023-12-22 NOTE — ASSESSMENT & PLAN NOTE
Patient having elevated blood pressure readings during hospital stay ranging from 172-200 sys/78-92. Call team had to give patient one time dose of hydralazine 5 mg IV last night.   Plan:  -Continue amlodipine 5 mg a day, consider increasing to 10 mg.  -Patient also on lisinopril (20 mg 1x day) at home, was held at admsison due to potential ROYAL but once ruled out it was restarted. -Will continue to monitor.

## 2023-12-22 NOTE — PLAN OF CARE
Problem: PAIN - ADULT  Goal: Verbalizes/displays adequate comfort level or baseline comfort level  Description: Interventions:  - Encourage patient to monitor pain and request assistance  - Assess pain using appropriate pain scale  - Administer analgesics based on type and severity of pain and evaluate response  - Implement non-pharmacological measures as appropriate and evaluate response  - Consider cultural and social influences on pain and pain management  - Notify physician/advanced practitioner if interventions unsuccessful or patient reports new pain  Outcome: Progressing     Problem: SKIN/TISSUE INTEGRITY - ADULT  Goal: Skin Integrity remains intact(Skin Breakdown Prevention)  Description: Assess:  -Perform Oliverio assessment  -Clean and moisturize skin   -Inspect skin when repositioning, toileting, and assisting with ADLS  -Assess under medical devices  -Assess extremities for adequate circulation and sensation     Bed Management:  -Have minimal linens on bed & keep smooth, unwrinkled  -Change linens as needed when moist or perspiring    Toileting:  -Offer bedside commode  -Assess for incontinence  -Use incontinent care products after each incontinent episode      Skin Care:  -Avoid use of baby powder, tape, friction and shearing, hot water or constrictive clothing  -Relieve pressure over bony prominences  -Do not massage red bony areas    Next Steps:  -Teach patient strategies to minimize risks   -Consider consults to  interdisciplinary teams   Outcome: Progressing  Goal: Incision(s), wounds(s) or drain site(s) healing without S/S of infection  Description: INTERVENTIONS  - Assess and document dressing, incision, wound bed, drain sites and surrounding tissue  - Provide patient and family education  - Perform skin care/dressing changes  Outcome: Progressing     Problem: INFECTION - ADULT  Goal: Absence or prevention of progression during hospitalization  Description: INTERVENTIONS:  - Assess and monitor for  signs and symptoms of infection  - Monitor lab/diagnostic results  - Monitor all insertion sites, i.e. indwelling lines, tubes, and drains  - Monitor endotracheal if appropriate and nasal secretions for changes in amount and color  - [place] appropriate cooling/warming therapies per order  - Administer medications as ordered  - Instruct and encourage patient and family to use good hand hygiene technique  - Identify and instruct in appropriate isolation precautions for identified infection/condition  Outcome: Progressing     Problem: Nutrition/Hydration-ADULT  Goal: Nutrient/Hydration intake appropriate for improving, restoring or maintaining nutritional needs  Description: Monitor and assess patient's nutrition/hydration status for malnutrition. Collaborate with interdisciplinary team and initiate plan and interventions as ordered.  Monitor patient's weight and dietary intake as ordered or per policy. Utilize nutrition screening tool and intervene as necessary. Determine patient's food preferences and provide high-protein, high-caloric foods as appropriate.     INTERVENTIONS:  - Monitor oral intake, urinary output, labs, and treatment plans  - Assess nutrition and hydration status and recommend course of action  - Evaluate amount of meals eaten  - Assist patient with eating if necessary   - Allow adequate time for meals  - Recommend/ encourage appropriate diets, oral nutritional supplements, and vitamin/mineral supplements  - Order, calculate, and assess calorie counts as needed  - Recommend, monitor, and adjust tube feedings and TPN/PPN based on assessed needs  - Assess need for intravenous fluids  - Provide specific nutrition/hydration education as appropriate  - Include patient/family/caregiver in decisions related to nutrition  Outcome: Progressing     Problem: DISCHARGE PLANNING  Goal: Discharge to home or other facility with appropriate resources  Description: INTERVENTIONS:  - Identify barriers to discharge  w/patient and caregiver  - Arrange for needed discharge resources and transportation as appropriate  - Identify discharge learning needs (meds, wound care, etc.)  - Arrange for interpretive services to assist at discharge as needed  - Refer to Case Management Department for coordinating discharge planning if the patient needs post-hospital services based on physician/advanced practitioner order or complex needs related to functional status, cognitive ability, or social support system  Outcome: Progressing

## 2023-12-22 NOTE — PLAN OF CARE
Problem: DISCHARGE PLANNING  Goal: Discharge to home or other facility with appropriate resources  Description: INTERVENTIONS:  - Identify barriers to discharge w/patient and caregiver  - Arrange for needed discharge resources and transportation as appropriate  - Identify discharge learning needs (meds, wound care, etc.)  - Arrange for interpretive services to assist at discharge as needed  - Refer to Case Management Department for coordinating discharge planning if the patient needs post-hospital services based on physician/advanced practitioner order or complex needs related to functional status, cognitive ability, or social support system  Outcome: Progressing     Problem: PAIN - ADULT  Goal: Verbalizes/displays adequate comfort level or baseline comfort level  Description: Interventions:  - Encourage patient to monitor pain and request assistance  - Assess pain using appropriate pain scale  - Administer analgesics based on type and severity of pain and evaluate response  - Implement non-pharmacological measures as appropriate and evaluate response  - Consider cultural and social influences on pain and pain management  - Notify physician/advanced practitioner if interventions unsuccessful or patient reports new pain  Outcome: Progressing        Problem: INFECTION - ADULT  Goal: Absence or prevention of progression during hospitalization  Description: INTERVENTIONS:  - Assess and monitor for signs and symptoms of infection  - Monitor lab/diagnostic results  - Monitor all insertion sites, i.e. indwelling lines, tubes, and drains  - Monitor endotracheal if appropriate and nasal secretions for changes in amount and color  - [place] appropriate cooling/warming therapies per order  - Administer medications as ordered  - Instruct and encourage patient and family to use good hand hygiene technique  - Identify and instruct in appropriate isolation precautions for identified infection/condition  Outcome: Progressing

## 2023-12-22 NOTE — DISCHARGE INSTR - AVS FIRST PAGE
Dear Flower Nunn,     It was our pleasure to care for you here at [facility].  It is our hope that we were always able to exceed the expected standards for your care during your stay.  You were hospitalized due to Cellulitis of face.  You were cared for on the 4th floor by Kerry Kovacs DO under the service of Urmila Graves MD with the [facility] who covers for your primary care physician (PCP), Justen Mills MD, while you were hospitalized.  If you have any questions or concerns related to this hospitalization, you may contact us at .  For follow up as well as any medication refills, we recommend that you follow up with your primary care physician.  A registered nurse will reach out to you by phone within a few days after your discharge to answer any additional questions that you may have after going home.  However, at this time we provide for you here, the most important instructions / recommendations at discharge:     Notable Medication Adjustments -   Oral Keflex, which is an antibiotic to help clear your infection. Please take this four times a day (breakfast, lunch, dinner and before bed).  Testing Required after Discharge -   None  ** Please contact your PCP to request testing orders for any of the testing recommended here **  Important follow up information -   Follow up with PCP within 1 week  Other Instructions -   Continue following with PCP for blood pressure control  Please review this entire after visit summary as additional general instructions including medication list, appointments, activity, diet, any pertinent wound care, and other additional recommendations from your care team that may be provided for you.      Sincerely,     Kerry Kovacs DO

## 2023-12-22 NOTE — ASSESSMENT & PLAN NOTE
Pt had diffuse headache on weekend followed by fever of 102.2, itchy rash on Left side of her cheek and occasional ringing in the Ear.   Pain is radiating in the trigeminal distribution( All over her Left side scalp,face, and tonsils).  Per pt pain lasts for few secs. Triggered by superficial touch and eating food. She took Alieve and Tylenol with resulted in mild improvement in pain.  She did not have F/u/RSV/HSV vaccination due her h/o of GB syndrome.  She was seen by her Family Physician and was prescribed with Amoxyclav for possible cellulitis. She completed 2 days course and believes that these ABX didn't help her with rash and pain.   On P/E no herpetic rash noted. Ears/Nose/Throat Normal. Pt denies recent Viral infections/ exposure to sick contacts.  IN the ED, She received 2g Vanc.   Recent Labs     12/20/23  1533   PROCALCITONI 0.10          Plan:  Switch IV Cefazolin 1g Every Q8H for total 5 days to oral today. Patient tolerated well.   Patient stable for discharge today  Patient uses [facility], so oral keflex sent for patient today to [facility]

## 2023-12-22 NOTE — ASSESSMENT & PLAN NOTE
[facility]. She was started on Lexapro.   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  Plan:  Cont Lexapro  Monitor anxiety

## 2023-12-22 NOTE — ASSESSMENT & PLAN NOTE
Cr 2021: 0.99, in 9/23 it was 1.00  She has Chronic History of Sjogren syndrome. Last flare was years ago. She has been on Pilocarpine 5 mg daily, Hydroxychloroquine 200 mg BID and Lisinopril 20 mg daily.  Unclear if her elevated cr is due to dehydration coz of Facial pain Vs Drug induced.  Recent Labs     12/20/23  1533 12/21/23  0436 12/22/23  0000   CREATININE 1.33* 1.02 0.92   EGFR 39 [id number] 61     Estimated Creatinine Clearance: 51.8 mL/min (by C-G formula based on SCr of 0.92 mg/dL).   Plan:  UA   BMP AM was down to 0.92. Patient not likely to have ROYAL at this time. Continue to monitor.

## 2023-12-24 LAB
BACTERIA BLD CULT: NORMAL
BACTERIA BLD CULT: NORMAL

## 2023-12-25 LAB
BACTERIA BLD CULT: NORMAL
BACTERIA BLD CULT: NORMAL

## 2024-01-03 ENCOUNTER — OFFICE VISIT (OUTPATIENT)
Dept: PSYCHIATRY | Facility: CLINIC | Age: 74
End: 2024-01-03
Payer: COMMERCIAL

## 2024-01-03 DIAGNOSIS — F33.0 MILD EPISODE OF RECURRENT MAJOR DEPRESSIVE DISORDER (HCC): ICD-10-CM

## 2024-01-03 DIAGNOSIS — F51.04 PSYCHOPHYSIOLOGICAL INSOMNIA: ICD-10-CM

## 2024-01-03 DIAGNOSIS — F41.0 PANIC DISORDER WITHOUT AGORAPHOBIA WITH MODERATE PANIC ATTACKS: ICD-10-CM

## 2024-01-03 PROCEDURE — 99213 OFFICE O/P EST LOW 20 MIN: CPT | Performed by: PSYCHIATRY & NEUROLOGY

## 2024-01-03 RX ORDER — OLANZAPINE 2.5 MG/1
2.5 TABLET, FILM COATED ORAL
Qty: 90 TABLET | Refills: 0 | Status: SHIPPED | OUTPATIENT
Start: 2024-01-03

## 2024-01-03 RX ORDER — ESCITALOPRAM OXALATE 10 MG/1
10 TABLET ORAL DAILY
Qty: 90 TABLET | Refills: 0 | Status: SHIPPED | OUTPATIENT
Start: 2024-01-03

## 2024-01-03 RX ORDER — CLONAZEPAM 0.5 MG/1
0.5 TABLET ORAL 2 TIMES DAILY PRN
Qty: 20 TABLET | Refills: 1 | Status: SHIPPED | OUTPATIENT
Start: 2024-01-03

## 2024-01-03 RX ORDER — ZOLPIDEM TARTRATE 10 MG/1
10 TABLET ORAL
Qty: 90 TABLET | Refills: 0 | Status: SHIPPED | OUTPATIENT
Start: 2024-01-03

## 2024-01-03 NOTE — PSYCH
Flower Nunn 1950 [id number]     The patient was seen for continuing care and pharmacotherapy.She started feeling better about a week after taking Lexapro. She has more interest and reports that her head is more clear.She had a bout of cellulitis of  face around [place] and was briefly hospitalized for antibiotic therapy.  She has also had bradycardia and is being considered for pacemaker placement.  She continues to have bouts of anxiety and has not been taking Xanax since it has not been helpful.  She also reports a history of no response to lorazepam.  She is willing to take clonazepam as needed    ROS:  Constipation  Current Mental Status Evaluation:  Appearance:  Adequate hygiene and grooming and Good eye contact   Behavior:  Calm and Pleasant   Mood:  Euthymic   Affect: appropriate   Speech: Normal volume and Normal rate   Thought Process:  Goal directed and coherent   Thought Content:  Does not verbalize delusional material   Perceptual Disturbances: Denies hallucinations and does not appear to be responding to internal stimuli   Risk Potential: No suicidal or homicidal ideation   Orientation:         Diagnosis ICD-10-CM Associated Orders   1. Panic disorder without agoraphobia with moderate panic attacks  F41.0 escitalopram (Lexapro) 10 mg tablet     OLANZapine (ZyPREXA) 2.5 mg tablet     clonazePAM (KlonoPIN) 0.5 mg tablet      2. Mild episode of recurrent major depressive disorder (HCC)  F33.0 escitalopram (Lexapro) 10 mg tablet      3. Psychophysiological insomnia  F51.04 zolpidem (AMBIEN) 10 mg tablet             Current Outpatient Medications   Medication Instructions    amLODIPine-benazepril (LOTREL 5-20) 5-20 MG per capsule 1 capsule, Oral, Daily    aspirin 325 mg, Oral, Daily    atenolol (TENORMIN) 100 mg, Oral, Daily    cevimeline (EVOXAC) 30 mg, Daily    cholecalciferol (VITAMIN D3) 1,000 Units, Oral, Daily    clonazePAM (KLONOPIN) 0.5 mg, Oral, 2 times daily PRN    cycloSPORINE (RESTASIS)  0.05 % ophthalmic emulsion 1 drop, Both Eyes, 2 times daily    escitalopram (LEXAPRO) 10 mg, Oral, Daily    ezetimibe-simvastatin (VYTORIN) 10-20 mg per tablet No dose, route, or frequency recorded.    hydroxychloroquine (PLAQUENIL) 200 mg, Oral, Daily with breakfast    OLANZapine (ZYPREXA) 2.5 mg, Oral, Daily at bedtime    pilocarpine (SALAGEN) 5 mg tablet No dose, route, or frequency recorded.    rOPINIRole (REQUIP) 0.5 mg, Oral, Daily    zolpidem (AMBIEN) 10 mg, Oral, Daily at bedtime          Treatment Recommendations- Risks Benefits    We will continue with Lexapro and olanzapine as well as zolpidem.  Clonazepam 0.5 mg twice daily as needed for severe anxiety and panic-like symptoms.  Follow-up in 4 weeks  Risks, Benefits And Possible Side Effects Of Medications:  Risks, benefits, and possible side effects of medications explained to patient and patient verbalizes understanding    VIRTUAL VISIT DISCLAIMER    Flower Nunn verbally agrees to participate in Virtual Care Services. Pt is aware that Virtual Care Services could be limited without vital signs or the ability to perform a full hands-on physical exam. Flower Nunn understands she or the provider may request at any time to terminate the video visit and request the patient to seek care or treatment in person.     Adam Evangelista MD 01/03/24

## 2024-01-03 NOTE — ED PROVIDER NOTES
History  Chief Complaint   Patient presents with    Facial Pain     Left sided facial swelling, redness, and pain, was seen yesterday at PCP and given Augmentin.      73-year-old female presents to the emergency department for evaluation of left-sided facial pain and swelling.  Patient states that 3 days ago she had fever and generalized headache.  Fever resolved and she developed discomfort and itching behind the left ear.  She then developed redness and swelling of the left side of the face.  She was seen at PCPs office and started on Augmentin.  To have progression of pain notes pain when swallowing and feels a fullness along the left side of the neck.  No choking.  No cough or shortness of breath.      History provided by:  Patient, medical records and spouse   used: No    Other  Location:  Left side of face  Quality:  Burning pain  Severity:  Severe  Onset quality:  Gradual  Duration:  2 days  Timing:  Constant  Progression:  Worsening  Chronicity:  New  Associated symptoms: fever, rash and sore throat    Associated symptoms: no cough, no diarrhea, no vomiting and no wheezing        Prior to Admission Medications   Prescriptions Last Dose Informant Patient Reported? Taking?   ALPRAZolam (XANAX) 0.5 mg tablet   No No   Sig: Take 1 tablet (0.5 mg total) by mouth 3 (three) times a day as needed for anxiety   OLANZapine (ZyPREXA) 2.5 mg tablet   No No   Sig: TAKE 1 TABLET BY MOUTH DAILY AT  BEDTIME   amLODIPine-benazepril (LOTREL 5-20) 5-20 MG per capsule  Self Yes No   Sig: Take 1 capsule by mouth daily.   aspirin 325 mg tablet  Self Yes No   Sig: Take 325 mg by mouth daily.   atenolol (TENORMIN) 100 mg tablet  Self Yes No   Sig: Take 100 mg by mouth daily.   cevimeline (EVOXAC) 30 MG capsule  Self Yes No   Sig: Take 30 mg by mouth daily.   Patient not taking: Reported on 3/21/2023   cholecalciferol (VITAMIN D3) 1,000 units tablet  Self Yes No   Sig: Take 1,000 Units by mouth daily    cycloSPORINE (RESTASIS) 0.05 % ophthalmic emulsion  Self Yes No   Sig: Administer 1 drop to both eyes 2 (two) times a day.   escitalopram (Lexapro) 10 mg tablet   No No   Sig: Take 1 tablet (10 mg total) by mouth daily   ezetimibe-simvastatin (VYTORIN) 10-20 mg per tablet   Yes No   hydroxychloroquine (PLAQUENIL) 200 mg tablet  Self Yes No   Sig: Take 200 mg by mouth daily with breakfast   pilocarpine (SALAGEN) 5 mg tablet   Yes No   rOPINIRole (REQUIP) 0.5 mg tablet  Self Yes No   Sig: Take 0.5 mg by mouth daily   zolpidem (AMBIEN) 10 mg tablet   No No   Sig: Take 1 tablet (10 mg total) by mouth daily at bedtime      Facility-Administered Medications: None       Past Medical History:   Diagnosis Date    Anxiety     Arthritis     Atopic dermatitis     Breast CA (HCC) 1999    left  age 49    Depression     Fracture     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HCC)     2013    History of chemotherapy     breast cancer    Infective polyneuritis, acute (HCC)     Osteoporosis     PE (pulmonary thromboembolism) (HCC)     Peptic ulcer disease     Psychiatric disorder     Sjogren's syndrome (HCC)     Sleep apnea     does not use cpap       Past Surgical History:   Procedure Laterality Date    ABDOMINAL SURGERY      APPENDECTOMY      BREAST BIOPSY Right     prior to breast cancer    CHOLECYSTECTOMY      COLONOSCOPY W/ ENDOSCOPIC US N/A 2/24/2017    Procedure: ANAL ENDOSCOPIC U/S;  Surgeon: Chang Arellano MD;  Location: [facility];  Service:     GASTRECTOMY      HYSTERECTOMY      age 33; ovaries intact    MASTECTOMY Left 1999    age 49    NE COLONOSCOPY FLX DX W/COLLJ SPEC WHEN PFRMD N/A 2/24/2017    Procedure: COLONOSCOPY;  Surgeon: Neo Baxter MD;  Location: [facility];  Service: Gastroenterology       Family History   Problem Relation Age of Onset    Hypertension Mother     Heart failure Father     Esophageal cancer Father 78    Breast cancer Paternal Aunt 48    Breast cancer Cousin 50        paternal    Breast cancer Cousin 50         paternal    Ovarian cancer Maternal Aunt 58    No Known Problems Sister     No Known Problems Maternal Grandmother     No Known Problems Maternal Grandfather     No Known Problems Paternal Grandmother     No Known Problems Paternal Grandfather     No Known Problems Son     No Known Problems Son     No Known Problems Maternal Aunt     No Known Problems Maternal Aunt     No Known Problems Paternal Aunt      I have reviewed and agree with the history as documented.    E-Cigarette/Vaping     E-Cigarette/Vaping Substances     Social History     Tobacco Use    Smoking status: Never    Smokeless tobacco: Never   Substance Use Topics    Alcohol use: Yes     Comment: socially    Drug use: No       Review of Systems   Constitutional:  Positive for chills and fever.   HENT:  Positive for sore throat.    Respiratory:  Negative for cough and wheezing.    Gastrointestinal:  Negative for diarrhea and vomiting.   Skin:  Positive for rash.   All other systems reviewed and are negative.      Physical Exam  Physical Exam  Constitutional:       General: She is in acute distress.      Appearance: She is well-developed. She is not ill-appearing or toxic-appearing.   HENT:      Head: Normocephalic.      Jaw: There is normal jaw occlusion. Tenderness present. No malocclusion.        Right Ear: Tympanic membrane and external ear normal.      Left Ear: Tympanic membrane and external ear normal. No mastoid tenderness.      Nose: Nose normal. No nasal deformity or rhinorrhea.      Mouth/Throat:      Lips: Pink.      Mouth: Mucous membranes are moist.      Pharynx: No oropharyngeal exudate or posterior oropharyngeal erythema.   Eyes:      Conjunctiva/sclera: Conjunctivae normal.      Pupils: Pupils are equal, round, and reactive to light.   Cardiovascular:      Rate and Rhythm: Normal rate and regular rhythm.      Heart sounds: Normal heart sounds.   Pulmonary:      Effort: Pulmonary effort is normal.      Breath sounds: Normal breath sounds.    Abdominal:      General: Bowel sounds are normal. There is no distension.      Palpations: Abdomen is soft.      Tenderness: There is no abdominal tenderness. There is no guarding or rebound.   Musculoskeletal:         General: No tenderness or deformity. Normal range of motion.      Cervical back: Normal range of motion and neck supple.   Lymphadenopathy:      Cervical: No cervical adenopathy.   Skin:     General: Skin is warm and dry.      Findings: No rash.   Neurological:      Mental Status: She is alert and oriented to person, place, and time.      Cranial Nerves: No cranial nerve deficit.      Sensory: No sensory deficit.      Motor: No abnormal muscle tone.      Coordination: Coordination normal.      Gait: Gait normal.      Deep Tendon Reflexes: Reflexes are normal and symmetric.   Psychiatric:         Behavior: Behavior normal.         Thought Content: Thought content normal.         Judgment: Judgment normal.         Vital Signs  ED Triage Vitals   Temperature Pulse Respirations Blood Pressure SpO2   12/20/23 1358 12/20/23 1358 12/20/23 1358 12/20/23 1358 12/20/23 1358   98.2 °F (36.8 °C) 55 18 162/80 98 %      Temp Source Heart Rate Source Patient Position - Orthostatic VS BP Location FiO2 (%)   12/21/23 0748 12/20/23 2021 12/20/23 2021 12/20/23 1358 --   Oral Monitor Sitting Right arm       Pain Score       12/20/23 1627       2           Vitals:    12/21/23 2231 12/22/23 0649 12/22/23 1107 12/22/23 1111   BP: 165/81 (!) 173/91 (!) 177/100 170/88   Pulse: 83 85 84 86   Patient Position - Orthostatic VS:             Visual Acuity      ED Medications  Medications   sodium chloride 0.9 % bolus 1,000 mL (0 mL Intravenous Stopped 12/20/23 1829)   acetaminophen (TYLENOL) tablet 650 mg (650 mg Oral Given 12/20/23 1540)   vancomycin (VANCOCIN) 2,000 mg in sodium chloride 0.9 % 500 mL IVPB (0 mg Intravenous Stopped 12/20/23 1822)   hydrALAZINE (APRESOLINE) injection 5 mg (5 mg Intravenous Given 12/21/23 1715)    ALPRAZolam (XANAX) tablet 0.5 mg (0.5 mg Oral Given 12/21/23 1850)       Diagnostic Studies  Results Reviewed       Procedure Component Value Units Date/Time    Blood culture #1 [[id number]] Collected: 12/20/23 1533    Lab Status: Final result Specimen: Blood from Arm, Left Updated: 12/25/23 1901     Blood Culture No Growth After 5 Days.    Blood culture #2 [[id number]] Collected: 12/20/23 1533    Lab Status: Final result Specimen: Blood from Arm, Right Updated: 12/25/23 1901     Blood Culture No Growth After 5 Days.    Basic metabolic panel [[id number]]  (Abnormal) Collected: 12/21/23 0436    Lab Status: Final result Specimen: Blood from Arm, Right Updated: 12/21/23 0604     Sodium 142 mmol/L      Potassium 3.8 mmol/L      Chloride 112 mmol/L      CO2 22 mmol/L      ANION GAP 8 mmol/L      BUN 19 mg/dL      Creatinine 1.02 mg/dL      Glucose 75 mg/dL      Calcium 8.0 mg/dL      eGFR 53 ml/min/1.73sq m     Narrative:      National Kidney Disease Foundation guidelines for Chronic Kidney Disease (CKD):     Stage 1 with normal or high GFR (GFR > 90 mL/min/1.73 square meters)    Stage 2 Mild CKD (GFR = 60-89 mL/min/1.73 square meters)    Stage 3A Moderate CKD (GFR = 45-59 mL/min/1.73 square meters)    Stage 3B Moderate CKD (GFR = 30-44 mL/min/1.73 square meters)    Stage 4 Severe CKD (GFR = 15-29 mL/min/1.73 square meters)    Stage 5 End Stage CKD (GFR <15 mL/min/1.73 square meters)  Note: GFR calculation is accurate only with a steady state creatinine    CBC and differential [[id number]] Collected: 12/21/23 0436    Lab Status: Final result Specimen: Blood from Arm, Right Updated: 12/21/23 0544     WBC 7.59 Thousand/uL      RBC 3.86 Million/uL      Hemoglobin 12.4 g/dL      Hematocrit 38.0 %      MCV 98 fL      MCH 32.1 pg      MCHC 32.6 g/dL      RDW 12.0 %      MPV 10.0 fL      Platelets 166 Thousands/uL      nRBC 0 /100 WBCs      Neutrophils Relative 57 %      Immat GRANS % 0 %      Lymphocytes Relative 32 %       Monocytes Relative 8 %      Eosinophils Relative 3 %      Basophils Relative 0 %      Neutrophils Absolute 4.28 Thousands/µL      Immature Grans Absolute 0.03 Thousand/uL      Lymphocytes Absolute 2.43 Thousands/µL      Monocytes Absolute 0.63 Thousand/µL      Eosinophils Absolute 0.19 Thousand/µL      Basophils Absolute 0.03 Thousands/µL     FLU/RSV/COVID - if FLU/RSV clinically relevant [[id number]]  (Normal) Collected: 12/20/23 1533    Lab Status: Final result Specimen: Nares from Nose Updated: 12/20/23 1629     SARS-CoV-2 Negative     INFLUENZA A PCR Negative     INFLUENZA B PCR Negative     RSV PCR Negative    Narrative:      FOR PEDIATRIC PATIENTS - copy/paste COVID Guidelines URL to browser: [email or web address]    SARS-CoV-2 assay is a Nucleic Acid Amplification assay intended for the  qualitative detection of nucleic acid from SARS-CoV-2 in nasopharyngeal  swabs. Results are for the presumptive identification of SARS-CoV-2 RNA.    Positive results are indicative of infection with SARS-CoV-2, the virus  causing COVID-19, but do not rule out bacterial infection or co-infection  with other viruses. Laboratories within the [place] and its  territories are required to report all positive results to the appropriate  public health authorities. Negative results do not preclude SARS-CoV-2  infection and should not be used as the sole basis for treatment or other  patient management decisions. Negative results must be combined with  clinical observations, patient history, and epidemiological information.  This test has not been FDA cleared or approved.    This test has been authorized by FDA under an Emergency Use Authorization  (EUA). This test is only authorized for the duration of time the  declaration that circumstances exist justifying the authorization of the  emergency use of an in vitro diagnostic tests for detection of SARS-CoV-2  virus and/or  diagnosis of COVID-19 infection under section 564(b)(1) of  the Act, 21 U.S.C. 360bbb-3(b)(1), unless the authorization is terminated  or revoked sooner. The test has been validated but independent review by FDA  and CLIA is pending.    Test performed using [employer] GeneXpert: This RT-PCR assay targets N2,  a region unique to SARS-CoV-2. A conserved region in the E-gene was chosen  for pan-Sarbecovirus detection which includes SARS-CoV-2.    According to CMS-2020-01-R, this platform meets the definition of high-throughput technology.    Procalcitonin [[id number]]  (Normal) Collected: 12/20/23 1533    Lab Status: Final result Specimen: Blood from Arm, Right Updated: 12/20/23 1621     Procalcitonin 0.10 ng/ml     Comprehensive metabolic panel [[id number]]  (Abnormal) Collected: 12/20/23 1533    Lab Status: Final result Specimen: Blood from Arm, Right Updated: 12/20/23 1611     Sodium 142 mmol/L      Potassium 3.7 mmol/L      Chloride 107 mmol/L      CO2 25 mmol/L      ANION GAP 10 mmol/L      BUN 20 mg/dL      Creatinine 1.33 mg/dL      Glucose 82 mg/dL      Calcium 9.4 mg/dL      AST 55 U/L      ALT 48 U/L      Alkaline Phosphatase 108 U/L      Total Protein 7.5 g/dL      Albumin 3.6 g/dL      Total Bilirubin 0.28 mg/dL      eGFR 39 ml/min/1.73sq m     Narrative:      National Kidney Disease Foundation guidelines for Chronic Kidney Disease (CKD):     Stage 1 with normal or high GFR (GFR > 90 mL/min/1.73 square meters)    Stage 2 Mild CKD (GFR = 60-89 mL/min/1.73 square meters)    Stage 3A Moderate CKD (GFR = 45-59 mL/min/1.73 square meters)    Stage 3B Moderate CKD (GFR = 30-44 mL/min/1.73 square meters)    Stage 4 Severe CKD (GFR = 15-29 mL/min/1.73 square meters)    Stage 5 End Stage CKD (GFR <15 mL/min/1.73 square meters)  Note: GFR calculation is accurate only with a steady state creatinine    Lactic acid, plasma (w/reflex if result > 2.0) [[id number]]  (Normal) Collected: 12/20/23 1533    Lab Status: Final result  Specimen: Blood from Arm, Right Updated: 12/20/23 1611     LACTIC ACID 1.3 mmol/L     Narrative:      Result may be elevated if tourniquet was used during collection.    CBC and differential [[id number]] Collected: 12/20/23 1533    Lab Status: Final result Specimen: Blood from Arm, Right Updated: 12/20/23 1552     WBC 8.25 Thousand/uL      RBC 4.40 Million/uL      Hemoglobin 14.0 g/dL      Hematocrit 43.3 %      MCV 98 fL      MCH 31.8 pg      MCHC 32.3 g/dL      RDW 12.1 %      MPV 9.6 fL      Platelets 198 Thousands/uL      nRBC 0 /100 WBCs      Neutrophils Relative 55 %      Immat GRANS % 0 %      Lymphocytes Relative 31 %      Monocytes Relative 11 %      Eosinophils Relative 2 %      Basophils Relative 1 %      Neutrophils Absolute 4.53 Thousands/µL      Immature Grans Absolute 0.02 Thousand/uL      Lymphocytes Absolute 2.59 Thousands/µL      Monocytes Absolute 0.94 Thousand/µL      Eosinophils Absolute 0.13 Thousand/µL      Basophils Absolute 0.04 Thousands/µL                    No orders to display              Procedures  Procedures         ED Course                               SBIRT 22yo+      Flowsheet Row Most Recent Value   Initial Alcohol Screen: US AUDIT-C     1. How often do you have a drink containing alcohol? 0 Filed at: 12/20/2023 1400   2. How many drinks containing alcohol do you have on a typical day you are drinking?  0 Filed at: 12/20/2023 1400   3b. FEMALE Any Age, or MALE 65+: How often do you have 4 or more drinks on one occassion? 0 Filed at: 12/20/2023 1400   Audit-C Score 0 Filed at: 12/20/2023 1400   HARRISON: How many times in the past year have you...    Used an illegal drug or used a prescription medication for non-medical reasons? Never Filed at: 12/20/2023 1400                      Medical Decision Making  73-year-old female presents with left-sided facial swelling and erythema.  Differential diagnosis includes but not limited to acute erysipelas, cellulitis, herpes zoster,  odontogenic infection with spread, allergic reaction.    Problems Addressed:  Facial cellulitis: acute illness or injury    Amount and/or Complexity of Data Reviewed  Independent Historian: spouse     Details: This was at bedside to help provide history  Labs: ordered.     Details: Labs ordered and independently interpreted by me, my interpretation is no acute abnormality  Discussion of management or test interpretation with external provider(s): Case discussed with internal medicine service will admit to internal medicine Dr. Krishnan for treatment of facial cellulitis.    Risk  OTC drugs.  Decision regarding hospitalization.             Disposition  Final diagnoses:   Facial cellulitis     Time reflects when diagnosis was documented in both MDM as applicable and the Disposition within this note       Time User Action Codes Description Comment    12/20/2023  5:57 PM Brisa Cruz Add [L03.211] Facial cellulitis     12/22/2023  1:14 PM Kerry Kovacs Add [L03.211] Cellulitis of face           ED Disposition       ED Disposition   Admit    Condition   Stable    Date/Time   Wed Dec 20, 2023 1726    Comment   Case was discussed with Dr. Krishnan and the patient's admission status was agreed to be Admission Status: inpatient status to the service of Dr. Krishnan .               Follow-up Information       Follow up With Specialties Details Why Contact Info    Justen Mills MD Internal Medicine Schedule an appointment as soon as possible for a visit in 1 week(s)  [address] [place] [address]  [phone]              Discharge Medication List as of 12/22/2023  1:17 PM        START taking these medications    Details   cephalexin (KEFLEX) 500 mg capsule Take 1 capsule (500 mg total) by mouth every 6 (six) hours for 8 days Do not start before December 23, 2023., Starting Sat 12/23/2023, Until Sun 12/31/2023, Normal           CONTINUE these medications which have NOT CHANGED    Details   ALPRAZolam (XANAX) 0.5 mg tablet  Take 1 tablet (0.5 mg total) by mouth 3 (three) times a day as needed for anxiety, Starting Mon 11/27/2023, Normal      amLODIPine-benazepril (LOTREL 5-20) 5-20 MG per capsule Take 1 capsule by mouth daily., Historical Med      aspirin 325 mg tablet Take 325 mg by mouth daily., Historical Med      atenolol (TENORMIN) 100 mg tablet Take 100 mg by mouth daily., Historical Med      cevimeline (EVOXAC) 30 MG capsule Take 30 mg by mouth daily., Historical Med      cholecalciferol (VITAMIN D3) 1,000 units tablet Take 1,000 Units by mouth daily, Historical Med      cycloSPORINE (RESTASIS) 0.05 % ophthalmic emulsion Administer 1 drop to both eyes 2 (two) times a day., Historical Med      escitalopram (Lexapro) 10 mg tablet Take 1 tablet (10 mg total) by mouth daily, Starting Wed 11/29/2023, Normal      ezetimibe-simvastatin (VYTORIN) 10-20 mg per tablet Starting Sun 2/12/2023, Historical Med      hydroxychloroquine (PLAQUENIL) 200 mg tablet Take 200 mg by mouth daily with breakfast, Historical Med      OLANZapine (ZyPREXA) 2.5 mg tablet TAKE 1 TABLET BY MOUTH DAILY AT  BEDTIME, Starting Tue 12/19/2023, Normal      pilocarpine (SALAGEN) 5 mg tablet Starting Thu 6/29/2023, Historical Med      rOPINIRole (REQUIP) 0.5 mg tablet Take 0.5 mg by mouth daily, Historical Med      zolpidem (AMBIEN) 10 mg tablet Take 1 tablet (10 mg total) by mouth daily at bedtime, Starting Mon 11/27/2023, No Print             No discharge procedures on file.    PDMP Review         Value Time User    PDMP Reviewed  Yes 11/27/2023 12:51 PM Adam Evangelista MD            ED Provider  Electronically Signed by             Brisa Cruz DO  01/03/24 [id number]

## 2024-01-18 ENCOUNTER — TELEPHONE (OUTPATIENT)
Dept: PSYCHIATRY | Facility: CLINIC | Age: 74
End: 2024-01-18

## 2024-01-18 NOTE — TELEPHONE ENCOUNTER
Pt called in and stated that she is asking for a refill on her effexor XR. 150mg ,  MRstated that its not on the med list. She stated that it should be. Please look into this as she needs a refill on the medication.   Thanks

## 2024-01-24 NOTE — TELEPHONE ENCOUNTER
Called and spoke with spouse who informed patient was unavailable at the time of call. Patient will return call to reschedule 2/7 12PM appt as there was a change in providers schedule.    Spouse also mentioned request for medication refill. Advised message would be followed up with. He informed patient has about a week and a half left of medication. For your review.

## 2024-01-24 NOTE — TELEPHONE ENCOUNTER
Patient contacted the office to schedule a follow up visit with provider. Patient is now scheduled for 2/14/2024  at 2pm in office.

## 2024-02-06 ENCOUNTER — OFFICE VISIT (OUTPATIENT)
Dept: SLEEP CENTER | Facility: CLINIC | Age: 74
End: 2024-02-06
Payer: COMMERCIAL

## 2024-02-06 VITALS
DIASTOLIC BLOOD PRESSURE: 80 MMHG | BODY MASS INDEX: 29.06 KG/M2 | HEIGHT: 63 IN | WEIGHT: 164 LBS | SYSTOLIC BLOOD PRESSURE: 130 MMHG

## 2024-02-06 DIAGNOSIS — G47.20 CIRCADIAN RHYTHM SLEEP DISORDER: Primary | ICD-10-CM

## 2024-02-06 DIAGNOSIS — F51.04 PSYCHOPHYSIOLOGICAL INSOMNIA: ICD-10-CM

## 2024-02-06 DIAGNOSIS — G47.33 OBSTRUCTIVE SLEEP APNEA TREATED WITH CONTINUOUS POSITIVE AIRWAY PRESSURE (CPAP): ICD-10-CM

## 2024-02-06 PROCEDURE — 99214 OFFICE O/P EST MOD 30 MIN: CPT | Performed by: NURSE PRACTITIONER

## 2024-02-06 RX ORDER — SODIUM FLUORIDE 6 MG/ML
PASTE, DENTIFRICE DENTAL
COMMUNITY

## 2024-02-06 NOTE — PATIENT INSTRUCTIONS
1.  Continue use of CPAP equipment nightly  2.  Continue to clean your equipment, as discussed  3.  Contact [facility] with any questions or concerns prior to your next visit, as needed  4.  Have your  wake you at 12:30 for about one week, then if that's going well, have him wake you at noon and continue to wake you 15-30 minutes earlier every week.  If you start needing naps, then don't wake up any earlier.  The plan is to try to feel sleepy when you're going to bed.  4.  Schedule visit for follow-up in 4-5 months - bring your CPAP equipment with you to your appointment          Nursing Support:  When: Monday through Friday 7A-5PM except holidays  Where: Our direct line is [phone].    If you are having a true emergency please call 911.  In the event that the line is busy or it is after hours please leave a voice message and we will return your call.  Please speak clearly, leaving your full name, birth date, best number to reach you and the reason for your call.   Medication refills: We will need the name of the medication, the dosage, the ordering provider, whether you get a 30 or 90 day refill, and the pharmacy name and address.  Medications will be ordered by the provider only.  Nurses cannot call in prescriptions.  Please allow 7 days for medication refills.  Physician requested updates: If your provider requested that you call with an update after starting medication, please be ready to provide us the medication and dosage, what time you take your medication, the time you attempt to fall asleep, time you fall asleep, when you wake up, and what time you get out of bed.  Sleep Study Results: We will contact you with sleep study results and/or next steps after the physician has reviewed your testing.

## 2024-02-06 NOTE — PROGRESS NOTES
Progress Note - [facility]   Flower Nunn 74 y.o. female   :1950, MRN: [id number]  2024        Follow Up Evaluation / Problem:  Mild Obstructive Sleep Apnea  History of Insomnia  Hypersomnia  Delayed Sleep-Wake Phase Disorder  Depression      Thank you for the opportunity of participating in the evaluation and care of this patient in the Sleep Clinic at [facility].      HPI: Flower Nunn is a 74 y.o. year old female.  The patient presents for follow up of mild obstructive sleep apnea with history of insomnia and hypersomnia.  She feels symptoms of hypersomnia began after having Guillian-Parker in  and her energy levels have never returned (She was on a vent and went through rehab - overall 4 months).  She was initially diagnosed with DENIZ in .  AHI was 14.0, worsening to 42 in REM sleep.  Equipment was titrated to 8cm of water pressure, however testing was in the absence of REM sleep.  She has been treated with CPAP equipment, using APAP 4-20cm of water pressure.  She has a history of anxiety and bipolar depression.  She reports that she was diagnosed 25 years ago when her mother passed away.  She currently takes zyprexa 2.5mg (decreased from 5mg) at 4:00pm and venlafaxine 75mg daily in the am (decreased from 150mg).  She occasionally uses alprazolam 0.5mg for anxiety, but reports that she never takes it at bedtime with ambien.  She recently established with [facility] behavioral health - Dr. Le.  She was treated with ropinirole 0.5mg for restless leg syndrome in the past, which was discontinued. She denies restless leg symptoms at bedtime or in the evening.  Past sleep study results indicated minimal PLMs, rarely associated with arousal and none during the titration study.    She reports a long history of insomnia.  She reports that she has been taking ambien 10mg for at least 20 years.  Medication was decreased to 5mg,  however, she was unable to sleep once decreased.  She tried Lunesta 2mg, increased to 3mg when decreased ambien dose didn't help, however, she was unable to sleep when taking Lunesta.  Ambien 10mg was restarted.  She denies any complex sleep behaviors with use of ambien.  She was treated with wake promoting medications in the past, including modafinil (developed intolerable metallic taste in her mouth), armodafinil (caused severe diarrhea), adderall (caused mouth swelling).      Current Outpatient Medications:     amLODIPine-benazepril (LOTREL 5-20) 5-20 MG per capsule, Take 1 capsule by mouth daily., Disp: , Rfl:     aspirin 325 mg tablet, Take 325 mg by mouth daily., Disp: , Rfl:     atenolol (TENORMIN) 100 mg tablet, Take 100 mg by mouth daily., Disp: , Rfl:     cholecalciferol (VITAMIN D3) 1,000 units tablet, Take 1,000 Units by mouth daily, Disp: , Rfl:     clonazePAM (KlonoPIN) 0.5 mg tablet, Take 1 tablet (0.5 mg total) by mouth 2 (two) times a day as needed for seizures, Disp: 20 tablet, Rfl: 1    cycloSPORINE (RESTASIS) 0.05 % ophthalmic emulsion, Administer 1 drop to both eyes 2 (two) times a day., Disp: , Rfl:     escitalopram (Lexapro) 10 mg tablet, Take 1 tablet (10 mg total) by mouth daily, Disp: 90 tablet, Rfl: 0    ezetimibe-simvastatin (VYTORIN) 10-20 mg per tablet, , Disp: , Rfl:     hydroxychloroquine (PLAQUENIL) 200 mg tablet, Take 200 mg by mouth daily with breakfast, Disp: , Rfl:     OLANZapine (ZyPREXA) 2.5 mg tablet, Take 1 tablet (2.5 mg total) by mouth daily at bedtime, Disp: 90 tablet, Rfl: 0    pilocarpine (SALAGEN) 5 mg tablet, , Disp: , Rfl:     Sodium Fluoride (PreviDent 5000 Booster Plus) 1.1 % PSTE, None Entered, Disp: , Rfl:     zolpidem (AMBIEN) 10 mg tablet, Take 1 tablet (10 mg total) by mouth daily at bedtime, Disp: 90 tablet, Rfl: 0    cevimeline (EVOXAC) 30 MG capsule, Take 30 mg by mouth daily. (Patient not taking: Reported on 2/6/2024), Disp: , Rfl:     How likely are you to  "doze off or fall asleep in the following situations, in contrast to feeling just tired?  Sitting and reading: Slight chance of dozing  Watching TV: Slight chance of dozing  Sitting, inactive in a public place (e.g. a theatre or a meeting): Would never doze  As a passenger in a car for an hour without a break: Moderate chance of dozing  Lying down to rest in the afternoon when circumstances permit: High chance of dozing  Sitting and talking to someone: Would never doze  Sitting quietly after a lunch without alcohol: Slight chance of dozing  In a car, while stopped for a few minutes in traffic: Would never doze  Total score: 8              Vitals:    02/06/24 1439   BP: 130/80   Weight: 74.4 kg (164 lb)   Height: 5' 3\" (1.6 m)       Body mass index is 29.05 kg/m².            EPWORTH SLEEPINESS SCORE  Total score: 8      Past History Since Last Sleep Center Visit:   She was hospitalized for facial cellulitis, which has resolved.    She continues to use CPAP equipment nightly.  She uses a hybrid full face mask. She has large time in an air leak noted.  She is aware of air leaks.  Otherwise, she feels the settings and mask are comfortable.      She is working with psychiatry regarding insomnia and continues to use ambien 10mg.  It still takes up to an hour for her to initiate sleep.  She states she does not feel sleepy when going to bed at 11:00pm with her .  She sleeps until 1:00pm.  She feels rested when she wakes up and does not feel she needs to nap or doze.         The review of systems and following portions of the patient's history were reviewed and updated as appropriate: allergies, current medications, past family history, past medical history, past social history, past surgical history, and problem list.        OBJECTIVE  Equipment set up date:  4/12/2019, received recall replacement, likely returned modem with old equipment  PAP Pressure: APAP 4-20 - pressure change to 4-7cm ordered and completed in " office  Type of mask used: full face  DME Provider: [employer]    Physical Exam:     General Appearance:   Alert, cooperative, no distress, appears stated age, overweight     Lungs:    Heart:  Clear to auscultation bilaterally, respirations unlabored      Bradycardic rate and regular rhythm, S1 and S2 normal, Grade 1/6 murmur noted, no rub or gallop              ASSESSMENT / PLAN    1. Circadian rhythm sleep disorder        2. Psychophysiological insomnia        3. Obstructive sleep apnea treated with continuous positive airway pressure (CPAP)  PAP DME Pressure Change    PAP DME Resupply/Reorder                Counseling / Coordination of Care  I have spent a total time of 30 minutes on 02/06/24 in caring for this patient including Risks and benefits of tx options, Instructions for management, Patient and family education, Importance of tx compliance, Risk factor reductions, Impressions, Counseling / Coordination of care, Documenting in the medical record, and Reviewing / ordering tests, medicine, procedures  .      Today I reviewed the patient's compliance data.  She has been able to use the equipment 83.3% of all days recorded.  Average usage was 4 or more hours 70% of all days recorded.  The patient uses the equipment for an average of 8 hours and 5 minutes per night.  The estimated AHI is 5.2 abnormal breathing events per hour, including 0.2 OA, 0.1 CA and 4.9 hypopneas.  A pressure change has been ordered today.  A mask fitting was also ordered.    The patient feels she benefits from the use of the equipment and would like to continue PAP treatment.  Response to treatment has been good.  A prescription for supplies has been provided to last for the next year.  She will be eligible for new CPAP equipment in April. We discussed that if AHI is less than 5, we will plan to replace her equipment.  If elevated, a titration study will be recommended.    We also discussed her sleep/wake schedule.  We discussed  setting an alarm to wake up  earlier, in increments of 15-30 minutes, every week, to allow her to feel sleepy at bedtime.  She understands the concept and plans to discuss this with him.  If she feels she needs to take an afternoon nap, she will return to prior wake up time.  She plans to have her  wake her instead of using an alarm.    She will continue using this equipment at the settings noted above for the next 4-5  months.  At that time she will return for a routine follow-up evaluation. I have asked the patient to contact the Sleep Disorders Center if any difficulties are encountered prior to that time.        The following instructions have been given to the patient today:    Patient Instructions   1.  Continue use of CPAP equipment nightly  2.  Continue to clean your equipment, as discussed  3.  Contact the Sleep Disorders Center with any questions or concerns prior to your next visit, as needed  4.  Have your  wake you at 12:30 for about one week, then if that's going well, have him wake you at noon and continue to wake you 15-30 minutes earlier every week.  If you start needing naps, then don't wake up any earlier.  The plan is to try to feel sleepy when you're going to bed.  4.  Schedule visit for follow-up in 4-5 months - bring your CPAP equipment with you to your appointment          Nursing Support:  When: Monday through Friday 7A-5PM except holidays  Where: Our direct line is [phone].    If you are having a true emergency please call 911.  In the event that the line is busy or it is after hours please leave a voice message and we will return your call.  Please speak clearly, leaving your full name, birth date, best number to reach you and the reason for your call.   Medication refills: We will need the name of the medication, the dosage, the ordering provider, whether you get a 30 or 90 day refill, and the pharmacy name and address.  Medications will be ordered by the provider only.  " Nurses cannot call in prescriptions.  Please allow 7 days for medication refills.  Physician requested updates: If your provider requested that you call with an update after starting medication, please be ready to provide us the medication and dosage, what time you take your medication, the time you attempt to fall asleep, time you fall asleep, when you wake up, and what time you get out of bed.  Sleep Study Results: We will contact you with sleep study results and/or next steps after the physician has reviewed your testing.      SHAZIA Rios  [facility]      Portions of the record may have been created with voice recognition software. Occasional wrong word or \"sound a like\" substitutions may have occurred due to the inherent limitations of voice recognition software. Read the chart carefully and recognize, using context, where substitutions have occurred.       "

## 2024-02-07 ENCOUNTER — TELEPHONE (OUTPATIENT)
Dept: SLEEP CENTER | Facility: CLINIC | Age: 74
End: 2024-02-07

## 2024-02-09 LAB

## 2024-02-14 ENCOUNTER — OFFICE VISIT (OUTPATIENT)
Dept: PSYCHIATRY | Facility: CLINIC | Age: 74
End: 2024-02-14
Payer: COMMERCIAL

## 2024-02-14 DIAGNOSIS — F41.1 GAD (GENERALIZED ANXIETY DISORDER): Primary | ICD-10-CM

## 2024-02-14 DIAGNOSIS — F33.0 MILD EPISODE OF RECURRENT MAJOR DEPRESSIVE DISORDER (HCC): ICD-10-CM

## 2024-02-14 DIAGNOSIS — F41.0 PANIC DISORDER WITHOUT AGORAPHOBIA WITH MODERATE PANIC ATTACKS: ICD-10-CM

## 2024-02-14 DIAGNOSIS — F31.81 BIPOLAR 2 DISORDER (HCC): ICD-10-CM

## 2024-02-14 PROCEDURE — 99213 OFFICE O/P EST LOW 20 MIN: CPT | Performed by: PSYCHIATRY & NEUROLOGY

## 2024-02-14 RX ORDER — OLANZAPINE 2.5 MG/1
2.5 TABLET, FILM COATED ORAL
Qty: 90 TABLET | Refills: 0 | Status: SHIPPED | OUTPATIENT
Start: 2024-02-14

## 2024-02-14 RX ORDER — LORAZEPAM 1 MG/1
1 TABLET ORAL EVERY 8 HOURS PRN
Qty: 90 TABLET | Refills: 0 | Status: SHIPPED | OUTPATIENT
Start: 2024-02-14

## 2024-02-14 RX ORDER — ESCITALOPRAM OXALATE 10 MG/1
15 TABLET ORAL DAILY
Qty: 135 TABLET | Refills: 0 | Status: SHIPPED | OUTPATIENT
Start: 2024-02-14

## 2024-02-14 NOTE — PSYCH
Flower Nunn 1950 [id number]     The patient was seen for continuing care and pharmacotherapy.Lexapro has been helpful and she is no longer experiences panic attacks.  However, she continues to have pervasive anxiety particularly related to her family.  Clonazepam was not helpful and she believes that lorazepam worked better for her.  Relationship with her family is stable.  Sleep and appetite are adequate        ROS:  As HPI  Current Mental Status Evaluation:  Appearance:  Adequate hygiene and grooming and Good eye contact   Behavior:  Calm and Cooperative   Mood:  Anxious   Affect: appropriate and mood-congruent   Speech: Normal volume and Normal rate   Thought Process:  Goal directed and coherent   Thought Content:  Does not verbalize delusional material   Perceptual Disturbances: Denies hallucinations and does not appear to be responding to internal stimuli   Risk Potential: No suicidal or homicidal ideation   Orientation:        No diagnosis found.       Current Outpatient Medications   Medication Instructions    amLODIPine-benazepril (LOTREL 5-20) 5-20 MG per capsule 1 capsule, Oral, Daily    aspirin 325 mg, Oral, Daily    atenolol (TENORMIN) 100 mg, Oral, Daily    cevimeline (EVOXAC) 30 mg, Daily    cholecalciferol (VITAMIN D3) 1,000 Units, Oral, Daily    clonazePAM (KLONOPIN) 0.5 mg, Oral, 2 times daily PRN    cycloSPORINE (RESTASIS) 0.05 % ophthalmic emulsion 1 drop, Both Eyes, 2 times daily    escitalopram (LEXAPRO) 10 mg, Oral, Daily    ezetimibe-simvastatin (VYTORIN) 10-20 mg per tablet No dose, route, or frequency recorded.    hydroxychloroquine (PLAQUENIL) 200 mg, Oral, Daily with breakfast    OLANZapine (ZYPREXA) 2.5 mg, Oral, Daily at bedtime    pilocarpine (SALAGEN) 5 mg tablet No dose, route, or frequency recorded.    Sodium Fluoride (PreviDent 5000 Booster Plus) 1.1 % PSTE None Entered    zolpidem (AMBIEN) 10 mg, Oral, Daily at bedtime          Treatment Recommendations- Risks Benefits     Lexapro will be increased to 15 mg daily.  We will switch from clonazepam to lorazepam 1 mg every 8 hours as needed.  Follow-up in 8 weeks  Risks, Benefits And Possible Side Effects Of Medications:  Risks, benefits, and possible side effects of medications explained to patient and patient verbalizes understanding    VIRTUAL VISIT DISCLAIMER    Flower Nunn verbally agrees to participate in Virtual Care Services. Pt is aware that Virtual Care Services could be limited without vital signs or the ability to perform a full hands-on physical exam. Flower Nunn understands she or the provider may request at any time to terminate the video visit and request the patient to seek care or treatment in person.     Adam Evangelista MD 02/14/24

## 2024-04-10 ENCOUNTER — OFFICE VISIT (OUTPATIENT)
Dept: PSYCHIATRY | Facility: CLINIC | Age: 74
End: 2024-04-10

## 2024-04-10 DIAGNOSIS — F51.04 PSYCHOPHYSIOLOGICAL INSOMNIA: ICD-10-CM

## 2024-04-10 DIAGNOSIS — F41.0 PANIC DISORDER WITHOUT AGORAPHOBIA WITH MODERATE PANIC ATTACKS: ICD-10-CM

## 2024-04-10 DIAGNOSIS — F33.0 MILD EPISODE OF RECURRENT MAJOR DEPRESSIVE DISORDER (HCC): ICD-10-CM

## 2024-04-10 DIAGNOSIS — F41.1 GAD (GENERALIZED ANXIETY DISORDER): ICD-10-CM

## 2024-04-10 RX ORDER — LORAZEPAM 1 MG/1
1 TABLET ORAL 4 TIMES DAILY
Qty: 120 TABLET | Refills: 2 | Status: SHIPPED | OUTPATIENT
Start: 2024-04-10

## 2024-04-10 RX ORDER — ZOLPIDEM TARTRATE 10 MG/1
10 TABLET ORAL
Qty: 90 TABLET | Refills: 0 | Status: SHIPPED | OUTPATIENT
Start: 2024-04-10

## 2024-04-10 RX ORDER — OLANZAPINE 2.5 MG/1
2.5 TABLET, FILM COATED ORAL
Qty: 90 TABLET | Refills: 0 | Status: SHIPPED | OUTPATIENT
Start: 2024-04-10

## 2024-04-10 RX ORDER — ESCITALOPRAM OXALATE 10 MG/1
15 TABLET ORAL DAILY
Qty: 135 TABLET | Refills: 0 | Status: SHIPPED | OUTPATIENT
Start: 2024-04-10

## 2024-04-10 NOTE — PSYCH
Flower Nunn 1950 [id number]     The patient was seen for continuing care and pharmacotherapy.  Denies depressive symptoms except that episodes of anxiety persist.  She has trouble falling asleep but her appetite is good.  Energy level and interests are good        ROS:  Trouble falling asleep and anxious mood  Current Mental Status Evaluation:  Appearance:  Adequate hygiene and grooming and Good eye contact   Behavior:  Calm and Cooperative   Mood:  Often anxious   Affect: appropriate and mood-congruent   Speech: Normal volume and Normal rate   Thought Process:  Goal directed and coherent   Thought Content:  Does not verbalize delusional material   Perceptual Disturbances: Denies hallucinations and does not appear to be responding to internal stimuli   Risk Potential: No suicidal or homicidal ideation   Orientation:         Diagnosis ICD-10-CM Associated Orders   1. MONTEZ (generalized anxiety disorder)  F41.1 LORazepam (ATIVAN) 1 mg tablet      2. Psychophysiological insomnia  F51.04 zolpidem (AMBIEN) 10 mg tablet      3. Panic disorder without agoraphobia with moderate panic attacks  F41.0 OLANZapine (ZyPREXA) 2.5 mg tablet     escitalopram (Lexapro) 10 mg tablet      4. Mild episode of recurrent major depressive disorder (HCC)  F33.0 escitalopram (Lexapro) 10 mg tablet             Current Outpatient Medications   Medication Instructions    amLODIPine-benazepril (LOTREL 5-20) 5-20 MG per capsule 1 capsule, Oral, Daily    aspirin 325 mg, Oral, Daily    atenolol (TENORMIN) 100 mg, Oral, Daily    cevimeline (EVOXAC) 30 mg, Daily    cholecalciferol (VITAMIN D3) 1,000 Units, Oral, Daily    cycloSPORINE (RESTASIS) 0.05 % ophthalmic emulsion 1 drop, Both Eyes, 2 times daily    escitalopram (LEXAPRO) 15 mg, Oral, Daily    ezetimibe-simvastatin (VYTORIN) 10-20 mg per tablet No dose, route, or frequency recorded.    hydroxychloroquine (PLAQUENIL) 200 mg, Oral, Daily with breakfast    LORazepam (ATIVAN) 1 mg, Oral, 4  times daily    OLANZapine (ZYPREXA) 2.5 mg, Oral, Daily at bedtime    pilocarpine (SALAGEN) 5 mg tablet No dose, route, or frequency recorded.    Sodium Fluoride (PreviDent 5000 Booster Plus) 1.1 % PSTE None Entered    zolpidem (AMBIEN) 10 mg, Oral, Daily at bedtime          Treatment Recommendations- Risks Benefits    Increase lorazepam to 4 times daily.  Follow-up in 3 weeks  Risks, Benefits And Possible Side Effects Of Medications:  Risks, benefits, and possible side effects of medications explained to patient and patient verbalizes understanding    VIRTUAL VISIT DISCLAIMER    Flower Arline verbally agrees to participate in Virtual Care Services. Pt is aware that Virtual Care Services could be limited without vital signs or the ability to perform a full hands-on physical exam. Flower Nunn understands she or the provider may request at any time to terminate the video visit and request the patient to seek care or treatment in person.     Adam Evangelista MD 04/10/24

## 2024-04-23 ENCOUNTER — HOSPITAL ENCOUNTER (OUTPATIENT)
Dept: RADIOLOGY | Age: 74
Discharge: HOME | End: 2024-04-23
Payer: COMMERCIAL

## 2024-04-23 DIAGNOSIS — Z12.31 VISIT FOR SCREENING MAMMOGRAM: ICD-10-CM

## 2024-04-23 PROCEDURE — 77067 SCR MAMMO BI INCL CAD: CPT

## 2024-04-23 PROCEDURE — 77063 BREAST TOMOSYNTHESIS BI: CPT

## 2024-05-06 ENCOUNTER — APPOINTMENT (OUTPATIENT)
Dept: LAB | Age: 74
End: 2024-05-06
Payer: COMMERCIAL

## 2024-05-06 DIAGNOSIS — M81.0 OSTEOPOROSIS, UNSPECIFIED OSTEOPOROSIS TYPE, UNSPECIFIED PATHOLOGICAL FRACTURE PRESENCE: ICD-10-CM

## 2024-05-06 LAB — CALCIUM SERPL-MCNC: 9.2 MG/DL (ref 8.4–10.2)

## 2024-05-06 PROCEDURE — 36415 COLL VENOUS BLD VENIPUNCTURE: CPT

## 2024-07-09 DIAGNOSIS — F51.04 PSYCHOPHYSIOLOGICAL INSOMNIA: ICD-10-CM

## 2024-07-09 DIAGNOSIS — F33.0 MILD EPISODE OF RECURRENT MAJOR DEPRESSIVE DISORDER (HCC): ICD-10-CM

## 2024-07-09 DIAGNOSIS — F41.0 PANIC DISORDER WITHOUT AGORAPHOBIA WITH MODERATE PANIC ATTACKS: ICD-10-CM

## 2024-07-09 RX ORDER — ZOLPIDEM TARTRATE 10 MG/1
10 TABLET ORAL
Qty: 90 TABLET | Refills: 0 | Status: SHIPPED | OUTPATIENT
Start: 2024-07-09

## 2024-07-09 RX ORDER — ESCITALOPRAM OXALATE 10 MG/1
15 TABLET ORAL DAILY
Qty: 135 TABLET | Refills: 0 | Status: SHIPPED | OUTPATIENT
Start: 2024-07-09 | End: 2024-07-17 | Stop reason: SDUPTHER

## 2024-07-09 NOTE — TELEPHONE ENCOUNTER
Medication Refill Request     Name of Medication ambien  Dose/Frequency 10mg take 1 tablet by mouth daily at bedtime  Quantity 90  Verified pharmacy   [x]  Verified ordering Provider   [x]  Does patient have enough for the next 3 days? Yes [x] No []  Does patient have a follow-up appointment scheduled? Yes [x] No []   If so when is appointment: 7/17/2024 2pm    Medication Refill Request     Name of Medication lexapro  Dose/Frequency 10mg take 1.5 tablets by mouth daily  Quantity 135  Verified pharmacy   [x]  Verified ordering Provider   [x]  Does patient have enough for the next 3 days? Yes [x] No []  Does patient have a follow-up appointment scheduled? Yes [x] No []   If so when is appointment: 7/17/2024 2pm

## 2024-07-17 ENCOUNTER — OFFICE VISIT (OUTPATIENT)
Dept: PSYCHIATRY | Facility: CLINIC | Age: 74
End: 2024-07-17
Payer: COMMERCIAL

## 2024-07-17 DIAGNOSIS — F33.0 MILD EPISODE OF RECURRENT MAJOR DEPRESSIVE DISORDER (HCC): ICD-10-CM

## 2024-07-17 DIAGNOSIS — F41.0 PANIC DISORDER WITHOUT AGORAPHOBIA WITH MODERATE PANIC ATTACKS: ICD-10-CM

## 2024-07-17 DIAGNOSIS — F41.1 GAD (GENERALIZED ANXIETY DISORDER): ICD-10-CM

## 2024-07-17 PROCEDURE — 99213 OFFICE O/P EST LOW 20 MIN: CPT | Performed by: PSYCHIATRY & NEUROLOGY

## 2024-07-17 RX ORDER — LORAZEPAM 1 MG/1
1 TABLET ORAL 4 TIMES DAILY
Qty: 360 TABLET | Refills: 0 | Status: SHIPPED | OUTPATIENT
Start: 2024-08-01

## 2024-07-17 RX ORDER — ESCITALOPRAM OXALATE 10 MG/1
15 TABLET ORAL DAILY
Qty: 135 TABLET | Refills: 0 | Status: SHIPPED | OUTPATIENT
Start: 2024-07-17

## 2024-07-17 RX ORDER — TRAZODONE HYDROCHLORIDE 50 MG/1
50 TABLET ORAL
Qty: 30 TABLET | Refills: 1 | Status: SHIPPED | OUTPATIENT
Start: 2024-07-17

## 2024-07-17 RX ORDER — OLANZAPINE 2.5 MG/1
2.5 TABLET, FILM COATED ORAL
Qty: 90 TABLET | Refills: 1 | Status: SHIPPED | OUTPATIENT
Start: 2024-07-17

## 2024-07-17 NOTE — BH TREATMENT PLAN
TREATMENT PLAN (Medication Management Only)        [facility]    Name and Date of Birth:  Flower Nunn 74 y.o. 1950  Date of Treatment Plan: July 17, 2024  Diagnosis/Diagnoses:    1. Panic disorder without agoraphobia with moderate panic attacks    2. MONTEZ (generalized anxiety disorder)    3. Mild episode of recurrent major depressive disorder (HCC)      Strengths/Personal Resources for Self-Care: supportive family, taking medications as prescribed, ability to adapt to life changes.  Area/Areas of need (in own words): anxiety symptoms, depressive symptoms  1. Long Term Goal: remission of depression.  Target Date:3 months - 10/17/2024  Person/Persons responsible for completion of goal: Flower  2.  Short Term Objective (s) - How will we reach this goal?:   A. Provider new recommended medication/dosage changes and/or continue medication(s): continue current medications as prescribed.  B. N/A.  C. N/A.  Target Date:3 months - 10/17/2024  Person/Persons Responsible for Completion of Goal: Flower  Progress Towards Goals: continuing treatment  Treatment Modality: medication management every 3 months  Review due 180 days from date of this plan: 6 months - 1/17/2025  Expected length of service: ongoing treatment  My Physician/PA/NP and I have developed this plan together and I agree to work on the goals and objectives. I understand the treatment goals that were developed for my treatment.

## 2024-07-17 NOTE — PSYCH
Flower Nunn 1950 [id number]     The patient was seen for continuing care and pharmacotherapy.  Flower has had insomnia for the past several weeks which coincides with her  developing acute kidney injury and being in the hospital.  She also adds that her anxiety is a chronic problem.  She has a normal appetite.  Tries to exercise and also avoids caffeinated beverages but insomnia persists.  She does not have any depressive symptoms        ROS:  As HPI  Current Mental Status Evaluation:  Appearance:  Adequate hygiene and grooming and Good eye contact   Behavior:  Calm and Cooperative   Mood:  Euthymic   Affect: appropriate   Speech: Normal volume and Normal rate   Thought Process:  Goal directed and coherent   Thought Content:  Does not verbalize delusional material   Perceptual Disturbances: Denies hallucinations and does not appear to be responding to internal stimuli   Risk Potential: No suicidal or homicidal ideation   Orientation:         Diagnosis ICD-10-CM Associated Orders   1. Panic disorder without agoraphobia with moderate panic attacks  F41.0 OLANZapine (ZyPREXA) 2.5 mg tablet     escitalopram (Lexapro) 10 mg tablet      2. MONTEZ (generalized anxiety disorder)  F41.1 traZODone (DESYREL) 50 mg tablet     LORazepam (ATIVAN) 1 mg tablet      3. Mild episode of recurrent major depressive disorder (HCC)  F33.0 escitalopram (Lexapro) 10 mg tablet             Current Outpatient Medications   Medication Instructions    amLODIPine-benazepril (LOTREL 5-20) 5-20 MG per capsule 1 capsule, Oral, Daily    aspirin 325 mg, Oral, Daily    atenolol (TENORMIN) 100 mg, Oral, Daily    cevimeline (EVOXAC) 30 mg, Daily    cholecalciferol (VITAMIN D3) 1,000 Units, Oral, Daily    cycloSPORINE (RESTASIS) 0.05 % ophthalmic emulsion 1 drop, Both Eyes, 2 times daily    escitalopram (LEXAPRO) 15 mg, Oral, Daily    ezetimibe-simvastatin (VYTORIN) 10-20 mg per tablet No dose, route, or frequency recorded.     hydroxychloroquine (PLAQUENIL) 200 mg, Oral, Daily with breakfast    [START ON 8/1/2024] LORazepam (ATIVAN) 1 mg, Oral, 4 times daily    OLANZapine (ZYPREXA) 2.5 mg, Oral, Daily at bedtime    pilocarpine (SALAGEN) 5 mg tablet No dose, route, or frequency recorded.    Sodium Fluoride (PreviDent 5000 Booster Plus) 1.1 % PSTE None Entered    traZODone (DESYREL) 50 mg, Oral, Daily at bedtime    zolpidem (AMBIEN) 10 mg, Oral, Daily at bedtime          Treatment Recommendations- Risks Benefits    We will continue with Ambien and lorazepam and Lexapro and olanzapine.  Trazodone 50 mg at bedtime is recommended.  Follow-up in 6 weeks  Risks, Benefits And Possible Side Effects Of Medications:  Risks, benefits, and possible side effects of medications explained to patient and patient verbalizes understanding    VIRTUAL VISIT DISCLAIMER    Flower Nunn verbally agrees to participate in Virtual Care Services. Pt is aware that Virtual Care Services could be limited without vital signs or the ability to perform a full hands-on physical exam. Flowerdoron Dorseyo understands she or the provider may request at any time to terminate the video visit and request the patient to seek care or treatment in person.     Adam Evangelista MD 07/17/24

## 2024-07-29 ENCOUNTER — TELEPHONE (OUTPATIENT)
Age: 74
End: 2024-07-29

## 2024-07-29 ENCOUNTER — RA CDI HCC (OUTPATIENT)
Dept: OTHER | Facility: HOSPITAL | Age: 74
End: 2024-07-29

## 2024-07-29 NOTE — TELEPHONE ENCOUNTER
Flower returned my call.  States the Trazodone is not doing anything for her. It's as though she is not taking anything at all.      Will refer to Dr Evangelista for review.

## 2024-07-29 NOTE — TELEPHONE ENCOUNTER
LM on Flower's VM informing her that I was returning her call regarding the Trazodone.  Requested call back when she has a chance.

## 2024-07-30 NOTE — TELEPHONE ENCOUNTER
Flower called to follow up on her call of yesterday regarding her Trazodone.  Flower is not sleeping and would like provider recommendation.  Informed her that I would refer again.    Will refer to Dr Evangelista for review.

## 2024-07-30 NOTE — TELEPHONE ENCOUNTER
The patient contacted the office, requesting an update regarding her concerns with Trazodone. The writer warm transferred the call to the clinical staff for assistance.

## 2024-07-30 NOTE — TELEPHONE ENCOUNTER
Called Flower back and informed her that provider does not want to make any further medication changes at this time due to medications she is already taking.  Asked if she would like to schedule a sooner appointment with provider to discuss further and Flower is in agreement.  Informed her I will refer to scheduling.

## 2024-07-31 NOTE — TELEPHONE ENCOUNTER
Called and left message for patient to return a call to [phone] and schedule a sooner appointment with provider. Please schedule upon return call. Thank you.

## 2024-08-07 ENCOUNTER — OFFICE VISIT (OUTPATIENT)
Dept: PSYCHIATRY | Facility: CLINIC | Age: 74
End: 2024-08-07
Payer: COMMERCIAL

## 2024-08-07 DIAGNOSIS — F51.04 PSYCHOPHYSIOLOGICAL INSOMNIA: ICD-10-CM

## 2024-08-07 DIAGNOSIS — F41.1 GAD (GENERALIZED ANXIETY DISORDER): ICD-10-CM

## 2024-08-07 DIAGNOSIS — F33.0 MILD EPISODE OF RECURRENT MAJOR DEPRESSIVE DISORDER (HCC): ICD-10-CM

## 2024-08-07 DIAGNOSIS — F41.0 PANIC DISORDER WITHOUT AGORAPHOBIA WITH MODERATE PANIC ATTACKS: ICD-10-CM

## 2024-08-07 PROCEDURE — 99213 OFFICE O/P EST LOW 20 MIN: CPT | Performed by: PSYCHIATRY & NEUROLOGY

## 2024-08-07 RX ORDER — LORAZEPAM 1 MG/1
1 TABLET ORAL 4 TIMES DAILY
Qty: 360 TABLET | Refills: 0 | Status: SHIPPED | OUTPATIENT
Start: 2024-08-07

## 2024-08-07 RX ORDER — ESCITALOPRAM OXALATE 10 MG/1
15 TABLET ORAL DAILY
Qty: 135 TABLET | Refills: 0 | Status: SHIPPED | OUTPATIENT
Start: 2024-08-07

## 2024-08-07 RX ORDER — OLANZAPINE 2.5 MG/1
2.5 TABLET, FILM COATED ORAL
Qty: 90 TABLET | Refills: 1 | Status: SHIPPED | OUTPATIENT
Start: 2024-08-07

## 2024-08-07 RX ORDER — ZOLPIDEM TARTRATE 10 MG/1
10 TABLET ORAL
Qty: 90 TABLET | Refills: 0 | Status: SHIPPED | OUTPATIENT
Start: 2024-08-07

## 2024-08-07 NOTE — PSYCH
Flower Nunn 1950 [id number]     The patient was seen for continuing care and pharmacotherapy.She has anxiety related to her family such as her 's health and their financial problems.Excessive anxiety prevents her from falling asleep.It sometimes takes 2 hours to fall asleep.  No true depressive symptoms.  Trazodone was not helpful with sleep and she went back on Ambien and reports that she has been sleeping better.      ROS:  anxiety  Current Mental Status Evaluation:  Appearance:  Adequate hygiene and grooming and Good eye contact   Behavior:  Calm and Cooperative   Mood:  Anxious   Affect: appropriate   Speech: Normal volume and Normal rate   Thought Process:  Goal directed and coherent   Thought Content:  Does not verbalize delusional material   Perceptual Disturbances: Denies hallucinations and does not appear to be responding to internal stimuli   Risk Potential: No suicidal or homicidal ideation   Orientation:         Diagnosis ICD-10-CM Associated Orders   1. Psychophysiological insomnia  F51.04 zolpidem (AMBIEN) 10 mg tablet      2. Panic disorder without agoraphobia with moderate panic attacks  F41.0 OLANZapine (ZyPREXA) 2.5 mg tablet     escitalopram (Lexapro) 10 mg tablet      3. Mild episode of recurrent major depressive disorder (HCC)  F33.0 escitalopram (Lexapro) 10 mg tablet      4. MONTEZ (generalized anxiety disorder)  F41.1 LORazepam (ATIVAN) 1 mg tablet             Current Outpatient Medications   Medication Instructions    amLODIPine-benazepril (LOTREL 5-20) 5-20 MG per capsule 1 capsule, Oral, Daily    aspirin 325 mg, Oral, Daily    atenolol (TENORMIN) 100 mg, Oral, Daily    cevimeline (EVOXAC) 30 mg, Daily    cholecalciferol (VITAMIN D3) 1,000 Units, Oral, Daily    cycloSPORINE (RESTASIS) 0.05 % ophthalmic emulsion 1 drop, Both Eyes, 2 times daily    escitalopram (LEXAPRO) 15 mg, Oral, Daily    ezetimibe-simvastatin (VYTORIN) 10-20 mg per tablet No dose, route, or frequency  recorded.    hydroxychloroquine (PLAQUENIL) 200 mg, Oral, Daily with breakfast    LORazepam (ATIVAN) 1 mg, Oral, 4 times daily    OLANZapine (ZYPREXA) 2.5 mg, Oral, Daily at bedtime    pilocarpine (SALAGEN) 5 mg tablet No dose, route, or frequency recorded.    Sodium Fluoride (PreviDent 5000 Booster Plus) 1.1 % PSTE None Entered    zolpidem (AMBIEN) 10 mg, Oral, Daily at bedtime          Treatment Recommendations- Risks Benefits    Continue with same meds.Follow up in  3 months  Risks, Benefits And Possible Side Effects Of Medications:  Risks, benefits, and possible side effects of medications explained to patient and patient verbalizes understanding    VIRTUAL VISIT DISCLAIMER    Flower Arline verbally agrees to participate in Virtual Care Services. Pt is aware that Virtual Care Services could be limited without vital signs or the ability to perform a full hands-on physical exam. Flower Nunn understands she or the provider may request at any time to terminate the video visit and request the patient to seek care or treatment in person.     Adam Evangelista MD 08/07/24

## 2024-08-12 DIAGNOSIS — F41.1 GAD (GENERALIZED ANXIETY DISORDER): ICD-10-CM

## 2024-08-12 NOTE — TELEPHONE ENCOUNTER
Reason for call:   [x] Refill   [] Prior Auth  [] Other:     Office:   [x] PCP/Provider -   [] Specialty/Provider -     Medication:  Take 1 tablet (1 mg total) by mouth 4 (four) times a day     Dose/Frequency: Take 1 tablet (1 mg total) by mouth 4 (four) times a day     Quantity: 360 tablet     Pharmacy: [facility] [address] [place] [address] [phone]     Does the patient have enough for 3 days?   [] Yes   [x] No - Send as HP to POD

## 2024-08-13 RX ORDER — LORAZEPAM 1 MG/1
1 TABLET ORAL 4 TIMES DAILY
Qty: 360 TABLET | Refills: 0 | OUTPATIENT
Start: 2024-08-13

## 2024-08-15 NOTE — TELEPHONE ENCOUNTER
Patient calling to follow up on RF request for lorazepam. Reviewed chart with patient and made aware script was sent to her pharmacy on 8/7/24. She is going to reach out to pharmacy to confirm and will call back if any issues.

## 2024-09-03 RX ORDER — BENAZEPRIL HYDROCHLORIDE 20 MG/1
TABLET ORAL
COMMUNITY
Start: 2024-08-02

## 2024-09-04 ENCOUNTER — TELEPHONE (OUTPATIENT)
Dept: PSYCHIATRY | Facility: CLINIC | Age: 74
End: 2024-09-04

## 2024-09-04 ENCOUNTER — TELEPHONE (OUTPATIENT)
Age: 74
End: 2024-09-04

## 2024-09-04 ENCOUNTER — OFFICE VISIT (OUTPATIENT)
Dept: FAMILY MEDICINE CLINIC | Facility: CLINIC | Age: 74
End: 2024-09-04
Payer: COMMERCIAL

## 2024-09-04 VITALS
WEIGHT: 174 LBS | DIASTOLIC BLOOD PRESSURE: 90 MMHG | OXYGEN SATURATION: 97 % | SYSTOLIC BLOOD PRESSURE: 134 MMHG | HEART RATE: 60 BPM | BODY MASS INDEX: 30.83 KG/M2 | TEMPERATURE: 97.6 F | HEIGHT: 63 IN

## 2024-09-04 DIAGNOSIS — G47.00 INSOMNIA, UNSPECIFIED TYPE: ICD-10-CM

## 2024-09-04 DIAGNOSIS — M35.00 SJOGREN SYNDROME, UNSPECIFIED (HCC): ICD-10-CM

## 2024-09-04 DIAGNOSIS — I49.5 SICK SINUS SYNDROME (HCC): ICD-10-CM

## 2024-09-04 DIAGNOSIS — Z12.11 COLON CANCER SCREENING: ICD-10-CM

## 2024-09-04 DIAGNOSIS — I10 PRIMARY HYPERTENSION: Primary | ICD-10-CM

## 2024-09-04 DIAGNOSIS — E78.00 HYPERCHOLESTEROLEMIA: ICD-10-CM

## 2024-09-04 PROBLEM — D69.3 IDIOPATHIC THROMBOCYTOPENIC PURPURA (HCC): Status: ACTIVE | Noted: 2024-09-04

## 2024-09-04 PROBLEM — G61.0 ACUTE INFECTIVE POLYNEURITIS (HCC): Status: ACTIVE | Noted: 2024-09-04

## 2024-09-04 PROBLEM — C80.1 MALIGNANT NEOPLASTIC DISEASE (HCC): Status: ACTIVE | Noted: 2024-09-04

## 2024-09-04 PROCEDURE — 99204 OFFICE O/P NEW MOD 45 MIN: CPT | Performed by: FAMILY MEDICINE

## 2024-09-04 RX ORDER — ASPIRIN 81 MG/1
81 TABLET, CHEWABLE ORAL DAILY
COMMUNITY

## 2024-09-04 NOTE — TELEPHONE ENCOUNTER
Spoke with Flower on the phone. She said that her anxiety has been so bad she felt like she was having a panic attack all week. Friday she almost took herself to the emergency room because of it. She said it impacted her digestive system and bowels. As the week goes on she is beginning to feel better but she is afraid it is going to happen again. I asked her if she has been taking her ativan 1 mg four times a day as prescribed and she said she ran out last week. She said she is going to call to see where the delivery is. I can see an order for the ativan from 8/7 but it never was delivered. She said she prayed constantly and tried to read as coping skills but couldn't concentrate. Her next appointment isn't until November, I encouraged her to see about getting an earlier appointment as well. Forwarding to provider for review. Clinical will follow up when advised.

## 2024-09-04 NOTE — TELEPHONE ENCOUNTER
Patient called wanting to speak with Dr Evangelista or a nurse in regards to the extreme anxiety she's been having. She stated it was so bad she was ready to go to the Er. Writer connected the patient with a nurse for assistance.

## 2024-09-04 NOTE — TELEPHONE ENCOUNTER
Patient called to say that her Ativan will not be delivered until 9/11. She is wondering if she can get enough to cover her until then. Please send to the [facility] pharmacy and reach out to patient if possible.

## 2024-09-05 ENCOUNTER — APPOINTMENT (OUTPATIENT)
Dept: LAB | Age: 74
End: 2024-09-05
Payer: COMMERCIAL

## 2024-09-05 DIAGNOSIS — E78.00 HYPERCHOLESTEROLEMIA: ICD-10-CM

## 2024-09-05 DIAGNOSIS — G47.00 INSOMNIA, UNSPECIFIED TYPE: ICD-10-CM

## 2024-09-05 DIAGNOSIS — I10 PRIMARY HYPERTENSION: ICD-10-CM

## 2024-09-05 DIAGNOSIS — F41.1 GAD (GENERALIZED ANXIETY DISORDER): Primary | ICD-10-CM

## 2024-09-05 LAB
ALBUMIN SERPL BCG-MCNC: 3.8 G/DL (ref 3.5–5)
ALP SERPL-CCNC: 80 U/L (ref 34–104)
ALT SERPL W P-5'-P-CCNC: 14 U/L (ref 7–52)
ANION GAP SERPL CALCULATED.3IONS-SCNC: 10 MMOL/L (ref 4–13)
AST SERPL W P-5'-P-CCNC: 22 U/L (ref 13–39)
BILIRUB SERPL-MCNC: 0.45 MG/DL (ref 0.2–1)
BUN SERPL-MCNC: 11 MG/DL (ref 5–25)
CALCIUM SERPL-MCNC: 8.7 MG/DL (ref 8.4–10.2)
CHLORIDE SERPL-SCNC: 105 MMOL/L (ref 96–108)
CHOLEST SERPL-MCNC: 174 MG/DL
CO2 SERPL-SCNC: 23 MMOL/L (ref 21–32)
CREAT SERPL-MCNC: 0.95 MG/DL (ref 0.6–1.3)
GFR SERPL CREATININE-BSD FRML MDRD: 58 ML/MIN/1.73SQ M
GLUCOSE P FAST SERPL-MCNC: 85 MG/DL (ref 65–99)
HDLC SERPL-MCNC: 53 MG/DL
LDLC SERPL CALC-MCNC: 103 MG/DL (ref 0–100)
NONHDLC SERPL-MCNC: 121 MG/DL
POTASSIUM SERPL-SCNC: 4.2 MMOL/L (ref 3.5–5.3)
PROT SERPL-MCNC: 7 G/DL (ref 6.4–8.4)
SODIUM SERPL-SCNC: 138 MMOL/L (ref 135–147)
TRIGL SERPL-MCNC: 90 MG/DL
TSH SERPL DL<=0.05 MIU/L-ACNC: 1.24 UIU/ML (ref 0.45–4.5)

## 2024-09-05 PROCEDURE — 36415 COLL VENOUS BLD VENIPUNCTURE: CPT

## 2024-09-05 PROCEDURE — 80061 LIPID PANEL: CPT

## 2024-09-05 PROCEDURE — 80053 COMPREHEN METABOLIC PANEL: CPT

## 2024-09-05 PROCEDURE — 84443 ASSAY THYROID STIM HORMONE: CPT

## 2024-09-05 RX ORDER — LORAZEPAM 1 MG/1
1 TABLET ORAL EVERY 6 HOURS PRN
Qty: 28 TABLET | Refills: 0 | Status: SHIPPED | OUTPATIENT
Start: 2024-09-05 | End: 2024-09-11 | Stop reason: SDUPTHER

## 2024-09-09 ENCOUNTER — TELEPHONE (OUTPATIENT)
Dept: PSYCHIATRY | Facility: CLINIC | Age: 74
End: 2024-09-09

## 2024-09-09 NOTE — TELEPHONE ENCOUNTER
Called and left message for patient to inform 11/14/24 needs to be changed due to provider schedule change. Requested return call to reschedule. Please schedule upon return call. Thank you. Can schedule for 1pm same day or 2pm day before (11/13/24)

## 2024-09-09 NOTE — TELEPHONE ENCOUNTER
Patient contacted the office to schedule a follow up visit with provider per prior note, 11/14/24 appt needed to be r/s. Patient is now scheduled for 11/20/24  at 10:30am in office.

## 2024-09-10 NOTE — PROGRESS NOTES
Patient ID: Flower Nunn is a 74 y.o. female.    HPI: 74 y.o.female presents to get established into the practice and for follow up of hypertension ,hypercholesterolemia, inisomnia, sick sinus syndrome and sjogren's syndrome. Pt denies any dizziness,  chest pain, palpitations, or dypsnea with exertion.      SUBJECTIVE    Family History   Problem Relation Age of Onset    Hypertension Mother     Heart failure Father     Esophageal cancer Father 78    Breast cancer Paternal Aunt 48    Breast cancer Cousin 50        paternal    Breast cancer Cousin 50        paternal    Ovarian cancer Maternal Aunt 58    No Known Problems Sister     No Known Problems Maternal Grandmother     No Known Problems Maternal Grandfather     No Known Problems Paternal Grandmother     No Known Problems Paternal Grandfather     No Known Problems Son     No Known Problems Son     No Known Problems Maternal Aunt     No Known Problems Maternal Aunt     No Known Problems Paternal Aunt      Social History     Socioeconomic History    Marital status: /Civil Union     Spouse name: Not on file    Number of children: Not on file    Years of education: Not on file    Highest education level: Not on file   Occupational History    Not on file   Tobacco Use    Smoking status: Never     Passive exposure: Past    Smokeless tobacco: Never   Vaping Use    Vaping status: Never Used   Substance and Sexual Activity    Alcohol use: Yes     Comment: socially    Drug use: No    Sexual activity: Not Currently   Other Topics Concern    Not on file   Social History Narrative    Not on file     Social Determinants of Health     Financial Resource Strain: Not on file   Food Insecurity: Not on file   Transportation Needs: Not on file   Physical Activity: Not on file   Stress: Not on file   Social Connections: Not on file   Intimate Partner Violence: Not on file   Housing Stability: Not on file     Past Medical History:   Diagnosis Date    Anxiety     Arthritis      Atopic dermatitis     Breast CA (HCC) 1999    left  age 49    Depression     Fracture     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HCC)     2013    History of chemotherapy     breast cancer    Infective polyneuritis, acute (HCC)     Osteoporosis     PE (pulmonary thromboembolism) (HCC)     Peptic ulcer disease     Primary hypertension 9/4/2024    Psychiatric disorder     Sjogren's syndrome (HCC)     Sleep apnea     does not use cpap     Past Surgical History:   Procedure Laterality Date    ABDOMINAL SURGERY      APPENDECTOMY      BREAST BIOPSY Right     prior to breast cancer    CHOLECYSTECTOMY      COLONOSCOPY W/ ENDOSCOPIC US N/A 02/24/2017    Procedure: ANAL ENDOSCOPIC U/S;  Surgeon: Chang Arellano MD;  Location: [facility];  Service:     HYSTERECTOMY      age 33; ovaries intact    MASTECTOMY Left 1999    age 49    NV COLONOSCOPY FLX DX W/COLLJ SPEC WHEN PFRMD N/A 02/24/2017    Procedure: COLONOSCOPY;  Surgeon: Neo Baxter MD;  Location: [facility];  Service: Gastroenterology     Allergies   Allergen Reactions    Adderall [Amphetamine-Dextroamphetamine] Tongue Swelling     Tongue swelling    Lamotrigine Rash     Other reaction(s): Possible rash, mouth sores (questionable reaction)       Current Outpatient Medications:     aspirin 81 mg chewable tablet, Chew 81 mg daily, Disp: , Rfl:     atenolol (TENORMIN) 100 mg tablet, Take 25 mg by mouth daily 0.5 tablet every morning, Disp: , Rfl:     benazepril (LOTENSIN) 20 mg tablet, , Disp: , Rfl:     cycloSPORINE (RESTASIS) 0.05 % ophthalmic emulsion, Administer 1 drop to both eyes 2 (two) times a day., Disp: , Rfl:     escitalopram (Lexapro) 10 mg tablet, Take 1.5 tablets (15 mg total) by mouth daily, Disp: 135 tablet, Rfl: 0    ezetimibe-simvastatin (VYTORIN) 10-20 mg per tablet, , Disp: , Rfl:     hydroxychloroquine (PLAQUENIL) 200 mg tablet, Take 200 mg by mouth daily with breakfast, Disp: , Rfl:     LORazepam (ATIVAN) 1 mg tablet, Take 1 tablet (1 mg total) by mouth 4 (four)  times a day, Disp: 360 tablet, Rfl: 0    OLANZapine (ZyPREXA) 2.5 mg tablet, Take 1 tablet (2.5 mg total) by mouth daily at bedtime, Disp: 90 tablet, Rfl: 1    pilocarpine (SALAGEN) 5 mg tablet, 2 (two) times a day, Disp: , Rfl:     zolpidem (AMBIEN) 10 mg tablet, Take 1 tablet (10 mg total) by mouth daily at bedtime, Disp: 90 tablet, Rfl: 0    amLODIPine-benazepril (LOTREL 5-20) 5-20 MG per capsule, Take 1 capsule by mouth daily. (Patient not taking: Reported on 9/4/2024), Disp: , Rfl:     cevimeline (EVOXAC) 30 MG capsule, Take 30 mg by mouth daily. (Patient not taking: Reported on 2/6/2024), Disp: , Rfl:     cholecalciferol (VITAMIN D3) 1,000 units tablet, Take 1,000 Units by mouth daily (Patient not taking: Reported on 9/4/2024), Disp: , Rfl:     LORazepam (ATIVAN) 1 mg tablet, Take 1 tablet (1 mg total) by mouth every 6 (six) hours as needed for anxiety, Disp: 28 tablet, Rfl: 0    Sodium Fluoride (PreviDent 5000 Booster Plus) 1.1 % PSTE, None Entered (Patient not taking: Reported on 9/4/2024), Disp: , Rfl:     Review of Systems  Constitutional:     Denies fever, chills ,fatigue ,weakness ,weight loss, weight gain     ENT: Denies earache ,loss of hearing ,nosebleed, nasal discharge,nasal congestion ,sore throat ,hoarseness  Pulmonary: Denies shortness of breath ,cough  ,dyspnea on exertion, orthopnea  ,PND   Cardiovascular:  Denies bradycardia , tachycardia  ,palpations, lower extremity edema leg, claudication  Breast:  Denies new or changing breast lumps ,nipple discharge ,nipple changes  Abdomen:  Denies abdominal pain , anorexia , indigestion, nausea, vomiting, constipation, diarrhea  Musculoskeletal: Denies myalgias, arthralgias, joint swelling, joint stiffness , limb pain, limb swelling  Gu: denies dysuria, polyuria  Skin: Denies skin rash, skin lesion, skin wound, itching, dry skin  Neuro: Denies headache, numbness, tingling, confusion, loss of consciousness, dizziness, vertigo  Psychiatric: Denies  "feelings of depression, suicidal ideation, anxiety, sleep disturbances    OBJECTIVE  /90   Pulse 60   Temp 97.6 °F (36.4 °C)   Ht 5' 3\" (1.6 m)   Wt 78.9 kg (174 lb)   LMP  (LMP Unknown)   SpO2 97%   BMI 30.82 kg/m²   Constitutional:   NAD, well appearing and well nourished      ENT:   Conjunctiva and lids: no injection, edema, or discharge     Pupils and iris: LISA bilaterally    External inspection of ears and nose: normal without deformities or discharge.      Otoscopic exam: Canals patent without erythema.       Nasal mucosa, septum and turbinates: Normal or edema or discharge         Oropharynx:  Moist mucosa, normal tongue and tonsils without lesions. No erythema        Pulmonary:Respiratory effort normal rate and rhythm, no increased work of breathing. Auscultation of lungs:  Clear bilaterally with no adventitious breath sounds       Cardiovascular: regular rate and rhythm, S1 and S2, no murmur, no edema and/or varicosities of LE      Abdomen: Soft and non-distended     Positive bowel sounds      No heptomegaly or splenomegaly      Gu: no suprapubic tenderness or CVA tenderness, no urethral discharge  Lymphatic:  No anterior or posterior cervical lymphadenopathy         Musculoskeletal:  Gait and station: Normal gait      Digits and nails normal without clubbing or cyanosis       Inspection/palpation of joints, bones, and muscles:  No joint tenderness, swelling, full active and passive range of motion       Skin: Normal skin turgor and no rashes      Neuro:    Normal reflexes     Psych:   alert and oriented to person, place and time     normal mood and affect       Assessment/Plan:Diagnoses and all orders for this visit:    Primary hypertension  Comments:  Stable on amlodipine/benazepril therapy  Orders:  -     Comprehensive metabolic panel; Future    Hypercholesterolemia  Comments:  Continue statin therapy  Orders:  -     Lipid panel; Future    Insomnia, unspecified type  -     TSH w/Reflex; " Future    Sick sinus syndrome (HCC)  Comments:  Management as per cardiology    Sjogren syndrome, unspecified (HCC)  Comments:  Management as per rheumatology    Colon cancer screening  -     Cologuard    Other orders  -     aspirin 81 mg chewable tablet; Chew 81 mg daily        Reviewed with patient plan to treat with above plan.    Patient instructed to call in 72 hours if not feeling better or if symptoms worse

## 2024-09-11 DIAGNOSIS — F41.1 GAD (GENERALIZED ANXIETY DISORDER): ICD-10-CM

## 2024-09-11 RX ORDER — LORAZEPAM 1 MG/1
1 TABLET ORAL EVERY 6 HOURS PRN
Qty: 120 TABLET | Refills: 0 | Status: SHIPPED | OUTPATIENT
Start: 2024-09-11

## 2024-09-11 NOTE — TELEPHONE ENCOUNTER
Medication Refill Request     Name of Medication Ativan   Dose/Frequency 1 mg  Quantity 28  Verified pharmacy   [x]  Verified ordering Provider   [x]  Does patient have enough for the next 3 days? Yes [] No [x]  Does patient have a follow-up appointment scheduled? Yes [x] No []   If so when is appointment: 11/20/2024 at 10:30 am.

## 2024-09-11 NOTE — TELEPHONE ENCOUNTER
1 [id number] 09/05/2024 09/05/2024 LORazepam (Tablet) 28.0 7 1 MG NA [facility]  Commercial Insurance 0 / 0 [place]   1 [id number] 07/10/2024 07/09/2024 Zolpidem Tartrate (Tablet) 90.0 90 10 MG NA RAGINI SHO[facility]AR [employer] Medicare 0 / 0 [place]   1 [id number] 07/01/2024 04/10/2024 LORazepam (Tablet) 120.0 30 1 MG NA [facility]  Commercial Insurance 2 / 2 [place]   1 [id number] 05/11/2024 04/10/2024 LORazepam (Tablet) 120.0 30 1 MG [facility]  Commercial Insurance 1 / 2 [place]   1 [id number] 05/02/2024 04/10/2024 Zolpidem Tartrate (Tablet) 90.0 90 10 MG [facility]  Commercial Insurance 0 / 0 [place]   1 [id number] 04/11/2024 04/10/2024 LORazepam (Tablet) 120.0 30 1 MG [facility]  Commercial Insurance 0 / 2 [place]   1 [id number] 02/14/2024 02/14/2024 LORazepam (Tablet) 90.0 30 1 MG [facility]  Commercial Insurance 0 / 0 [place]   1 [id number] 01/30/2024 01/03/2024 Zolpidem Tartrate (Tablet) 90.0 90 10 MG NA RAGINI JEAN-PIERRELEVAR [employer] Medicare 0 / 0 [place]   1 [id number] 01/04/2024 01/03/2024 clonazePAM (Tablet) 20.0 10 0.5 MG NA RAGINI SHOLEVAR [employer] Medicare 0 / 1 [place]   1 [id number] 12/12/2023 11/27/2023 ALPRAZolam (Tablet) 135.0 45 0.5 MG NA RAGINI SHOLEVAR [employer] Medicare 0 / 1 [place]

## 2024-09-12 ENCOUNTER — TELEPHONE (OUTPATIENT)
Age: 74
End: 2024-09-12

## 2024-09-12 NOTE — TELEPHONE ENCOUNTER
[facility] called in regard to Lorazepam medication. Caller states they need a fax request filled out with clinical questions about the medication. Writer provided fax number.   Caller provided case number [id number]  Phone number [phone]

## 2024-09-19 ENCOUNTER — TELEPHONE (OUTPATIENT)
Dept: PSYCHIATRY | Facility: CLINIC | Age: 74
End: 2024-09-19

## 2024-09-19 ENCOUNTER — OFFICE VISIT (OUTPATIENT)
Dept: SLEEP CENTER | Facility: CLINIC | Age: 74
End: 2024-09-19
Payer: COMMERCIAL

## 2024-09-19 VITALS
DIASTOLIC BLOOD PRESSURE: 70 MMHG | BODY MASS INDEX: 30.65 KG/M2 | HEIGHT: 63 IN | SYSTOLIC BLOOD PRESSURE: 126 MMHG | WEIGHT: 173 LBS

## 2024-09-19 DIAGNOSIS — G47.33 OBSTRUCTIVE SLEEP APNEA TREATED WITH CONTINUOUS POSITIVE AIRWAY PRESSURE (CPAP): Primary | ICD-10-CM

## 2024-09-19 DIAGNOSIS — G47.20 CIRCADIAN RHYTHM SLEEP DISORDER: ICD-10-CM

## 2024-09-19 PROCEDURE — 99214 OFFICE O/P EST MOD 30 MIN: CPT | Performed by: NURSE PRACTITIONER

## 2024-09-19 PROCEDURE — G2211 COMPLEX E/M VISIT ADD ON: HCPCS | Performed by: NURSE PRACTITIONER

## 2024-09-19 NOTE — PATIENT INSTRUCTIONS
1.  Continue use of CPAP equipment nightly  2.  Continue to clean your equipment, as discussed  3.  Contact the [facility] with any questions or concerns prior to your next visit, as needed  4.  Schedule visit for follow-up in 6 months  5.  We will plan to replace your CPAP if adjustments improve your results.  6.  If you set an alarm to get up a little earlier, you may be able to fall asleep more easily.      Nursing Support:  When:  Monday through Friday 7:00am-5:00pm, except holidays  Where:  Direct phone line is [phone], option 3  If you are having a true emergency, please call 911.  In the event that the line is busy or it is after hours, please leave a voice mail message and we will return your call.  Please speak clearly, leaving your full name, birth date, best number to reach you and the reason for your call.  Medication refills:  We will need the name of the medication, the dosage, the ordering provider, whether you get a 30 day or 90 day refill and the pharmacy name and address.  Medications will be ordered by the providers only.  Nurses cannot call in prescriptions.    To reach the [facility] office staff:  Call [phone], option 1, followed by option 3

## 2024-09-19 NOTE — PROGRESS NOTES
Progress Note - [facility]   Flower Nunn 74 y.o. female   :1950, MRN: [id number]  2024        Follow Up Evaluation / Problem:  Mild Obstructive Sleep Apnea  History of Insomnia  Delayed Sleep-Wake Phase Disorder  Depression      Thank you for the opportunity of participating in the evaluation and care of this patient in the Sleep Clinic at [facility].      HPI: Flower Nunn is a 74 y.o. year old female.  The patient presents for follow up of mild obstructive sleep apnea with history of insomnia and hypersomnia.  She feels symptoms of hypersomnia began after having Guillian-Hobgood in  and her energy levels have never returned (She was on a vent and went through rehab - overall 4 months).  She was initially diagnosed with DENIZ in .  AHI was 14.0, worsening to 42 in REM sleep.  Equipment was titrated to 8cm of water pressure, however testing was in the absence of REM sleep.  She has been treated with CPAP equipment, using APAP 4-20cm of water pressure.  She has a history of anxiety and bipolar depression.  She reports that she was diagnosed 25 years ago when her mother passed away.  She currently takes zyprexa 2.5mg (decreased from 5mg) at 4:00pm and venlafaxine 75mg daily in the am (decreased from 150mg).  She occasionally uses alprazolam 0.5mg for anxiety, but reports that she never takes it at bedtime with ambien.  She recently established with [facility] behavioral health - Dr. Le.  She was treated with ropinirole 0.5mg for restless leg syndrome in the past, which was discontinued. She denies restless leg symptoms at bedtime or in the evening.  Past sleep study results indicated minimal PLMs, rarely associated with arousal and none during the titration study.    She reports a long history of insomnia.  She reports that she has been taking ambien 10mg for at least 20 years.  Medication was decreased to 5mg, however, she was  unable to sleep once decreased.  She tried Lunesta 2mg, increased to 3mg when decreased ambien dose didn't help, however, she was unable to sleep when taking Lunesta.  Ambien 10mg was restarted.  She denies any complex sleep behaviors with use of ambien.  She was treated with wake promoting medications in the past, including modafinil (developed intolerable metallic taste in her mouth), armodafinil (caused severe diarrhea), adderall (caused mouth swelling).      Current Outpatient Medications:     aspirin 81 mg chewable tablet, Chew 81 mg daily, Disp: , Rfl:     atenolol (TENORMIN) 100 mg tablet, Take 25 mg by mouth daily 0.5 tablet every morning, Disp: , Rfl:     benazepril (LOTENSIN) 20 mg tablet, , Disp: , Rfl:     cycloSPORINE (RESTASIS) 0.05 % ophthalmic emulsion, Administer 1 drop to both eyes 2 (two) times a day., Disp: , Rfl:     escitalopram (Lexapro) 10 mg tablet, Take 1.5 tablets (15 mg total) by mouth daily, Disp: 135 tablet, Rfl: 0    ezetimibe-simvastatin (VYTORIN) 10-20 mg per tablet, , Disp: , Rfl:     hydroxychloroquine (PLAQUENIL) 200 mg tablet, Take 200 mg by mouth daily with breakfast, Disp: , Rfl:     LORazepam (ATIVAN) 1 mg tablet, Take 1 tablet (1 mg total) by mouth 4 (four) times a day, Disp: 360 tablet, Rfl: 0    LORazepam (ATIVAN) 1 mg tablet, Take 1 tablet (1 mg total) by mouth every 6 (six) hours as needed for anxiety, Disp: 120 tablet, Rfl: 0    OLANZapine (ZyPREXA) 2.5 mg tablet, Take 1 tablet (2.5 mg total) by mouth daily at bedtime, Disp: 90 tablet, Rfl: 1    pilocarpine (SALAGEN) 5 mg tablet, 2 (two) times a day, Disp: , Rfl:     zolpidem (AMBIEN) 10 mg tablet, Take 1 tablet (10 mg total) by mouth daily at bedtime, Disp: 90 tablet, Rfl: 0    amLODIPine-benazepril (LOTREL 5-20) 5-20 MG per capsule, Take 1 capsule by mouth daily. (Patient not taking: Reported on 9/4/2024), Disp: , Rfl:     cevimeline (EVOXAC) 30 MG capsule, Take 30 mg by mouth daily. (Patient not taking: Reported on  "2/6/2024), Disp: , Rfl:     cholecalciferol (VITAMIN D3) 1,000 units tablet, Take 1,000 Units by mouth daily (Patient not taking: Reported on 9/4/2024), Disp: , Rfl:     Sodium Fluoride (PreviDent 5000 Booster Plus) 1.1 % PSTE, None Entered (Patient not taking: Reported on 9/4/2024), Disp: , Rfl:     How likely are you to doze off or fall asleep in the following situations, in contrast to feeling just tired?  Sitting and reading: Would never doze  Watching TV: Would never doze  Sitting, inactive in a public place (e.g. a theatre or a meeting): Would never doze  As a passenger in a car for an hour without a break: Would never doze  Lying down to rest in the afternoon when circumstances permit: High chance of dozing  Sitting and talking to someone: Would never doze  Sitting quietly after a lunch without alcohol: Would never doze  In a car, while stopped for a few minutes in traffic: Would never doze  Total score: 3              Vitals:    09/19/24 1419   BP: 126/70   Weight: 78.5 kg (173 lb)   Height: 5' 3\" (1.6 m)       Body mass index is 30.65 kg/m².            EPWORTH SLEEPINESS SCORE  Total score: 3      Past History Since Last Sleep Center Visit:   She was able to initiate care with psychiatry over the past year and depression has improved.  She reports that she had panic attacks 2 weeks ago that lasted for one week when she ran out of her medication.  She worked with her psychiatrist, restarted her medication and symptoms slowly improved.  Past symptoms of hypersomnia have improved - likely were due to depression.    She continues to use CPAP equipment nightly.  She uses a hybrid full face mask. She has large time in an air leak noted.  She is aware of air leaks.  Otherwise, she feels the settings and mask are comfortable.      She has been able to adjust her sleep/wake schedule after our discussion at her last visit.  She wakes up at 10:30am without the use of an alarm.  She goes to bed around 10:00pm with her " .  It takes her about 30 minutes to fall asleep.  She reports that she sleeps for 10 hours but only uses CPAP equipment for part of the night most nights.        The review of systems and following portions of the patient's history were reviewed and updated as appropriate: allergies, current medications, past family history, past medical history, past social history, past surgical history, and problem list.        OBJECTIVE  Equipment set up date:  4/12/2019, received recall replacement, likely returned modem with old equipment  PAP Pressure: APAP 4-7cm - pressure change to 4-10cm ordered and completed in office   Type of mask used: full face  DME Provider: [employer]    Physical Exam:     General Appearance:   Alert, cooperative, no distress, appears stated age, overweight     Lungs:    Heart:  Clear to auscultation bilaterally, respirations unlabored      Bradycardic rate and regular rhythm, S1 and S2 normal, Grade 1/6 murmur noted, no rub or gallop              ASSESSMENT / PLAN    1. Obstructive sleep apnea treated with continuous positive airway pressure (CPAP)  PAP DME Pressure Change    PAP DME Resupply/Reorder      2. Circadian rhythm sleep disorder                    Counseling / Coordination of Care  I have spent a total time of 30 minutes on 09/19/24 in caring for this patient including Risks and benefits of tx options, Instructions for management, Patient and family education, Importance of tx compliance, Risk factor reductions, Impressions, Counseling / Coordination of care, Documenting in the medical record, and Reviewing / ordering tests, medicine, procedures  .      Today I reviewed the patient's compliance data.  She has been able to use the equipment 100% of all days recorded.  Average usage was 4 or more hours % 56.7of all days recorded.  The patient uses the equipment for an average of 5 hours and18 minutes per night.  The estimated AHI is 9.8 abnormal breathing events per hour,  including 2.9 OA, 0.3 CA and 6.6 hypopneas.  A pressure change has been ordered today.    The patient feels she benefits from the use of the equipment and would like to continue PAP treatment.  Response to treatment has been fairly good.  A prescription for supplies has been provided to last for the next year.  She is eligible for new CPAP equipment. We discussed that if AHI is less than 5, we will plan to replace her equipment.  If elevated, a titration study will be needed.  Data will be reviewed in the next 1-2 months and new equipment or titration study will be planned.    We also discussed her sleep/wake schedule.  She has made significant improvements.  She has been setting an alarm to wake up earlier and has been successful with this.  She is still taking ambien and zyprexa at bedtime. She is able to initiate sleep in approximately 30 minutes.  We discussed setting an earlier wake up time to help her to fall asleep earlier.    She will continue using this equipment at the settings noted above for the next 6  months.  At that time she will return for a routine follow-up evaluation. I have asked the patient to contact the Sleep Disorders Center if any difficulties are encountered prior to that time.        The following instructions have been given to the patient today:    Patient Instructions   1.  Continue use of CPAP equipment nightly  2.  Continue to clean your equipment, as discussed  3.  Contact the Sleep Disorders Center with any questions or concerns prior to your next visit, as needed  4.  Schedule visit for follow-up in 6 months  5.  We will plan to replace your CPAP if adjustments improve your results.  6.  If you set an alarm to get up a little earlier, you may be able to fall asleep more easily.      Nursing Support:  When:  Monday through Friday 7:00am-5:00pm, except holidays  Where:  Direct phone line is [phone], option 3  If you are having a true emergency, please call 911.  In the event  "that the line is busy or it is after hours, please leave a voice mail message and we will return your call.  Please speak clearly, leaving your full name, birth date, best number to reach you and the reason for your call.  Medication refills:  We will need the name of the medication, the dosage, the ordering provider, whether you get a 30 day or 90 day refill and the pharmacy name and address.  Medications will be ordered by the providers only.  Nurses cannot call in prescriptions.    To reach the Sleep Disorders Center office staff:  Call [phone], option 1, followed by option 3    SHAZIA Rios  [facility]      Portions of the record may have been created with voice recognition software. Occasional wrong word or \"sound a like\" substitutions may have occurred due to the inherent limitations of voice recognition software. Read the chart carefully and recognize, using context, where substitutions have occurred.       "

## 2024-09-20 NOTE — TELEPHONE ENCOUNTER
PA for LORazepam (ATIVAN) 1 mg tablet SUBMITTED     via    []CMM-KEY:   [x][employer]-Case ID # [id number]   []Faxed to plan   []Other website   []Phone call Case ID #     Office notes sent, clinical questions answered. Awaiting determination    Turnaround time for your insurance to make a decision on your Prior Authorization can take 7-21 business days.

## 2024-09-22 LAB — COLOGUARD RESULT REPORTABLE: NEGATIVE

## 2024-09-25 ENCOUNTER — TELEPHONE (OUTPATIENT)
Dept: SLEEP CENTER | Facility: CLINIC | Age: 74
End: 2024-09-25

## 2024-09-25 NOTE — TELEPHONE ENCOUNTER
Called [facility] [phone] to check status of PA for LORazepam 1 mg tablet, was informed PA was approved on 9/11/24, she will fax approval letter. She also stated medication was sent to patient       PA for LORazepam 1 mg tablet APPROVED     Date(s) approved 9/11/2024 to 12/31/2024    Case #[id number]     Patient advised by          []ePARt Message  []Phone call   []LMOM  []L/M to call office as no active Communication consent on file  []Unable to leave detailed message as VM not approved on Communication consent       Pharmacy advised by    []Fax  [x]Phone call    Approval letter scanned into [employer] No

## 2024-09-27 LAB
DME PARACHUTE DELIVERY DATE REQUESTED: NORMAL
DME PARACHUTE ITEM DESCRIPTION: NORMAL
DME PARACHUTE ORDER STATUS: NORMAL
DME PARACHUTE SUPPLIER NAME: NORMAL
DME PARACHUTE SUPPLIER PHONE: NORMAL

## 2024-10-01 ENCOUNTER — TELEPHONE (OUTPATIENT)
Dept: PSYCHIATRY | Facility: CLINIC | Age: 74
End: 2024-10-01

## 2024-10-01 DIAGNOSIS — F41.1 GAD (GENERALIZED ANXIETY DISORDER): ICD-10-CM

## 2024-10-01 RX ORDER — LORAZEPAM 1 MG/1
1 TABLET ORAL EVERY 6 HOURS PRN
Qty: 120 TABLET | Refills: 0 | Status: SHIPPED | OUTPATIENT
Start: 2024-10-01

## 2024-10-01 NOTE — TELEPHONE ENCOUNTER
Received a form in [employer] requesting a refill of lorazepam be sent to [facility]. Last refill was sent to [facility] pharmacy.  left requesting a call back to ask Flower where she wants her script sent. Form uploaded to media. Forwarding to provider to make aware.     [facility] [address] [place] [address] Phone: [phone]   Fax: [phone]      [facility] - [address] Phone: [phone]   Fax: [phone]

## 2024-10-01 NOTE — TELEPHONE ENCOUNTER
Patient returned call to say she would like the LORazepam (ATIVAN) 1 mg tablet sent to [facility]

## 2024-10-26 ENCOUNTER — APPOINTMENT (EMERGENCY)
Dept: RADIOLOGY | Facility: HOSPITAL | Age: 74
DRG: 176 | End: 2024-10-26
Payer: COMMERCIAL

## 2024-10-26 ENCOUNTER — APPOINTMENT (EMERGENCY)
Dept: CT IMAGING | Facility: HOSPITAL | Age: 74
DRG: 176 | End: 2024-10-26
Payer: COMMERCIAL

## 2024-10-26 ENCOUNTER — HOSPITAL ENCOUNTER (INPATIENT)
Facility: HOSPITAL | Age: 74
LOS: 1 days | Discharge: HOME | DRG: 176 | End: 2024-10-28
Attending: EMERGENCY MEDICINE | Admitting: INTERNAL MEDICINE
Payer: COMMERCIAL

## 2024-10-26 DIAGNOSIS — I87.8 PHLEBOLITH: ICD-10-CM

## 2024-10-26 DIAGNOSIS — R10.12 LEFT UPPER QUADRANT ABDOMINAL PAIN: ICD-10-CM

## 2024-10-26 DIAGNOSIS — Z86.79 HISTORY OF HYPERTENSION: ICD-10-CM

## 2024-10-26 DIAGNOSIS — I31.39 PERICARDIAL EFFUSION: ICD-10-CM

## 2024-10-26 DIAGNOSIS — I26.99 PULMONARY EMBOLISM (HCC): Primary | ICD-10-CM

## 2024-10-26 DIAGNOSIS — I10 PRIMARY HYPERTENSION: ICD-10-CM

## 2024-10-26 DIAGNOSIS — R03.0 ELEVATED BLOOD PRESSURE READING: ICD-10-CM

## 2024-10-26 LAB
2HR DELTA HS TROPONIN: 1 NG/L
ALBUMIN SERPL BCG-MCNC: 3.7 G/DL (ref 3.5–5)
ALP SERPL-CCNC: 102 U/L (ref 34–104)
ALT SERPL W P-5'-P-CCNC: 17 U/L (ref 7–52)
ANION GAP SERPL CALCULATED.3IONS-SCNC: 7 MMOL/L (ref 4–13)
AST SERPL W P-5'-P-CCNC: 24 U/L (ref 13–39)
BASOPHILS # BLD AUTO: 0.03 THOUSANDS/ΜL (ref 0–0.1)
BASOPHILS NFR BLD AUTO: 0 % (ref 0–1)
BILIRUB SERPL-MCNC: 0.49 MG/DL (ref 0.2–1)
BUN SERPL-MCNC: 22 MG/DL (ref 5–25)
CALCIUM SERPL-MCNC: 9.4 MG/DL (ref 8.4–10.2)
CARDIAC TROPONIN I PNL SERPL HS: 19 NG/L
CARDIAC TROPONIN I PNL SERPL HS: 20 NG/L
CHLORIDE SERPL-SCNC: 108 MMOL/L (ref 96–108)
CO2 SERPL-SCNC: 26 MMOL/L (ref 21–32)
CREAT SERPL-MCNC: 1.15 MG/DL (ref 0.6–1.3)
D DIMER PPP FEU-MCNC: 1.16 UG/ML FEU
EOSINOPHIL # BLD AUTO: 0.18 THOUSAND/ΜL (ref 0–0.61)
EOSINOPHIL NFR BLD AUTO: 2 % (ref 0–6)
ERYTHROCYTE [DISTWIDTH] IN BLOOD BY AUTOMATED COUNT: 12.7 % (ref 11.6–15.1)
GFR SERPL CREATININE-BSD FRML MDRD: 46 ML/MIN/1.73SQ M
GLUCOSE SERPL-MCNC: 91 MG/DL (ref 65–140)
HCT VFR BLD AUTO: 39.3 % (ref 34.8–46.1)
HGB BLD-MCNC: 13.1 G/DL (ref 11.5–15.4)
IMM GRANULOCYTES # BLD AUTO: 0.02 THOUSAND/UL (ref 0–0.2)
IMM GRANULOCYTES NFR BLD AUTO: 0 % (ref 0–2)
LIPASE SERPL-CCNC: 48 U/L (ref 11–82)
LYMPHOCYTES # BLD AUTO: 2.06 THOUSANDS/ΜL (ref 0.6–4.47)
LYMPHOCYTES NFR BLD AUTO: 27 % (ref 14–44)
MCH RBC QN AUTO: 32.8 PG (ref 26.8–34.3)
MCHC RBC AUTO-ENTMCNC: 33.3 G/DL (ref 31.4–37.4)
MCV RBC AUTO: 99 FL (ref 82–98)
MONOCYTES # BLD AUTO: 0.58 THOUSAND/ΜL (ref 0.17–1.22)
MONOCYTES NFR BLD AUTO: 8 % (ref 4–12)
NEUTROPHILS # BLD AUTO: 4.64 THOUSANDS/ΜL (ref 1.85–7.62)
NEUTS SEG NFR BLD AUTO: 63 % (ref 43–75)
NRBC BLD AUTO-RTO: 0 /100 WBCS
PLATELET # BLD AUTO: 233 THOUSANDS/UL (ref 149–390)
PMV BLD AUTO: 10.3 FL (ref 8.9–12.7)
POTASSIUM SERPL-SCNC: 4.1 MMOL/L (ref 3.5–5.3)
PROT SERPL-MCNC: 7.2 G/DL (ref 6.4–8.4)
RBC # BLD AUTO: 3.99 MILLION/UL (ref 3.81–5.12)
SODIUM SERPL-SCNC: 141 MMOL/L (ref 135–147)
TSH SERPL DL<=0.05 MIU/L-ACNC: 0.82 UIU/ML (ref 0.45–4.5)
WBC # BLD AUTO: 7.51 THOUSAND/UL (ref 4.31–10.16)

## 2024-10-26 PROCEDURE — 99285 EMERGENCY DEPT VISIT HI MDM: CPT | Performed by: PHYSICIAN ASSISTANT

## 2024-10-26 PROCEDURE — 71275 CT ANGIOGRAPHY CHEST: CPT

## 2024-10-26 PROCEDURE — 99285 EMERGENCY DEPT VISIT HI MDM: CPT

## 2024-10-26 PROCEDURE — 71045 X-RAY EXAM CHEST 1 VIEW: CPT

## 2024-10-26 PROCEDURE — 85025 COMPLETE CBC W/AUTO DIFF WBC: CPT | Performed by: EMERGENCY MEDICINE

## 2024-10-26 PROCEDURE — 83690 ASSAY OF LIPASE: CPT | Performed by: PHYSICIAN ASSISTANT

## 2024-10-26 PROCEDURE — 84484 ASSAY OF TROPONIN QUANT: CPT | Performed by: EMERGENCY MEDICINE

## 2024-10-26 PROCEDURE — 36415 COLL VENOUS BLD VENIPUNCTURE: CPT

## 2024-10-26 PROCEDURE — 96375 TX/PRO/DX INJ NEW DRUG ADDON: CPT

## 2024-10-26 PROCEDURE — 84443 ASSAY THYROID STIM HORMONE: CPT | Performed by: PHYSICIAN ASSISTANT

## 2024-10-26 PROCEDURE — 96374 THER/PROPH/DIAG INJ IV PUSH: CPT

## 2024-10-26 PROCEDURE — 83880 ASSAY OF NATRIURETIC PEPTIDE: CPT | Performed by: PHYSICIAN ASSISTANT

## 2024-10-26 PROCEDURE — 80053 COMPREHEN METABOLIC PANEL: CPT | Performed by: EMERGENCY MEDICINE

## 2024-10-26 PROCEDURE — 74177 CT ABD & PELVIS W/CONTRAST: CPT

## 2024-10-26 PROCEDURE — 93005 ELECTROCARDIOGRAM TRACING: CPT

## 2024-10-26 PROCEDURE — 85379 FIBRIN DEGRADATION QUANT: CPT | Performed by: PHYSICIAN ASSISTANT

## 2024-10-26 RX ORDER — ENALAPRILAT 1.25 MG/ML
0.62 INJECTION INTRAVENOUS ONCE
Status: COMPLETED | OUTPATIENT
Start: 2024-10-26 | End: 2024-10-26

## 2024-10-26 RX ORDER — HYDROMORPHONE HCL IN WATER/PF 6 MG/30 ML
0.2 PATIENT CONTROLLED ANALGESIA SYRINGE INTRAVENOUS ONCE
Status: COMPLETED | OUTPATIENT
Start: 2024-10-26 | End: 2024-10-26

## 2024-10-26 RX ORDER — ACETAMINOPHEN 10 MG/ML
1000 INJECTION, SOLUTION INTRAVENOUS ONCE
Status: COMPLETED | OUTPATIENT
Start: 2024-10-26 | End: 2024-10-26

## 2024-10-26 RX ADMIN — ENALAPRILAT 0.62 MG: 1.25 INJECTION INTRAVENOUS at 22:22

## 2024-10-26 RX ADMIN — IOHEXOL 85 ML: 350 INJECTION, SOLUTION INTRAVENOUS at 21:39

## 2024-10-26 RX ADMIN — ACETAMINOPHEN 1000 MG: 10 INJECTION INTRAVENOUS at 21:08

## 2024-10-26 RX ADMIN — HYDROMORPHONE HYDROCHLORIDE 0.2 MG: 0.2 INJECTION, SOLUTION INTRAMUSCULAR; INTRAVENOUS; SUBCUTANEOUS at 21:07

## 2024-10-27 ENCOUNTER — APPOINTMENT (OUTPATIENT)
Dept: NON INVASIVE DIAGNOSTICS | Facility: HOSPITAL | Age: 74
DRG: 176 | End: 2024-10-27
Payer: COMMERCIAL

## 2024-10-27 PROBLEM — I26.99 PULMONARY EMBOLISM (HCC): Status: ACTIVE | Noted: 2024-10-27

## 2024-10-27 PROBLEM — I16.0 HYPERTENSIVE URGENCY: Status: ACTIVE | Noted: 2024-10-27

## 2024-10-27 LAB
4HR DELTA HS TROPONIN: 2 NG/L
ANION GAP SERPL CALCULATED.3IONS-SCNC: 7 MMOL/L (ref 4–13)
AORTIC ROOT: 3.1 CM
APICAL FOUR CHAMBER EJECTION FRACTION: 61 %
APTT PPP: 114 SECONDS (ref 23–34)
APTT PPP: 60 SECONDS (ref 23–34)
ASCENDING AORTA: 3.2 CM
ATRIAL RATE: 57 BPM
ATRIAL RATE: 64 BPM
BNP SERPL-MCNC: 64 PG/ML (ref 0–100)
BSA FOR ECHO PROCEDURE: 1.83 M2
BUN SERPL-MCNC: 20 MG/DL (ref 5–25)
CALCIUM SERPL-MCNC: 9.1 MG/DL (ref 8.4–10.2)
CARDIAC TROPONIN I PNL SERPL HS: 21 NG/L
CHLORIDE SERPL-SCNC: 108 MMOL/L (ref 96–108)
CO2 SERPL-SCNC: 25 MMOL/L (ref 21–32)
CREAT SERPL-MCNC: 1.05 MG/DL (ref 0.6–1.3)
E WAVE DECELERATION TIME: 177 MS
E/A RATIO: 1.16
ERYTHROCYTE [DISTWIDTH] IN BLOOD BY AUTOMATED COUNT: 12.8 % (ref 11.6–15.1)
FRACTIONAL SHORTENING: 28 (ref 28–44)
GFR SERPL CREATININE-BSD FRML MDRD: 51 ML/MIN/1.73SQ M
GLUCOSE SERPL-MCNC: 94 MG/DL (ref 65–140)
HCT VFR BLD AUTO: 36.5 % (ref 34.8–46.1)
HGB BLD-MCNC: 12 G/DL (ref 11.5–15.4)
INTERVENTRICULAR SEPTUM IN DIASTOLE (PARASTERNAL SHORT AXIS VIEW): 1.1 CM
INTERVENTRICULAR SEPTUM: 1.1 CM (ref 0.6–1.1)
LAAS-AP2: 25.8 CM2
LAAS-AP4: 20.9 CM2
LEFT ATRIUM SIZE: 4 CM
LEFT ATRIUM VOLUME (MOD BIPLANE): 72 ML
LEFT ATRIUM VOLUME INDEX (MOD BIPLANE): 39.3 ML/M2
LEFT INTERNAL DIMENSION IN SYSTOLE: 3.1 CM (ref 2.1–4)
LEFT VENTRICULAR INTERNAL DIMENSION IN DIASTOLE: 4.3 CM (ref 3.5–6)
LEFT VENTRICULAR POSTERIOR WALL IN END DIASTOLE: 1.1 CM
LEFT VENTRICULAR STROKE VOLUME: 46 ML
LVSV (TEICH): 46 ML
MCH RBC QN AUTO: 32.4 PG (ref 26.8–34.3)
MCHC RBC AUTO-ENTMCNC: 32.9 G/DL (ref 31.4–37.4)
MCV RBC AUTO: 99 FL (ref 82–98)
MV E'TISSUE VEL-SEP: 7 CM/S
MV PEAK A VEL: 0.77 M/S
MV PEAK E VEL: 89 CM/S
MV STENOSIS PRESSURE HALF TIME: 51 MS
MV VALVE AREA P 1/2 METHOD: 4.31
P AXIS: 48 DEGREES
P AXIS: 62 DEGREES
PLATELET # BLD AUTO: 188 THOUSANDS/UL (ref 149–390)
PMV BLD AUTO: 10.5 FL (ref 8.9–12.7)
POTASSIUM SERPL-SCNC: 4.2 MMOL/L (ref 3.5–5.3)
PR INTERVAL: 158 MS
PR INTERVAL: 162 MS
QRS AXIS: 31 DEGREES
QRS AXIS: 39 DEGREES
QRSD INTERVAL: 82 MS
QRSD INTERVAL: 84 MS
QT INTERVAL: 452 MS
QT INTERVAL: 482 MS
QTC INTERVAL: 466 MS
QTC INTERVAL: 469 MS
RA PRESSURE ESTIMATED: 3 MMHG
RBC # BLD AUTO: 3.7 MILLION/UL (ref 3.81–5.12)
RIGHT ATRIUM AREA SYSTOLE A4C: 15 CM2
RIGHT VENTRICLE ID DIMENSION: 3.4 CM
RV PSP: 41 MMHG
SL CV LEFT ATRIUM LENGTH A2C: 6.3 CM
SL CV LV EF: 60
SL CV PED ECHO LEFT VENTRICLE DIASTOLIC VOLUME (MOD BIPLANE) 2D: 85 ML
SL CV PED ECHO LEFT VENTRICLE SYSTOLIC VOLUME (MOD BIPLANE) 2D: 39 ML
SODIUM SERPL-SCNC: 140 MMOL/L (ref 135–147)
T WAVE AXIS: 39 DEGREES
T WAVE AXIS: 44 DEGREES
TR MAX PG: 38 MMHG
TR PEAK VELOCITY: 3.1 M/S
TRICUSPID ANNULAR PLANE SYSTOLIC EXCURSION: 1.8 CM
TRICUSPID VALVE PEAK REGURGITATION VELOCITY: 3.07 M/S
VENTRICULAR RATE: 57 BPM
VENTRICULAR RATE: 64 BPM
WBC # BLD AUTO: 7.52 THOUSAND/UL (ref 4.31–10.16)

## 2024-10-27 PROCEDURE — 94660 CPAP INITIATION&MGMT: CPT

## 2024-10-27 PROCEDURE — 94760 N-INVAS EAR/PLS OXIMETRY 1: CPT

## 2024-10-27 PROCEDURE — 36415 COLL VENOUS BLD VENIPUNCTURE: CPT | Performed by: EMERGENCY MEDICINE

## 2024-10-27 PROCEDURE — 85027 COMPLETE CBC AUTOMATED: CPT | Performed by: NURSE PRACTITIONER

## 2024-10-27 PROCEDURE — 80048 BASIC METABOLIC PNL TOTAL CA: CPT | Performed by: NURSE PRACTITIONER

## 2024-10-27 PROCEDURE — 93306 TTE W/DOPPLER COMPLETE: CPT | Performed by: INTERNAL MEDICINE

## 2024-10-27 PROCEDURE — 99223 1ST HOSP IP/OBS HIGH 75: CPT | Performed by: INTERNAL MEDICINE

## 2024-10-27 PROCEDURE — 93010 ELECTROCARDIOGRAM REPORT: CPT | Performed by: INTERNAL MEDICINE

## 2024-10-27 PROCEDURE — 99222 1ST HOSP IP/OBS MODERATE 55: CPT | Performed by: NURSE PRACTITIONER

## 2024-10-27 PROCEDURE — 84484 ASSAY OF TROPONIN QUANT: CPT | Performed by: EMERGENCY MEDICINE

## 2024-10-27 PROCEDURE — 85730 THROMBOPLASTIN TIME PARTIAL: CPT | Performed by: INTERNAL MEDICINE

## 2024-10-27 PROCEDURE — 93306 TTE W/DOPPLER COMPLETE: CPT

## 2024-10-27 RX ORDER — ZOLPIDEM TARTRATE 5 MG/1
10 TABLET ORAL
Status: DISCONTINUED | OUTPATIENT
Start: 2024-10-27 | End: 2024-10-28 | Stop reason: HOSPADM

## 2024-10-27 RX ORDER — HYDRALAZINE HYDROCHLORIDE 20 MG/ML
5 INJECTION INTRAMUSCULAR; INTRAVENOUS EVERY 6 HOURS PRN
Status: DISCONTINUED | OUTPATIENT
Start: 2024-10-27 | End: 2024-10-28 | Stop reason: HOSPADM

## 2024-10-27 RX ORDER — HEPARIN SODIUM 10000 [USP'U]/100ML
3-30 INJECTION, SOLUTION INTRAVENOUS
Status: DISCONTINUED | OUTPATIENT
Start: 2024-10-27 | End: 2024-10-28

## 2024-10-27 RX ORDER — ATENOLOL 25 MG/1
12.5 TABLET ORAL DAILY
Status: DISCONTINUED | OUTPATIENT
Start: 2024-10-27 | End: 2024-10-28 | Stop reason: HOSPADM

## 2024-10-27 RX ORDER — OLANZAPINE 2.5 MG/1
2.5 TABLET, FILM COATED ORAL
Status: DISCONTINUED | OUTPATIENT
Start: 2024-10-27 | End: 2024-10-28 | Stop reason: HOSPADM

## 2024-10-27 RX ORDER — LISINOPRIL 20 MG/1
20 TABLET ORAL DAILY
Status: DISCONTINUED | OUTPATIENT
Start: 2024-10-27 | End: 2024-10-28 | Stop reason: HOSPADM

## 2024-10-27 RX ORDER — DABIGATRAN ETEXILATE 150 MG/1
150 CAPSULE ORAL 2 TIMES DAILY
Qty: 60 CAPSULE | Refills: 0 | Status: SHIPPED | OUTPATIENT
Start: 2024-10-27 | End: 2024-10-28

## 2024-10-27 RX ORDER — LORAZEPAM 1 MG/1
1 TABLET ORAL EVERY 6 HOURS PRN
Status: DISCONTINUED | OUTPATIENT
Start: 2024-10-27 | End: 2024-10-28 | Stop reason: HOSPADM

## 2024-10-27 RX ORDER — ESCITALOPRAM OXALATE 10 MG/1
15 TABLET ORAL DAILY
Status: DISCONTINUED | OUTPATIENT
Start: 2024-10-27 | End: 2024-10-28 | Stop reason: HOSPADM

## 2024-10-27 RX ORDER — EZETIMIBE 10 MG/1
10 TABLET ORAL
Status: DISCONTINUED | OUTPATIENT
Start: 2024-10-27 | End: 2024-10-28 | Stop reason: HOSPADM

## 2024-10-27 RX ORDER — HYDROXYCHLOROQUINE SULFATE 200 MG/1
200 TABLET, FILM COATED ORAL
Status: DISCONTINUED | OUTPATIENT
Start: 2024-10-27 | End: 2024-10-28 | Stop reason: HOSPADM

## 2024-10-27 RX ORDER — MUSCLE RUB CREAM 100; 150 MG/G; MG/G
CREAM TOPICAL 4 TIMES DAILY PRN
Status: DISCONTINUED | OUTPATIENT
Start: 2024-10-27 | End: 2024-10-28 | Stop reason: HOSPADM

## 2024-10-27 RX ORDER — AMLODIPINE BESYLATE 5 MG/1
5 TABLET ORAL DAILY
Status: DISCONTINUED | OUTPATIENT
Start: 2024-10-27 | End: 2024-10-28

## 2024-10-27 RX ORDER — PILOCARPINE HYDROCHLORIDE 5 MG/1
5 TABLET, FILM COATED ORAL 2 TIMES DAILY
Status: DISCONTINUED | OUTPATIENT
Start: 2024-10-27 | End: 2024-10-28 | Stop reason: HOSPADM

## 2024-10-27 RX ORDER — ACETAMINOPHEN 325 MG/1
650 TABLET ORAL EVERY 6 HOURS PRN
Status: DISCONTINUED | OUTPATIENT
Start: 2024-10-27 | End: 2024-10-28 | Stop reason: HOSPADM

## 2024-10-27 RX ORDER — PRAVASTATIN SODIUM 40 MG
40 TABLET ORAL
Status: DISCONTINUED | OUTPATIENT
Start: 2024-10-27 | End: 2024-10-28 | Stop reason: HOSPADM

## 2024-10-27 RX ORDER — ASPIRIN 81 MG/1
81 TABLET, CHEWABLE ORAL DAILY
Status: DISCONTINUED | OUTPATIENT
Start: 2024-10-27 | End: 2024-10-28 | Stop reason: HOSPADM

## 2024-10-27 RX ADMIN — ATENOLOL 12.5 MG: 25 TABLET ORAL at 08:41

## 2024-10-27 RX ADMIN — LORAZEPAM 1 MG: 1 TABLET ORAL at 08:51

## 2024-10-27 RX ADMIN — ZOLPIDEM TARTRATE 10 MG: 5 TABLET, COATED ORAL at 03:17

## 2024-10-27 RX ADMIN — GLYCERIN 1 DROP: .002; .002; .01 SOLUTION/ DROPS OPHTHALMIC at 23:30

## 2024-10-27 RX ADMIN — PRAVASTATIN SODIUM 40 MG: 40 TABLET ORAL at 17:09

## 2024-10-27 RX ADMIN — EZETIMIBE 10 MG: 10 TABLET ORAL at 17:09

## 2024-10-27 RX ADMIN — PILOCARPINE HYDROCHLORIDE 5 MG: 5 TABLET, FILM COATED ORAL at 17:10

## 2024-10-27 RX ADMIN — ASPIRIN 81 MG 81 MG: 81 TABLET ORAL at 08:41

## 2024-10-27 RX ADMIN — ESCITALOPRAM OXALATE 15 MG: 10 TABLET ORAL at 08:41

## 2024-10-27 RX ADMIN — GLYCERIN 1 DROP: .002; .002; .01 SOLUTION/ DROPS OPHTHALMIC at 08:41

## 2024-10-27 RX ADMIN — LISINOPRIL 20 MG: 20 TABLET ORAL at 08:40

## 2024-10-27 RX ADMIN — ACETAMINOPHEN 650 MG: 325 TABLET ORAL at 18:36

## 2024-10-27 RX ADMIN — PILOCARPINE HYDROCHLORIDE 5 MG: 5 TABLET, FILM COATED ORAL at 08:40

## 2024-10-27 RX ADMIN — AMLODIPINE BESYLATE 5 MG: 5 TABLET ORAL at 09:32

## 2024-10-27 RX ADMIN — HEPARIN SODIUM 18 UNITS/KG/HR: 10000 INJECTION, SOLUTION INTRAVENOUS at 00:35

## 2024-10-27 RX ADMIN — HEPARIN SODIUM 14 UNITS/KG/HR: 10000 INJECTION, SOLUTION INTRAVENOUS at 23:33

## 2024-10-27 RX ADMIN — HYDRALAZINE HYDROCHLORIDE 5 MG: 20 INJECTION, SOLUTION INTRAMUSCULAR; INTRAVENOUS at 07:38

## 2024-10-27 RX ADMIN — HYDROXYCHLOROQUINE SULFATE 200 MG: 200 TABLET, FILM COATED ORAL at 07:38

## 2024-10-27 RX ADMIN — ZOLPIDEM TARTRATE 10 MG: 5 TABLET, COATED ORAL at 23:28

## 2024-10-27 NOTE — ASSESSMENT & PLAN NOTE
Mood stable  Continue PTA meds  PDMP reviewed: lorazepam 1 mg.  Patient reports she takes this QID PRN, not scheduled QID.  Zyprexa 2.5 mg HS  Lexapro 15 mg QD  PDMP revieweD: ambien 10 mg HS

## 2024-10-27 NOTE — CASE MANAGEMENT
Case Management Progress Note    Patient name Flower Nunn  Location S /S -01 MRN [id number]  : 1950 Date 10/27/2024       LOS (days): 0  Geometric Mean LOS (GMLOS) (days):   Days to GMLOS:        OBJECTIVE:        Current admission status: Observation  Preferred Pharmacy:   [facility] [address] [place] [id number]  Phone: [phone] Fax: [phone]    [facility] [address]  [address] [address]  Phone: [phone] Fax: [phone]    Primary Care Provider: Halima King DO    Primary Insurance: [employer]  REP  Secondary Insurance:     PROGRESS NOTE:    JOSEPH price checked Eliquis at [facility] and was informed that it is $601.97. JOSEPH notified Attending.

## 2024-10-27 NOTE — H&P
H&P - Hospitalist   Name: Flower Nunn 74 y.o. female I MRN: [id number]  Unit/Bed#: S -01 I Date of Admission: 10/26/2024   Date of Service: 10/27/2024 I Hospital Day: 0     Assessment & Plan  Pulmonary embolism (HCC)  Presented with chest pain.  Recent COVID infection a couple of weeks ago, treated conservatively as outpatient.  Hx breast ca, PE (2013) treated with 6 months warfarin  PE study (vrad)  Segmental right lower lobe pulmonary artery filling defect  Normal RV:LV  Troponin x 2 normal.  BNP pending.  Check echo   Anticoagulation: heparin drip  Consult CM for pricing   Hypertensive urgency  With known history of hypertension  230/110.  Improvement with pain control and one dose of enalaprilat   Continue home regimen:   Atenolol 12.5 mg qd  Benazepril 20 mg qd  PRN hydralazine  Personal history of malignant neoplasm of breast  1999 s/p left modified radical mastectomy and sentinel lymph node biopsy for cancer. Lymph nodes were negative as well as hormone receptors. She completed 4 cycles of adjuvant Adriamycin and Cytoxan. No hormonal therapy.  Reconstruction.   Mammography 4/2024: no malignancy   Continue annual mammography   Bipolar 2 disorder (HCC)  Mood stable  Continue PTA meds  PDMP reviewed: lorazepam 1 mg.  Patient reports she takes this QID PRN, not scheduled QID.  Zyprexa 2.5 mg HS  Lexapro 15 mg QD  PDMP revieweD: ambien 10 mg HS        VTE Pharmacologic Prophylaxis: VTE Score: 7 High Risk (Score >/= 5) - Pharmacological DVT Prophylaxis Ordered: heparin drip. Sequential Compression Devices Ordered.  Code Status: Level 1 - Full Code full  Discussion with family: Updated  (significant other) at bedside.    Anticipated Length of Stay: Patient will be admitted on an observation basis with an anticipated length of stay of less than 2 midnights secondary to PE on heparin drip .    History of Present Illness   Chief Complaint: chest pain    Flower Nunn is a 74 y.o. female with a PMH  of PE, HTN, breast cancer s/p left mastectomy/chemo/reconstruction (1999), DENIZ on CPAP, sjogren's syndrome on plaquenil, anxiety, bipolar disorder, hysterectomy who presents with left sided chest pain.  Troponins negative.  She underwent CT PE study with abdomen pelvis.  Per ED provider, vrad report showed segmental right lower lobe pulmonary artery filling defect and normal RV:LV.  Patient was started on heparin drip and CM is consulted for pricing.  To note, patient recently had covid infection a couple weeks ago and reports conservative treatment as outpatient.     Patient reports history of PE in setting of guillain barre in approximately 2013.  She was treated with 6 months of warfarin.  Did discuss she will lifelong anticoagulation.     Review of Systems   Respiratory:  Negative for shortness of breath.    Cardiovascular:  Positive for chest pain.   All other systems reviewed and are negative.      Historical Information   Past Medical History:   Diagnosis Date    Anxiety     Arthritis     Atopic dermatitis     Breast CA (HCC) 1999    left  age 49    Depression     Fracture     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HCC)     2013    History of chemotherapy     breast cancer    Infective polyneuritis, acute (HCC)     Osteoporosis     PE (pulmonary thromboembolism) (HCC)     Peptic ulcer disease     Primary hypertension 9/4/2024    Psychiatric disorder     Sjogren's syndrome (HCC)     Sleep apnea     does not use cpap     Past Surgical History:   Procedure Laterality Date    ABDOMINAL SURGERY      APPENDECTOMY      BREAST BIOPSY Right     prior to breast cancer    CHOLECYSTECTOMY      COLONOSCOPY W/ ENDOSCOPIC US N/A 02/24/2017    Procedure: ANAL ENDOSCOPIC U/S;  Surgeon: Chang Arellano MD;  Location: [facility];  Service:     HYSTERECTOMY      age 33; ovaries intact    MASTECTOMY Left 1999    age 49    MA COLONOSCOPY FLX DX W/COLLJ SPEC WHEN PFRMD N/A 02/24/2017    Procedure: COLONOSCOPY;  Surgeon: Neo Baxter MD;   Location: [facility];  Service: Gastroenterology     Social History     Tobacco Use    Smoking status: Never     Passive exposure: Past    Smokeless tobacco: Never   Vaping Use    Vaping status: Never Used   Substance and Sexual Activity    Alcohol use: Yes     Comment: socially    Drug use: No    Sexual activity: Not Currently     E-Cigarette/Vaping    E-Cigarette Use Never User      E-Cigarette/Vaping Substances    Nicotine No     THC No     CBD No     Flavoring No     Other No     Unknown No      Family History   Problem Relation Age of Onset    Hypertension Mother     Heart failure Father     Esophageal cancer Father 78    Breast cancer Paternal Aunt 48    Breast cancer Cousin 50        paternal    Breast cancer Cousin 50        paternal    Ovarian cancer Maternal Aunt 58    No Known Problems Sister     No Known Problems Maternal Grandmother     No Known Problems Maternal Grandfather     No Known Problems Paternal Grandmother     No Known Problems Paternal Grandfather     No Known Problems Son     No Known Problems Son     No Known Problems Maternal Aunt     No Known Problems Maternal Aunt     No Known Problems Paternal Aunt      Social History:  Marital Status: /Civil Union   Occupation:   Patient Pre-hospital Living Situation: Home  Patient Pre-hospital Level of Mobility: walks  Patient Pre-hospital Diet Restrictions:     Meds/Allergies   I have reviewed home medications with patient personally.  Prior to Admission medications    Medication Sig Start Date End Date Taking? Authorizing Provider   LORazepam (ATIVAN) 1 mg tablet Take 1 tablet (1 mg total) by mouth every 6 (six) hours as needed for anxiety 10/1/24   Adam Evangelista MD   amLODIPine-benazepril (LOTREL 5-20) 5-20 MG per capsule Take 1 capsule by mouth daily.  Patient not taking: Reported on 9/4/2024    Historical Provider, MD   aspirin 81 mg chewable tablet Chew 81 mg daily    Historical Provider, MD   atenolol (TENORMIN) 100 mg tablet Take 25  mg by mouth daily 0.5 tablet every morning    Historical Provider, MD   benazepril (LOTENSIN) 20 mg tablet  8/2/24   Historical Provider, MD   cevimeline (EVOXAC) 30 MG capsule Take 30 mg by mouth daily.  Patient not taking: Reported on 2/6/2024    Historical Provider, MD   cholecalciferol (VITAMIN D3) 1,000 units tablet Take 1,000 Units by mouth daily  Patient not taking: Reported on 9/4/2024    Historical Provider, MD   cycloSPORINE (RESTASIS) 0.05 % ophthalmic emulsion Administer 1 drop to both eyes 2 (two) times a day.    Historical Provider, MD   escitalopram (Lexapro) 10 mg tablet Take 1.5 tablets (15 mg total) by mouth daily 8/7/24   Adam Evangelista MD   ezetimibe-simvastatin (VYTORIN) 10-20 mg per tablet  2/12/23   Historical Provider, MD   hydroxychloroquine (PLAQUENIL) 200 mg tablet Take 200 mg by mouth daily with breakfast    Historical Provider, MD   LORazepam (ATIVAN) 1 mg tablet Take 1 tablet (1 mg total) by mouth 4 (four) times a day 8/7/24   Adam Evangelista MD   OLANZapine (ZyPREXA) 2.5 mg tablet Take 1 tablet (2.5 mg total) by mouth daily at bedtime 8/7/24   Adam Evangelista MD   pilocarpine (SALAGEN) 5 mg tablet 2 (two) times a day 6/29/23   Historical Provider, MD   Sodium Fluoride (PreviDent 5000 Booster Plus) 1.1 % PSTE None Entered  Patient not taking: Reported on 9/4/2024    Historical Provider, MD   zolpidem (AMBIEN) 10 mg tablet Take 1 tablet (10 mg total) by mouth daily at bedtime 8/7/24   Adam Evangelista MD     Allergies   Allergen Reactions    Adderall [Amphetamine-Dextroamphetamine] Tongue Swelling     Tongue swelling    Lamotrigine Rash     Other reaction(s): Possible rash, mouth sores (questionable reaction)       Objective :  Temp:  [98 °F (36.7 °C)] 98 °F (36.7 °C)  HR:  [53-62] 54  BP: (159-239)/() 159/75  Resp:  [20] 20  SpO2:  [94 %-98 %] 95 %  O2 Device: None (Room air)    Physical Exam  Constitutional:       General: She is not in acute distress.     Appearance: Normal  appearance. She is not ill-appearing.   HENT:      Head: Normocephalic and atraumatic.      Right Ear: External ear normal.      Left Ear: External ear normal.      Nose: Nose normal.      Mouth/Throat:      Pharynx: Oropharynx is clear.   Eyes:      Extraocular Movements: Extraocular movements intact.      Conjunctiva/sclera: Conjunctivae normal.   Cardiovascular:      Rate and Rhythm: Normal rate and regular rhythm.      Pulses: Normal pulses.      Heart sounds: Normal heart sounds.   Pulmonary:      Effort: Pulmonary effort is normal.      Breath sounds: Normal breath sounds.   Abdominal:      General: Bowel sounds are normal.      Palpations: Abdomen is soft.   Musculoskeletal:         General: Normal range of motion.      Cervical back: Normal range of motion.      Right lower leg: No edema.      Left lower leg: No edema.   Skin:     General: Skin is warm.      Capillary Refill: Capillary refill takes less than 2 seconds.   Neurological:      General: No focal deficit present.      Mental Status: She is alert and oriented to person, place, and time.   Psychiatric:         Mood and Affect: Mood normal.         Behavior: Behavior normal.          Lines/Drains:            Lab Results: I have reviewed the following results:  Results from last 7 days   Lab Units 10/26/24  2004   WBC Thousand/uL 7.51   HEMOGLOBIN g/dL 13.1   HEMATOCRIT % 39.3   PLATELETS Thousands/uL 233   SEGS PCT % 63   LYMPHO PCT % 27   MONO PCT % 8   EOS PCT % 2     Results from last 7 days   Lab Units 10/26/24  2004   SODIUM mmol/L 141   POTASSIUM mmol/L 4.1   CHLORIDE mmol/L 108   CO2 mmol/L 26   BUN mg/dL 22   CREATININE mg/dL 1.15   ANION GAP mmol/L 7   CALCIUM mg/dL 9.4   ALBUMIN g/dL 3.7   TOTAL BILIRUBIN mg/dL 0.49   ALK PHOS U/L 102   ALT U/L 17   AST U/L 24   GLUCOSE RANDOM mg/dL 91             Lab Results   Component Value Date    HGBA1C 4.9 05/30/2023    HGBA1C 4.8 11/29/2022    HGBA1C 5.3 11/23/2021           Imaging Results Review: I  reviewed radiology reports from this admission including: CT chest and CT abdomen/pelvis.  Brief vrad report given by ED provider.    Other Study Results Review: EKG was reviewed.  EKG was personally reviewed and my interpretation is: Personally Reviewed. NSR. HR 64..    Administrative Statements   I have spent a total time of   minutes in caring for this patient on the day of the visit/encounter including Diagnostic results, Risks and benefits of tx options, Instructions for management, Patient and family education, Importance of tx compliance, Risk factor reductions, Impressions, Counseling / Coordination of care, Documenting in the medical record, Reviewing / ordering tests, medicine, procedures  , Obtaining or reviewing history  , and Communicating with other healthcare professionals .    ** Please Note: This note has been constructed using a voice recognition system. **

## 2024-10-27 NOTE — UTILIZATION REVIEW
Initial Clinical Review  PT SEEN IN ED 10/26/24 , MADE OBSERVATION 10/27/24 @ 0006 CONVERTED TO INPATIENT ADMISSION 10/27/24 @ 1514  DUE TO CONTINUED STAY REQUIRED TO EVALUATE AND TREAT PATIENT WITH PE, HYPERTENSIVE URGENCY WITH HEPARIN DRIP, TELEMETRY, ONGOING MONITORING  BP WITH MED ADDITION .     Admission: Date/Time/Statement:   Admission Orders (From admission, onward)       Ordered        10/27/24 1514  INPATIENT ADMISSION  Once            10/27/24 0006  Place in Observation  Once                          Orders Placed This Encounter   Procedures    INPATIENT ADMISSION     Standing Status:   Standing     Number of Occurrences:   1     Order Specific Question:   Level of Care     Answer:   Med Surg [16]     Order Specific Question:   Estimated length of stay     Answer:   More than 2 Midnights     Order Specific Question:   Certification     Answer:   I certify that inpatient services are medically necessary for this patient for a duration of greater than two midnights. See H&P and MD Progress Notes for additional information about the patient's course of treatment.     ED Arrival Information       Expected   -    Arrival   10/26/2024 19:37    Acuity   Emergent              Means of arrival   Walk-In    Escorted by   Spouse    Service   Hospitalist    Admission type   Emergency              Arrival complaint   chest pain             Chief Complaint   Patient presents with    Chest Pain     Patient reports L sided rib/ chest pain. States pain started yesterday and subsided but has been consistent today. Denies SOB. Denies N/V. States pain is not positional. No meds PTA. Rates pain 8/10, describes as sharp       Initial Presentation: 74 y.o. 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 X 1 day  On exam, BP elevated 230/110,  , abdominal tenderness in the left upper quadrant. . Normal breath sounds . On RA with o2 sat 96 % .  Labs : D Dimer 1.16 , troponin neg  CTA chest, A/P shows segmental right lower lobe pulmonary artery filling defect and normal RV:LV. Pt given IV analgesics , started on heparin drip, given one dose of Vasotec in ED. BP improved . Admitted as OBS to telemetry 10/27 with PE, hypertensive urgency . Plan- Continue heparin drip. F/U BNP . Obtain echo . Monitor BP ./ Continue home Atenolol and benazepril. Add PRN Hydralazine .   Anticipated Length of Stay/Certification Statement: Patient will be admitted on an observation basis with an anticipated length of stay of less than 2 midnights secondary to PE on heparin drip .     Date: 10/27 Converted to IP    BP remains elevated, given prn Hydralazine IV this am. Added Amlodipine 5 mg daily today. Echo today shows EF 60 % . RA sat 94-96 % . Pt on Heparin drip.    Eliquis price check $602/month.  Pradaxa price check $158/month . Telemetry .     ED Treatment-Medication Administration from 10/26/2024 1937 to 10/27/2024 0124         Date/Time Order Dose Route Action     10/26/2024 2108 acetaminophen (Ofirmev) injection 1,000 mg 1,000 mg Intravenous New Bag     10/26/2024 2107 HYDROmorphone HCl (DILAUDID) injection 0.2 mg 0.2 mg Intravenous Given     10/26/2024 2139 iohexol (OMNIPAQUE) 350 MG/ML injection (MULTI-DOSE) 85 mL 85 mL Intravenous Given     10/26/2024 2222 Enalaprilat (VASOTEC) injection 0.625 mg 0.625 mg Intravenous Given     10/27/2024 0035 heparin (porcine) 25,000 units in 0.45% NaCl 250 mL infusion (premix) 18 Units/kg/hr Intravenous New Bag            Scheduled Medications:  amLODIPine, 5 mg, Oral, Daily  Artificial Tears, 1 drop, Both Eyes, Q12H GERARDO  aspirin, 81 mg, Oral, Daily  atenolol, 12.5 mg, Oral, Daily  escitalopram, 15 mg, Oral, Daily  ezetimibe, 10 mg, Oral, Daily  With Dinner   And  pravastatin, 40 mg, Oral, Daily With Dinner  hydroxychloroquine, 200 mg, Oral, Daily With Breakfast  lisinopril, 20 mg, Oral, Daily  OLANZapine, 2.5 mg, Oral, HS  pilocarpine, 5 mg, Oral, BID  zolpidem, 10 mg, Oral, HS      Continuous IV Infusions:  heparin (porcine), 3-30 Units/kg/hr (Order-Specific), Intravenous, Titrated      PRN Meds:  acetaminophen, 650 mg, Oral, Q6H PRN  hydrALAZINE, 5 mg, Intravenous, Q6H PRN x1 10/27  LORazepam, 1 mg, Oral, Q6H PRN x1 10/27  menthol-methyl salicylate, , Apply externally, 4x Daily PRN      ED Triage Vitals [10/26/24 1953]   Temperature Pulse Respirations Blood Pressure SpO2 Pain Score   98 °F (36.7 °C) 60 20 (!) 239/104 98 % 8     Weight (last 2 days)       Date/Time Weight    10/27/24 1300 79.8 (176)    10/27/24 0043 80 (176.37)            Vital Signs (last 3 days)       Date/Time Temp Pulse Resp BP MAP (mmHg) SpO2 O2 Device O2 Interface Device Patient Position - Orthostatic VS Pain    10/27/24 1553 -- -- -- 164/90 -- -- -- -- -- --    10/27/24 1300 -- 54 -- 172/88 -- 96 % -- -- -- --    10/27/24 09:48:32 -- 67 -- 172/88 116 94 % -- -- -- --    10/27/24 0800 -- -- -- -- -- -- -- -- -- No Pain    10/27/24 07:31:07 98.6 °F (37 °C) 72 18 191/94 126 92 % -- -- -- --    10/27/24 0214 -- -- -- -- -- 95 % -- Face mask -- --    10/27/24 0153 -- -- -- 178/82 -- -- -- -- Lying --    10/27/24 01:42:53 98.3 °F (36.8 °C) 54 18 212/81 125 96 % -- -- Lying --    10/27/24 0132 -- -- -- -- -- -- -- -- -- No Pain    10/26/24 2330 -- 54 -- 159/75 108 95 % -- -- -- --    10/26/24 2300 -- 53 -- 162/73 105 95 % -- -- -- --    10/26/24 2245 -- 54 -- 169/77 110 94 % -- -- -- --    10/26/24 2230 -- 54 -- 177/66 113 95 % -- -- -- --    10/26/24 2200 -- 56 -- 224/93  134 96 % -- -- -- --    10/26/24 2130 -- 57 -- 215/95 136 95 % -- -- -- --    10/26/24 2107 -- -- -- -- -- -- -- -- -- 5    10/26/24 2100 -- 62 -- 206/90 129 96 % -- -- -- --    10/26/24 2000 -- 60 -- 198/89 133 96 % --  -- -- --    10/26/24 1953 98 °F (36.7 °C) 60 20 239/104 -- 98 % None (Room air) -- -- 8              Pertinent Labs/Diagnostic Test Results:   Radiology:  CT pe study w abdomen pelvis w contrast   Final Interpretation by Nicolas Nj MD (10/27 4193)      Small segmental acute lateral right lower lobe and lingular pulmonary emboli. No evidence for right heart strain. Measured RV/LV ratio is within normal limits at less than 0.9.      Cardiomegaly and specifically enlargement of left atrium as well as questionable enlargement of left ventricular cavity.      Multifocal pulmonary atelectasis/scar. Otherwise clear lungs.      No acute pathology in the abdomen/pelvis.      Findings are consistent with the preliminary report from [facility] which was provided shortly after completion of the exam.            Workstation performed: [id number]         XR chest 1 view portable   ED Interpretation by Jeremy Mack PA-C (10/26 0355)   No acute cardiopulmonary disease on initial read by me.      Final Interpretation by Nicolas Nj MD (10/27 0615)      No focal consolidation, pleural effusion, or pneumothorax.            Workstation performed: [id number]           Cardiology:   10/26 ECG    Interpretation: normal    Rate:     ECG rate:  57     ECG rate assessment: bradycardic    Rhythm:     Rhythm: sinus bradycardia    Ectopy:     Ectopy: none   Echo complete w/ contrast if indicated   Final Result by Jessica Medina MD (10/27 3335)        Left Ventricle: Left ventricular cavity size is normal. Wall thickness    is normal. The left ventricular ejection fraction is 60%. Systolic    function is normal. Wall motion is normal. Diastolic function is normal    for age.     Left Atrium: The atrium is dilated.     Aortic Valve: There is mild regurgitation. There is aortic valve    sclerosis.     Tricuspid Valve: The right ventricular systolic pressure is mildly    elevated. The estimated right ventricular systolic  pressure is 41.00 mmHg.         ECG 12 lead   Final Result by Jessica eMdina MD (10/27 1052)   Sinus bradycardia with sinus arrhythmia   Nonspecific T wave abnormality   Prolonged QT   Abnormal ECG   When compared with ECG of 26-OCT-2024 19:56, (unconfirmed)   No significant change was found   Confirmed by Jessica Medina ([id number]) on 10/27/2024 10:52:47 AM      ECG 12 lead   Final Result by Jessica Medina MD (10/27 1053)   Normal sinus rhythm with sinus arrhythmia   Normal ECG   When compared with ECG of 24-OCT-2013 09:52,   Vent. rate has decreased BY  32 BPM   QRS axis Shifted left   Criteria for Lateral infarct are no longer Present   Nonspecific T wave abnormality, worse in Anterolateral leads   Confirmed by Jessica Medina ([id number]) on 10/27/2024 10:53:06 AM                  Results from last 7 days   Lab Units 10/27/24  0648 10/26/24  2004   WBC Thousand/uL 7.52 7.51   HEMOGLOBIN g/dL 12.0 13.1   HEMATOCRIT % 36.5 39.3   PLATELETS Thousands/uL 188 233   TOTAL NEUT ABS Thousands/µL  --  4.64         Results from last 7 days   Lab Units 10/27/24  0648 10/26/24  2004   SODIUM mmol/L 140 141   POTASSIUM mmol/L 4.2 4.1   CHLORIDE mmol/L 108 108   CO2 mmol/L 25 26   ANION GAP mmol/L 7 7   BUN mg/dL 20 22   CREATININE mg/dL 1.05 1.15   EGFR ml/min/1.73sq m 51 46   CALCIUM mg/dL 9.1 9.4     Results from last 7 days   Lab Units 10/26/24  2004   AST U/L 24   ALT U/L 17   ALK PHOS U/L 102   TOTAL PROTEIN g/dL 7.2   ALBUMIN g/dL 3.7   TOTAL BILIRUBIN mg/dL 0.49         Results from last 7 days   Lab Units 10/27/24  0648 10/26/24  2004   GLUCOSE RANDOM mg/dL 94 91               Results from last 7 days   Lab Units 10/27/24  0040 10/26/24  2222 10/26/24  2004   HS TNI 0HR ng/L  --   --  19   HS TNI 2HR ng/L  --  20  --    HSTNI D2 ng/L  --  1  --    HS TNI 4HR ng/L 21  --   --    HSTNI D4 ng/L 2  --   --      Results from last 7 days   Lab Units 10/26/24  2004   D-DIMER QUANTITATIVE ug/ml FEU 1.16*     Results from last  7 days   Lab Units 10/27/24  0648   PTT seconds 114*     Results from last 7 days   Lab Units 10/26/24  2004   TSH 3RD GENERATON uIU/mL 0.819           Results from last 7 days   Lab Units 10/26/24  2004   BNP pg/mL 64           Results from last 7 days   Lab Units 10/26/24  2004   LIPASE u/L 48             Past Medical History:   Diagnosis Date    Anxiety     Arthritis     Atopic dermatitis     Breast CA (HCC) 1999    left  age 49    Depression     Fracture     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HCC)     2013    History of chemotherapy     breast cancer    Infective polyneuritis, acute (HCC)     Osteoporosis     PE (pulmonary thromboembolism) (HCC)     Peptic ulcer disease     Primary hypertension 9/4/2024    Psychiatric disorder     Sjogren's syndrome (HCC)     Sleep apnea     does not use cpap     Present on Admission:   Bipolar 2 disorder (HCC)      Admitting Diagnosis: Phlebolith [I87.8]  Pericardial effusion [I31.39]  Elevated blood pressure reading [R03.0]  Pulmonary embolism (HCC) [I26.99]  History of hypertension [Z86.79]  Left upper quadrant abdominal pain [R10.12]  Age/Sex: 74 y.o. female    Network Utilization Review Department  ATTENTION: Please call with any questions or concerns to [phone] and carefully listen to the prompts so that you are directed to the right person. All voicemails are confidential.   For Discharge needs, contact Care Management DC Support Team at [phone] opt. 2  Send all requests for admission clinical reviews, approved or denied determinations and any other requests to dedicated fax number below belonging to the campus where the patient is receiving treatment. List of dedicated fax numbers for the Facilities:  FACILITY NAME UR FAX NUMBER   ADMISSION DENIALS (Administrative/Medical Necessity) [phone]   DISCHARGE SUPPORT TEAM (NETWORK) [phone]   PARENT CHILD HEALTH (Maternity/NICU/Pediatrics) [phone]   [facility] [phone]   [facility]  [facility] [phone]   [facility] [phone]   [facility] [phone]   [facility] [phone]   [facility] [phone]   [facility] [phone]   [facility] [phone]   [facility] [phone]   [facility] [phone]   [facility] [phone]   [facility] [phone]

## 2024-10-27 NOTE — ASSESSMENT & PLAN NOTE
1999 s/p left modified radical mastectomy and sentinel lymph node biopsy for cancer. Lymph nodes were negative as well as hormone receptors. She completed 4 cycles of adjuvant Adriamycin and Cytoxan. No hormonal therapy.  Reconstruction.   Mammography 4/2024: no malignancy   Continue annual mammography

## 2024-10-27 NOTE — ASSESSMENT & PLAN NOTE
With known history of hypertension  230/110.  Improvement with pain control and one dose of enalaprilat   Continue home regimen:   Atenolol 12.5 mg qd  Benazepril 20 mg qd  PRN hydralazine

## 2024-10-27 NOTE — PLAN OF CARE
Problem: PAIN - ADULT  Goal: Verbalizes/displays adequate comfort level or baseline comfort level  Description: Interventions:  - Encourage patient to monitor pain and request assistance  - Assess pain using appropriate pain scale  - Administer analgesics based on type and severity of pain and evaluate response  - Implement non-pharmacological measures as appropriate and evaluate response  - Consider cultural and social influences on pain and pain management  - Notify physician/advanced practitioner if interventions unsuccessful or patient reports new pain  Outcome: Progressing     Problem: INFECTION - ADULT  Goal: Absence or prevention of progression during hospitalization  Description: INTERVENTIONS:  - Assess and monitor for signs and symptoms of infection  - Monitor lab/diagnostic results  - Monitor all insertion sites, i.e. indwelling lines, tubes, and drains  - Monitor endotracheal if appropriate and nasal secretions for changes in amount and color  - [place] appropriate cooling/warming therapies per order  - Administer medications as ordered  - Instruct and encourage patient and family to use good hand hygiene technique  - Identify and instruct in appropriate isolation precautions for identified infection/condition  Outcome: Progressing  Goal: Absence of fever/infection during neutropenic period  Description: INTERVENTIONS:  - Monitor WBC    Outcome: Progressing     Problem: SAFETY ADULT  Goal: Patient will remain free of falls  Description: INTERVENTIONS:  - Educate patient/family on patient safety including physical limitations  - Instruct patient to call for assistance with activity   - Consult OT/PT to assist with strengthening/mobility   - Keep Call bell within reach  - Keep bed low and locked with side rails adjusted as appropriate  - Keep care items and personal belongings within reach  - Initiate and maintain comfort rounds  - Make Fall Risk Sign visible to staff  - Offer Toileting every  Hours,  in advance of need  - Initiate/Maintain alarm  - Obtain necessary fall risk management equipment:  - Apply yellow socks and bracelet for high fall risk patients  - Consider moving patient to room near nurses station  Outcome: Progressing  Goal: Maintain or return to baseline ADL function  Description: INTERVENTIONS:  -  Assess patient's ability to carry out ADLs; assess patient's baseline for ADL function and identify physical deficits which impact ability to perform ADLs (bathing, care of mouth/teeth, toileting, grooming, dressing, etc.)  - Assess/evaluate cause of self-care deficits   - Assess range of motion  - Assess patient's mobility; develop plan if impaired  - Assess patient's need for assistive devices and provide as appropriate  - Encourage maximum independence but intervene and supervise when necessary  - Involve family in performance of ADLs  - Assess for home care needs following discharge   - Consider OT consult to assist with ADL evaluation and planning for discharge  - Provide patient education as appropriate  Outcome: Progressing  Goal: Maintains/Returns to pre admission functional level  Description: INTERVENTIONS:  - Perform AM-PAC 6 Click Basic Mobility/ Daily Activity assessment daily.  - Set and communicate daily mobility goal to care team and patient/family/caregiver.   - Collaborate with rehabilitation services on mobility goals if consulted  - Perform Range of Motion times a day.  - Reposition patient every  hours.  - Dangle patient  times a day  - Stand patient  times a day  - Ambulate patient  times a day  - Out of bed to chair  times a day   - Out of bed for meals  times a day  - Out of bed for toileting  - Record patient progress and toleration of activity level   Outcome: Progressing     Problem: DISCHARGE PLANNING  Goal: Discharge to home or other facility with appropriate resources  Description: INTERVENTIONS:  - Identify barriers to discharge w/patient and caregiver  - Arrange for  needed discharge resources and transportation as appropriate  - Identify discharge learning needs (meds, wound care, etc.)  - Arrange for interpretive services to assist at discharge as needed  - Refer to Case Management Department for coordinating discharge planning if the patient needs post-hospital services based on physician/advanced practitioner order or complex needs related to functional status, cognitive ability, or social support system  Outcome: Progressing     Problem: Knowledge Deficit  Goal: Patient/family/caregiver demonstrates understanding of disease process, treatment plan, medications, and discharge instructions  Description: Complete learning assessment and assess knowledge base.  Interventions:  - Provide teaching at level of understanding  - Provide teaching via preferred learning methods  Outcome: Progressing

## 2024-10-27 NOTE — ED PROVIDER NOTES
Time reflects when diagnosis was documented in both MDM as applicable and the Disposition within this note       Time User Action Codes Description Comment    10/26/2024 10:23 PM Jeremy Mack [Z86.79] History of hypertension     10/26/2024 10:23 PM Jeremy Mack [R03.0] Elevated blood pressure reading     10/26/2024 10:23 PM Jeremy Mack [R10.12] Left upper quadrant abdominal pain     10/26/2024 11:48 PM Jeremy Mack [R19.00] Pelvic swelling, right sided     10/26/2024 11:48 PM Jeremy Mack Remove [R19.00] Pelvic swelling, right sided     10/26/2024 11:48 PM Jeremy Mack [I26.99] Pulmonary embolism (HCC)     10/26/2024 11:49 PM Jeremy Mack Modify [I26.99] Pulmonary embolism (HCC) right sided; lower lobe subsegmental    10/27/2024 12:06 AM Jeremy Mack [Z86.79] History of hypertension     10/27/2024 12:06 AM Jeremy Mack [I26.99] Pulmonary embolism (HCC) right sided; lower lobe subsegmental    10/27/2024 12:12 AM Jeremy Mack [I87.8] Phlebolith     10/27/2024 12:12 AM Jeremy Mack [I31.39] Pericardial effusion     10/27/2024 12:12 AM Jeremy Mack [I31.39] Pericardial effusion small on CT reading          ED Disposition       ED Disposition   Admit    Condition   Stable    Date/Time   Sun Oct 27, 2024 12:07 AM    Comment   Case was discussed with clarence and the patient's admission status was agreed to be Admission Status: observation status to the service of Dr. Magdaleno, internal medicine .               Assessment & Plan       Medical Decision Making  Patient is a 74-year-old female with a history of breast cancer, PE no current anticoagulation, hypertension, surgical history of cholecystectomy, hysterectomy and appendectomy that presents to the emergency department with sharp shooting intermittent nonradiating left-sided chest and abdominal pain for 1 day.    Patient hemodynamically stable and afebrile  Negative serial troponins initially; positive Wells,  positive PERC, positive D-dimer at 1.16; CT PE study with abdomen and pelvis with right lower pulmonary artery subsegmental filling defect noted.  Patient has chest CT scan with contrast with right upper filling defect, different from current CT  Normal electrolytes, normal kidney function  No leukocytosis, no bandemia  normal TSH  No evidence of endorgan damage, patient has elevated blood pressure readings with Vasotec delivered with improvement of elevated blood pressure readings  Discussed patient case with internal medicine team and both agreed to place patient on inpatient observational status, telemetry under the care of Dr. Magdaleno, internal medicine  High molecular weight heparin started  CT scan sent to [facility]  Delivered Multimodal pain control prior to antihypertensive medication delivery  Patient with verbal understanding of all clinical laboratory and imaging findings, admission instructions and verbalized agreement with patient current treatment plan with teach back.      Amount and/or Complexity of Data Reviewed  Labs: ordered. Decision-making details documented in ED Course.  Radiology: ordered and independent interpretation performed. Decision-making details documented in ED Course.  ECG/medicine tests: ordered and independent interpretation performed. Decision-making details documented in ED Course.    Risk  Prescription drug management.  Decision regarding hospitalization.        ED Course as of 10/27/24 0131   Sat Oct 26, 2024   2101 Patient reports taking all of her antihypertensive medications as prescribed.  Elevated D-dimer, will place order for CT.   2132 9/4/2024 with family med physician reporting patient is not taking Lotrel 5-20 per that visit, however patient states that she is taking that medication presently.   2203 With consistent elevated blood pressure readings, will deliver antihypertensive via IV.   2205 Patient has history of taking ACE inhibitors, no evidence of ROYAL; patient remains  with elevated blood pressures, GCS 15 alert and oriented x 3, no acute focal neurological findings; Vasotec ordered.   2240 Blood Pressure(!): 177/66   2248 Reevaluated patient patient GCS 15 alert and oriented x 3, no acute focal neurological deficits.  Repeat blood pressures 160s and 170s systolically   2338 Delta 2hr hsTnI: 1       Medications   heparin (porcine) 25,000 units in 0.45% NaCl 250 mL infusion (premix) (18 Units/kg/hr × 75 kg (Order-Specific) Intravenous New Bag 10/27/24 0035)   hydrALAZINE (APRESOLINE) injection 5 mg (has no administration in time range)   acetaminophen (Ofirmev) injection 1,000 mg (0 mg Intravenous Stopped 10/26/24 2123)   HYDROmorphone HCl (DILAUDID) injection 0.2 mg (0.2 mg Intravenous Given 10/26/24 2107)   iohexol (OMNIPAQUE) 350 MG/ML injection (MULTI-DOSE) 85 mL (85 mL Intravenous Given 10/26/24 2139)   Enalaprilat (VASOTEC) injection 0.625 mg (0.625 mg Intravenous Given 10/26/24 2222)       ED Risk Strat Scores   HEART Risk Score      Flowsheet Row Most Recent Value   Heart Score Risk Calculator    History 0 Filed at: 10/26/2024 2136   ECG 0 Filed at: 10/26/2024 2136   Age 2 Filed at: 10/26/2024 2136   Risk Factors 1 Filed at: 10/26/2024 2136   Troponin 1 Filed at: 10/26/2024 2136   HEART Score 4 Filed at: 10/26/2024 2136                           PERC Rule for PE      Flowsheet Row Most Recent Value   PERC Rule for PE    Age >=50 1 Filed at: 10/26/2024 2043   HR >=100 0 Filed at: 10/26/2024 2043   O2 Sat on room air < 95% 0 Filed at: 10/26/2024 2043   History of PE or DVT 1 Filed at: 10/26/2024 2043   Recent trauma or surgery 0 Filed at: 10/26/2024 2043   Hemoptysis 0 Filed at: 10/26/2024 2043   Exogenous estrogen 0 Filed at: 10/26/2024 2043   Unilateral leg swelling 0 Filed at: 10/26/2024 2043   PERC Rule for PE Results 2 Filed at: 10/26/2024 2043            SBIRT 20yo+      Flowsheet Row Most Recent Value   Initial Alcohol Screen: US AUDIT-C     1. How often do you have a  drink containing alcohol? 1 Filed at: 10/26/2024 1953   2. How many drinks containing alcohol do you have on a typical day you are drinking?  0 Filed at: 10/26/2024 1953   3a. Male UNDER 65: How often do you have five or more drinks on one occasion? 0 Filed at: 10/26/2024 1953   3b. FEMALE Any Age, or MALE 65+: How often do you have 4 or more drinks on one occassion? 0 Filed at: 10/26/2024 1953   Audit-C Score 1 Filed at: 10/26/2024 1953   HARRISON: How many times in the past year have you...    Used an illegal drug or used a prescription medication for non-medical reasons? Never Filed at: 10/26/2024 1953            Wells' Criteria for PE      Flowsheet Row Most Recent Value   Wells' Criteria for PE    Clinical signs and symptoms of DVT 0 Filed at: 10/26/2024 2043   PE is primary diagnosis or equally likely 3 Filed at: 10/26/2024 2043   HR >100 0 Filed at: 10/26/2024 2043   Immobilization at least 3 days or Surgery in the previous 4 weeks 0 Filed at: 10/26/2024 2043   Previous, objectively diagnosed PE or DVT 0 Filed at: 10/26/2024 2043   Hemoptysis 0 Filed at: 10/26/2024 2043   Malignancy with treatment within 6 months or palliative 0 Filed at: 10/26/2024 2043   Wells' Criteria Total 3 Filed at: 10/26/2024 2043                        History of Present Illness       Chief Complaint   Patient presents with    Chest Pain     Patient reports L sided rib/ chest pain. States pain started yesterday and subsided but has been consistent today. Denies SOB. Denies N/V. States pain is not positional. No meds PTA. Rates pain 8/10, describes as sharp     Patient is a 74-year-old female with a history of breast cancer, PE no current anticoagulation, hypertension, surgical history of cholecystectomy, hysterectomy and appendectomy that presents to the emergency department with sharp shooting intermittent nonradiating left-sided chest and abdominal pain for 1 day.  Patient denies associated symptoms.  Patient states that yesterday she  "had noted left-sided chest pain symptoms that \"happened every 10 minutes for a couple seconds.\",  with complete spontaneous abatement.  Patient stated that this evening, \"there was a half an hour where I did not feel any pain, just had some pain a little while ago but not as sharp.\"  Patient denies palliative and provocative factors.  Patient denies noneffective treatment.  Patient denies fevers, chills, nausea, vomiting, diarrhea, constipation and urinary symptoms.  Patient denies recent fall recent trauma.  Patient denies sick contacts and recent travel.  Patient does report an abrasion on the left side of her chest, \"happened a couple days ago.\",  No bleeding, with scab intact.  Patient denies shortness of breath, and abdominal pain.      Past Medical History:   Diagnosis Date    Anxiety     Arthritis     Atopic dermatitis     Breast CA (HCC) 1999    left  age 49    Depression     Fracture     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HCC)     2013    History of chemotherapy     breast cancer    Infective polyneuritis, acute (HCC)     Osteoporosis     PE (pulmonary thromboembolism) (HCC)     Peptic ulcer disease     Primary hypertension 9/4/2024    Psychiatric disorder     Sjogren's syndrome (HCC)     Sleep apnea     does not use cpap      Past Surgical History:   Procedure Laterality Date    ABDOMINAL SURGERY      APPENDECTOMY      BREAST BIOPSY Right     prior to breast cancer    CHOLECYSTECTOMY      COLONOSCOPY W/ ENDOSCOPIC US N/A 02/24/2017    Procedure: ANAL ENDOSCOPIC U/S;  Surgeon: Chang Arellano MD;  Location: [facility];  Service:     HYSTERECTOMY      age 33; ovaries intact    MASTECTOMY Left 1999    age 49    TN COLONOSCOPY FLX DX W/COLLJ SPEC WHEN PFRMD N/A 02/24/2017    Procedure: COLONOSCOPY;  Surgeon: Neo Baxter MD;  Location: [facility];  Service: Gastroenterology      Family History   Problem Relation Age of Onset    Hypertension Mother     Heart failure Father     Esophageal cancer Father 78    Breast cancer " Paternal Aunt 48    Breast cancer Cousin 50        paternal    Breast cancer Cousin 50        paternal    Ovarian cancer Maternal Aunt 58    No Known Problems Sister     No Known Problems Maternal Grandmother     No Known Problems Maternal Grandfather     No Known Problems Paternal Grandmother     No Known Problems Paternal Grandfather     No Known Problems Son     No Known Problems Son     No Known Problems Maternal Aunt     No Known Problems Maternal Aunt     No Known Problems Paternal Aunt       Social History     Tobacco Use    Smoking status: Never     Passive exposure: Past    Smokeless tobacco: Never   Vaping Use    Vaping status: Never Used   Substance Use Topics    Alcohol use: Yes     Comment: socially    Drug use: No      E-Cigarette/Vaping    E-Cigarette Use Never User       E-Cigarette/Vaping Substances    Nicotine No     THC No     CBD No     Flavoring No     Other No     Unknown No       I have reviewed and agree with the history as documented.       History provided by:  Patient   used: No    Chest Pain  Associated symptoms: no abdominal pain, no back pain, no cough, no dizziness, no fever, no headache, no nausea, no numbness, no palpitations, no shortness of breath, not vomiting and no weakness        Review of Systems   Constitutional:  Negative for activity change, appetite change, chills and fever.   HENT:  Negative for congestion, ear pain, postnasal drip, rhinorrhea, sinus pressure, sinus pain, sore throat and tinnitus.    Eyes:  Negative for photophobia, pain and visual disturbance.   Respiratory:  Negative for cough, chest tightness and shortness of breath.    Cardiovascular:  Positive for chest pain. Negative for palpitations.   Gastrointestinal:  Negative for abdominal pain, constipation, diarrhea, nausea and vomiting.   Genitourinary:  Negative for difficulty urinating, dysuria, flank pain, frequency, hematuria and urgency.   Musculoskeletal:  Negative for arthralgias,  back pain, gait problem, neck pain and neck stiffness.   Skin:  Negative for color change, pallor and rash.   Allergic/Immunologic: Negative for environmental allergies and food allergies.   Neurological:  Negative for dizziness, seizures, syncope, weakness, numbness and headaches.   Psychiatric/Behavioral:  Negative for confusion.    All other systems reviewed and are negative.          Objective       ED Triage Vitals [10/26/24 1953]   Temperature Pulse Blood Pressure Respirations SpO2 Patient Position - Orthostatic VS   98 °F (36.7 °C) 60 (!) 239/104 20 98 % --      Temp src Heart Rate Source BP Location FiO2 (%) Pain Score    -- -- -- -- 8      Vitals      Date and Time Temp Pulse SpO2 Resp BP Pain Score FACES Pain Rating User   10/26/24 2330 -- 54 95 % -- 159/75 -- -- MM   10/26/24 2300 -- 53 95 % -- 162/73 -- -- MM   10/26/24 2245 -- 54 94 % -- 169/77 -- -- MM   10/26/24 2230 -- 54 95 % -- 177/66 -- -- MM   10/26/24 2200 -- 56 96 % --  224/93 provider aware -- -- MM   10/26/24 2130 -- 57 95 % -- 215/95 -- -- NP   10/26/24 2107 -- -- -- -- -- 5 -- JR   10/26/24 2100 -- 62 96 % -- 206/90 -- -- NP   10/26/24 2000 -- 60 96 % -- 198/89 -- -- MM   10/26/24 1953 98 °F (36.7 °C) 60 98 % 20 239/104 8 --             Physical Exam  Vitals and nursing note reviewed.   Constitutional:       General: She is awake.      Appearance: Normal appearance. She is well-developed and normal weight. She is not ill-appearing, toxic-appearing or diaphoretic.      Comments: BP (!) 198/89   Pulse 60   Temp 98 °F (36.7 °C)   Resp 20   LMP  (LMP Unknown)   SpO2 96%      HENT:      Head: Normocephalic and atraumatic.      Jaw: There is normal jaw occlusion.      Right Ear: Hearing and external ear normal. No decreased hearing noted. No drainage, swelling or tenderness. No mastoid tenderness.      Left Ear: Hearing and external ear normal. No decreased hearing noted. No drainage, swelling or tenderness. No mastoid tenderness.       Nose: Nose normal.      Mouth/Throat:      Lips: Pink.      Mouth: Mucous membranes are moist.      Pharynx: Oropharynx is clear. Uvula midline.   Eyes:      General: Lids are normal. Vision grossly intact. Gaze aligned appropriately.         Right eye: No discharge.         Left eye: No discharge.      Extraocular Movements: Extraocular movements intact.      Conjunctiva/sclera: Conjunctivae normal.      Pupils: Pupils are equal, round, and reactive to light.   Neck:      Vascular: No JVD.      Trachea: Trachea and phonation normal. No tracheal tenderness or tracheal deviation.   Cardiovascular:      Rate and Rhythm: Normal rate and regular rhythm.      Pulses: Normal pulses.           Radial pulses are 2+ on the right side and 2+ on the left side.        Posterior tibial pulses are 2+ on the right side and 2+ on the left side.      Heart sounds: Normal heart sounds.   Pulmonary:      Effort: Pulmonary effort is normal.      Breath sounds: Normal breath sounds and air entry. No stridor. No decreased breath sounds, wheezing, rhonchi or rales.   Chest:      Chest wall: No tenderness.   Abdominal:      General: Abdomen is flat. Bowel sounds are normal. There is no distension.      Palpations: Abdomen is soft. Abdomen is not rigid.      Tenderness: There is abdominal tenderness in the left upper quadrant. There is no right CVA tenderness, left CVA tenderness, guarding or rebound.   Musculoskeletal:         General: Normal range of motion.      Cervical back: Full passive range of motion without pain, normal range of motion and neck supple. No rigidity. No spinous process tenderness or muscular tenderness. Normal range of motion.   Feet:      Right foot:      Toenail Condition: Right toenails are normal.      Left foot:      Toenail Condition: Left toenails are normal.   Lymphadenopathy:      Head:      Right side of head: No submental, submandibular, tonsillar, preauricular, posterior auricular or occipital adenopathy.       Left side of head: No submental, submandibular, tonsillar, preauricular, posterior auricular or occipital adenopathy.      Cervical: No cervical adenopathy.      Right cervical: No superficial, deep or posterior cervical adenopathy.     Left cervical: No superficial, deep or posterior cervical adenopathy.   Skin:     General: Skin is warm.      Capillary Refill: Capillary refill takes less than 2 seconds.      Findings: No rash.   Neurological:      General: No focal deficit present.      Mental Status: She is alert and oriented to person, place, and time. Mental status is at baseline.      GCS: GCS eye subscore is 4. GCS verbal subscore is 5. GCS motor subscore is 6.      Sensory: No sensory deficit.   Psychiatric:         Attention and Perception: Attention normal.         Mood and Affect: Mood normal.         Speech: Speech normal.         Behavior: Behavior normal. Behavior is cooperative.         Thought Content: Thought content normal.         Judgment: Judgment normal.         Results Reviewed       Procedure Component Value Units Date/Time    HS Troponin I 4hr [[id number]]  (Normal) Collected: 10/27/24 0040    Lab Status: Final result Specimen: Blood from Arm, Right Updated: 10/27/24 0120     hs TnI 4hr 21 ng/L      Delta 4hr hsTnI 2 ng/L     B-Type Natriuretic Peptide(BNP) [[id number]]     Lab Status: No result Specimen: Blood     HS Troponin I 2hr [[id number]]  (Normal) Collected: 10/26/24 2222    Lab Status: Final result Specimen: Blood from Hand, Right Updated: 10/26/24 2253     hs TnI 2hr 20 ng/L      Delta 2hr hsTnI 1 ng/L     TSH, 3rd generation with Free T4 reflex [[id number]]  (Normal) Collected: 10/26/24 2004    Lab Status: Final result Specimen: Blood from Arm, Right Updated: 10/26/24 2205     TSH 3RD GENERATON 0.819 uIU/mL     Lipase [[id number]]  (Normal) Collected: 10/26/24 2004    Lab Status: Final result Specimen: Blood from Arm, Right Updated: 10/26/24 2056     Lipase 48 u/L     D-Dimer  [[id number]]  (Abnormal) Collected: 10/26/24 2004    Lab Status: Final result Specimen: Blood from Arm, Right Updated: 10/26/24 2055     D-Dimer, Quant 1.16 ug/ml FEU     Narrative:      In the evaluation for possible pulmonary embolism, in the appropriate (Well's Score of 4 or less) patient, the age adjusted d-dimer cutoff for this patient can be calculated as:    Age x 0.01 (in ug/mL) for Age-adjusted D-dimer exclusion threshold for a patient over 50 years.    HS Troponin 0hr (reflex protocol) [[id number]]  (Normal) Collected: 10/26/24 2004    Lab Status: Final result Specimen: Blood from Arm, Right Updated: 10/26/24 2038     hs TnI 0hr 19 ng/L     Comprehensive metabolic panel [[id number]] Collected: 10/26/24 2004    Lab Status: Final result Specimen: Blood from Arm, Right Updated: 10/26/24 2032     Sodium 141 mmol/L      Potassium 4.1 mmol/L      Chloride 108 mmol/L      CO2 26 mmol/L      ANION GAP 7 mmol/L      BUN 22 mg/dL      Creatinine 1.15 mg/dL      Glucose 91 mg/dL      Calcium 9.4 mg/dL      AST 24 U/L      ALT 17 U/L      Alkaline Phosphatase 102 U/L      Total Protein 7.2 g/dL      Albumin 3.7 g/dL      Total Bilirubin 0.49 mg/dL      eGFR 46 ml/min/1.73sq m     Narrative:      [employer] Kid[employer] guidelines for Chronic Kidney Disease (CKD):     Stage 1 with normal or high GFR (GFR > 90 mL/min/1.73 square meters)    Stage 2 Mild CKD (GFR = 60-89 mL/min/1.73 square meters)    Stage 3A Moderate CKD (GFR = 45-59 mL/min/1.73 square meters)    Stage 3B Moderate CKD (GFR = 30-44 mL/min/1.73 square meters)    Stage 4 Severe CKD (GFR = 15-29 mL/min/1.73 square meters)    Stage 5 End Stage CKD (GFR <15 mL/min/1.73 square meters)  Note: GFR calculation is accurate only with a steady state creatinine    CBC and differential [[id number]]  (Abnormal) Collected: 10/26/24 2004    Lab Status: Final result Specimen: Blood from Arm, Right Updated: 10/26/24 2015     WBC 7.51 Thousand/uL      RBC 3.99  Million/uL      Hemoglobin 13.1 g/dL      Hematocrit 39.3 %      MCV 99 fL      MCH 32.8 pg      MCHC 33.3 g/dL      RDW 12.7 %      MPV 10.3 fL      Platelets 233 Thousands/uL      nRBC 0 /100 WBCs      Segmented % 63 %      Immature Grans % 0 %      Lymphocytes % 27 %      Monocytes % 8 %      Eosinophils Relative 2 %      Basophils Relative 0 %      Absolute Neutrophils 4.64 Thousands/µL      Absolute Immature Grans 0.02 Thousand/uL      Absolute Lymphocytes 2.06 Thousands/µL      Absolute Monocytes 0.58 Thousand/µL      Eosinophils Absolute 0.18 Thousand/µL      Basophils Absolute 0.03 Thousands/µL             XR chest 1 view portable   ED Interpretation by Jeremy Mack PA-C (10/26 2155)   No acute cardiopulmonary disease on initial read by me.      CT pe study w abdomen pelvis w contrast    (Results Pending)       ECG 12 Lead Documentation Only    Date/Time: 10/26/2024 7:56 PM    Performed by: Jeremy Mack PA-C  Authorized by: Jeremy Mack PA-C    Indications / Diagnosis:  Chest pain  ECG reviewed by me, the ED Provider: yes    Patient location:  ED  Previous ECG:     Previous ECG:  Compared to current    Comparison ECG info:  When compared with ECG of October 24, 2013, no significant changes were noted.    Similarity:  No change    Comparison to cardiac monitor: Yes    Interpretation:     Interpretation: normal    Rate:     ECG rate:  64    ECG rate assessment: normal    Rhythm:     Rhythm: sinus rhythm    Ectopy:     Ectopy: none    QRS:     QRS axis:  Normal    QRS intervals:  Normal  Conduction:     Conduction: normal    ST segments:     ST segments:  Normal  T waves:     T waves: normal    ECG 12 Lead Documentation Only    Date/Time: 10/26/2024 10:56 PM    Performed by: Jeremy Mack PA-C  Authorized by: Jeremy Mack PA-C    Indications / Diagnosis:  Left-sided chest pain/abdominal pain  ECG reviewed by me, the ED Provider: no    Patient location:  ED  Previous ECG:     Previous ECG:  Compared to  current    Comparison ECG info:  When compared with ECG on October 26, 2024 1956, no significant changes were noted.    Similarity:  No change    Comparison to cardiac monitor: Yes    Interpretation:     Interpretation: normal    Rate:     ECG rate:  57    ECG rate assessment: bradycardic    Rhythm:     Rhythm: sinus bradycardia    Ectopy:     Ectopy: none    QRS:     QRS axis:  Normal    QRS intervals:  Normal  Conduction:     Conduction: normal    ST segments:     ST segments:  Normal  T waves:     T waves: normal        ED Medication and Procedure Management   Prior to Admission Medications   Prescriptions Last Dose Informant Patient Reported? Taking?   LORazepam (ATIVAN) 1 mg tablet More than a month Self No No   Sig: Take 1 tablet (1 mg total) by mouth 4 (four) times a day   LORazepam (ATIVAN) 1 mg tablet More than a month  No No   Sig: Take 1 tablet (1 mg total) by mouth every 6 (six) hours as needed for anxiety   OLANZapine (ZyPREXA) 2.5 mg tablet 10/27/2024 Self No Yes   Sig: Take 1 tablet (2.5 mg total) by mouth daily at bedtime   Sodium Fluoride (PreviDent 5000 Booster Plus) 1.1 % PSTE  Self Yes No   Sig: None Entered   Patient not taking: Reported on 9/4/2024   amLODIPine-benazepril (LOTREL 5-20) 5-20 MG per capsule Not Taking Self Yes No   Sig: Take 1 capsule by mouth daily.   Patient not taking: Reported on 9/4/2024   aspirin 81 mg chewable tablet 10/26/2024 Self Yes Yes   Sig: Chew 81 mg daily   atenolol (TENORMIN) 100 mg tablet 10/26/2024 Self Yes Yes   Sig: Take 25 mg by mouth daily 0.5 tablet every morning   benazepril (LOTENSIN) 20 mg tablet 10/26/2024 Self Yes Yes   cevimeline (EVOXAC) 30 MG capsule  Self Yes No   Sig: Take 30 mg by mouth daily.   Patient not taking: Reported on 2/6/2024   cholecalciferol (VITAMIN D3) 1,000 units tablet  Self Yes No   Sig: Take 1,000 Units by mouth daily   Patient not taking: Reported on 9/4/2024   cycloSPORINE (RESTASIS) 0.05 % ophthalmic emulsion  Self Yes No    Sig: Administer 1 drop to both eyes 2 (two) times a day.   escitalopram (Lexapro) 10 mg tablet 10/26/2024 Self No Yes   Sig: Take 1.5 tablets (15 mg total) by mouth daily   ezetimibe-simvastatin (VYTORIN) 10-20 mg per tablet 10/26/2024 Self Yes Yes   hydroxychloroquine (PLAQUENIL) 200 mg tablet 10/26/2024 Self Yes Yes   Sig: Take 200 mg by mouth daily with breakfast   pilocarpine (SALAGEN) 5 mg tablet 10/26/2024 Self Yes Yes   Si (two) times a day   zolpidem (AMBIEN) 10 mg tablet 10/26/2024 Self No Yes   Sig: Take 1 tablet (10 mg total) by mouth daily at bedtime      Facility-Administered Medications: None     Current Discharge Medication List        CONTINUE these medications which have NOT CHANGED    Details   aspirin 81 mg chewable tablet Chew 81 mg daily      atenolol (TENORMIN) 100 mg tablet Take 25 mg by mouth daily 0.5 tablet every morning      benazepril (LOTENSIN) 20 mg tablet       escitalopram (Lexapro) 10 mg tablet Take 1.5 tablets (15 mg total) by mouth daily  Qty: 135 tablet, Refills: 0    Associated Diagnoses: Panic disorder without agoraphobia with moderate panic attacks; Mild episode of recurrent major depressive disorder (HCC)      ezetimibe-simvastatin (VYTORIN) 10-20 mg per tablet       hydroxychloroquine (PLAQUENIL) 200 mg tablet Take 200 mg by mouth daily with breakfast      !! LORazepam (ATIVAN) 1 mg tablet Take 1 tablet (1 mg total) by mouth every 6 (six) hours as needed for anxiety  Qty: 120 tablet, Refills: 0    Associated Diagnoses: MONTEZ (generalized anxiety disorder)      OLANZapine (ZyPREXA) 2.5 mg tablet Take 1 tablet (2.5 mg total) by mouth daily at bedtime  Qty: 90 tablet, Refills: 1    Comments: Requesting 1 year supply  Associated Diagnoses: Panic disorder without agoraphobia with moderate panic attacks      pilocarpine (SALAGEN) 5 mg tablet 2 (two) times a day      zolpidem (AMBIEN) 10 mg tablet Take 1 tablet (10 mg total) by mouth daily at bedtime  Qty: 90 tablet, Refills: 0     Associated Diagnoses: Psychophysiological insomnia      amLODIPine-benazepril (LOTREL 5-20) 5-20 MG per capsule Take 1 capsule by mouth daily.      cevimeline (EVOXAC) 30 MG capsule Take 30 mg by mouth daily.      cholecalciferol (VITAMIN D3) 1,000 units tablet Take 1,000 Units by mouth daily      cycloSPORINE (RESTASIS) 0.05 % ophthalmic emulsion Administer 1 drop to both eyes 2 (two) times a day.      !! LORazepam (ATIVAN) 1 mg tablet Take 1 tablet (1 mg total) by mouth 4 (four) times a day  Qty: 360 tablet, Refills: 0    Associated Diagnoses: MONTEZ (generalized anxiety disorder)      Sodium Fluoride (PreviDent 5000 Booster Plus) 1.1 % PSTE None Entered       !! - Potential duplicate medications found. Please discuss with provider.        No discharge procedures on file.  ED SEPSIS DOCUMENTATION   Time reflects when diagnosis was documented in both MDM as applicable and the Disposition within this note       Time User Action Codes Description Comment    10/26/2024 10:23 PM Jeremy Mack [Z86.79] History of hypertension     10/26/2024 10:23 PM Jeremy Mack [R03.0] Elevated blood pressure reading     10/26/2024 10:23 PM Jeremy Mack [R10.12] Left upper quadrant abdominal pain     10/26/2024 11:48 PM Jeremy Mack [R19.00] Pelvic swelling, right sided     10/26/2024 11:48 PM Jeremy Mack Remove [R19.00] Pelvic swelling, right sided     10/26/2024 11:48 PM Jeremy Mack [I26.99] Pulmonary embolism (HCC)     10/26/2024 11:49 PM Jeremy Mack [I26.99] Pulmonary embolism (HCC) right sided; lower lobe subsegmental    10/27/2024 12:06 AM Jeremy Mack [Z86.79] History of hypertension     10/27/2024 12:06 AM Jeremy Mack [I26.99] Pulmonary embolism (HCC) right sided; lower lobe subsegmental    10/27/2024 12:12 AM Jeremy Mack [I87.8] Phlebolith     10/27/2024 12:12 AM Jeremy Mack [I31.39] Pericardial effusion     10/27/2024 12:12 AM Jeremy Mack [I31.39]  Pericardial effusion small on CT reading                 Jeremy Mack PA-C  10/27/24 7215

## 2024-10-27 NOTE — ASSESSMENT & PLAN NOTE
Presented with chest pain.  Recent COVID infection a couple of weeks ago, treated conservatively as outpatient.  Hx breast ca, PE (2013) treated with 6 months warfarin  PE study (vrad)  Segmental right lower lobe pulmonary artery filling defect  Normal RV:LV  Troponin x 2 normal.  BNP pending.  Check echo   Anticoagulation: heparin drip  Consult CM for pricing

## 2024-10-27 NOTE — CASE MANAGEMENT
Case Management Progress Note    Patient name Flower Nunn  Location S /S -01 MRN [id number]  : 1950 Date 10/27/2024       LOS (days): 0  Geometric Mean LOS (GMLOS) (days):   Days to GMLOS:        OBJECTIVE:        Current admission status: Observation  Preferred Pharmacy:   [facility] [address] [place] [id number]  Phone: [phone] Fax: [phone]    [facility] [address]  [address] [address]  Phone: [phone] Fax: [phone]    Primary Care Provider: Halima King DO    Primary Insurance: AET[employer]  REP  Secondary Insurance:     PROGRESS NOTE:    Prodaxa price checked at [facility] and was informed that it is $157.79.

## 2024-10-28 VITALS
BODY MASS INDEX: 31.18 KG/M2 | HEIGHT: 63 IN | HEART RATE: 61 BPM | RESPIRATION RATE: 18 BRPM | DIASTOLIC BLOOD PRESSURE: 79 MMHG | WEIGHT: 176 LBS | TEMPERATURE: 97.7 F | SYSTOLIC BLOOD PRESSURE: 168 MMHG | OXYGEN SATURATION: 92 %

## 2024-10-28 LAB
APTT PPP: 83 SECONDS (ref 23–34)
APTT PPP: 93 SECONDS (ref 23–34)

## 2024-10-28 PROCEDURE — 85730 THROMBOPLASTIN TIME PARTIAL: CPT | Performed by: INTERNAL MEDICINE

## 2024-10-28 PROCEDURE — 99239 HOSP IP/OBS DSCHRG MGMT >30: CPT | Performed by: INTERNAL MEDICINE

## 2024-10-28 RX ORDER — AMLODIPINE BESYLATE 10 MG/1
10 TABLET ORAL DAILY
Status: DISCONTINUED | OUTPATIENT
Start: 2024-10-29 | End: 2024-10-28 | Stop reason: HOSPADM

## 2024-10-28 RX ORDER — AMLODIPINE BESYLATE 10 MG/1
10 TABLET ORAL DAILY
Qty: 30 TABLET | Refills: 1 | Status: SHIPPED | OUTPATIENT
Start: 2024-10-29 | End: 2024-11-05

## 2024-10-28 RX ADMIN — AMLODIPINE BESYLATE 5 MG: 5 TABLET ORAL at 08:14

## 2024-10-28 RX ADMIN — GLYCERIN 1 DROP: .002; .002; .01 SOLUTION/ DROPS OPHTHALMIC at 08:14

## 2024-10-28 RX ADMIN — ESCITALOPRAM OXALATE 15 MG: 10 TABLET ORAL at 08:13

## 2024-10-28 RX ADMIN — ASPIRIN 81 MG 81 MG: 81 TABLET ORAL at 08:14

## 2024-10-28 RX ADMIN — PILOCARPINE HYDROCHLORIDE 5 MG: 5 TABLET, FILM COATED ORAL at 08:14

## 2024-10-28 RX ADMIN — ATENOLOL 12.5 MG: 25 TABLET ORAL at 08:13

## 2024-10-28 RX ADMIN — LISINOPRIL 20 MG: 20 TABLET ORAL at 08:14

## 2024-10-28 RX ADMIN — HYDROXYCHLOROQUINE SULFATE 200 MG: 200 TABLET, FILM COATED ORAL at 08:13

## 2024-10-28 NOTE — ASSESSMENT & PLAN NOTE
1999 s/p left modified radical mastectomy and sentinel lymph node biopsy for cancer. Lymph nodes were negative as well as hormone receptors. She completed 4 cycles of adjuvant Adriamycin and Cytoxan. No hormonal therapy.  Reconstruction.   Mammography 4/2024: no malignancy   Continue annual mammography    Azathioprine Counseling:  I discussed with the patient the risks of azathioprine including but not limited to myelosuppression, immunosuppression, hepatotoxicity, lymphoma, and infections.  The patient understands that monitoring is required including baseline LFTs, Creatinine, possible TPMP genotyping and weekly CBCs for the first month and then every 2 weeks thereafter.  The patient verbalized understanding of the proper use and possible adverse effects of azathioprine.  All of the patient's questions and concerns were addressed.

## 2024-10-28 NOTE — CASE MANAGEMENT
Case Management Discharge Planning Note    Patient name Flower Nunn  Location S /S -01 MRN [id number]  : 1950 Date 10/28/2024       Current Admission Date: 10/26/2024  Current Admission Diagnosis:Pulmonary embolism (HCC)   Patient Active Problem List    Diagnosis Date Noted Date Diagnosed    Hypertensive urgency 10/27/2024     Pulmonary embolism (HCC) 10/27/2024     Primary hypertension 2024     Acute infective polyneuritis (HCC) 2024     Idiopathic thrombocytopenic purpura (HCC) 2024     Sick sinus syndrome (HCC) 2024     Malignant neoplastic disease (HCC) 2024     Elevated blood pressure reading 2023     Cellulitis of face 2023     Sjogren's syndrome (HCC) 2023     Bipolar 2 disorder (HCC) 2023     Major depressive disorder with current active episode 2023     Insomnia 2023     Circadian rhythm sleep disorder 2023     Obstructive sleep apnea treated with continuous positive airway pressure (CPAP)      Anxiety      Macrocytosis without anemia 10/01/2020     Nutritional deficiency 10/01/2020     Light chain disease (HCC) 2018     Personal history of malignant neoplasm of breast 2018       LOS (days): 1  Geometric Mean LOS (GMLOS) (days):   Days to GMLOS:     OBJECTIVE:  Risk of Unplanned Readmission Score: 16.99         Current admission status: Inpatient   Preferred Pharmacy:   [facility] [address] [place] [address]  Phone: [phone] Fax: [phone]    [facility] [place], [place] - [address]  [address]  [address] [address]  Phone: [phone] Fax: [phone]    Primary Care Provider: Halima King DO    Primary Insurance: AETRABIA  REP  Secondary Insurance:     DISCHARGE DETAILS:    Discharge planning discussed with:: Spouse    Contacts  Patient Contacts: Spouse  Relationship  to Patient:: Family  Contact Method: Phone  Phone Number: [phone]  Reason/Outcome: Continuity of Care, Discharge Planning, Emergency Contact    Other Referral/Resources/Interventions Provided:  Interventions: Other (Specify), Prescription Price Check  Referral Comments: CM received phone call from pt spouse who provided insurance information for pt [facility] prescription coverage. CM placed call to [facility] ([phone]) and spoke with pharmacy staff. [facility] info provided. As per pharmacy staff, Eliquis is now $15.40 and Prodaxa is $42. Both are in stock. CM notified provider of same via secure chat. No further CM needs noted at this time.    Would you like to participate in our [facility] service program?  : No - Declined    Treatment Team Recommendation: Home  Discharge Destination Plan:: Home  Transport at Discharge : Family

## 2024-10-28 NOTE — ASSESSMENT & PLAN NOTE
With known history of hypertension  230/110.  Improvement with pain control and one dose of enalaprilat   Continue home regimen:   Atenolol 12.5 mg qd  Benazepril 20 mg qd

## 2024-10-28 NOTE — ASSESSMENT & PLAN NOTE
Presented with chest pain.  Recent COVID infection a couple of weeks ago, treated conservatively as outpatient.  Hx breast ca, PE (2013) treated with 6 months warfarin  PE study (vrad)  Segmental right lower lobe pulmonary artery filling defect  Normal RV:LV  Troponin x 2 normal.   Check echo echo revealed ejection fraction of 60%  Anticoagulation with Eliquis 10 mg twice daily for 7 days followed by 5 mg twice a day indefinitely

## 2024-10-28 NOTE — UTILIZATION REVIEW
Continued Stay Review    SEE INITIAL REVIEW AT BOTTOM    Date: 10/28                          Current Patient Class: Inpatient  Current Level of Care: MS    HPI:74 y.o. female initially admitted on 10/27     Assessment/Plan:   Date: 10/28  Day 3: Has surpassed a 2nd midnight with active treatments and services.  Pt reports feeling well without any symptoms of chest pain, shortness of breath, or palpitations. BP improving w/ pain control and enalapril x 1.  On hep gtt. Transitioned to Eliquis. Cont Atenolol and Benazepril.       Medications:   Scheduled Medications:  [START ON 10/29/2024] amLODIPine, 10 mg, Oral, Daily  Artificial Tears, 1 drop, Both Eyes, Q12H GERARDO  aspirin, 81 mg, Oral, Daily  atenolol, 12.5 mg, Oral, Daily  escitalopram, 15 mg, Oral, Daily  ezetimibe, 10 mg, Oral, Daily With Dinner   And  pravastatin, 40 mg, Oral, Daily With Dinner  hydroxychloroquine, 200 mg, Oral, Daily With Breakfast  lisinopril, 20 mg, Oral, Daily  OLANZapine, 2.5 mg, Oral, HS  pilocarpine, 5 mg, Oral, BID  zolpidem, 10 mg, Oral, HS      Continuous IV Infusions:  heparin (porcine) 25,000 units in 0.45% NaCl 250 mL infusion (premix)  Rate: 2.3-22.5 mL/hr Dose: 3-30 Units/kg/hr  Weight Dosing Info: 75 kg (Order-Specific)  Freq: Titrated Route: IV  Last Dose: 12 Units/kg/hr (10/28/24 0143)  Start: 10/27/24 0015 End: 10/28/24 1548       PRN Meds:  acetaminophen, 650 mg, Oral, Q6H PRN  hydrALAZINE, 5 mg, Intravenous, Q6H PRN  LORazepam, 1 mg, Oral, Q6H PRN  menthol-methyl salicylate, , Apply externally, 4x Daily PRN      Discharge Plan: TBD    Vital Signs (last 3 days)       Date/Time Temp Pulse Resp BP MAP (mmHg) SpO2 O2 Device O2 Interface Device Patient Position - Orthostatic VS Pain    10/28/24 1206 -- -- -- -- -- 92 % None (Room air) -- -- --    10/28/24 1205 -- -- -- -- -- 96 % None (Room air) -- -- --    10/28/24 08:04:39 97.7 °F (36.5 °C) 61 18 168/79 109 93 % -- -- -- --    10/28/24 0800 -- -- -- -- -- -- -- -- -- No Pain     10/27/24 2212 -- -- -- 160/82 -- -- -- -- -- --    10/27/24 22:10:21 98.3 °F (36.8 °C) 59 16 186/79 115 96 % -- -- -- --    10/27/24 2118 -- -- -- -- -- 96 % -- -- -- --    10/27/24 1836 -- -- -- -- -- -- -- -- -- 2    10/27/24 1553 -- -- -- 164/90 -- -- -- -- -- --    10/27/24 1300 -- 54 -- 172/88 -- 96 % -- -- -- --    10/27/24 09:48:32 -- 67 -- 172/88 116 94 % -- -- -- --    10/27/24 0800 -- -- -- -- -- -- -- -- -- No Pain    10/27/24 07:31:07 98.6 °F (37 °C) 72 18 191/94 126 92 % -- -- -- --    10/27/24 0214 -- -- -- -- -- 95 % -- Face mask -- --    10/27/24 0153 -- -- -- 178/82 -- -- -- -- Lying --    10/27/24 01:42:53 98.3 °F (36.8 °C) 54 18 212/81 125 96 % -- -- Lying --    10/27/24 0132 -- -- -- -- -- -- -- -- -- No Pain    10/26/24 2330 -- 54 -- 159/75 108 95 % -- -- -- --    10/26/24 2300 -- 53 -- 162/73 105 95 % -- -- -- --    10/26/24 2245 -- 54 -- 169/77 110 94 % -- -- -- --    10/26/24 2230 -- 54 -- 177/66 113 95 % -- -- -- --    10/26/24 2200 -- 56 -- 224/93  134 96 % -- -- -- --    10/26/24 2130 -- 57 -- 215/95 136 95 % -- -- -- --    10/26/24 2107 -- -- -- -- -- -- -- -- -- 5    10/26/24 2100 -- 62 -- 206/90 129 96 % -- -- -- --    10/26/24 2000 -- 60 -- 198/89 133 96 % -- -- -- --    10/26/24 1953 98 °F (36.7 °C) 60 20 239/104 -- 98 % None (Room air) -- -- 8          Weight (last 2 days)       Date/Time Weight    10/27/24 1300 79.8 (176)    10/27/24 0043 80 (176.37)            Pertinent Labs/Diagnostic Results:   Radiology:  CT pe study w abdomen pelvis w contrast   Final Interpretation by Nicolas Nj MD (10/27 3471)      Small segmental acute lateral right lower lobe and lingular pulmonary emboli. No evidence for right heart strain. Measured RV/LV ratio is within normal limits at less than 0.9.      Cardiomegaly and specifically enlargement of left atrium as well as questionable enlargement of left ventricular cavity.      Multifocal pulmonary atelectasis/scar. Otherwise clear lungs.       No acute pathology in the abdomen/pelvis.      Findings are consistent with the preliminary report from [facility] which was provided shortly after completion of the exam.            Workstation performed: [id number]         XR chest 1 view portable   ED Interpretation by Jeremy Mack PA-C (10/26 2155)   No acute cardiopulmonary disease on initial read by me.      Final Interpretation by Nicolas Nj MD (10/27 2058)      No focal consolidation, pleural effusion, or pneumothorax.            Workstation performed: [id number]           Cardiology:  Echo complete w/ contrast if indicated   Final Result by Jessica Medina MD (10/27 2983)        Left Ventricle: Left ventricular cavity size is normal. Wall thickness    is normal. The left ventricular ejection fraction is 60%. Systolic    function is normal. Wall motion is normal. Diastolic function is normal    for age.     Left Atrium: The atrium is dilated.     Aortic Valve: There is mild regurgitation. There is aortic valve    sclerosis.     Tricuspid Valve: The right ventricular systolic pressure is mildly    elevated. The estimated right ventricular systolic pressure is 41.00 mmHg.         ECG 12 lead   Final Result by Jessica Medina MD (10/27 1052)   Sinus bradycardia with sinus arrhythmia   Nonspecific T wave abnormality   Prolonged QT   Abnormal ECG   When compared with ECG of 26-OCT-2024 19:56, (unconfirmed)   No significant change was found   Confirmed by Jessica Medina ([id number]) on 10/27/2024 10:52:47 AM      ECG 12 lead   Final Result by Jessica Medina MD (10/27 3062)   Normal sinus rhythm with sinus arrhythmia   Normal ECG   When compared with ECG of 24-OCT-2013 09:52,   Vent. rate has decreased BY  32 BPM   QRS axis Shifted left   Criteria for Lateral infarct are no longer Present   Nonspecific T wave abnormality, worse in Anterolateral leads   Confirmed by Jessica Medina ([id number]) on 10/27/2024 10:53:06 AM        GI:  No orders to  "display           Results from last 7 days   Lab Units 10/27/24  0648 10/26/24  2004   WBC Thousand/uL 7.52 7.51   HEMOGLOBIN g/dL 12.0 13.1   HEMATOCRIT % 36.5 39.3   PLATELETS Thousands/uL 188 233   TOTAL NEUT ABS Thousands/µL  --  4.64         Results from last 7 days   Lab Units 10/27/24  0648 10/26/24  2004   SODIUM mmol/L 140 141   POTASSIUM mmol/L 4.2 4.1   CHLORIDE mmol/L 108 108   CO2 mmol/L 25 26   ANION GAP mmol/L 7 7   BUN mg/dL 20 22   CREATININE mg/dL 1.05 1.15   EGFR ml/min/1.73sq m 51 46   CALCIUM mg/dL 9.1 9.4     Results from last 7 days   Lab Units 10/26/24  2004   AST U/L 24   ALT U/L 17   ALK PHOS U/L 102   TOTAL PROTEIN g/dL 7.2   ALBUMIN g/dL 3.7   TOTAL BILIRUBIN mg/dL 0.49         Results from last 7 days   Lab Units 10/27/24  0648 10/26/24  2004   GLUCOSE RANDOM mg/dL 94 91             No results found for: \"BETA-HYDROXYBUTYRATE\"                   Results from last 7 days   Lab Units 10/27/24  0040 10/26/24  2222 10/26/24  2004   HS TNI 0HR ng/L  --   --  19   HS TNI 2HR ng/L  --  20  --    HSTNI D2 ng/L  --  1  --    HS TNI 4HR ng/L 21  --   --    HSTNI D4 ng/L 2  --   --      Results from last 7 days   Lab Units 10/26/24  2004   D-DIMER QUANTITATIVE ug/ml FEU 1.16*     Results from last 7 days   Lab Units 10/28/24  0811 10/28/24  0031 10/27/24  1552   PTT seconds 83* 93* 60*     Results from last 7 days   Lab Units 10/26/24  2004   TSH 3RD GENERATON uIU/mL 0.819                     Results from last 7 days   Lab Units 10/26/24  2004   BNP pg/mL 64                     Results from last 7 days   Lab Units 10/26/24  2004   LIPASE u/L 48                                                                   Network Utilization Review Department  ATTENTION: Please call with any questions or concerns to [phone] and carefully listen to the prompts so that you are directed to the right person. All voicemails are confidential.   For Discharge needs, contact Care Management DC Support Team at " [phone] opt[facility]  Send all requests for admission clinical reviews, approved or denied determinations and any other requests to dedicated fax number below belonging to the campus where the patient is receiving treatment. List of dedicated fax numbers for the Facilities:  FACILITY NAME UR FAX NUMBER   ADMISSION DENIALS (Administrative/Medical Necessity) [phone]   DISCHARGE SUPPORT TEAM (NETWORK) [phone]   PARENT CHILD HEALTH (Maternity/NICU/Pediatrics) [phone]   [facility] [phone]   [facility] [phone]   [facility] [phone]   [facility] [phone]   [facility] [phone]   [facility] [phone]   [facility] [phone]   [facility] [phone]   [facility] [phone]   [facility] [phone]   [facility] [phone]   [facility] [phone]

## 2024-10-28 NOTE — DISCHARGE SUMMARY
Discharge Summary - Hospitalist   Name: Flower Nunn 74 y.o. female I MRN: [id number]  Unit/Bed#: S -01 I Date of Admission: 10/26/2024   Date of Service: 10/28/2024 I Hospital Day: 1     Assessment & Plan  Pulmonary embolism (HCC)  Presented with chest pain.  Recent COVID infection a couple of weeks ago, treated conservatively as outpatient.  Hx breast ca, PE (2013) treated with 6 months warfarin  PE study (vrad)  Segmental right lower lobe pulmonary artery filling defect  Normal RV:LV  Troponin x 2 normal.   Check echo echo revealed ejection fraction of 60%  Anticoagulation with Eliquis 10 mg twice daily for 7 days followed by 5 mg twice a day indefinitely  Hypertensive urgency  With known history of hypertension  230/110.  Improvement with pain control and one dose of enalaprilat   Continue home regimen:   Atenolol 12.5 mg qd  Benazepril 20 mg qd  Personal history of malignant neoplasm of breast  1999 s/p left modified radical mastectomy and sentinel lymph node biopsy for cancer. Lymph nodes were negative as well as hormone receptors. She completed 4 cycles of adjuvant Adriamycin and Cytoxan. No hormonal therapy.  Reconstruction.   Mammography 4/2024: no malignancy   Continue annual mammography   Bipolar 2 disorder (HCC)  Mood stable  Continue PTA meds  PDMP reviewed: lorazepam 1 mg.  Patient reports she takes this QID PRN, not scheduled QID.  Zyprexa 2.5 mg HS  Lexapro 15 mg QD  PDMP revieweD: ambien 10 mg HS       Medical Problems       Resolved Problems  Date Reviewed: 10/27/2024   None       Discharging Physician / Practitioner: Atif Perry MD  PCP: Halima King DO  Admission Date:   Admission Orders (From admission, onward)       Ordered        10/27/24 1514  INPATIENT ADMISSION  Once            10/27/24 0006  Place in Observation  Once                          Discharge Date: 10/28/24    Consultations During Hospital Stay:  None    Procedures Performed:   None    Significant Findings /  Test Results:   Small segmental acute lateral right lower lobe and lingular pulmonary emboli. No evidence for right heart strain. Measured RV/LV ratio is within normal limits at less than 0.9.    Incidental Findings:   Cardiomegaly and specifically enlargement of left atrium as well as questionable enlargement of left ventricular cavity.  Multifocal pulmonary atelectasis/scar. Otherwise clear lungs.   I reviewed the above mentioned incidental findings with the patient and/or family and they expressed understanding.    Test Results Pending at Discharge (will require follow up):   None     Outpatient Tests Requested:  None    Complications: None    Reason for Admission: PE    Hospital Course:   Flower Nunn is a 74 y.o. female patient who originally presented to the hospital on 10/26/2024 due to chest pain.  She was found to have small right lower lobe segmental and lingula pulmonary emboli without evidence of right heart strain.  She was maintaining oxygen saturations in the mid 90s on room air.  She was started on IV heparin.  On subsequent examination she denied any chest pain or discomfort or shortness of breath.  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  Initial price check showed Eliquis was costing her $600 per month.  However her  had prescription plan which allows her to get medication for $15 per month.  On 10/28 she was medically stable for discharge with recommendation to follow-up with her PCP.    Condition at Discharge: good    Discharge Day Visit / Exam:   Subjective:   Patient seen and examined at bedside.  Reports feeling well without any symptoms of chest pain, shortness of breath, or palpitations.  Initial pain that brought her to the ER has resolved  Vitals: Blood Pressure: 168/79 (10/28/24 0804)  Pulse: 61 (10/28/24 0804)  Temperature: 97.7 °F (36.5 °C) (10/28/24 0804)  Respirations: 18 (10/28/24 0804)  Height:  "5' 3\" (160 cm) (10/27/24 1300)  Weight - Scale: 79.8 kg (176 lb) (10/27/24 1300)  SpO2: 92 % (During lap around wright oxygen dropped to 92% at its lowest. Pt reported feeling light headed and unsteady at times throughout the lap.) (10/28/24 1206)  Physical Exam  Vitals and nursing note reviewed.   Constitutional:       General: She is not in acute distress.     Appearance: She is well-developed.   HENT:      Head: Normocephalic and atraumatic.   Eyes:      Conjunctiva/sclera: Conjunctivae normal.   Cardiovascular:      Rate and Rhythm: Normal rate and regular rhythm.      Heart sounds: No murmur heard.  Pulmonary:      Effort: Pulmonary effort is normal. No respiratory distress.      Breath sounds: Normal breath sounds.   Abdominal:      Palpations: Abdomen is soft.      Tenderness: There is no abdominal tenderness.   Musculoskeletal:         General: No swelling.   Skin:     General: Skin is warm and dry.      Capillary Refill: Capillary refill takes less than 2 seconds.   Neurological:      Mental Status: She is alert.   Psychiatric:         Mood and Affect: Mood normal.          Discussion with Family: Updated  () at bedside.    Discharge instructions/Information to patient and family:   See after visit summary for information provided to patient and family.      Provisions for Follow-Up Care:  See after visit summary for information related to follow-up care and any pertinent home health orders.      Mobility at time of Discharge:   Basic Mobility Inpatient Raw Score: 24  JH-HLM Goal: 8: Walk 250 feet or more  JH-HLM Achieved: 8: Walk 250 feet ot more  HLM Goal achieved. Continue to encourage appropriate mobility.     Disposition:   Home    Planned Readmission: No    Discharge Medications:  See after visit summary for reconciled discharge medications provided to patient and/or family.      Administrative Statements   Discharge Statement:  I have spent a total time of 45 minutes in caring for " this patient on the day of the visit/encounter. >30 minutes of time was spent on: Diagnostic results, Instructions for management, and Impressions.    **Please Note: This note may have been constructed using a voice recognition system**

## 2024-10-28 NOTE — PLAN OF CARE
Problem: PAIN - ADULT  Goal: Verbalizes/displays adequate comfort level or baseline comfort level  Description: Interventions:  - Encourage patient to monitor pain and request assistance  - Assess pain using appropriate pain scale  - Administer analgesics based on type and severity of pain and evaluate response  - Implement non-pharmacological measures as appropriate and evaluate response  - Consider cultural and social influences on pain and pain management  - Notify physician/advanced practitioner if interventions unsuccessful or patient reports new pain  Outcome: Progressing     Problem: INFECTION - ADULT  Goal: Absence or prevention of progression during hospitalization  Description: INTERVENTIONS:  - Assess and monitor for signs and symptoms of infection  - Monitor lab/diagnostic results  - Monitor all insertion sites, i.e. indwelling lines, tubes, and drains  - Monitor endotracheal if appropriate and nasal secretions for changes in amount and color  - [place] appropriate cooling/warming therapies per order  - Administer medications as ordered  - Instruct and encourage patient and family to use good hand hygiene technique  - Identify and instruct in appropriate isolation precautions for identified infection/condition  Outcome: Progressing  Goal: Absence of fever/infection during neutropenic period  Description: INTERVENTIONS:  - Monitor WBC    Outcome: Progressing     Problem: SAFETY ADULT  Goal: Patient will remain free of falls  Description: INTERVENTIONS:  - Educate patient/family on patient safety including physical limitations  - Instruct patient to call for assistance with activity   - Consult OT/PT to assist with strengthening/mobility   - Keep Call bell within reach  - Keep bed low and locked with side rails adjusted as appropriate  - Keep care items and personal belongings within reach  - Initiate and maintain comfort rounds  - Make Fall Risk Sign visible to staff  - Offer Toileting every  Hours,  in advance of need  - Initiate/Maintain alarm  - Obtain necessary fall risk management equipment:   - Apply yellow socks and bracelet for high fall risk patients  - Consider moving patient to room near nurses station  Outcome: Progressing  Goal: Maintain or return to baseline ADL function  Description: INTERVENTIONS:  -  Assess patient's ability to carry out ADLs; assess patient's baseline for ADL function and identify physical deficits which impact ability to perform ADLs (bathing, care of mouth/teeth, toileting, grooming, dressing, etc.)  - Assess/evaluate cause of self-care deficits   - Assess range of motion  - Assess patient's mobility; develop plan if impaired  - Assess patient's need for assistive devices and provide as appropriate  - Encourage maximum independence but intervene and supervise when necessary  - Involve family in performance of ADLs  - Assess for home care needs following discharge   - Consider OT consult to assist with ADL evaluation and planning for discharge  - Provide patient education as appropriate  Outcome: Progressing  Goal: Maintains/Returns to pre admission functional level  Description: INTERVENTIONS:  - Perform AM-PAC 6 Click Basic Mobility/ Daily Activity assessment daily.  - Set and communicate daily mobility goal to care team and patient/family/caregiver.   - Collaborate with rehabilitation services on mobility goals if consulted  - Perform Range of Motion  times a day.  - Reposition patient every hours.  - Dangle patient  times a day  - Stand patient  times a day  - Ambulate patient  times a day  - Out of bed to chair  times a day   - Out of bed for meals  times a day  - Out of bed for toileting  - Record patient progress and toleration of activity level   Outcome: Progressing     Problem: DISCHARGE PLANNING  Goal: Discharge to home or other facility with appropriate resources  Description: INTERVENTIONS:  - Identify barriers to discharge w/patient and caregiver  - Arrange for  needed discharge resources and transportation as appropriate  - Identify discharge learning needs (meds, wound care, etc.)  - Arrange for interpretive services to assist at discharge as needed  - Refer to Case Management Department for coordinating discharge planning if the patient needs post-hospital services based on physician/advanced practitioner order or complex needs related to functional status, cognitive ability, or social support system  Outcome: Progressing     Problem: Knowledge Deficit  Goal: Patient/family/caregiver demonstrates understanding of disease process, treatment plan, medications, and discharge instructions  Description: Complete learning assessment and assess knowledge base.  Interventions:  - Provide teaching at level of understanding  - Provide teaching via preferred learning methods  Outcome: Progressing

## 2024-10-28 NOTE — DISCHARGE INSTR - AVS FIRST PAGE
Dear Flower Nunn,     It was our pleasure to care for you here at [facility].  It is our hope that we were always able to exceed the expected standards for your care during your stay.  You were hospitalized due to ***.  You were cared for on the *** floor by Atif Perry MD under the service of Vi Cartagena, * with the [facility] who covers for your primary care physician (PCP), Halima King DO, while you were hospitalized.  If you have any questions or concerns related to this hospitalization, you may contact us at .  For follow up as well as any medication refills, we recommend that you follow up with your primary care physician.  A registered nurse will reach out to you by phone within a few days after your discharge to answer any additional questions that you may have after going home.  However, at this time we provide for you here, the most important instructions / recommendations at discharge:       Notable Medication Adjustments -   Please take amlodipine 10 mg daily.  Please take Eliquis 10 mg twice a day for 7 days followed by 5 mg twice a day indefinitely.  Testing Required after Discharge -   None  ** Please contact your PCP to request testing orders for any of the testing recommended here **  Important follow up information -   Please follow-up with your PCP within a week of discharge  Other Instructions -   Please take all your medications regularly as directed  If symptoms return/persist contact your PCP if unable then visit to nearest ER  Please review this entire after visit summary as additional general instructions including medication list, appointments, activity, diet, any pertinent wound care, and other additional recommendations from your care team that may be provided for you.      Sincerely,   Atif Perry MD

## 2024-10-29 ENCOUNTER — TRANSITIONAL CARE MANAGEMENT (OUTPATIENT)
Dept: FAMILY MEDICINE CLINIC | Facility: CLINIC | Age: 74
End: 2024-10-29

## 2024-10-29 NOTE — UTILIZATION REVIEW
NOTIFICATION OF ADMISSION DISCHARGE   This is a Notification of Discharge from [facility]. Please be advised that this patient has been discharge from our facility. Below you will find the admission and discharge date and time including the patient’s disposition.   UTILIZATION REVIEW CONTACT:  Megan Carrillo  Utilization   Network Utilization Review Department  Phone: [phone] x carefully listen to the prompts. All voicemails are confidential.  Email: [email or web address]     ADMISSION INFORMATION  PRESENTATION DATE: 10/26/2024  7:43 PM  OBERVATION ADMISSION DATE: 10/27/2024 0006  INPATIENT ADMISSION DATE: 10/27/24  3:14 PM   DISCHARGE DATE: 10/28/2024  5:56 PM   DISPOSITION:Home/Self Care    Network Utilization Review Department  ATTENTION: Please call with any questions or concerns to [phone] and carefully listen to the prompts so that you are directed to the right person. All voicemails are confidential.   For Discharge needs, contact Care Management DC Support Team at [phone] opt. [address]  Send all requests for admission clinical reviews, approved or denied determinations and any other requests to dedicated fax number below belonging to the campus where the patient is receiving treatment. List of dedicated fax numbers for the Facilities:  FACILITY NAME UR FAX NUMBER   ADMISSION DENIALS (Administrative/Medical Necessity) [phone]   DISCHARGE SUPPORT TEAM ([facility]) [phone]   PARENT CHILD HEALTH (Maternity/NICU/Pediatrics) [phone]   [facility] [phone]   [facility] [phone]   [facility] [phone]   [facility] [phone]   Counts include 234 beds at the [facility] [phone]   [facility] [phone]   [facility] [phone]   [facility]  [phone]   [facility] [phone]   [facility] [phone]   [facility] [phone]   [facility] [phone]

## 2024-11-01 ENCOUNTER — TELEPHONE (OUTPATIENT)
Dept: INTERNAL MEDICINE CLINIC | Facility: CLINIC | Age: 74
End: 2024-11-01

## 2024-11-05 ENCOUNTER — OFFICE VISIT (OUTPATIENT)
Dept: FAMILY MEDICINE CLINIC | Facility: CLINIC | Age: 74
End: 2024-11-05
Payer: COMMERCIAL

## 2024-11-05 VITALS
OXYGEN SATURATION: 97 % | SYSTOLIC BLOOD PRESSURE: 102 MMHG | DIASTOLIC BLOOD PRESSURE: 62 MMHG | HEART RATE: 49 BPM | WEIGHT: 174 LBS | HEIGHT: 63 IN | TEMPERATURE: 97.6 F | BODY MASS INDEX: 30.83 KG/M2

## 2024-11-05 DIAGNOSIS — I26.99 PULMONARY EMBOLISM (HCC): Primary | ICD-10-CM

## 2024-11-05 DIAGNOSIS — I10 PRIMARY HYPERTENSION: ICD-10-CM

## 2024-11-05 DIAGNOSIS — F51.01 PRIMARY INSOMNIA: ICD-10-CM

## 2024-11-05 PROCEDURE — 99496 TRANSJ CARE MGMT HIGH F2F 7D: CPT | Performed by: FAMILY MEDICINE

## 2024-11-05 RX ORDER — ATENOLOL 25 MG/1
12.5 TABLET ORAL DAILY
COMMUNITY
Start: 2024-10-16

## 2024-11-05 RX ORDER — AMLODIPINE BESYLATE 5 MG/1
5 TABLET ORAL 2 TIMES DAILY
Qty: 180 TABLET | Refills: 3 | Status: SHIPPED | OUTPATIENT
Start: 2024-11-05

## 2024-11-05 RX ORDER — DIPHENOXYLATE HYDROCHLORIDE AND ATROPINE SULFATE 2.5; .025 MG/1; MG/1
1 TABLET ORAL DAILY
COMMUNITY

## 2024-11-05 NOTE — PROGRESS NOTES
Transition of Care Visit  Name: Flower Nunn      : 1950      MRN: [id number]  Encounter Provider: Halima King DO  Encounter Date: 2024   Encounter department: [facility]    Assessment & Plan  Pulmonary embolism (HCC)  Cotninue long term anticoagulation (this was not first event)  Orders:    apixaban (Eliquis) 5 mg; Take 1 tablet (5 mg total) by mouth 2 (two) times a day    amLODIPine (NORVASC) 5 mg tablet; Take 1 tablet (5 mg total) by mouth 2 (two) times a day    Primary hypertension  Stable on amlodipine therapy  Orders:    apixaban (Eliquis) 5 mg; Take 1 tablet (5 mg total) by mouth 2 (two) times a day    amLODIPine (NORVASC) 5 mg tablet; Take 1 tablet (5 mg total) by mouth 2 (two) times a day    Primary insomnia  Stable on zolpidem therapy            History of Present Illness     Transitional Care Management Review:   Flower Nunn is a 74 y.o. female here for TCM follow up. Pt had covid and developed a pulmonary embolus post covid.      During the TCM phone call patient stated:  TCM Call       Date and time call was made  10/29/2024  1:21 PM    Date of Admission  10/26/24    Date of discharge  10/28/24    Diagnosis  Pulmonary embolism (HCC)    Disposition  Home    Were the patients medications reviewed and updated  Yes    Current Symptoms  None          TCM Call       Should patient be enrolled in anticoag monitoring?  Yes    Scheduled for follow up?  Yes    Did you obtain your prescribed medications  Yes    Do you need help managing your prescriptions or medications  No    Is transportation to your appointment needed  No    I have advised the patient to call PCP with any new or worsening symptoms  MICHELLE Garcia          The patient presents post hospitalization due to pulmonary embolus post covid.  This is her second time having one, the first happening when she had cancer.  She denies any dyspnea, chest pain or pressure.      Review of Systems  "  Constitutional:  Negative for appetite change, chills and fever.   HENT:  Negative for ear pain, facial swelling, rhinorrhea, sinus pain, sore throat and trouble swallowing.    Eyes:  Negative for discharge and redness.   Respiratory:  Negative for chest tightness, shortness of breath and wheezing.    Cardiovascular:  Negative for chest pain and palpitations.   Gastrointestinal:  Negative for abdominal pain, diarrhea, nausea and vomiting.   Endocrine: Negative for polyuria.   Genitourinary:  Negative for dysuria and urgency.   Musculoskeletal:  Negative for arthralgias and back pain.   Skin:  Negative for rash.   Neurological:  Negative for dizziness, weakness and headaches.   Hematological:  Negative for adenopathy.   Psychiatric/Behavioral:  Negative for behavioral problems, confusion and sleep disturbance.    All other systems reviewed and are negative.    Objective     /62   Pulse (!) 49   Temp 97.6 °F (36.4 °C)   Ht 5' 3\" (1.6 m)   Wt 78.9 kg (174 lb)   LMP  (LMP Unknown)   SpO2 97%   BMI 30.82 kg/m²     Physical Exam  Vitals and nursing note reviewed.   Constitutional:       General: She is not in acute distress.     Appearance: Normal appearance. She is well-developed. She is not ill-appearing or diaphoretic.   HENT:      Head: Normocephalic and atraumatic.      Right Ear: Tympanic membrane, ear canal and external ear normal.      Left Ear: Tympanic membrane, ear canal and external ear normal.      Nose: Nose normal. No congestion or rhinorrhea.      Mouth/Throat:      Mouth: Mucous membranes are moist.      Pharynx: Oropharynx is clear. No oropharyngeal exudate or posterior oropharyngeal erythema.   Eyes:      General: No scleral icterus.        Right eye: No discharge.         Left eye: No discharge.      Extraocular Movements: Extraocular movements intact.      Conjunctiva/sclera: Conjunctivae normal.      Pupils: Pupils are equal, round, and reactive to light.   Neck:      Thyroid: No " thyromegaly.      Vascular: No carotid bruit or JVD.      Trachea: No tracheal deviation.   Cardiovascular:      Rate and Rhythm: Normal rate and regular rhythm.      Pulses: Normal pulses.      Heart sounds: Normal heart sounds. No murmur heard.  Pulmonary:      Effort: Pulmonary effort is normal. No respiratory distress.      Breath sounds: Normal breath sounds. No stridor. No wheezing, rhonchi or rales.   Abdominal:      General: Abdomen is flat. Bowel sounds are normal. There is no distension.      Palpations: Abdomen is soft. There is no mass.      Tenderness: There is no abdominal tenderness. There is no guarding or rebound.   Musculoskeletal:         General: No swelling, tenderness or deformity. Normal range of motion.      Cervical back: Normal range of motion and neck supple. No rigidity.      Right lower leg: No edema.      Left lower leg: No edema.   Lymphadenopathy:      Cervical: No cervical adenopathy.   Skin:     General: Skin is warm and dry.      Capillary Refill: Capillary refill takes less than 2 seconds.      Coloration: Skin is not jaundiced.      Findings: No bruising, erythema or rash.   Neurological:      General: No focal deficit present.      Mental Status: She is alert and oriented to person, place, and time.      Cranial Nerves: No cranial nerve deficit.      Sensory: No sensory deficit.      Motor: No abnormal muscle tone.      Coordination: Coordination normal.      Gait: Gait normal.      Deep Tendon Reflexes: Reflexes are normal and symmetric. Reflexes normal.   Psychiatric:         Mood and Affect: Mood normal.         Behavior: Behavior normal.         Thought Content: Thought content normal.         Judgment: Judgment normal.           Administrative Statements   I have spent a total time of 20 minutes in caring for this patient on the day of the visit/encounter including Diagnostic results, Prognosis, Risks and benefits of tx options, Instructions for management, and  Impressions.

## 2024-11-08 DIAGNOSIS — F41.1 GAD (GENERALIZED ANXIETY DISORDER): ICD-10-CM

## 2024-11-08 DIAGNOSIS — F51.04 PSYCHOPHYSIOLOGICAL INSOMNIA: ICD-10-CM

## 2024-11-08 NOTE — TELEPHONE ENCOUNTER
Reason for call:   [x] Refill   [] Prior Auth  [] Other:     Office:   [] PCP/Provider -   [x] Specialty/Provider - Psychiatry    Medication:     LORazepam (ATIVAN) 1 mg tablet   Dose/Frequency: Take 1 tablet (1 mg total) by mouth every 6 (six) hours as needed   120 tablet    (AMBIEN) 10 mg tablet   Take 1 tablet (10 mg total) by mouth daily at bedtime,   90 tablet    Pharmacy: [facility] [place]     Does the patient have enough for 3 days?   [x] Yes   [] No - Send as HP to POD

## 2024-11-11 RX ORDER — LORAZEPAM 1 MG/1
1 TABLET ORAL EVERY 6 HOURS PRN
Qty: 120 TABLET | Refills: 1 | Status: SHIPPED | OUTPATIENT
Start: 2024-11-11

## 2024-11-11 RX ORDER — ZOLPIDEM TARTRATE 10 MG/1
10 TABLET ORAL
Qty: 90 TABLET | Refills: 0 | Status: SHIPPED | OUTPATIENT
Start: 2024-11-11 | End: 2024-11-20 | Stop reason: SDUPTHER

## 2024-11-13 ENCOUNTER — APPOINTMENT (OUTPATIENT)
Dept: LAB | Age: 74
End: 2024-11-13
Payer: COMMERCIAL

## 2024-11-13 ENCOUNTER — TRANSCRIBE ORDERS (OUTPATIENT)
Dept: ADMINISTRATIVE | Age: 74
End: 2024-11-13

## 2024-11-13 DIAGNOSIS — M81.0 SENILE OSTEOPOROSIS: Primary | ICD-10-CM

## 2024-11-13 DIAGNOSIS — F33.0 MILD EPISODE OF RECURRENT MAJOR DEPRESSIVE DISORDER (HCC): ICD-10-CM

## 2024-11-13 DIAGNOSIS — M81.0 SENILE OSTEOPOROSIS: ICD-10-CM

## 2024-11-13 DIAGNOSIS — F41.0 PANIC DISORDER WITHOUT AGORAPHOBIA WITH MODERATE PANIC ATTACKS: ICD-10-CM

## 2024-11-13 LAB
25(OH)D3 SERPL-MCNC: 50.3 NG/ML (ref 30–100)
CALCIUM SERPL-MCNC: 9.5 MG/DL (ref 8.4–10.2)

## 2024-11-13 PROCEDURE — 82306 VITAMIN D 25 HYDROXY: CPT

## 2024-11-13 PROCEDURE — 36415 COLL VENOUS BLD VENIPUNCTURE: CPT

## 2024-11-13 NOTE — ASSESSMENT & PLAN NOTE
Cotninue long term anticoagulation (this was not first event)  Orders:    apixaban (Eliquis) 5 mg; Take 1 tablet (5 mg total) by mouth 2 (two) times a day    amLODIPine (NORVASC) 5 mg tablet; Take 1 tablet (5 mg total) by mouth 2 (two) times a day

## 2024-11-13 NOTE — ASSESSMENT & PLAN NOTE
Stable on amlodipine therapy  Orders:    apixaban (Eliquis) 5 mg; Take 1 tablet (5 mg total) by mouth 2 (two) times a day    amLODIPine (NORVASC) 5 mg tablet; Take 1 tablet (5 mg total) by mouth 2 (two) times a day

## 2024-11-14 RX ORDER — ESCITALOPRAM OXALATE 10 MG/1
15 TABLET ORAL DAILY
Qty: 135 TABLET | Refills: 1 | Status: SHIPPED | OUTPATIENT
Start: 2024-11-14 | End: 2024-11-20 | Stop reason: SDUPTHER

## 2024-11-20 ENCOUNTER — OFFICE VISIT (OUTPATIENT)
Dept: PSYCHIATRY | Facility: CLINIC | Age: 74
End: 2024-11-20
Payer: COMMERCIAL

## 2024-11-20 DIAGNOSIS — F51.04 PSYCHOPHYSIOLOGICAL INSOMNIA: ICD-10-CM

## 2024-11-20 DIAGNOSIS — F41.0 PANIC DISORDER WITHOUT AGORAPHOBIA WITH MODERATE PANIC ATTACKS: ICD-10-CM

## 2024-11-20 DIAGNOSIS — F33.0 MILD EPISODE OF RECURRENT MAJOR DEPRESSIVE DISORDER (HCC): ICD-10-CM

## 2024-11-20 DIAGNOSIS — F41.1 GAD (GENERALIZED ANXIETY DISORDER): ICD-10-CM

## 2024-11-20 PROCEDURE — 99213 OFFICE O/P EST LOW 20 MIN: CPT | Performed by: PSYCHIATRY & NEUROLOGY

## 2024-11-20 RX ORDER — ESCITALOPRAM OXALATE 10 MG/1
15 TABLET ORAL DAILY
Qty: 135 TABLET | Refills: 1 | Status: SHIPPED | OUTPATIENT
Start: 2024-11-20

## 2024-11-20 RX ORDER — LORAZEPAM 1 MG/1
1 TABLET ORAL 4 TIMES DAILY
Qty: 360 TABLET | Refills: 0 | Status: SHIPPED | OUTPATIENT
Start: 2024-12-11

## 2024-11-20 RX ORDER — ZOLPIDEM TARTRATE 10 MG/1
10 TABLET ORAL
Qty: 90 TABLET | Refills: 0 | Status: SHIPPED | OUTPATIENT
Start: 2024-12-11

## 2024-11-20 RX ORDER — OLANZAPINE 2.5 MG/1
2.5 TABLET, FILM COATED ORAL
Qty: 90 TABLET | Refills: 1 | Status: SHIPPED | OUTPATIENT
Start: 2024-11-20

## 2024-11-20 RX ORDER — MIRTAZAPINE 7.5 MG/1
7.5 TABLET, FILM COATED ORAL
Qty: 30 TABLET | Refills: 2 | Status: SHIPPED | OUTPATIENT
Start: 2024-11-20

## 2024-11-20 NOTE — PSYCH
Flower Nunn 1950 [id number]     The patient was seen for continuing care and pharmacotherapy.Around Labor Day she was very anxious. 2 weeks ago she was hospitalized for PE x 3 days.She has trouble going to sleep at night but once asleep she can sleep thru the night.  No crying spells.  Appetite is normal        ROS:  As HPI  Current Mental Status Evaluation:  Appearance:  Adequate hygiene and grooming and Good eye contact   Behavior:  Calm and Cooperative   Mood:  Anxious   Affect: appropriate and mood-congruent   Speech: Normal volume and Normal rate   Thought Process:  Goal directed and coherent   Thought Content:  Does not verbalize delusional material   Perceptual Disturbances: Denies hallucinations and does not appear to be responding to internal stimuli   Risk Potential: No suicidal or homicidal ideation   Orientation:         Diagnosis ICD-10-CM Associated Orders   1. Panic disorder without agoraphobia with moderate panic attacks  F41.0 escitalopram (LEXAPRO) 10 mg tablet     OLANZapine (ZyPREXA) 2.5 mg tablet      2. Mild episode of recurrent major depressive disorder (HCC)  F33.0 escitalopram (LEXAPRO) 10 mg tablet      3. MONTEZ (generalized anxiety disorder)  F41.1 mirtazapine (REMERON) 7.5 MG tablet     LORazepam (ATIVAN) 1 mg tablet      4. Psychophysiological insomnia  F51.04 zolpidem (AMBIEN) 10 mg tablet             Current Outpatient Medications   Medication Instructions    amLODIPine (NORVASC) 5 mg, Oral, 2 times daily    apixaban (ELIQUIS) 10 mg, Oral, 2 times daily    apixaban (ELIQUIS) 5 mg, Oral, 2 times daily    aspirin 81 mg, Oral, Daily    atenolol (TENORMIN) 12.5 mg, Oral, Daily    benazepril (LOTENSIN) 20 mg tablet     cycloSPORINE (RESTASIS) 0.05 % ophthalmic emulsion 1 drop, 2 times daily    escitalopram (LEXAPRO) 15 mg, Oral, Daily    ezetimibe-simvastatin (VYTORIN) 10-20 mg per tablet No dose, route, or frequency recorded.    hydroxychloroquine (PLAQUENIL) 200 mg, Oral, Daily with  breakfast    LORazepam (ATIVAN) 1 mg, Oral, Every 6 hours PRN    [START ON 12/11/2024] LORazepam (ATIVAN) 1 mg, Oral, 4 times daily    mirtazapine (REMERON) 7.5 mg, Oral, Daily at bedtime    multivitamin (THERAGRAN) TABS 1 tablet, Oral, Daily    OLANZapine (ZYPREXA) 2.5 mg, Oral, Daily at bedtime    pilocarpine (SALAGEN) 5 mg tablet 2 times daily    [START ON 12/11/2024] zolpidem (AMBIEN) 10 mg, Oral, Daily at bedtime          Treatment Recommendations- Risks Benefits    Small dose of mirtazapine 7.5 mg to promote sleep and alleviate nighttime anxiety.  Follow-up in 3 months  Risks, Benefits And Possible Side Effects Of Medications:  Risks, benefits, and possible side effects of medications explained to patient and patient verbalizes understanding    VIRTUAL VISIT DISCLAIMER    Flowerdoron Singhanco verbally agrees to participate in Virtual Care Services. Pt is aware that Virtual Care Services could be limited without vital signs or the ability to perform a full hands-on physical exam. Flower Nunn understands she or the provider may request at any time to terminate the video visit and request the patient to seek care or treatment in person.     Adam Evangelista MD 11/20/24

## 2024-11-25 ENCOUNTER — TELEPHONE (OUTPATIENT)
Age: 74
End: 2024-11-25

## 2024-11-25 NOTE — TELEPHONE ENCOUNTER
Flower Nunn called and  requested a call back to discuss her Mirtazapine. She stated she took it for 3 nights and it kept her up all 3 nights. .    They can be reached at P# [phone].       Thank you.

## 2024-11-26 NOTE — TELEPHONE ENCOUNTER
Nurse left a message for Flower Nunn [phone] - awaiting return call to review clinician response to discontinue Remeron.

## 2024-12-04 LAB

## 2024-12-05 ENCOUNTER — OFFICE VISIT (OUTPATIENT)
Dept: FAMILY MEDICINE CLINIC | Facility: CLINIC | Age: 74
End: 2024-12-05
Payer: COMMERCIAL

## 2024-12-05 VITALS
OXYGEN SATURATION: 97 % | DIASTOLIC BLOOD PRESSURE: 78 MMHG | WEIGHT: 174.5 LBS | SYSTOLIC BLOOD PRESSURE: 120 MMHG | HEIGHT: 63 IN | BODY MASS INDEX: 30.92 KG/M2 | HEART RATE: 87 BPM | TEMPERATURE: 96.7 F

## 2024-12-05 DIAGNOSIS — I10 PRIMARY HYPERTENSION: Primary | ICD-10-CM

## 2024-12-05 DIAGNOSIS — I26.99 PULMONARY EMBOLISM, UNSPECIFIED CHRONICITY, UNSPECIFIED PULMONARY EMBOLISM TYPE, UNSPECIFIED WHETHER ACUTE COR PULMONALE PRESENT (HCC): ICD-10-CM

## 2024-12-05 PROCEDURE — 99213 OFFICE O/P EST LOW 20 MIN: CPT | Performed by: FAMILY MEDICINE

## 2024-12-05 PROCEDURE — G2211 COMPLEX E/M VISIT ADD ON: HCPCS | Performed by: FAMILY MEDICINE

## 2024-12-20 ENCOUNTER — HOSPITAL ENCOUNTER (OUTPATIENT)
Dept: RADIOLOGY | Age: 74
Discharge: HOME | End: 2024-12-20
Payer: COMMERCIAL

## 2024-12-20 DIAGNOSIS — M35.01 SJOGREN SYNDROME WITH KERATOCONJUNCTIVITIS (HCC): ICD-10-CM

## 2024-12-20 PROBLEM — I16.0 HYPERTENSIVE URGENCY: Status: RESOLVED | Noted: 2024-10-27 | Resolved: 2024-12-20

## 2024-12-20 PROCEDURE — 77080 DXA BONE DENSITY AXIAL: CPT

## 2024-12-20 NOTE — PROGRESS NOTES
Name: Flower Nunn      : 1950      MRN: [id number]  Encounter Provider: Halima King DO  Encounter Date: 2024   Encounter department: [facility]    Assessment & Plan  Primary hypertension  Blood pressure is stable on current therapy       Pulmonary embolism, unspecified chronicity, unspecified pulmonary embolism type, unspecified whether acute cor pulmonale present (HCC)  Cotninue Eliquis therapy            History of Present Illness     The patient presents for follow up of hypertension and pulmonary embolism. Both are well controlled at this time.  She deneis any dyspnea, chest pressure, palpitations, etc.      Review of Systems   Constitutional:  Negative for appetite change, chills and fever.   HENT:  Negative for ear pain, facial swelling, rhinorrhea, sinus pain, sore throat and trouble swallowing.    Eyes:  Negative for discharge and redness.   Respiratory:  Negative for chest tightness, shortness of breath and wheezing.    Cardiovascular:  Negative for chest pain and palpitations.   Gastrointestinal:  Negative for abdominal pain, diarrhea, nausea and vomiting.   Endocrine: Negative for polyuria.   Genitourinary:  Negative for dysuria and urgency.   Musculoskeletal:  Negative for arthralgias and back pain.   Skin:  Negative for rash.   Neurological:  Negative for dizziness, weakness and headaches.   Hematological:  Negative for adenopathy.   Psychiatric/Behavioral:  Negative for behavioral problems, confusion and sleep disturbance.    All other systems reviewed and are negative.    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[facility])         History of chemotherapy     breast cancer    Hypertensive urgency 10/27/2024    Infective polyneuritis, acute ([facility])     Osteoporosis     PE (pulmonary thromboembolism) ([facility])     Peptic  ulcer disease     Primary hypertension 09/04/2024    Psychiatric disorder     Sjogren's syndrome (HCC)     Sleep apnea     does not use cpap     Past Surgical History:   Procedure Laterality Date    ABDOMINAL SURGERY      APPENDECTOMY      BREAST BIOPSY Right     prior to breast cancer    CHOLECYSTECTOMY      COLONOSCOPY W/ ENDOSCOPIC US N/A 02/24/2017    Procedure: ANAL ENDOSCOPIC U/S;  Surgeon: Chang Arellano MD;  Location: [facility];  Service:     HYSTERECTOMY      age 33; ovaries intact    MASTECTOMY Left 1999    age 49    DC COLONOSCOPY FLX DX W/COLLJ SPEC WHEN PFRMD N/A 02/24/2017    Procedure: COLONOSCOPY;  Surgeon: Neo Baxter MD;  Location: [facility];  Service: Gastroenterology     Family History   Problem Relation Age of Onset    Hypertension Mother     Heart failure Father     Esophageal cancer Father 78    Breast cancer Paternal Aunt 48    Breast cancer Cousin 50        paternal    Breast cancer Cousin 50        paternal    Ovarian cancer Maternal Aunt 58    No Known Problems Sister     No Known Problems Maternal Grandmother     No Known Problems Maternal Grandfather     No Known Problems Paternal Grandmother     No Known Problems Paternal Grandfather     No Known Problems Son     No Known Problems Son     No Known Problems Maternal Aunt     No Known Problems Maternal Aunt     No Known Problems Paternal Aunt      Social History     Tobacco Use    Smoking status: Never     Passive exposure: Past    Smokeless tobacco: Never   Vaping Use    Vaping status: Never Used   Substance and Sexual Activity    Alcohol use: Yes     Comment: socially    Drug use: No    Sexual activity: Not Currently     Current Outpatient Medications on File Prior to Visit   Medication Sig    amLODIPine (NORVASC) 5 mg tablet Take 1 tablet (5 mg total) by mouth 2 (two) times a day    apixaban (Eliquis) 5 mg Take 1 tablet (5 mg total) by mouth 2 (two) times a day    aspirin 81 mg chewable tablet Chew 81 mg daily    atenolol  "(TENORMIN) 25 mg tablet Take 12.5 mg by mouth daily    escitalopram (LEXAPRO) 10 mg tablet Take 1.5 tablets (15 mg total) by mouth daily    ezetimibe-simvastatin (VYTORIN) 10-20 mg per tablet     hydroxychloroquine (PLAQUENIL) 200 mg tablet Take 200 mg by mouth daily with breakfast    LORazepam (ATIVAN) 1 mg tablet TAKE 1 TABLET BY MOUTH EVERY 6  HOURS AS NEEDED FOR ANXIETY    multivitamin (THERAGRAN) TABS Take 1 tablet by mouth daily    OLANZapine (ZyPREXA) 2.5 mg tablet Take 1 tablet (2.5 mg total) by mouth daily at bedtime    pilocarpine (SALAGEN) 5 mg tablet 2 (two) times a day    zolpidem (AMBIEN) 10 mg tablet Take 1 tablet (10 mg total) by mouth daily at bedtime Do not start before December 11, 2024.    LORazepam (ATIVAN) 1 mg tablet Take 1 tablet (1 mg total) by mouth 4 (four) times a day Do not start before December 11, 2024. (Patient not taking: Reported on 12/5/2024 Do not start before December 11, 2024.)     Allergies   Allergen Reactions    Adderall [Amphetamine-Dextroamphetamine] Tongue Swelling     Tongue swelling    Lamotrigine Rash     Other reaction(s): Possible rash, mouth sores (questionable reaction)     Immunization History   Administered Date(s) Administered    COVID-19 PFIZER VACCINE 0.3 ML IM 03/05/2021, 03/24/2021, 11/01/2021    COVID-19 Pfizer Vac BIVALENT Vern-sucrose 12 Yr+ IM 10/25/2022    COVID-19 Pfizer mRNA vacc PF vern-sucrose 12 yr and older (Comirnaty) 10/23/2023    H1N1, All Formulations 01/07/2010    Zoster 07/08/2013     Objective   /78   Pulse 87   Temp (!) 96.7 °F (35.9 °C)   Ht 5' 3\" (1.6 m)   Wt 79.2 kg (174 lb 8 oz)   LMP  (LMP Unknown)   SpO2 97%   BMI 30.91 kg/m²     Physical Exam  Vitals and nursing note reviewed.   Constitutional:       General: She is not in acute distress.     Appearance: Normal appearance. She is well-developed. She is not ill-appearing or diaphoretic.   HENT:      Head: Normocephalic and atraumatic.      Right Ear: Tympanic membrane, " ear canal and external ear normal.      Left Ear: Tympanic membrane, ear canal and external ear normal.      Nose: Nose normal. No congestion or rhinorrhea.      Mouth/Throat:      Mouth: Mucous membranes are moist.      Pharynx: Oropharynx is clear. No oropharyngeal exudate or posterior oropharyngeal erythema.   Eyes:      General: No scleral icterus.        Right eye: No discharge.         Left eye: No discharge.      Extraocular Movements: Extraocular movements intact.      Conjunctiva/sclera: Conjunctivae normal.      Pupils: Pupils are equal, round, and reactive to light.   Neck:      Thyroid: No thyromegaly.      Vascular: No carotid bruit or JVD.      Trachea: No tracheal deviation.   Cardiovascular:      Rate and Rhythm: Normal rate and regular rhythm.      Pulses: Normal pulses.      Heart sounds: Normal heart sounds. No murmur heard.  Pulmonary:      Effort: Pulmonary effort is normal. No respiratory distress.      Breath sounds: Normal breath sounds. No stridor. No wheezing, rhonchi or rales.   Abdominal:      General: Abdomen is flat. Bowel sounds are normal. There is no distension.      Palpations: Abdomen is soft. There is no mass.      Tenderness: There is no abdominal tenderness. There is no guarding or rebound.   Musculoskeletal:         General: No swelling, tenderness or deformity. Normal range of motion.      Cervical back: Normal range of motion and neck supple. No rigidity.      Right lower leg: No edema.      Left lower leg: No edema.   Lymphadenopathy:      Cervical: No cervical adenopathy.   Skin:     General: Skin is warm and dry.      Capillary Refill: Capillary refill takes less than 2 seconds.      Coloration: Skin is not jaundiced.      Findings: No bruising, erythema or rash.   Neurological:      General: No focal deficit present.      Mental Status: She is alert and oriented to person, place, and time.      Cranial Nerves: No cranial nerve deficit.      Sensory: No sensory deficit.       Motor: No abnormal muscle tone.      Coordination: Coordination normal.      Gait: Gait normal.      Deep Tendon Reflexes: Reflexes are normal and symmetric. Reflexes normal.   Psychiatric:         Mood and Affect: Mood normal.         Behavior: Behavior normal.         Thought Content: Thought content normal.         Judgment: Judgment normal.       Administrative Statements   I have spent a total time of 15 minutes in caring for this patient on the day of the visit/encounter including Diagnostic results, Prognosis, Risks and benefits of tx options, Instructions for management, Patient and family education, and Importance of tx compliance. Topics discussed with the patient / family include symptom assessment and management, medication review, medication adjustment, psychosocial support, and advanced directives.

## 2024-12-24 ENCOUNTER — TELEPHONE (OUTPATIENT)
Dept: PSYCHIATRY | Facility: CLINIC | Age: 74
End: 2024-12-24

## 2024-12-24 NOTE — TELEPHONE ENCOUNTER
Spoke with patient and rescheduled 2/19/25 due to provider schedule change. Scheduled 3/17/25.

## 2024-12-31 ENCOUNTER — RA CDI HCC (OUTPATIENT)
Dept: OTHER | Facility: HOSPITAL | Age: 74
End: 2024-12-31

## 2025-01-03 ENCOUNTER — OFFICE VISIT (OUTPATIENT)
Dept: FAMILY MEDICINE CLINIC | Facility: CLINIC | Age: 75
End: 2025-01-03
Payer: COMMERCIAL

## 2025-01-03 VITALS
SYSTOLIC BLOOD PRESSURE: 110 MMHG | DIASTOLIC BLOOD PRESSURE: 60 MMHG | TEMPERATURE: 97.5 F | HEART RATE: 51 BPM | WEIGHT: 177.2 LBS | OXYGEN SATURATION: 94 % | HEIGHT: 63 IN | BODY MASS INDEX: 31.4 KG/M2

## 2025-01-03 DIAGNOSIS — I10 PRIMARY HYPERTENSION: Primary | ICD-10-CM

## 2025-01-03 DIAGNOSIS — E78.00 HYPERCHOLESTEROLEMIA: ICD-10-CM

## 2025-01-03 DIAGNOSIS — F32.9 MAJOR DEPRESSIVE DISORDER WITH CURRENT ACTIVE EPISODE, UNSPECIFIED DEPRESSION EPISODE SEVERITY, UNSPECIFIED WHETHER RECURRENT: ICD-10-CM

## 2025-01-03 DIAGNOSIS — R06.2 WHEEZING: ICD-10-CM

## 2025-01-03 PROCEDURE — 99214 OFFICE O/P EST MOD 30 MIN: CPT | Performed by: FAMILY MEDICINE

## 2025-01-03 PROCEDURE — G2211 COMPLEX E/M VISIT ADD ON: HCPCS | Performed by: FAMILY MEDICINE

## 2025-01-03 RX ORDER — PREDNISONE 5 MG/1
TABLET ORAL
Qty: 26 TABLET | Refills: 0 | Status: SHIPPED | OUTPATIENT
Start: 2025-01-03

## 2025-01-06 ENCOUNTER — TRANSCRIBE ORDERS (OUTPATIENT)
Dept: ADMINISTRATIVE | Age: 75
End: 2025-01-06

## 2025-01-06 ENCOUNTER — APPOINTMENT (OUTPATIENT)
Dept: LAB | Age: 75
End: 2025-01-06
Payer: COMMERCIAL

## 2025-01-06 DIAGNOSIS — I10 PRIMARY HYPERTENSION: ICD-10-CM

## 2025-01-06 DIAGNOSIS — E78.00 HYPERCHOLESTEROLEMIA: ICD-10-CM

## 2025-01-06 DIAGNOSIS — I10 ESSENTIAL HYPERTENSION: Primary | ICD-10-CM

## 2025-01-06 DIAGNOSIS — I10 ESSENTIAL HYPERTENSION: ICD-10-CM

## 2025-01-06 LAB
ALBUMIN SERPL BCG-MCNC: 3.9 G/DL (ref 3.5–5)
ALP SERPL-CCNC: 88 U/L (ref 34–104)
ALT SERPL W P-5'-P-CCNC: 23 U/L (ref 7–52)
ANION GAP SERPL CALCULATED.3IONS-SCNC: 10 MMOL/L (ref 4–13)
AST SERPL W P-5'-P-CCNC: 26 U/L (ref 13–39)
BASOPHILS # BLD AUTO: 0.02 THOUSANDS/ΜL (ref 0–0.1)
BASOPHILS NFR BLD AUTO: 0 % (ref 0–1)
BILIRUB SERPL-MCNC: 0.45 MG/DL (ref 0.2–1)
BUN SERPL-MCNC: 21 MG/DL (ref 5–25)
CALCIUM SERPL-MCNC: 9 MG/DL (ref 8.4–10.2)
CHLORIDE SERPL-SCNC: 106 MMOL/L (ref 96–108)
CHOLEST SERPL-MCNC: 215 MG/DL (ref ?–200)
CO2 SERPL-SCNC: 23 MMOL/L (ref 21–32)
CREAT SERPL-MCNC: 1.05 MG/DL (ref 0.6–1.3)
EOSINOPHIL # BLD AUTO: 0.03 THOUSAND/ΜL (ref 0–0.61)
EOSINOPHIL NFR BLD AUTO: 0 % (ref 0–6)
ERYTHROCYTE [DISTWIDTH] IN BLOOD BY AUTOMATED COUNT: 13.1 % (ref 11.6–15.1)
GFR SERPL CREATININE-BSD FRML MDRD: 51 ML/MIN/1.73SQ M
GLUCOSE P FAST SERPL-MCNC: 79 MG/DL (ref 65–99)
HCT VFR BLD AUTO: 42.7 % (ref 34.8–46.1)
HDLC SERPL-MCNC: 80 MG/DL
HGB BLD-MCNC: 13.7 G/DL (ref 11.5–15.4)
IMM GRANULOCYTES # BLD AUTO: 0.05 THOUSAND/UL (ref 0–0.2)
IMM GRANULOCYTES NFR BLD AUTO: 1 % (ref 0–2)
LDLC SERPL CALC-MCNC: 117 MG/DL (ref 0–100)
LYMPHOCYTES # BLD AUTO: 2.76 THOUSANDS/ΜL (ref 0.6–4.47)
LYMPHOCYTES NFR BLD AUTO: 29 % (ref 14–44)
MCH RBC QN AUTO: 31.7 PG (ref 26.8–34.3)
MCHC RBC AUTO-ENTMCNC: 32.1 G/DL (ref 31.4–37.4)
MCV RBC AUTO: 99 FL (ref 82–98)
MONOCYTES # BLD AUTO: 0.85 THOUSAND/ΜL (ref 0.17–1.22)
MONOCYTES NFR BLD AUTO: 9 % (ref 4–12)
NEUTROPHILS # BLD AUTO: 5.78 THOUSANDS/ΜL (ref 1.85–7.62)
NEUTS SEG NFR BLD AUTO: 61 % (ref 43–75)
NONHDLC SERPL-MCNC: 135 MG/DL
NRBC BLD AUTO-RTO: 0 /100 WBCS
PLATELET # BLD AUTO: 238 THOUSANDS/UL (ref 149–390)
PMV BLD AUTO: 9.9 FL (ref 8.9–12.7)
POTASSIUM SERPL-SCNC: 4.3 MMOL/L (ref 3.5–5.3)
PROT SERPL-MCNC: 7.7 G/DL (ref 6.4–8.4)
RBC # BLD AUTO: 4.32 MILLION/UL (ref 3.81–5.12)
SODIUM SERPL-SCNC: 139 MMOL/L (ref 135–147)
TRIGL SERPL-MCNC: 90 MG/DL (ref ?–150)
WBC # BLD AUTO: 9.49 THOUSAND/UL (ref 4.31–10.16)

## 2025-01-06 PROCEDURE — 80061 LIPID PANEL: CPT

## 2025-01-06 PROCEDURE — 85025 COMPLETE CBC W/AUTO DIFF WBC: CPT

## 2025-01-06 PROCEDURE — 36415 COLL VENOUS BLD VENIPUNCTURE: CPT

## 2025-01-06 PROCEDURE — 80053 COMPREHEN METABOLIC PANEL: CPT

## 2025-01-06 NOTE — PROGRESS NOTES
Name: Flower Nunn      : 1950      MRN: [id number]  Encounter Provider: Halima King DO  Encounter Date: 1/3/2025   Encounter department: [facility]    Assessment & Plan  Primary hypertension    Orders:    Comprehensive metabolic panel; Future    Hypercholesterolemia    Orders:    Lipid panel; Future    Major depressive disorder with current active episode, unspecified depression episode severity, unspecified whether recurrent           Wheezing    Orders:    predniSONE 5 mg tablet; 3 tabs po bid x2 days, then 2 tabs po bid x2 days, then 1 tab bid x2 days, then 1 daily until done.         History of Present Illness     The patient presents for follow-up of hypertension, hypercholesterolemia, and major depression.  All are well-controlled at this time but the patient is complaining of some wheezing that occurs intermittently throughout the day and happened after she got over a cold.  She denies coughing up any discolored phlegm any fever or chills.    Major depression,  Review of Systems   Constitutional:  Negative for appetite change, chills and fever.   HENT:  Negative for ear pain, facial swelling, rhinorrhea, sinus pain, sore throat and trouble swallowing.    Eyes:  Negative for discharge and redness.   Respiratory:  Positive for wheezing. Negative for chest tightness and shortness of breath.    Cardiovascular:  Negative for chest pain and palpitations.   Gastrointestinal:  Negative for abdominal pain, diarrhea, nausea and vomiting.   Endocrine: Negative for polyuria.   Genitourinary:  Negative for dysuria and urgency.   Musculoskeletal:  Negative for arthralgias and back pain.   Skin:  Negative for rash.   Neurological:  Negative for dizziness, weakness and headaches.   Hematological:  Negative for adenopathy.   Psychiatric/Behavioral:  Negative for behavioral problems, confusion and sleep disturbance.    All other systems reviewed and are negative.    Past Medical  History:   Diagnosis Date    Anxiety     Arthritis     Atopic dermatitis     Breast CA (HCC) 1999    left  age 49    Depression     Fracture     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HCC)     2013    History of chemotherapy     breast cancer    Hypertensive urgency 10/27/2024    Infective polyneuritis, acute (HCC)     Osteoporosis     PE (pulmonary thromboembolism) (HCC)     Peptic ulcer disease     Primary hypertension 09/04/2024    Psychiatric disorder     Sjogren's syndrome (HCC)     Sleep apnea     does not use cpap     Past Surgical History:   Procedure Laterality Date    ABDOMINAL SURGERY      APPENDECTOMY      BREAST BIOPSY Right     prior to breast cancer    CHOLECYSTECTOMY      COLONOSCOPY W/ ENDOSCOPIC US N/A 02/24/2017    Procedure: ANAL ENDOSCOPIC U/S;  Surgeon: Chang Arellano MD;  Location: [facility];  Service:     HYSTERECTOMY      age 33; ovaries intact    MASTECTOMY Left 1999    age 49    VA COLONOSCOPY FLX DX W/COLLJ SPEC WHEN PFRMD N/A 02/24/2017    Procedure: COLONOSCOPY;  Surgeon: Neo Baxter MD;  Location: [facility];  Service: Gastroenterology     Family History   Problem Relation Age of Onset    Hypertension Mother     Heart failure Father     Esophageal cancer Father 78    Breast cancer Paternal Aunt 48    Breast cancer Cousin 50        paternal    Breast cancer Cousin 50        paternal    Ovarian cancer Maternal Aunt 58    No Known Problems Sister     No Known Problems Maternal Grandmother     No Known Problems Maternal Grandfather     No Known Problems Paternal Grandmother     No Known Problems Paternal Grandfather     No Known Problems Son     No Known Problems Son     No Known Problems Maternal Aunt     No Known Problems Maternal Aunt     No Known Problems Paternal Aunt      Social History     Tobacco Use    Smoking status: Never     Passive exposure: Past    Smokeless tobacco: Never   Vaping Use    Vaping status: Never Used   Substance and Sexual Activity    Alcohol use: Yes     Comment: socially  "   Drug use: No    Sexual activity: Not Currently     Current Outpatient Medications on File Prior to Visit   Medication Sig    amLODIPine (NORVASC) 5 mg tablet Take 1 tablet (5 mg total) by mouth 2 (two) times a day    apixaban (Eliquis) 5 mg Take 1 tablet (5 mg total) by mouth 2 (two) times a day    aspirin 81 mg chewable tablet Chew 81 mg daily    atenolol (TENORMIN) 25 mg tablet Take 12.5 mg by mouth daily    escitalopram (LEXAPRO) 10 mg tablet Take 1.5 tablets (15 mg total) by mouth daily    ezetimibe-simvastatin (VYTORIN) 10-20 mg per tablet     hydroxychloroquine (PLAQUENIL) 200 mg tablet Take 200 mg by mouth daily with breakfast    LORazepam (ATIVAN) 1 mg tablet TAKE 1 TABLET BY MOUTH EVERY 6  HOURS AS NEEDED FOR ANXIETY    multivitamin (THERAGRAN) TABS Take 1 tablet by mouth daily    OLANZapine (ZyPREXA) 2.5 mg tablet Take 1 tablet (2.5 mg total) by mouth daily at bedtime    pilocarpine (SALAGEN) 5 mg tablet 2 (two) times a day    zolpidem (AMBIEN) 10 mg tablet Take 1 tablet (10 mg total) by mouth daily at bedtime Do not start before December 11, 2024.    LORazepam (ATIVAN) 1 mg tablet Take 1 tablet (1 mg total) by mouth 4 (four) times a day Do not start before December 11, 2024. (Patient not taking: Reported on 12/5/2024)     Allergies   Allergen Reactions    Adderall [Amphetamine-Dextroamphetamine] Tongue Swelling     Tongue swelling    Lamotrigine Rash     Other reaction(s): Possible rash, mouth sores (questionable reaction)     Immunization History   Administered Date(s) Administered    COVID-19 PFIZER VACCINE 0.3 ML IM 03/05/2021, 03/24/2021, 11/01/2021    COVID-19 Pfizer Vac BIVALENT Vern-sucrose 12 Yr+ IM 10/25/2022    COVID-19 Pfizer mRNA vacc PF vern-sucrose 12 yr and older (Comirnaty) 10/23/2023    H1N1, All Formulations 01/07/2010    Zoster 07/08/2013     Objective   /60   Pulse (!) 51   Temp 97.5 °F (36.4 °C)   Ht 5' 3\" (1.6 m)   Wt 80.4 kg (177 lb 3.2 oz)   LMP  (LMP Unknown)   SpO2 " 94%   BMI 31.39 kg/m²     Physical Exam  Vitals and nursing note reviewed.   Constitutional:       General: She is not in acute distress.     Appearance: Normal appearance. She is well-developed. She is not ill-appearing or diaphoretic.   HENT:      Head: Normocephalic and atraumatic.      Right Ear: Tympanic membrane, ear canal and external ear normal.      Left Ear: Tympanic membrane, ear canal and external ear normal.      Nose: Nose normal. No congestion or rhinorrhea.      Mouth/Throat:      Mouth: Mucous membranes are moist.      Pharynx: Oropharynx is clear. No oropharyngeal exudate or posterior oropharyngeal erythema.   Eyes:      General: No scleral icterus.        Right eye: No discharge.         Left eye: No discharge.      Extraocular Movements: Extraocular movements intact.      Conjunctiva/sclera: Conjunctivae normal.      Pupils: Pupils are equal, round, and reactive to light.   Neck:      Thyroid: No thyromegaly.      Vascular: No carotid bruit or JVD.      Trachea: No tracheal deviation.   Cardiovascular:      Rate and Rhythm: Normal rate and regular rhythm.      Pulses: Normal pulses.      Heart sounds: Normal heart sounds. No murmur heard.  Pulmonary:      Effort: Pulmonary effort is normal. No respiratory distress.      Breath sounds: No stridor. Wheezing present. No rhonchi or rales.      Comments: Positive intermittent expiratory wheezes bilaterally  Abdominal:      General: Abdomen is flat. Bowel sounds are normal. There is no distension.      Palpations: Abdomen is soft. There is no mass.      Tenderness: There is no abdominal tenderness. There is no guarding or rebound.   Musculoskeletal:         General: No swelling, tenderness or deformity. Normal range of motion.      Cervical back: Normal range of motion and neck supple. No rigidity.      Right lower leg: No edema.      Left lower leg: No edema.   Lymphadenopathy:      Cervical: No cervical adenopathy.   Skin:     General: Skin is warm  and dry.      Capillary Refill: Capillary refill takes less than 2 seconds.      Coloration: Skin is not jaundiced.      Findings: No bruising, erythema or rash.   Neurological:      General: No focal deficit present.      Mental Status: She is alert and oriented to person, place, and time.      Cranial Nerves: No cranial nerve deficit.      Sensory: No sensory deficit.      Motor: No abnormal muscle tone.      Coordination: Coordination normal.      Gait: Gait normal.      Deep Tendon Reflexes: Reflexes are normal and symmetric. Reflexes normal.   Psychiatric:         Mood and Affect: Mood normal.         Behavior: Behavior normal.         Thought Content: Thought content normal.         Judgment: Judgment normal.

## 2025-01-07 ENCOUNTER — RESULTS FOLLOW-UP (OUTPATIENT)
Dept: FAMILY MEDICINE CLINIC | Facility: CLINIC | Age: 75
End: 2025-01-07

## 2025-01-28 ENCOUNTER — TELEPHONE (OUTPATIENT)
Age: 75
End: 2025-01-28

## 2025-01-28 NOTE — TELEPHONE ENCOUNTER
Patient called and requested to speak with nurse regarding insomnia for the last month. She would like a medication change. Writer successfully transferred her to nurse line for assistance.

## 2025-01-28 NOTE — TELEPHONE ENCOUNTER
"Spoke with Flower. She said she has had really severe insomnia. For example last night she got into bed around 10:30 and couldn't fall asleep until 2:30. She said when she takes 2 tablets of ambien she is able to sleep. I counseled her on taking her medications as prescribed and always checking with Dr. Evangelista before changing her medications. She stated, \"I know but that is how desperate I have been.\" Her next appointment isn't until 3/17. Forwarding to provider for review. Clinical will follow up as advised. She is ok with staff leaving a detailed VM if she doesn't answer.   "

## 2025-01-28 NOTE — TELEPHONE ENCOUNTER
Called Flower and reviewed that she is on the maximum dose of ambien and it is unsafe for her to take it differently then prescribed. Reviewed that Dr. Evangelista wants to discuss options with her at next appt and I will reach out to clerical to put her on a cancellation list. Reviewed appropriate sleep hygiene practices to utilize in the mean time. Nothing further needed at this time.     Clerical - please just put her on the cancellation list. Thanks!

## 2025-01-29 DIAGNOSIS — F51.04 PSYCHOPHYSIOLOGICAL INSOMNIA: ICD-10-CM

## 2025-01-31 DIAGNOSIS — F51.04 PSYCHOPHYSIOLOGICAL INSOMNIA: ICD-10-CM

## 2025-01-31 NOTE — TELEPHONE ENCOUNTER
Reason for call:   [x] Refill   [] Prior Auth  [x] Other: Patient states she needs a new script    Office:   [] PCP/Provider -   [x] Specialty/Provider - [facility] Evangelista MD     Medication: zolpidem (AMBIEN) 10 mg tablet     Dose/Frequency: Take 1 tablet (10 mg total) by mouth daily at bedtime     Quantity: 90 tablet     Pharmacy: [facility] - [address] [phone]    Does the patient have enough for 3 days?   [x] Yes   [] No - Send as HP to POD

## 2025-02-03 RX ORDER — ZOLPIDEM TARTRATE 10 MG/1
10 TABLET ORAL
Qty: 90 TABLET | Refills: 0 | OUTPATIENT
Start: 2025-02-03

## 2025-02-03 RX ORDER — ZOLPIDEM TARTRATE 10 MG/1
10 TABLET ORAL
Qty: 90 TABLET | OUTPATIENT
Start: 2025-02-03

## 2025-02-03 NOTE — TELEPHONE ENCOUNTER
Will be due for refill early March. Called Flower ([phone]) and left  requesting a call back to inform her that Dr. Evangelista refused the request until it is closer to due date.

## 2025-02-03 NOTE — TELEPHONE ENCOUNTER
Pt called back, writer informed pt that she must call back before she runs out closer to march.   Pt verbalized understanding.

## 2025-02-12 ENCOUNTER — TELEPHONE (OUTPATIENT)
Age: 75
End: 2025-02-12

## 2025-02-12 DIAGNOSIS — F51.04 PSYCHOPHYSIOLOGICAL INSOMNIA: ICD-10-CM

## 2025-02-12 NOTE — TELEPHONE ENCOUNTER
Nurse spoke with Flower.    Over the past 6 months, Ambien has not been effective for sleep. Takes Ambien 10 mg at 10 pm and cannot fall asleep until 2 am, wakes at 11 am. I do not nap during the day. I started walking on my treadmill during the day to help with sleep at night but, it is not making a difference.     I want to sleep from 10 pm to 8 am.          Please advise.

## 2025-02-12 NOTE — TELEPHONE ENCOUNTER
Flower Nunn called and  requested a call back to discuss scheduling a SHRUTHI from Dr Evangelista to another provider. Patient stated she has not slept in 3 weeks and can never get a hold of the Dr . She would like a SHRUTHI..    They can be reached at P# [phone].       Thank you.

## 2025-02-12 NOTE — TELEPHONE ENCOUNTER
Reason for call:   [x] Refill   [] Prior Auth  [] Other:     Office:   [] PCP/Provider -   [x] Specialty/Provider - psych     Medication: zolpidem     Dose/Frequency: 10 mg take daily at bedtime     Quantity: 90    Pharmacy: [facility]     Does the patient have enough for 3 days?   [x] Yes - pt stated she gets nervous when she gets low  [] No - Send as HP to POD

## 2025-02-14 ENCOUNTER — TELEPHONE (OUTPATIENT)
Dept: PSYCHIATRY | Facility: CLINIC | Age: 75
End: 2025-02-14

## 2025-02-14 NOTE — TELEPHONE ENCOUNTER
Received call from POD with patient regarding SHRUTHI. Writer scheduled Medication Management  SHRUTHI for 6/11/25 at 1pm with Dr. Jeronimo Santana due to patient request.    Scheduled f/u for 9/16/25 at 1pm

## 2025-02-14 NOTE — TELEPHONE ENCOUNTER
Schedule at [place] in June with Dr. Decker or Dr. Linton NP slot.     Advise patient to stay with Dr. Evangelista until SHRUTHI visit.

## 2025-02-14 NOTE — TELEPHONE ENCOUNTER
1st outreach attempt. Called and left message for patient to return a call to [phone] regarding SHRUTHI appt for Medication Management . Please transfer call to [facility] for assistance.

## 2025-02-17 RX ORDER — ZOLPIDEM TARTRATE 10 MG/1
10 TABLET ORAL
Qty: 90 TABLET | Refills: 0 | OUTPATIENT
Start: 2025-02-17

## 2025-03-11 ENCOUNTER — APPOINTMENT (OUTPATIENT)
Dept: LAB | Age: 75
End: 2025-03-11
Payer: COMMERCIAL

## 2025-03-11 DIAGNOSIS — M35.01 SICCA SYNDROME WITH KERATOCONJUNCTIVITIS: ICD-10-CM

## 2025-03-11 LAB
BASOPHILS # BLD AUTO: 0.03 THOUSANDS/ÂΜL (ref 0–0.1)
BASOPHILS NFR BLD AUTO: 0 % (ref 0–1)
C3 SERPL-MCNC: 162 MG/DL (ref 87–200)
C4 SERPL-MCNC: 36 MG/DL (ref 19–52)
CREAT SERPL-MCNC: 1.07 MG/DL (ref 0.6–1.3)
CRP SERPL QL: 1.7 MG/L
EOSINOPHIL # BLD AUTO: 0.24 THOUSAND/ÂΜL (ref 0–0.61)
EOSINOPHIL NFR BLD AUTO: 3 % (ref 0–6)
ERYTHROCYTE [DISTWIDTH] IN BLOOD BY AUTOMATED COUNT: 12.9 % (ref 11.6–15.1)
ERYTHROCYTE [SEDIMENTATION RATE] IN BLOOD: 46 MM/HOUR (ref 0–29)
GFR SERPL CREATININE-BSD FRML MDRD: 50 ML/MIN/1.73SQ M
HCT VFR BLD AUTO: 42.4 % (ref 34.8–46.1)
HGB BLD-MCNC: 13.7 G/DL (ref 11.5–15.4)
IMM GRANULOCYTES # BLD AUTO: 0.03 THOUSAND/UL (ref 0–0.2)
IMM GRANULOCYTES NFR BLD AUTO: 0 % (ref 0–2)
LYMPHOCYTES # BLD AUTO: 1.99 THOUSANDS/ÂΜL (ref 0.6–4.47)
LYMPHOCYTES NFR BLD AUTO: 27 % (ref 14–44)
MCH RBC QN AUTO: 31.6 PG (ref 26.8–34.3)
MCHC RBC AUTO-ENTMCNC: 32.3 G/DL (ref 31.4–37.4)
MCV RBC AUTO: 98 FL (ref 82–98)
MONOCYTES # BLD AUTO: 0.59 THOUSAND/ÂΜL (ref 0.17–1.22)
MONOCYTES NFR BLD AUTO: 8 % (ref 4–12)
NEUTROPHILS # BLD AUTO: 4.56 THOUSANDS/ÂΜL (ref 1.85–7.62)
NEUTS SEG NFR BLD AUTO: 62 % (ref 43–75)
NRBC BLD AUTO-RTO: 0 /100 WBCS
PLATELET # BLD AUTO: 195 THOUSANDS/UL (ref 149–390)
PMV BLD AUTO: 10.9 FL (ref 8.9–12.7)
RBC # BLD AUTO: 4.33 MILLION/UL (ref 3.81–5.12)
RHEUMATOID FACT SERPL-ACNC: <10 IU/ML
WBC # BLD AUTO: 7.44 THOUSAND/UL (ref 4.31–10.16)

## 2025-03-11 PROCEDURE — 82565 ASSAY OF CREATININE: CPT

## 2025-03-11 PROCEDURE — 86200 CCP ANTIBODY: CPT

## 2025-03-11 PROCEDURE — 86431 RHEUMATOID FACTOR QUANT: CPT

## 2025-03-11 PROCEDURE — 86235 NUCLEAR ANTIGEN ANTIBODY: CPT

## 2025-03-11 PROCEDURE — 86225 DNA ANTIBODY NATIVE: CPT

## 2025-03-11 PROCEDURE — 85025 COMPLETE CBC W/AUTO DIFF WBC: CPT

## 2025-03-11 PROCEDURE — 86160 COMPLEMENT ANTIGEN: CPT

## 2025-03-11 PROCEDURE — 86039 ANTINUCLEAR ANTIBODIES (ANA): CPT

## 2025-03-11 PROCEDURE — 85652 RBC SED RATE AUTOMATED: CPT

## 2025-03-11 PROCEDURE — 86140 C-REACTIVE PROTEIN: CPT

## 2025-03-11 PROCEDURE — 36415 COLL VENOUS BLD VENIPUNCTURE: CPT

## 2025-03-11 PROCEDURE — 86038 ANTINUCLEAR ANTIBODIES: CPT

## 2025-03-12 LAB
ANA HOMOGEN SER QL IF: NORMAL
ANA HOMOGEN TITR SER: NORMAL {TITER}
ANA SER QL IF: POSITIVE

## 2025-03-17 ENCOUNTER — OFFICE VISIT (OUTPATIENT)
Dept: PSYCHIATRY | Facility: CLINIC | Age: 75
End: 2025-03-17
Payer: COMMERCIAL

## 2025-03-17 DIAGNOSIS — F33.0 MILD EPISODE OF RECURRENT MAJOR DEPRESSIVE DISORDER (HCC): ICD-10-CM

## 2025-03-17 DIAGNOSIS — F51.04 PSYCHOPHYSIOLOGICAL INSOMNIA: ICD-10-CM

## 2025-03-17 DIAGNOSIS — F41.0 PANIC DISORDER WITHOUT AGORAPHOBIA WITH MODERATE PANIC ATTACKS: ICD-10-CM

## 2025-03-17 LAB
CCP AB SER IA-ACNC: 2.4 (ref ?–10)
DSDNA IGG SERPL IA-ACNC: <0.9 IU/ML (ref ?–15)
ENA SS-A AB SER IA-ACNC: <0.5 U/ML (ref ?–10)
ENA SS-B IGG SER IA-ACNC: <0.6 U/ML (ref ?–10)
NUCLEAR IGG SER IA-RTO: 0.3 RATIO (ref ?–1)

## 2025-03-17 PROCEDURE — 99214 OFFICE O/P EST MOD 30 MIN: CPT | Performed by: PSYCHIATRY & NEUROLOGY

## 2025-03-17 RX ORDER — ESCITALOPRAM OXALATE 10 MG/1
15 TABLET ORAL DAILY
Qty: 135 TABLET | Refills: 1 | Status: SHIPPED | OUTPATIENT
Start: 2025-03-17

## 2025-03-17 RX ORDER — OLANZAPINE 2.5 MG/1
2.5 TABLET, FILM COATED ORAL
Qty: 90 TABLET | Refills: 1 | Status: SHIPPED | OUTPATIENT
Start: 2025-03-17

## 2025-03-17 RX ORDER — ZOLPIDEM TARTRATE 10 MG/1
10 TABLET ORAL
Qty: 90 TABLET | Refills: 0 | Status: SHIPPED | OUTPATIENT
Start: 2025-03-17

## 2025-03-17 RX ORDER — ALPRAZOLAM 0.5 MG
0.5 TABLET ORAL 4 TIMES DAILY PRN
Qty: 120 TABLET | Refills: 1 | Status: SHIPPED | OUTPATIENT
Start: 2025-03-17

## 2025-03-17 NOTE — BH TREATMENT PLAN
TREATMENT PLAN (Medication Management Only)        [facility]    Name and Date of Birth:  Flower Nunn 75 y.o. 1950  MRN: [id number]  Date of Treatment Plan: March 17, 2025  Diagnosis/Diagnoses:    1. Panic disorder without agoraphobia with moderate panic attacks    2. Mild episode of recurrent major depressive disorder (HCC)    3. Psychophysiological insomnia      Strengths/Personal Resources for Self-Care: supportive family, taking medications as prescribed, ability to communicate well.  Area/Areas of need (in own words): anxiety, sleep problems  1. Long Term Goal:   maintain control of anxiety.  Target Date:2 months - 5/17/2025  Person/Persons responsible for completion of goal: Flower  2.  Short Term Objective (s) - How will we reach this goal?:   A.  Provider new recommended medication/dosage changes and/or continue medication(s): continue current medications as prescribed.  B.  N/A.  C.  N/A.  Target Date:2 months - 5/17/2025  Person/Persons Responsible for Completion of Goal: Flower  Progress Towards Goals: stable, continuing treatment  Treatment Modality: medication management every 2 months  Review due 180 days from date of this plan: 6 months - 9/17/2025  Expected length of service: ongoing treatment unless revised  My Physician/PA/NP and I have developed this plan together and I agree to work on the goals and objectives. I understand the treatment goals that were developed for my treatment.   Electronic Signatures: on file (unless signed below)    Adam Evangelista MD 03/17/25

## 2025-03-17 NOTE — PSYCH
Flower Nunn 1950 [id number]     The patient was seen for continuing care and pharmacotherapy. Continues to report difficulty falling asleep. She sleeps a total of 6-7 hours. She is worried about her 51 years old son who is struggling financially. Her older brother is also in financial trouble and is about to become homeless.  She has a normal appetite.  She has not had any new health problems.  Lorazepam has not been helpful and she stopped taking it.  She reports that she had a better response to alprazolam in the past        ROS:  As HPI  Current Mental Status Evaluation:  Appearance:  Adequate hygiene and grooming and Good eye contact   Behavior:  Cooperative   Mood:  Anxious   Affect: appropriate   Speech: Normal volume and Normal rate   Thought Process:  Goal directed and coherent   Thought Content:  Does not verbalize delusional material   Perceptual Disturbances: Denies hallucinations and does not appear to be responding to internal stimuli   Risk Potential: No suicidal or homicidal ideation   Orientation:         Diagnosis ICD-10-CM Associated Orders   1. Panic disorder without agoraphobia with moderate panic attacks  F41.0 ALPRAZolam (XANAX) 0.5 mg tablet     escitalopram (LEXAPRO) 10 mg tablet     OLANZapine (ZyPREXA) 2.5 mg tablet      2. Mild episode of recurrent major depressive disorder (HCC)  F33.0 escitalopram (LEXAPRO) 10 mg tablet      3. Psychophysiological insomnia  F51.04 ALPRAZolam (XANAX) 0.5 mg tablet     zolpidem (AMBIEN) 10 mg tablet             Current Outpatient Medications   Medication Instructions    ALPRAZolam (XANAX) 0.5 mg, Oral, 4 times daily PRN    amLODIPine (NORVASC) 5 mg, Oral, 2 times daily    apixaban (ELIQUIS) 5 mg, Oral, 2 times daily    aspirin 81 mg, Daily    atenolol (TENORMIN) 12.5 mg, Daily    escitalopram (LEXAPRO) 15 mg, Oral, Daily    ezetimibe-simvastatin (VYTORIN) 10-20 mg per tablet No dose, route, or frequency recorded.    hydroxychloroquine (PLAQUENIL)  200 mg, Daily with breakfast    multivitamin (THERAGRAN) TABS 1 tablet, Daily    OLANZapine (ZYPREXA) 2.5 mg, Oral, Daily at bedtime    pilocarpine (SALAGEN) 5 mg tablet 2 times daily    predniSONE 5 mg tablet 3 tabs po bid x2 days, then 2 tabs po bid x2 days, then 1 tab bid x2 days, then 1 daily until done.    zolpidem (AMBIEN) 10 mg, Oral, Daily at bedtime          Treatment Recommendations- Risks Benefits    Sleep hygiene techniques were.  We will also discontinue lorazepam and switch to alprazolam.  She was instructed to avoid alcoholic beverages and avoid driving if alprazolam causes sedation.  She is aware of the risks.  Follow-up in 2 months  Risks, Benefits And Possible Side Effects Of Medications:  Risks, benefits, and possible side effects of medications explained to patient and patient verbalizes understanding    VIRTUAL VISIT DISCLAIMER    Flowerdoron Singhanco verbally agrees to participate in Virtual Care Services. Pt is aware that Virtual Care Services could be limited without vital signs or the ability to perform a full hands-on physical exam. Flower Nunn understands she or the provider may request at any time to terminate the video visit and request the patient to seek care or treatment in person.     Adam Evangelista MD 03/17/25

## 2025-04-05 ENCOUNTER — APPOINTMENT (EMERGENCY)
Dept: CT IMAGING | Facility: HOSPITAL | Age: 75
End: 2025-04-05
Payer: COMMERCIAL

## 2025-04-05 ENCOUNTER — HOSPITAL ENCOUNTER (OUTPATIENT)
Facility: HOSPITAL | Age: 75
Setting detail: OBSERVATION
Discharge: HOME | End: 2025-04-06
Attending: EMERGENCY MEDICINE | Admitting: INTERNAL MEDICINE
Payer: COMMERCIAL

## 2025-04-05 DIAGNOSIS — R42 DIZZINESS: Primary | ICD-10-CM

## 2025-04-05 DIAGNOSIS — R42 VERTIGO: ICD-10-CM

## 2025-04-05 DIAGNOSIS — R11.0 NAUSEA: ICD-10-CM

## 2025-04-05 PROBLEM — I25.10 CAD (CORONARY ARTERY DISEASE): Status: ACTIVE | Noted: 2025-04-05

## 2025-04-05 PROBLEM — I25.10 CAD (CORONARY ARTERY DISEASE): Status: RESOLVED | Noted: 2025-04-05 | Resolved: 2025-04-05

## 2025-04-05 PROBLEM — Z86.711 HISTORY OF PULMONARY EMBOLISM: Status: ACTIVE | Noted: 2024-10-27

## 2025-04-05 PROBLEM — E78.5 HLD (HYPERLIPIDEMIA): Status: ACTIVE | Noted: 2025-04-05

## 2025-04-05 LAB
ALBUMIN SERPL BCG-MCNC: 4.2 G/DL (ref 3.5–5)
ALP SERPL-CCNC: 94 U/L (ref 34–104)
ALT SERPL W P-5'-P-CCNC: 20 U/L (ref 7–52)
ANION GAP SERPL CALCULATED.3IONS-SCNC: 8 MMOL/L (ref 4–13)
APTT PPP: 30 SECONDS (ref 23–34)
AST SERPL W P-5'-P-CCNC: 26 U/L (ref 13–39)
BASOPHILS # BLD AUTO: 0.04 THOUSANDS/ÂΜL (ref 0–0.1)
BASOPHILS NFR BLD AUTO: 1 % (ref 0–1)
BILIRUB SERPL-MCNC: 0.43 MG/DL (ref 0.2–1)
BUN SERPL-MCNC: 19 MG/DL (ref 5–25)
CALCIUM SERPL-MCNC: 9.6 MG/DL (ref 8.4–10.2)
CHLORIDE SERPL-SCNC: 107 MMOL/L (ref 96–108)
CO2 SERPL-SCNC: 27 MMOL/L (ref 21–32)
CREAT SERPL-MCNC: 1.08 MG/DL (ref 0.6–1.3)
EOSINOPHIL # BLD AUTO: 0.17 THOUSAND/ÂΜL (ref 0–0.61)
EOSINOPHIL NFR BLD AUTO: 2 % (ref 0–6)
ERYTHROCYTE [DISTWIDTH] IN BLOOD BY AUTOMATED COUNT: 13.2 % (ref 11.6–15.1)
EST. AVERAGE GLUCOSE BLD GHB EST-MCNC: 103 MG/DL
GFR SERPL CREATININE-BSD FRML MDRD: 49 ML/MIN/1.73SQ M
GLUCOSE SERPL-MCNC: 78 MG/DL (ref 65–140)
HBA1C MFR BLD: 5.2 %
HCT VFR BLD AUTO: 42.9 % (ref 34.8–46.1)
HGB BLD-MCNC: 13.9 G/DL (ref 11.5–15.4)
IMM GRANULOCYTES # BLD AUTO: 0.02 THOUSAND/UL (ref 0–0.2)
IMM GRANULOCYTES NFR BLD AUTO: 0 % (ref 0–2)
INR PPP: 1.11 (ref 0.85–1.19)
LYMPHOCYTES # BLD AUTO: 1.94 THOUSANDS/ÂΜL (ref 0.6–4.47)
LYMPHOCYTES NFR BLD AUTO: 24 % (ref 14–44)
MCH RBC QN AUTO: 31.4 PG (ref 26.8–34.3)
MCHC RBC AUTO-ENTMCNC: 32.4 G/DL (ref 31.4–37.4)
MCV RBC AUTO: 97 FL (ref 82–98)
MONOCYTES # BLD AUTO: 0.58 THOUSAND/ÂΜL (ref 0.17–1.22)
MONOCYTES NFR BLD AUTO: 7 % (ref 4–12)
NEUTROPHILS # BLD AUTO: 5.32 THOUSANDS/ÂΜL (ref 1.85–7.62)
NEUTS SEG NFR BLD AUTO: 66 % (ref 43–75)
NRBC BLD AUTO-RTO: 0 /100 WBCS
PLATELET # BLD AUTO: 196 THOUSANDS/UL (ref 149–390)
PMV BLD AUTO: 9.9 FL (ref 8.9–12.7)
POTASSIUM SERPL-SCNC: 4.2 MMOL/L (ref 3.5–5.3)
PROT SERPL-MCNC: 7.7 G/DL (ref 6.4–8.4)
PROTHROMBIN TIME: 15 SECONDS (ref 12.3–15)
RBC # BLD AUTO: 4.42 MILLION/UL (ref 3.81–5.12)
SODIUM SERPL-SCNC: 142 MMOL/L (ref 135–147)
VIT B12 SERPL-MCNC: 302 PG/ML (ref 180–914)
WBC # BLD AUTO: 8.07 THOUSAND/UL (ref 4.31–10.16)

## 2025-04-05 PROCEDURE — 85025 COMPLETE CBC W/AUTO DIFF WBC: CPT

## 2025-04-05 PROCEDURE — 70498 CT ANGIOGRAPHY NECK: CPT

## 2025-04-05 PROCEDURE — 80053 COMPREHEN METABOLIC PANEL: CPT

## 2025-04-05 PROCEDURE — 93005 ELECTROCARDIOGRAM TRACING: CPT

## 2025-04-05 PROCEDURE — 85610 PROTHROMBIN TIME: CPT

## 2025-04-05 PROCEDURE — 70496 CT ANGIOGRAPHY HEAD: CPT

## 2025-04-05 PROCEDURE — 94760 N-INVAS EAR/PLS OXIMETRY 1: CPT

## 2025-04-05 PROCEDURE — 85730 THROMBOPLASTIN TIME PARTIAL: CPT

## 2025-04-05 PROCEDURE — 82607 VITAMIN B-12: CPT

## 2025-04-05 PROCEDURE — 36415 COLL VENOUS BLD VENIPUNCTURE: CPT

## 2025-04-05 PROCEDURE — 99285 EMERGENCY DEPT VISIT HI MDM: CPT

## 2025-04-05 PROCEDURE — 83036 HEMOGLOBIN GLYCOSYLATED A1C: CPT

## 2025-04-05 RX ORDER — MECLIZINE HCL 12.5 MG 12.5 MG/1
25 TABLET ORAL ONCE
Status: COMPLETED | OUTPATIENT
Start: 2025-04-05 | End: 2025-04-05

## 2025-04-05 RX ORDER — ASPIRIN 81 MG/1
81 TABLET, CHEWABLE ORAL DAILY
Status: DISCONTINUED | OUTPATIENT
Start: 2025-04-06 | End: 2025-04-06 | Stop reason: HOSPADM

## 2025-04-05 RX ORDER — EZETIMIBE 10 MG/1
10 TABLET ORAL
Status: DISCONTINUED | OUTPATIENT
Start: 2025-04-06 | End: 2025-04-06 | Stop reason: HOSPADM

## 2025-04-05 RX ORDER — OLANZAPINE 5 MG/1
2.5 TABLET ORAL
Status: DISCONTINUED | OUTPATIENT
Start: 2025-04-05 | End: 2025-04-06 | Stop reason: HOSPADM

## 2025-04-05 RX ORDER — PRAVASTATIN SODIUM 40 MG
40 TABLET ORAL
Status: DISCONTINUED | OUTPATIENT
Start: 2025-04-06 | End: 2025-04-06 | Stop reason: HOSPADM

## 2025-04-05 RX ORDER — AMLODIPINE BESYLATE 2.5 MG/1
5 TABLET ORAL 2 TIMES DAILY
Status: DISCONTINUED | OUTPATIENT
Start: 2025-04-05 | End: 2025-04-06 | Stop reason: HOSPADM

## 2025-04-05 RX ORDER — HYDROXYCHLOROQUINE SULFATE 200 MG/1
200 TABLET, FILM COATED ORAL
Status: DISCONTINUED | OUTPATIENT
Start: 2025-04-06 | End: 2025-04-06 | Stop reason: HOSPADM

## 2025-04-05 RX ORDER — PILOCARPINE HYDROCHLORIDE 5 MG/1
5 TABLET, FILM COATED ORAL 2 TIMES DAILY
Status: DISCONTINUED | OUTPATIENT
Start: 2025-04-05 | End: 2025-04-06 | Stop reason: HOSPADM

## 2025-04-05 RX ORDER — ATENOLOL 25 MG/1
12.5 TABLET ORAL DAILY
Status: DISCONTINUED | OUTPATIENT
Start: 2025-04-06 | End: 2025-04-06 | Stop reason: HOSPADM

## 2025-04-05 RX ORDER — MECLIZINE HCL 12.5 MG 12.5 MG/1
25 TABLET ORAL EVERY 12 HOURS
Status: DISCONTINUED | OUTPATIENT
Start: 2025-04-05 | End: 2025-04-06 | Stop reason: HOSPADM

## 2025-04-05 RX ORDER — ALPRAZOLAM 0.25 MG
0.5 TABLET ORAL 4 TIMES DAILY PRN
Status: DISCONTINUED | OUTPATIENT
Start: 2025-04-05 | End: 2025-04-06 | Stop reason: HOSPADM

## 2025-04-05 RX ORDER — ZOLPIDEM TARTRATE 5 MG/1
10 TABLET ORAL
Status: DISCONTINUED | OUTPATIENT
Start: 2025-04-05 | End: 2025-04-06 | Stop reason: HOSPADM

## 2025-04-05 RX ORDER — ESCITALOPRAM OXALATE 10 MG/1
15 TABLET ORAL
Status: DISCONTINUED | OUTPATIENT
Start: 2025-04-05 | End: 2025-04-06 | Stop reason: HOSPADM

## 2025-04-05 RX ADMIN — ESCITALOPRAM OXALATE 15 MG: 10 TABLET ORAL at 21:18

## 2025-04-05 RX ADMIN — AMLODIPINE BESYLATE 5 MG: 2.5 TABLET ORAL at 20:22

## 2025-04-05 RX ADMIN — IOHEXOL 70 ML: 350 INJECTION, SOLUTION INTRAVENOUS at 16:14

## 2025-04-05 RX ADMIN — ZOLPIDEM TARTRATE 10 MG: 5 TABLET, FILM COATED ORAL at 21:12

## 2025-04-05 RX ADMIN — PILOCARPINE HYDROCHLORIDE 5 MG: 5 TABLET, FILM COATED ORAL at 21:16

## 2025-04-05 RX ADMIN — MECLIZINE 25 MG: 12.5 TABLET ORAL at 14:28

## 2025-04-05 RX ADMIN — OLANZAPINE 2.5 MG: 5 TABLET, FILM COATED ORAL at 21:12

## 2025-04-05 RX ADMIN — APIXABAN 5 MG: 5 TABLET, FILM COATED ORAL at 20:22

## 2025-04-05 NOTE — ED PROVIDER NOTES
Time reflects when diagnosis was documented in both MDM as applicable and the Disposition within this note       Time User Action Codes Description Comment    4/5/2025  5:38 PM Kyle, Boaz Add [R42] Dizziness     4/5/2025  5:38 PM Kyle, Boaz Add [R11.0] Nausea     4/5/2025  7:45 PM May Quintanilla Add [R42] Vertigo           ED Disposition       ED Disposition   Admit    Condition   Stable    Date/Time   Sat Apr 5, 2025  5:38 PM    Comment   Case was discussed with Dr. Mcgowan and the patient's admission status was agreed to be Admission Status: observation status to the service of Dr. Mcgowan  .               Assessment & Plan       Medical Decision Making  Flower Nunn is a 75 y.o. female with a past medical history of hypertension, pulmonary embolism, sleep apnea, Guillain-Barré being evaluated for dizziness    Differential diagnoses include but not limited to: Cerebellar stroke, vertebral dissection, peripheral vertigo, cardiac arrhythmia, electrolyte abnormality, hypoglycemia    Initial workup to include: CBC, CMP, coags, EKG, CTA head and neck    See ED Course for data/imaging interpretation and further MDM.    Disposition: Lab work in the ED unremarkable.  CTA without evidence of intracranial hemorrhage, mass effect or edema.  Patient continues to endorse irregular gait and dizziness despite meclizine.  Patient to be admitted for further evaluation with concern for central cause of vertigo.      Amount and/or Complexity of Data Reviewed  Labs: ordered. Decision-making details documented in ED Course.  Radiology: ordered. Decision-making details documented in ED Course.  ECG/medicine tests:  Decision-making details documented in ED Course.    Risk  Prescription drug management.  Decision regarding hospitalization.        ED Course as of 04/06/25 0906   Sat Apr 05, 2025   1412 ECG 12 lead  Sinus bradycardia, prolonged QT interval, no ST or T wave changes concerning for ischemia   1553 CBC and  differential  Within normal limits, no leukocytosis or anemia   1553 Comprehensive metabolic panel  Within normal limits, no electrolyte abnormalities.  Normal kidney and liver function   1717 CTA head and neck with and without contrast  IMPRESSION:     CT Brain:     1. No acute intracranial hemorrhage, mass effect, or edema.  2. Mild, chronic microangiopathy.     CT Angiography:     3. No hemodynamically significant stenosis in the major arteries of the neck.  4. No intracranial aneurysm or major intracranial arterial stenosis.     Resident: IVAN RONQUILLO I, the attending radiologist, have reviewed the images and agree with the final report above.     Workstation performed: [id number]         Medications   meclizine (ANTIVERT) tablet 25 mg (25 mg Oral Given 4/5/25 1428)   iohexol (OMNIPAQUE) 350 MG/ML injection (MULTI-DOSE) 70 mL (70 mL Intravenous Given 4/5/25 1614)       ED Risk Strat Scores                                                History of Present Illness       Chief Complaint   Patient presents with    Dizziness     Dizziness that started on Thursday. Pt believes it is vertigo. Some nausea.        Past Medical History:   Diagnosis Date    Anxiety     Arthritis     Atopic dermatitis     Breast CA (HCC) 1999    left  age 49    CAD (coronary artery disease) 4/5/2025    Depression     Fracture     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HCC)     2013    History of chemotherapy     breast cancer    Hypertensive urgency 10/27/2024    Infective polyneuritis, acute (HCC)     Osteoporosis     PE (pulmonary thromboembolism) (HCC)     Peptic ulcer disease     Primary hypertension 09/04/2024    Psychiatric disorder     Sjogren's syndrome (HCC)     Sleep apnea     does not use cpap      Past Surgical History:   Procedure Laterality Date    ABDOMINAL SURGERY      APPENDECTOMY      BREAST BIOPSY Right     prior to breast cancer    CHOLECYSTECTOMY      COLONOSCOPY W/ ENDOSCOPIC US N/A 02/24/2017    Procedure: ANAL ENDOSCOPIC  U/S;  Surgeon: Chang Arellano MD;  Location: [facility];  Service:     HYSTERECTOMY      age 33; ovaries intact    MASTECTOMY Left 1999    age 49    AK COLONOSCOPY FLX DX W/COLLJ SPEC WHEN PFRMD N/A 02/24/2017    Procedure: COLONOSCOPY;  Surgeon: Neo Baxter MD;  Location: [facility];  Service: Gastroenterology      Family History   Problem Relation Age of Onset    Hypertension Mother     Heart failure Father     Esophageal cancer Father 78    Breast cancer Paternal Aunt 48    Breast cancer Cousin 50        paternal    Breast cancer Cousin 50        paternal    Ovarian cancer Maternal Aunt 58    No Known Problems Sister     No Known Problems Maternal Grandmother     No Known Problems Maternal Grandfather     No Known Problems Paternal Grandmother     No Known Problems Paternal Grandfather     No Known Problems Son     No Known Problems Son     No Known Problems Maternal Aunt     No Known Problems Maternal Aunt     No Known Problems Paternal Aunt       Social History     Tobacco Use    Smoking status: Never     Passive exposure: Past    Smokeless tobacco: Never   Vaping Use    Vaping status: Never Used   Substance Use Topics    Alcohol use: Yes     Comment: socially    Drug use: No      E-Cigarette/Vaping    E-Cigarette Use Never User       E-Cigarette/Vaping Substances    Nicotine No     THC No     CBD No     Flavoring No     Other No     Unknown No       I have reviewed and agree with the history as documented.     Flower Nunn is a 75 y.o. female with a past medical history of hypertension, pulmonary embolism, sleep apnea, Guillain-Barré being evaluated for dizziness.  States that symptoms began approximately 3 days ago.  Patient states that she has had difficulty with gait since Guillain-Barré approximately 10 years ago, but walks without assistance of cane or walker at home normally.  Symptoms acutely worsened 3 days ago and patient now describing sensation of room spinning around her, denies any prior  history of peripheral vertigo.  No prior history of stroke or TIA.  Patient reports bradycardia at baseline.  She had previous pacemaker placed which was infected leading to bacteremia and sepsis, pacemaker was taken out she has had sinus bradycardia since. Mild nausea without any vomiting.  Denies any current chest pain, shortness of breath, abdominal pain, diarrhea, constipation, dysuria, urinary urgency or frequency.            Dizziness  Associated symptoms: nausea        Review of Systems   Gastrointestinal:  Positive for nausea.   Neurological:  Positive for dizziness.   All other systems reviewed and are negative.          Objective       ED Triage Vitals   Temperature Pulse Blood Pressure Respirations SpO2 Patient Position - Orthostatic VS   04/05/25 1354 04/05/25 1354 04/05/25 1354 04/05/25 1354 04/05/25 1354 04/05/25 1855   97.7 °F (36.5 °C) (!) 43 121/59 16 95 % Lying      Temp Source Heart Rate Source BP Location FiO2 (%) Pain Score    04/05/25 1354 04/05/25 1354 04/05/25 1354 -- 04/05/25 1900    Oral Monitor Right arm  No Pain      Vitals      Date and Time Temp Pulse SpO2 Resp BP Pain Score FACES Pain Rating User   04/06/25 0700 98 °F (36.7 °C) 56 96 % 18 132/68 -- -- CC   04/06/25 0530 -- 58 96 % 16 128/64 -- -- RL   04/06/25 0400 -- 62 96 % 14 138/68 -- -- RL   04/06/25 0327 -- -- 96 % -- -- -- -- MO   04/06/25 0200 -- 61 96 % 16 127/61 -- -- SR   04/06/25 0000 -- 74 95 % 20 151/78 -- -- SR   04/05/25 2241 97.6 °F (36.4 °C) 63 99 % 20 124/74 -- -- SR   04/05/25 2235 -- -- 97 % -- -- -- -- MO   04/05/25 2000 -- -- -- -- 124/74 -- -- JR   04/05/25 1900 -- -- -- -- -- No Pain -- JR   04/05/25 1855 97.6 °F (36.4 °C) 88 94 % 16 164/78 -- -- SR   04/05/25 1800 -- 56 97 % 16 148/72 -- -- SG   04/05/25 1745 -- 54 97 % 16 140/73 -- -- SG   04/05/25 1354 97.7 °F (36.5 °C) 43 95 % 16 121/59 -- -- LD            Physical Exam  Constitutional:       Appearance: Normal appearance.   Eyes:      Extraocular  Movements: Extraocular movements intact.      Conjunctiva/sclera: Conjunctivae normal.      Pupils: Pupils are equal, round, and reactive to light.   Cardiovascular:      Rate and Rhythm: Regular rhythm. Bradycardia present.      Pulses: Normal pulses.      Heart sounds: Normal heart sounds. No murmur heard.     No friction rub. No gallop.   Pulmonary:      Effort: Pulmonary effort is normal. No respiratory distress.      Breath sounds: Normal breath sounds. No stridor. No wheezing, rhonchi or rales.   Abdominal:      General: Abdomen is flat. Bowel sounds are normal. There is no distension.      Palpations: Abdomen is soft.      Tenderness: There is no abdominal tenderness. There is no right CVA tenderness, left CVA tenderness, guarding or rebound.   Skin:     General: Skin is warm and dry.      Capillary Refill: Capillary refill takes less than 2 seconds.   Neurological:      General: No focal deficit present.      Mental Status: She is alert and oriented to person, place, and time. Mental status is at baseline.      GCS: GCS eye subscore is 4. GCS verbal subscore is 5. GCS motor subscore is 6.      Cranial Nerves: Cranial nerves 2-12 are intact. No facial asymmetry.      Sensory: Sensation is intact. No sensory deficit.      Motor: Motor function is intact. No pronator drift.      Coordination: Coordination is intact. Finger-Nose-Finger Test and Heel to Shin Test normal.      Gait: Gait abnormal.         Results Reviewed       Procedure Component Value Units Date/Time    Vitamin B12 [[id number]]  (Normal) Collected: 04/05/25 1520    Lab Status: Final result Specimen: Blood from Arm, Right Updated: 04/05/25 2332     Vitamin B-12 302 pg/mL     Hemoglobin A1C w/ EAG Estimation [[id number]] Collected: 04/05/25 1520    Lab Status: Final result Specimen: Blood from Arm, Right Updated: 04/05/25 2258     Hemoglobin A1C 5.2 %       mg/dl     Comprehensive metabolic panel [[id number]] Collected: 04/05/25 1520    Lab  Status: Final result Specimen: Blood from Arm, Right Updated: 04/05/25 1547     Sodium 142 mmol/L      Potassium 4.2 mmol/L      Chloride 107 mmol/L      CO2 27 mmol/L      ANION GAP 8 mmol/L      BUN 19 mg/dL      Creatinine 1.08 mg/dL      Glucose 78 mg/dL      Calcium 9.6 mg/dL      AST 26 U/L      ALT 20 U/L      Alkaline Phosphatase 94 U/L      Total Protein 7.7 g/dL      Albumin 4.2 g/dL      Total Bilirubin 0.43 mg/dL      eGFR 49 ml/min/1.73sq m     Narrative:      [employer] guidelines for Chronic Kidney Disease (CKD):     Stage 1 with normal or high GFR (GFR > 90 mL/min/1.73 square meters)    Stage 2 Mild CKD (GFR = 60-89 mL/min/1.73 square meters)    Stage 3A Moderate CKD (GFR = 45-59 mL/min/1.73 square meters)    Stage 3B Moderate CKD (GFR = 30-44 mL/min/1.73 square meters)    Stage 4 Severe CKD (GFR = 15-29 mL/min/1.73 square meters)    Stage 5 End Stage CKD (GFR <15 mL/min/1.73 square meters)  Note: GFR calculation is accurate only with a steady state creatinine    Protime-INR [[id number]]  (Normal) Collected: 04/05/25 1520    Lab Status: Final result Specimen: Blood from Arm, Right Updated: 04/05/25 1541     Protime 15.0 seconds      INR 1.11    Narrative:      INR Therapeutic Range    Indication                                             INR Range      Atrial Fibrillation                                               2.0-3.0  Hypercoagulable State                                    2.0.2.3  Left Ventricular Asist Device                            2.0-3.0  Mechanical Heart Valve                                  -    Aortic(with afib, MI, embolism, HF, LA enlargement,    and/or coagulopathy)                                     2.0-3.0 (2.5-3.5)     Mitral                                                             2.5-3.5  Prosthetic/Bioprosthetic Heart Valve               2.0-3.0  Venous thromboembolism (VTE: VT, PE        2.0-3.0    APTT [[id number]]  (Normal) Collected:  04/05/25 1520    Lab Status: Final result Specimen: Blood from Arm, Right Updated: 04/05/25 1541     PTT 30 seconds     CBC and differential [[id number]] Collected: 04/05/25 1520    Lab Status: Final result Specimen: Blood from Arm, Right Updated: 04/05/25 1534     WBC 8.07 Thousand/uL      RBC 4.42 Million/uL      Hemoglobin 13.9 g/dL      Hematocrit 42.9 %      MCV 97 fL      MCH 31.4 pg      MCHC 32.4 g/dL      RDW 13.2 %      MPV 9.9 fL      Platelets 196 Thousands/uL      nRBC 0 /100 WBCs      Segmented % 66 %      Immature Grans % 0 %      Lymphocytes % 24 %      Monocytes % 7 %      Eosinophils Relative 2 %      Basophils Relative 1 %      Absolute Neutrophils 5.32 Thousands/µL      Absolute Immature Grans 0.02 Thousand/uL      Absolute Lymphocytes 1.94 Thousands/µL      Absolute Monocytes 0.58 Thousand/µL      Eosinophils Absolute 0.17 Thousand/µL      Basophils Absolute 0.04 Thousands/µL             CTA head and neck with and without contrast   Final Interpretation by Aniceto Rush MD (04/05 1714)      CT Brain:      1. No acute intracranial hemorrhage, mass effect, or edema.   2. Mild, chronic microangiopathy.      CT Angiography:      3. No hemodynamically significant stenosis in the major arteries of the neck.   4. No intracranial aneurysm or major intracranial arterial stenosis.      Resident: IVAN RONQUILLO I, the attending radiologist, have reviewed the images and agree with the final report above.      Workstation performed: [id number]         MRI brain w wo contrast    (Results Pending)       Procedures    ED Medication and Procedure Management   Prior to Admission Medications   Prescriptions Last Dose Informant Patient Reported? Taking?   ALPRAZolam (XANAX) 0.5 mg tablet   No No   Sig: Take 1 tablet (0.5 mg total) by mouth 4 (four) times a day as needed for anxiety   OLANZapine (ZyPREXA) 2.5 mg tablet   No No   Sig: Take 1 tablet (2.5 mg total) by mouth daily at bedtime   amLODIPine  (NORVASC) 5 mg tablet  Self No No   Sig: Take 1 tablet (5 mg total) by mouth 2 (two) times a day   apixaban (Eliquis) 5 mg  Self No No   Sig: Take 1 tablet (5 mg total) by mouth 2 (two) times a day   aspirin 81 mg chewable tablet  Self Yes No   Sig: Chew 81 mg daily   atenolol (TENORMIN) 25 mg tablet  Self Yes No   Sig: Take 12.5 mg by mouth daily   escitalopram (LEXAPRO) 10 mg tablet   No No   Sig: Take 1.5 tablets (15 mg total) by mouth daily   ezetimibe-simvastatin (VYTORIN) 10-20 mg per tablet  Self Yes No   hydroxychloroquine (PLAQUENIL) 200 mg tablet  Self Yes No   Sig: Take 200 mg by mouth daily with breakfast   multivitamin (THERAGRAN) TABS  Self Yes No   Sig: Take 1 tablet by mouth daily   pilocarpine (SALAGEN) 5 mg tablet  Self Yes No   Si (two) times a day   zolpidem (AMBIEN) 10 mg tablet   No No   Sig: Take 1 tablet (10 mg total) by mouth daily at bedtime      Facility-Administered Medications: None     Current Discharge Medication List        CONTINUE these medications which have NOT CHANGED    Details   ALPRAZolam (XANAX) 0.5 mg tablet Take 1 tablet (0.5 mg total) by mouth 4 (four) times a day as needed for anxiety  Qty: 120 tablet, Refills: 1    Associated Diagnoses: Panic disorder without agoraphobia with moderate panic attacks; Psychophysiological insomnia      amLODIPine (NORVASC) 5 mg tablet Take 1 tablet (5 mg total) by mouth 2 (two) times a day  Qty: 180 tablet, Refills: 3    Associated Diagnoses: Pulmonary embolism (HCC); Primary hypertension      apixaban (Eliquis) 5 mg Take 1 tablet (5 mg total) by mouth 2 (two) times a day  Qty: 180 tablet, Refills: 3    Associated Diagnoses: Pulmonary embolism (HCC); Primary hypertension      aspirin 81 mg chewable tablet Chew 81 mg daily      atenolol (TENORMIN) 25 mg tablet Take 12.5 mg by mouth daily      escitalopram (LEXAPRO) 10 mg tablet Take 1.5 tablets (15 mg total) by mouth daily  Qty: 135 tablet, Refills: 1    Comments: Please send a  replace/new response with 90-Day Supply if appropriate to maximize member benefit. Requesting 1 year supply.  Associated Diagnoses: Panic disorder without agoraphobia with moderate panic attacks; Mild episode of recurrent major depressive disorder (HCC)      ezetimibe-simvastatin (VYTORIN) 10-20 mg per tablet       hydroxychloroquine (PLAQUENIL) 200 mg tablet Take 200 mg by mouth daily with breakfast      multivitamin (THERAGRAN) TABS Take 1 tablet by mouth daily      OLANZapine (ZyPREXA) 2.5 mg tablet Take 1 tablet (2.5 mg total) by mouth daily at bedtime  Qty: 90 tablet, Refills: 1    Comments: Requesting 1 year supply  Associated Diagnoses: Panic disorder without agoraphobia with moderate panic attacks      pilocarpine (SALAGEN) 5 mg tablet 2 (two) times a day      zolpidem (AMBIEN) 10 mg tablet Take 1 tablet (10 mg total) by mouth daily at bedtime  Qty: 90 tablet, Refills: 0    Associated Diagnoses: Psychophysiological insomnia           No discharge procedures on file.  ED SEPSIS DOCUMENTATION   Time reflects when diagnosis was documented in both MDM as applicable and the Disposition within this note       Time User Action Codes Description Comment    4/5/2025  5:38 PM Boaz Wright Add [R42] Dizziness     4/5/2025  5:38 PM Boaz Wright Add [R11.0] Nausea     4/5/2025  7:45 PM May Quintanilla Add [R42] Vertigo                  Boaz Wright DO  04/06/25 0906

## 2025-04-05 NOTE — ED ATTENDING ATTESTATION
4/5/2025  IChristel DO, saw and evaluated the patient. I have discussed the patient with the resident/non-physician practitioner and agree with the resident's/non-physician practitioner's findings, Plan of Care, and MDM as documented in the resident's/non-physician practitioner's note, except where noted. All available labs and Radiology studies were reviewed.  I was present for key portions of any procedure(s) performed by the resident/non-physician practitioner and I was immediately available to provide assistance.       At this point I agree with the current assessment done in the Emergency Department.  I have conducted an independent evaluation of this patient a history and physical is as follows:    75-year-old female presenting to the emergency department with dizziness that started Thursday morning upon waking up, it is persistent since then.  However does worsen with certain movements such as turning her head, standing up.  Describes it as a spinning sensation, does cause nausea but no vomiting.  No falls.  Has a history of Guillain-Barré syndrome around 10 years ago, states ever since that she has had some gait abnormalities however gait abnormalities worsened several months ago and have not gotten better since then.  States she feels like she is going to lose her balance and fall.  No history of CVA/TIA.  No trauma.  No fevers or chills, no recent illnesses, no ear pain, no hearing difficulties, no visual changes or speech disturbance.  No chest pain shortness of breath.  She is on Eliquis for PE.    Upon physical examination, patient does not appear to be in acute distress, heart bradycardic regular rhythm, no respiratory distress, abdomen nondistended, no focal weakness or numbness in extremities, speech is normal, no visual field deficit, does have bilateral horizontal nystagmus that is fatigable.  However, states just moving her eyes to the left or the right does not worsen her dizziness, it  stays the same.  However turning her head either way does worsen it, but more so on the left.    Differential diagnosis considered includes but not limited to BPPV, vestibular neuritis, Ménière's disease, central vertigo such as CVA or mass, vascular dissection.    Will obtain CTA, blood work.    Imaging results as below.  Blood work is reassuring.  Patient feeling better upon reevaluation after meclizine.  However given concern for possible central pathology, discussed with internal medicine for hospitalization.    ED Course         Critical Care Time  Procedures    CTA head and neck with and without contrast   Final Result      CT Brain:      1. No acute intracranial hemorrhage, mass effect, or edema.   2. Mild, chronic microangiopathy.      CT Angiography:      3. No hemodynamically significant stenosis in the major arteries of the neck.   4. No intracranial aneurysm or major intracranial arterial stenosis.      Resident: IVAN RONQUILLO I, the attending radiologist, have reviewed the images and agree with the final report above.      Workstation performed: [id number]

## 2025-04-05 NOTE — H&P
"H&P - Hospitalist   Name: Flower Nunn 75 y.o. female I MRN: [id number]  Unit/Bed#: -[facility] I Date of Admission: 4/5/2025   Date of Service: 4/5/2025 I Hospital Day: 0     Assessment & Plan  Vertigo  Endorsed feeling the \"room spinning,\" which worsened since it started on Thursday  She notes that she was diagnosed with Guillain-Barré about 10 years ago, after which she was unable to continue her hobby of running, and has had gait dysfunction since  Over the last few months, she endorses increasing weakness in her bilateral lower extremities, previously being able to walk 5 miles per day, and now only being able to walk 0.8 miles per day despite walking on the treadmill daily to try to increase her endurance  She endorsed diarrhea (though she has a history of IBS), weakness, fatigue, and dysphagia  However, she denied any changes in her weight, nausea, vomiting, visual changes, or hearing changes  On exam, there is mild horizontal nystagmus and mild dysmetria. Otherwise, she did not have any focal neurologic deficits  She notes improvement in her symptoms since receiving 25 mg of meclizine in the ED at rest, though she still is experiencing progressive weakness    Plan:  Neurology consulted, appreciate recommendations  Meclizine 25 mg every 12 hours  Monitor on telemetry  Follow-up brain MRI  Follow-up updated echocardiogram  Follow-up lipid panel, A1c, and B12 levels  Admit under stroke pathway with neuro checks q1h x 4, q2h x 4, and q4h x 18  PT/OT consulted, appreciate recommendations  Speech language eval and treatment, follow-up  Obstructive sleep apnea treated with continuous positive airway pressure (CPAP)  CPAP at bedtime  Bipolar 2 disorder (HCC)  Continue PTA medications: Escitalopram 15 mg daily, olanzapine 2.5 mg daily, zolpidem 10 mg daily at bedtime, and alprazolam 0.5 mg 4 times daily as needed for anxiety  Sjogren's syndrome (HCC)  Endorses intermittent, more noticeable (though unclear if more " "frequent) dysphagia, where she will have difficulty swallowing solid foods, and if she drinks water will cough up the water before dislodging the food  Continue PTA pilocarpine 5 mg twice daily and hydroxychloroquine 200 mg daily  Primary hypertension  Continue PTA amlodipine 5 mg twice daily and atenolol 12.5 mg daily  History of pulmonary embolism  Continue PTA Eliquis 5 mg twice daily and aspirin 81 mg daily  HLD (hyperlipidemia)  PTA medication: Ezetimibe-simvastatin 10-20 mg/tab    Plan:  Ezetimibe-pravastatin 10-40 mg tablets while hospitalized      VTE Pharmacologic Prophylaxis: VTE Score: 7 High Risk (Score >/= 5) - Pharmacological DVT Prophylaxis Ordered: apixaban (Eliquis). Sequential Compression Devices Ordered.  Code Status: Level 1 - Full Code   Discussion with family: Updated  () at bedside.    Anticipated Length of Stay: Patient will be admitted on an inpatient basis with an anticipated length of stay of greater than 2 midnights secondary to vertigo.    History of Present Illness   Chief Complaint: Dizziness    Flower Nunn is a 75 y.o. female with a PMHx of previous PE, ITP, DENIZ on CPAP, depression, anxiety, bipolar 2, HTN, Guillain-Barré (10 years ago) with residual gait dysfunction, and Sjogren's syndrome who presents with dizziness. While she has had ambulatory dysfunction for the last 10 years due to residual symptoms from Guillain-Barré, she has had increasing gait dysfunction over the last few months. She notes weakness in her bilateral lower extremities, and only recently started experiencing the sensation of the \"room spinning\" on Thursday.  She denies any falls, but notes that she has often had to grab onto railings or the wall when she is \"the symptoms.  In terms of the weakness, she previously was able to walk 5 miles daily, and was an avid runner prior to her diagnosis of Guillain-Barré, but now can only walk up to 0.8 miles per day.  Despite the fact that she tries " to walk daily on the treadmill, she has been unable to increase her endurance.    In the ED, she was given 1 dose of p.o. meclizine 25 mg, which helped her symptoms that she was having at rest. On evaluation, she had mild dysmetria and horizontal nystagmus.  Otherwise, she denied any nausea, vomiting, visual disturbances, hearing changes, or any other focal neurologic deficits. She has been having diarrhea for the last week, though she has a history of IBS and can have periods of time that she is having diarrhea and other periods of time where she is constipated. On labs, she had no electrolyte abnormalities.    Review of Systems   Constitutional:  Positive for activity change and fatigue. Negative for appetite change and unexpected weight change.   HENT:  Positive for trouble swallowing.    Respiratory:  Positive for choking. Negative for cough, chest tightness, shortness of breath and wheezing.    Cardiovascular:  Negative for chest pain, palpitations and leg swelling.   Gastrointestinal:  Positive for diarrhea. Negative for abdominal pain, constipation, nausea, rectal pain and vomiting.   Genitourinary:  Negative for dysuria, flank pain, hematuria and urgency.   Musculoskeletal:  Positive for gait problem.   Neurological:  Positive for dizziness and weakness. Negative for tremors, speech difficulty, numbness and headaches.       Historical Information   Past Medical History:   Diagnosis Date    Anxiety     Arthritis     Atopic dermatitis     Breast CA ([facility]) 1999    left  age 49    Depression     Fracture     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[facility])     2013    History of chemotherapy     breast cancer    Hypertensive urgency 10/27/2024    Infective polyneuritis, acute ([facility])     Osteoporosis     PE (pulmonary thromboembolism) ([facility])     Peptic ulcer disease     Primary hypertension 09/04/2024    Psychiatric disorder     Sjogren's syndrome ([facility])     Sleep apnea     does not use cpap     Past Surgical History:   Procedure  Laterality Date    ABDOMINAL SURGERY      APPENDECTOMY      BREAST BIOPSY Right     prior to breast cancer    CHOLECYSTECTOMY      COLONOSCOPY W/ ENDOSCOPIC US N/A 02/24/2017    Procedure: ANAL ENDOSCOPIC U/S;  Surgeon: Chang Arellano MD;  Location: [facility];  Service:     HYSTERECTOMY      age 33; ovaries intact    MASTECTOMY Left 1999    age 49    FL COLONOSCOPY FLX DX W/COLLJ SPEC WHEN PFRMD N/A 02/24/2017    Procedure: COLONOSCOPY;  Surgeon: Neo Baxter MD;  Location: [facility];  Service: Gastroenterology     Social History     Tobacco Use    Smoking status: Never     Passive exposure: Past    Smokeless tobacco: Never   Vaping Use    Vaping status: Never Used   Substance and Sexual Activity    Alcohol use: Yes     Comment: socially    Drug use: No    Sexual activity: Not Currently     E-Cigarette/Vaping    E-Cigarette Use Never User      E-Cigarette/Vaping Substances    Nicotine No     THC No     CBD No     Flavoring No     Other No     Unknown No      Family History   Problem Relation Age of Onset    Hypertension Mother     Heart failure Father     Esophageal cancer Father 78    Breast cancer Paternal Aunt 48    Breast cancer Cousin 50        paternal    Breast cancer Cousin 50        paternal    Ovarian cancer Maternal Aunt 58    No Known Problems Sister     No Known Problems Maternal Grandmother     No Known Problems Maternal Grandfather     No Known Problems Paternal Grandmother     No Known Problems Paternal Grandfather     No Known Problems Son     No Known Problems Son     No Known Problems Maternal Aunt     No Known Problems Maternal Aunt     No Known Problems Paternal Aunt      Social History:  Marital Status: /Civil Union   Occupation: Retired  Patient Pre-hospital Living Situation: Home  Patient Pre-hospital Level of Mobility: walks  Patient Pre-hospital Diet Restrictions: None    Meds/Allergies   I have reviewed home medications with patient family member.  Prior to Admission medications     Medication Sig Start Date End Date Taking? Authorizing Provider   ALPRAZolam (XANAX) 0.5 mg tablet Take 1 tablet (0.5 mg total) by mouth 4 (four) times a day as needed for anxiety 3/17/25   Adam Evangelista MD   amLODIPine (NORVASC) 5 mg tablet Take 1 tablet (5 mg total) by mouth 2 (two) times a day 11/5/24   Halima King DO   apixaban (Eliquis) 5 mg Take 1 tablet (5 mg total) by mouth 2 (two) times a day 11/5/24 2/3/25  Halima King DO   aspirin 81 mg chewable tablet Chew 81 mg daily    Historical Provider, MD   atenolol (TENORMIN) 25 mg tablet Take 12.5 mg by mouth daily 10/16/24   Historical Provider, MD   escitalopram (LEXAPRO) 10 mg tablet Take 1.5 tablets (15 mg total) by mouth daily 3/17/25   Adam Evangelista MD   ezetimibe-simvastatin (VYTORIN) 10-20 mg per tablet  2/12/23   Historical Provider, MD   hydroxychloroquine (PLAQUENIL) 200 mg tablet Take 200 mg by mouth daily with breakfast    Historical Provider, MD   multivitamin (THERAGRAN) TABS Take 1 tablet by mouth daily    Historical Provider, MD   OLANZapine (ZyPREXA) 2.5 mg tablet Take 1 tablet (2.5 mg total) by mouth daily at bedtime 3/17/25   Adam Evangelista MD   pilocarpine (SALAGEN) 5 mg tablet 2 (two) times a day 6/29/23   Historical Provider, MD   zolpidem (AMBIEN) 10 mg tablet Take 1 tablet (10 mg total) by mouth daily at bedtime 3/17/25   Adam Evangelista MD   predniSONE 5 mg tablet 3 tabs po bid x2 days, then 2 tabs po bid x2 days, then 1 tab bid x2 days, then 1 daily until done. 1/3/25 4/5/25  Halima King DO     Allergies   Allergen Reactions    Adderall [Amphetamine-Dextroamphetamine] Tongue Swelling     Tongue swelling    Lamotrigine Rash     Other reaction(s): Possible rash, mouth sores (questionable reaction)       Objective :  Temp:  [97.6 °F (36.4 °C)-97.7 °F (36.5 °C)] 97.6 °F (36.4 °C)  HR:  [43-88] 88  BP: (121-164)/(59-78) 164/78  Resp:  [16] 16  SpO2:  [94 %-97 %] 94 %  O2 Device: None (Room  air)    Physical Exam  Vitals reviewed.   Constitutional:       General: She is not in acute distress.     Appearance: Normal appearance. She is not toxic-appearing or diaphoretic.   HENT:      Head: Normocephalic and atraumatic.      Mouth/Throat:      Mouth: Mucous membranes are moist.      Pharynx: Oropharynx is clear.   Eyes:      General: No scleral icterus.     Extraocular Movements: Extraocular movements intact.      Right eye: Nystagmus present.      Left eye: Nystagmus present.      Conjunctiva/sclera: Conjunctivae normal.      Pupils: Pupils are equal, round, and reactive to light.      Comments: Minimal horizontal nystagmus   Cardiovascular:      Rate and Rhythm: Normal rate and regular rhythm.      Heart sounds: Normal heart sounds.   Pulmonary:      Effort: Pulmonary effort is normal.      Breath sounds: Normal breath sounds.   Abdominal:      General: There is no distension.      Palpations: Abdomen is soft.      Tenderness: There is no abdominal tenderness. There is no guarding.   Musculoskeletal:      Right lower leg: No edema.      Left lower leg: No edema.   Skin:     General: Skin is warm and dry.   Neurological:      General: No focal deficit present.      Mental Status: She is alert and oriented to person, place, and time.      Sensory: Sensation is intact.      Coordination: Coordination abnormal. Finger-Nose-Finger Test abnormal.      Gait: Gait abnormal.          Lines/Drains:            Lab Results: I have reviewed the following results:  Results from last 7 days   Lab Units 04/05/25  1520   WBC Thousand/uL 8.07   HEMOGLOBIN g/dL 13.9   HEMATOCRIT % 42.9   PLATELETS Thousands/uL 196   SEGS PCT % 66   LYMPHO PCT % 24   MONO PCT % 7   EOS PCT % 2     Results from last 7 days   Lab Units 04/05/25  1520   SODIUM mmol/L 142   POTASSIUM mmol/L 4.2   CHLORIDE mmol/L 107   CO2 mmol/L 27   BUN mg/dL 19   CREATININE mg/dL 1.08   ANION GAP mmol/L 8   CALCIUM mg/dL 9.6   ALBUMIN g/dL 4.2   TOTAL  BILIRUBIN mg/dL 0.43   ALK PHOS U/L 94   ALT U/L 20   AST U/L 26   GLUCOSE RANDOM mg/dL 78     Results from last 7 days   Lab Units 04/05/25  1520   INR  1.11         Lab Results   Component Value Date    HGBA1C 4.9 05/30/2023    HGBA1C 4.8 11/29/2022    HGBA1C 5.3 11/23/2021           Imaging Results Review: I reviewed radiology reports from this admission including: CT head.      Administrative Statements       ** Please Note: This note has been constructed using a voice recognition system. **

## 2025-04-06 ENCOUNTER — APPOINTMENT (OUTPATIENT)
Dept: MRI IMAGING | Facility: HOSPITAL | Age: 75
End: 2025-04-06
Payer: COMMERCIAL

## 2025-04-06 VITALS
DIASTOLIC BLOOD PRESSURE: 53 MMHG | HEART RATE: 56 BPM | TEMPERATURE: 98 F | OXYGEN SATURATION: 96 % | WEIGHT: 172.62 LBS | RESPIRATION RATE: 18 BRPM | HEIGHT: 61 IN | BODY MASS INDEX: 32.59 KG/M2 | SYSTOLIC BLOOD PRESSURE: 90 MMHG

## 2025-04-06 LAB
ATRIAL RATE: 44 BPM
CHOLEST SERPL-MCNC: 171 MG/DL (ref ?–200)
HDLC SERPL-MCNC: 56 MG/DL
LDLC SERPL CALC-MCNC: 100 MG/DL (ref 0–100)
P AXIS: 43 DEGREES
PR INTERVAL: 178 MS
QRS AXIS: 36 DEGREES
QRSD INTERVAL: 90 MS
QT INTERVAL: 532 MS
QTC INTERVAL: 454 MS
T WAVE AXIS: 23 DEGREES
TRIGL SERPL-MCNC: 77 MG/DL (ref ?–150)
VENTRICULAR RATE: 44 BPM

## 2025-04-06 PROCEDURE — 93010 ELECTROCARDIOGRAM REPORT: CPT | Performed by: STUDENT IN AN ORGANIZED HEALTH CARE EDUCATION/TRAINING PROGRAM

## 2025-04-06 PROCEDURE — 99204 OFFICE O/P NEW MOD 45 MIN: CPT | Performed by: STUDENT IN AN ORGANIZED HEALTH CARE EDUCATION/TRAINING PROGRAM

## 2025-04-06 PROCEDURE — 70553 MRI BRAIN STEM W/O & W/DYE: CPT

## 2025-04-06 PROCEDURE — 94760 N-INVAS EAR/PLS OXIMETRY 1: CPT

## 2025-04-06 PROCEDURE — A9585 GADOBUTROL INJECTION: HCPCS | Performed by: INTERNAL MEDICINE

## 2025-04-06 PROCEDURE — 80061 LIPID PANEL: CPT

## 2025-04-06 PROCEDURE — 94660 CPAP INITIATION&MGMT: CPT

## 2025-04-06 PROCEDURE — 99239 HOSP IP/OBS DSCHRG MGMT >30: CPT | Performed by: INTERNAL MEDICINE

## 2025-04-06 RX ORDER — MECLIZINE HYDROCHLORIDE 25 MG/1
25 TABLET ORAL 3 TIMES DAILY PRN
Qty: 30 TABLET | Refills: 0 | Status: SHIPPED | OUTPATIENT
Start: 2025-04-06 | End: 2025-04-20

## 2025-04-06 RX ORDER — MECLIZINE HYDROCHLORIDE 25 MG/1
25 TABLET ORAL EVERY 8 HOURS SCHEDULED
Qty: 30 TABLET | Refills: 0 | Status: SHIPPED | OUTPATIENT
Start: 2025-04-06 | End: 2025-04-09

## 2025-04-06 RX ORDER — GADOBUTROL 604.72 MG/ML
8 INJECTION INTRAVENOUS
Status: COMPLETED | OUTPATIENT
Start: 2025-04-06 | End: 2025-04-06

## 2025-04-06 RX ADMIN — PILOCARPINE HYDROCHLORIDE 5 MG: 5 TABLET, FILM COATED ORAL at 08:52

## 2025-04-06 RX ADMIN — APIXABAN 5 MG: 5 TABLET, FILM COATED ORAL at 08:50

## 2025-04-06 RX ADMIN — MECLIZINE HYDROCHLORIDE 25 MG: 12.5 TABLET ORAL at 08:49

## 2025-04-06 RX ADMIN — GADOBUTROL 8 ML: 604.72 INJECTION INTRAVENOUS at 09:41

## 2025-04-06 RX ADMIN — ASPIRIN 81 MG CHEWABLE TABLET 81 MG: 81 TABLET CHEWABLE at 08:49

## 2025-04-06 RX ADMIN — B-COMPLEX W/ C & FOLIC ACID TAB 1 TABLET: TAB at 08:51

## 2025-04-06 RX ADMIN — ATENOLOL 12.5 MG: 25 TABLET ORAL at 08:50

## 2025-04-06 RX ADMIN — AMLODIPINE BESYLATE 5 MG: 2.5 TABLET ORAL at 08:49

## 2025-04-06 NOTE — ASSESSMENT & PLAN NOTE
Endorses intermittent, more noticeable (though unclear if more frequent) dysphagia, where she will have difficulty swallowing solid foods, and if she drinks water will cough up the water before dislodging the food  Continue PTA pilocarpine 5 mg twice daily and hydroxychloroquine 200 mg daily

## 2025-04-06 NOTE — ASSESSMENT & PLAN NOTE
Endorses intermittent,difficulty swallowing solid foods, and if she drinks water will cough up the water before dislodging the food  Continue PTA pilocarpine 5 mg twice daily and hydroxychloroquine 200 mg daily  States symptoms are chronic and at baseline, continue outpatient follow up

## 2025-04-06 NOTE — ASSESSMENT & PLAN NOTE
"Endorsed feeling the \"room spinning,\" which worsened since it started on Thursday  She notes that she was diagnosed with Guillain-Barré about 10 years ago, after which she was unable to continue her hobby of running, and has had gait dysfunction since  Over the last few months, she endorses increasing weakness in her bilateral lower extremities, previously being able to walk 5 miles per day, and now only being able to walk 0.8 miles per day despite walking on the treadmill daily to try to increase her endurance  She endorsed diarrhea (though she has a history of IBS), weakness, fatigue, and dysphagia  However, she denied any changes in her weight, nausea, vomiting, visual changes, or hearing changes  CTA head and neck showed no acute finding   MRI brain was negative for any acute stroke        Plan:  Patient medical stable for discharge  Discharged on meclizine 25 mg TID for 2 days for by 25 mg TID PRN  Referral to PT for vestibular therapy  Outpatient follow up with PCP  "

## 2025-04-06 NOTE — DISCHARGE SUMMARY
"Discharge Summary - Hospitalist   Name: Flower Nunn 75 y.o. female I MRN: [id number]  Unit/Bed#: -01 I Date of Admission: 4/5/2025   Date of Service: 4/6/2025 I Hospital Day: 0     Assessment & Plan  Vertigo  Endorsed feeling the \"room spinning,\" which worsened since it started on Thursday  She notes that she was diagnosed with Guillain-Barré about 10 years ago, after which she was unable to continue her hobby of running, and has had gait dysfunction since  Over the last few months, she endorses increasing weakness in her bilateral lower extremities, previously being able to walk 5 miles per day, and now only being able to walk 0.8 miles per day despite walking on the treadmill daily to try to increase her endurance  She endorsed diarrhea (though she has a history of IBS), weakness, fatigue, and dysphagia  However, she denied any changes in her weight, nausea, vomiting, visual changes, or hearing changes  CTA head and neck showed no acute finding   MRI brain was negative for any acute stroke        Plan:  Patient medical stable for discharge  Discharged on meclizine 25 mg TID for 2 days for by 25 mg TID PRN  Referral to PT for vestibular therapy  Outpatient follow up with PCP  Obstructive sleep apnea treated with continuous positive airway pressure (CPAP)  CPAP at bedtime  Bipolar 2 disorder (HCC)  Continue PTA medications: Escitalopram 15 mg daily, olanzapine 2.5 mg daily, zolpidem 10 mg daily at bedtime, and alprazolam 0.5 mg 4 times daily as needed for anxiety  Sjogren's syndrome (HCC)  Endorses intermittent,difficulty swallowing solid foods, and if she drinks water will cough up the water before dislodging the food  Continue PTA pilocarpine 5 mg twice daily and hydroxychloroquine 200 mg daily  States symptoms are chronic and at baseline, continue outpatient follow up  Primary hypertension  Continue PTA amlodipine 5 mg twice daily and atenolol 12.5 mg daily  History of pulmonary embolism  Continue PTA " "Eliquis 5 mg twice daily and aspirin 81 mg daily  HLD (hyperlipidemia)  PTA medication: Ezetimibe-simvastatin 10-20 mg/tab    Plan:  Ezetimibe-pravastatin 10-40 mg tablets while hospitalized     Medical Problems       Resolved Problems  Date Reviewed: 1/6/2025          Resolved    CAD (coronary artery disease) 4/5/2025     Resolved by  May Quintanilla DO        Discharging Physician / Practitioner: Ariella Mandujano MD  PCP: Halima King DO  Admission Date:   Admission Orders (From admission, onward)       Ordered        04/05/25 1738  Place in Observation  Once                          Discharge Date: 04/06/25    Consultations During Hospital Stay:  neurology    Procedures Performed:   none    Significant Findings / Test Results:   none    Incidental Findings:   none       Test Results Pending at Discharge (will require follow up):   none     Outpatient Tests Requested:  none    Complications:  none    Reason for Admission: dizziness    Hospital Course:   Flower Nunn is a 75 y.o. female patient who originally presented to the hospital on 4/5/2025 due to dizzinessMHx of previous PE, ITP, DENIZ on CPAP, depression, anxiety, bipolar 2, HTN, Guillain-Barré (10 years ago) with residual gait dysfunction, and Sjogren's syndrome who presents with dizziness. While she has had ambulatory dysfunction for the last 10 years due to residual symptoms from Guillain-Barré, she has had increasing gait dysfunction over the last few months. She notes weakness in her bilateral lower extremities, and only recently started experiencing the sensation of the \"room spinning\" on Thursday. In the ED, she was given 1 dose of p.o. meclizine 25 mg, which helped her symptoms that she was having at rest. CTA head and neck showed no acute finding. MRI brain was negative for any acute stroke. Patient was seen by neurology who do not suspect any TIA/stroke. Patient is medically stable for discharged and will be discharged on " meclizine and PT referral for vestibular therapy            The patient, initially admitted to the hospital as inpatient, was discharged earlier than expected given the following: resolution of symptoms and negative workup.  Please see above list of diagnoses and related plan for additional information.     Condition at Discharge: stable    Discharge Day Visit / Exam:   * Please refer to separate progress note for these details *    Discussion with Family: Updated  () at bedside.    Discharge instructions/Information to patient and family:   See after visit summary for information provided to patient and family.      Provisions for Follow-Up Care:  See after visit summary for information related to follow-up care and any pertinent home health orders.      Mobility at time of Discharge:   JH-HLM Achieved: 7: Walk 25 feet or more  HLM Goal achieved. Continue to encourage appropriate mobility.     Disposition:   Home    Planned Readmission: none    Discharge Medications:  See after visit summary for reconciled discharge medications provided to patient and/or family.      Administrative Statements   Discharge Statement:  I have spent a total time of 40 minutes in caring for this patient on the day of the visit/encounter. .    **Please Note: This note may have been constructed using a voice recognition system**

## 2025-04-06 NOTE — CONSULTS
Consultation - Neurology   Name: Flower Nunn 75 y.o. female I MRN: [id number]  Unit/Bed#: -01 I Date of Admission: 4/5/2025   Date of Service: 4/6/2025 I Hospital Day: 0   Inpatient consult to Neurology  Consult performed by: SHAZIA Perez  Consult ordered by: May Quintanilla DO        Physician Requesting Evaluation: Roger Garcia MD   Reason for Evaluation / Principal Problem: Vertigo    Assessment & Plan  Vertigo  75 y.o. 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  Patient reported she first noticed spinning sensation on 4/3 when she woke up. She reports dizziness worsens with positional changes or head movement.      BP on arrival 121/59.    Initial CT Imaging unremarkable for acute intracranial abnormalities.  Per documentation of H&P, patient exhibited mild horizontal nystagmus and mild dysmetria during exam.   She received meclizine in the ED and noticed her symptoms improved at rest.      Imaging:  - CBC/CMP on arrival unremarkable.  - CTA head/neck: CTA head/neck negative for acute intracranial abnormality, significant stenosis or aneurysm.    - MRI brain w/wo negative for acute ischemia on attending neurologist read, however final report pending   - Lipid panel: Cholesterol 171,   - Hemoglobin A1c 5.2  - Orthostatic VS: Lying 110/53, Sitting 105/55, Standing 90/53    She reports she is dizzy today, however symptoms are milder and she is feeling closer to baseline. Workup negative for acute ischemia, no concern for TIA as symptoms persistent > 24 hours.    Plan:  - Discontinue stroke pathway  - Continue with home Eliquis 5 mg twice daily and home aspirin 81 mg daily  - No need for high intensity statin from neuro  standpoint   - Continue with meclizine as patient reports effectiveness. Recommend q 8 hrs as needed for 3-5 days   - Maintain normotension  - Consider outpatient vestibular therapy if symptoms persist   - Medical management and supportive care per primary team. Correction of any metabolic or infectious disturbances.     Plan discussed with Attending Neurologist, please see attestation for further input/recommendations.       Flower Nunn will need follow-up in in 6 weeks with general neurology team for Peripheral vertigo in 60 minute appointment. They will not require outpatient neurological testing.   Message sent to schedulers    History of Present Illness   Flower Nunn is a 75 y.o. 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  Patient reported she first noticed spinning sensation on 4/3 when she woke up and got out of bed.  She reports dizziness worsens with positional changes or head movement.     BP on arrival 121/59. CBC/CMP unremarkable. CTA head/neck negative for acute intracranial abnormality, significant stenosis or aneurysm.  Per documentation of H&P, patient exhibited mild horizontal nystagmus and mild dysmetria during exam.  She received meclizine in the ED and noticed her symptoms improved at rest.  Out of concern for stroke, patient was admitted to the hospital on the stroke pathway.    No recent illness or head trauma.    Performed by Attending Neurologist, AP present at bedside acting as scribe  Review of Systems   Constitutional:  Negative for chills and diaphoresis.   HENT:  Positive for trouble swallowing (intermittent, ongoing x years). Negative for congestion, hearing loss, tinnitus and voice change.    Eyes:  Negative for photophobia and visual disturbance.    Respiratory:  Negative for cough and shortness of breath.    Cardiovascular:  Negative for chest pain and palpitations.   Musculoskeletal:  Positive for back pain and gait problem (chronic). Negative for neck pain.   Skin:  Negative for color change and pallor.   Neurological:  Positive for dizziness (mild) and weakness (chronic). Negative for tremors, facial asymmetry, speech difficulty, light-headedness, numbness and headaches.   Psychiatric/Behavioral:  Negative for behavioral problems. The patient is not nervous/anxious.         Historical Information   Past Medical History:   Diagnosis Date    Anxiety     Arthritis     Atopic dermatitis     Breast CA (HCC) 1999    left  age 49    CAD (coronary artery disease) 4/5/2025    Depression     Fracture     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HCC)     2013    History of chemotherapy     breast cancer    Hypertensive urgency 10/27/2024    Infective polyneuritis, acute (HCC)     Osteoporosis     PE (pulmonary thromboembolism) ([facility])     Peptic ulcer disease     Primary hypertension 09/04/2024    Psychiatric disorder     Sjogren's syndrome (HCC)     Sleep apnea     does not use cpap     Past Surgical History:   Procedure Laterality Date    ABDOMINAL SURGERY      APPENDECTOMY      BREAST BIOPSY Right     prior to breast cancer    CHOLECYSTECTOMY      COLONOSCOPY W/ ENDOSCOPIC US N/A 02/24/2017    Procedure: ANAL ENDOSCOPIC U/S;  Surgeon: Chang Arellano MD;  Location: [facility];  Service:     HYSTERECTOMY      age 33; ovaries intact    MASTECTOMY Left 1999    age 49    NH COLONOSCOPY FLX DX W/COLLJ SPEC WHEN PFRMD N/A 02/24/2017    Procedure: COLONOSCOPY;  Surgeon: Neo Baxter MD;  Location: [facility];  Service: Gastroenterology     Social History     Tobacco Use    Smoking status: Never     Passive exposure: Past    Smokeless tobacco: Never   Vaping Use    Vaping status: Never Used   Substance and Sexual Activity    Alcohol use: Yes     Comment: socially    Drug use: No    Sexual  activity: Not Currently     E-Cigarette/Vaping    E-Cigarette Use Never User      E-Cigarette/Vaping Substances    Nicotine No     THC No     CBD No     Flavoring No     Other No     Unknown No      Family History   Problem Relation Age of Onset    Hypertension Mother     Heart failure Father     Esophageal cancer Father 78    Breast cancer Paternal Aunt 48    Breast cancer Cousin 50        paternal    Breast cancer Cousin 50        paternal    Ovarian cancer Maternal Aunt 58    No Known Problems Sister     No Known Problems Maternal Grandmother     No Known Problems Maternal Grandfather     No Known Problems Paternal Grandmother     No Known Problems Paternal Grandfather     No Known Problems Son     No Known Problems Son     No Known Problems Maternal Aunt     No Known Problems Maternal Aunt     No Known Problems Paternal Aunt      Social History     Tobacco Use    Smoking status: Never     Passive exposure: Past    Smokeless tobacco: Never   Vaping Use    Vaping status: Never Used   Substance and Sexual Activity    Alcohol use: Yes     Comment: socially    Drug use: No    Sexual activity: Not Currently       Current Facility-Administered Medications:     ALPRAZolam (XANAX) tablet 0.5 mg, 4x Daily PRN    amLODIPine (NORVASC) tablet 5 mg, BID    apixaban (ELIQUIS) tablet 5 mg, BID    aspirin chewable tablet 81 mg, Daily    atenolol (TENORMIN) tablet 12.5 mg, Daily    escitalopram (LEXAPRO) tablet 15 mg, HS    ezetimibe (ZETIA) tablet 10 mg, Daily With Dinner **AND** pravastatin (PRAVACHOL) tablet 40 mg, Daily With Dinner    hydroxychloroquine (PLAQUENIL) tablet 200 mg, Daily With Breakfast    meclizine (ANTIVERT) tablet 25 mg, Q12H    multivitamin stress formula tablet 1 tablet, Daily    OLANZapine (ZyPREXA) tablet 2.5 mg, HS    pilocarpine (SALAGEN) tablet 5 mg, BID    zolpidem (AMBIEN) tablet 10 mg, HS  Prior to Admission Medications   Prescriptions Last Dose Informant Patient Reported? Taking?   ALPRAZolam  (XANAX) 0.5 mg tablet   No No   Sig: Take 1 tablet (0.5 mg total) by mouth 4 (four) times a day as needed for anxiety   OLANZapine (ZyPREXA) 2.5 mg tablet   No No   Sig: Take 1 tablet (2.5 mg total) by mouth daily at bedtime   amLODIPine (NORVASC) 5 mg tablet  Self No No   Sig: Take 1 tablet (5 mg total) by mouth 2 (two) times a day   apixaban (Eliquis) 5 mg  Self No No   Sig: Take 1 tablet (5 mg total) by mouth 2 (two) times a day   aspirin 81 mg chewable tablet  Self Yes No   Sig: Chew 81 mg daily   atenolol (TENORMIN) 25 mg tablet  Self Yes No   Sig: Take 12.5 mg by mouth daily   escitalopram (LEXAPRO) 10 mg tablet   No No   Sig: Take 1.5 tablets (15 mg total) by mouth daily   ezetimibe-simvastatin (VYTORIN) 10-20 mg per tablet  Self Yes No   hydroxychloroquine (PLAQUENIL) 200 mg tablet  Self Yes No   Sig: Take 200 mg by mouth daily with breakfast   multivitamin (THERAGRAN) TABS  Self Yes No   Sig: Take 1 tablet by mouth daily   pilocarpine (SALAGEN) 5 mg tablet  Self Yes No   Si (two) times a day   zolpidem (AMBIEN) 10 mg tablet   No No   Sig: Take 1 tablet (10 mg total) by mouth daily at bedtime      Facility-Administered Medications: None     Adderall [amphetamine-dextroamphetamine] and Lamotrigine    Objective :  Temp:  [97.6 °F (36.4 °C)-98 °F (36.7 °C)] 98 °F (36.7 °C)  HR:  [43-88] 56  BP: (121-164)/(59-78) 132/68  Resp:  [14-20] 18  SpO2:  [94 %-99 %] 96 %  O2 Device: CPAP    Performed by Attending Neurologist, AP present at bedside acting as scribe  Physical Exam  Vitals reviewed. Chaperone present: family present at bedside.   Constitutional:       General: She is not in acute distress.     Appearance: Normal appearance. She is obese. She is not ill-appearing.   HENT:      Head: Normocephalic and atraumatic.      Right Ear: External ear normal.      Left Ear: External ear normal.      Nose: Nose normal.      Mouth/Throat:      Mouth: Mucous membranes are moist.   Eyes:      General:         Right  eye: No discharge.         Left eye: No discharge.      Extraocular Movements: Extraocular movements intact and EOM normal.      Conjunctiva/sclera: Conjunctivae normal.   Cardiovascular:      Rate and Rhythm: Normal rate.   Pulmonary:      Effort: Pulmonary effort is normal. No respiratory distress.   Musculoskeletal:         General: Normal range of motion.   Skin:     General: Skin is warm and dry.      Coloration: Skin is not jaundiced or pale.   Neurological:      Mental Status: She is alert and oriented to person, place, and time.      Motor: Motor strength is normal.     Coordination: Finger-Nose-Finger Test and Heel to Shin Test normal.      Deep Tendon Reflexes:      Reflex Scores:       Patellar reflexes are 2+ on the right side and 2+ on the left side.       Achilles reflexes are 1+ on the right side and 1+ on the left side.     Comments: See below   Psychiatric:         Speech: Speech normal.       Neurologic Exam     Mental Status   Oriented to person, place, and time.   Follows 2 step commands.   Attention: normal. Concentration: normal.   Speech: speech is normal   Level of consciousness: alert    Cranial Nerves     CN II   Visual fields full to confrontation.     CN III, IV, VI   Extraocular motions are normal.   Right pupil: Size: 2 mm. Shape: regular. Reactivity: brisk. Consensual response: intact.   Left pupil: Size: 3 mm. Shape: regular. Reactivity: brisk. Consensual response: intact.   Nystagmus: none   Diplopia: none  Upgaze: normal  Downgaze: normal  Conjugate gaze: present    CN V   Facial sensation intact.     CN VII   Facial expression full, symmetric.     CN VIII   CN VIII normal.     CN IX, X   CN IX normal.     CN XII   CN XII normal.     Motor Exam   Muscle bulk: normal    Strength   Strength 5/5 throughout.     Sensory Exam   Light touch normal.     Gait, Coordination, and Reflexes     Coordination   Finger to nose coordination: normal  Heel to shin coordination: normal    Tremor    Resting tremor: absent    Reflexes   Right patellar: 2+  Left patellar: 2+  Right achilles: 1+  Left achilles: 1+        Lab Results: I have reviewed the following results:I have personally reviewed pertinent reports.  , CBC:   Results from last 7 days   Lab Units 04/05/25  1520   WBC Thousand/uL 8.07   RBC Million/uL 4.42   HEMOGLOBIN g/dL 13.9   HEMATOCRIT % 42.9   MCV fL 97   PLATELETS Thousands/uL 196   , BMP/CMP:   Results from last 7 days   Lab Units 04/05/25  1520   SODIUM mmol/L 142   POTASSIUM mmol/L 4.2   CHLORIDE mmol/L 107   CO2 mmol/L 27   BUN mg/dL 19   CREATININE mg/dL 1.08   CALCIUM mg/dL 9.6   AST U/L 26   ALT U/L 20   ALK PHOS U/L 94   EGFR ml/min/1.73sq m 49   , HgBA1C:   Results from last 7 days   Lab Units 04/05/25  1520   HEMOGLOBIN A1C % 5.2   , Lipid Profile:   Results from last 7 days   Lab Units 04/06/25  0538   HDL mg/dL 56   LDL CALC mg/dL 100   TRIGLYCERIDES mg/dL 77     Recent Labs     04/05/25  1520   WBC 8.07   HGB 13.9   HCT 42.9      SODIUM 142   K 4.2      CO2 27   BUN 19   CREATININE 1.08   GLUC 78     Imaging Results Review: CTA Head/neck, MRI brain     VTE Prophylaxis: VTE covered by:  apixaban, Oral, 5 mg at 04/06/25 0850

## 2025-04-06 NOTE — UTILIZATION REVIEW
"Initial Clinical Review    Admission: Date/Time/Statement:   Admission Orders (From admission, onward)       Ordered        04/05/25 1738  Place in Observation  Once                          Orders Placed This Encounter   Procedures    Place in Observation     Standing Status:   Standing     Number of Occurrences:   1     Level of Care:   Med Surg [16]     ED Arrival Information       Expected   -    Arrival   4/5/2025 13:37    Acuity   Urgent              Means of arrival   Walk-In    Escorted by   Self    Service   Hospitalist    Admission type   Emergency              Arrival complaint   Dizziness             Chief Complaint   Patient presents with    Dizziness     Dizziness that started on Thursday. Pt believes it is vertigo. Some nausea.        Initial Presentation: 75 y.o. female PMHx of previous PE, ITP, DENIZ on CPAP, depression, anxiety, bipolar 2, HTN, Guillain-Barré (10 years ago) with residual gait dysfunction, IBS, and Sjogren's syndrome who presents to ED from home with dizziness \"room spinning \" that started Thursday. Pt reports  increasing gait dysfunction over the last few months. Diarrhea x 1 week . On exam, mild dysmetria and horizontal nystagmus .  Coordination abnormal. Finger-Nose-Finger Test abnormal . Abnormal gait.  CTA H/N w/o acute findings . Pt given po Meclizine in ED which helped her symptoms that she was having at rest . Pt admitted as OBS to telemetry with vertigo. Plan : stroke path : Telemetry ,. MRI brain. Meclizine 25 mg q12h. Echo. A1c, lipid panel, B12. Neuro checks q1h x 4, q2h x 4, and q4h x 18 . PT/OT/ SLP . Continue PTA amlodipine 5 mg twice daily and atenolol 12.5 mg daily . Continue PTA Eliquis and ASA, statin . .   Anticipated Length of Stay/Certification Statement: Patient will be admitted on an inpatient basis with an anticipated length of stay of greater than 2 midnights secondary to vertigo.       Date: 4/6    ED Treatment-Medication Administration from 04/05/2025 1337 " to 04/05/2025 1839         Date/Time Order Dose Route Action     04/05/2025 1428 meclizine (ANTIVERT) tablet 25 mg 25 mg Oral Given     04/05/2025 1614 iohexol (OMNIPAQUE) 350 MG/ML injection (MULTI-DOSE) 70 mL 70 mL Intravenous Given            Scheduled Medications:  amLODIPine, 5 mg, Oral, BID  apixaban, 5 mg, Oral, BID  aspirin, 81 mg, Oral, Daily  atenolol, 12.5 mg, Oral, Daily  escitalopram, 15 mg, Oral, HS  ezetimibe, 10 mg, Oral, Daily With Dinner   And  pravastatin, 40 mg, Oral, Daily With Dinner  hydroxychloroquine, 200 mg, Oral, Daily With Breakfast  meclizine, 25 mg, Oral, Q12H  multivitamin stress formula, 1 tablet, Oral, Daily  OLANZapine, 2.5 mg, Oral, HS  pilocarpine, 5 mg, Oral, BID  zolpidem, 10 mg, Oral, HS      Continuous IV Infusions:     PRN Meds:  ALPRAZolam, 0.5 mg, Oral, 4x Daily PRN      ED Triage Vitals   Temperature Pulse Respirations Blood Pressure SpO2 Pain Score   04/05/25 1354 04/05/25 1354 04/05/25 1354 04/05/25 1354 04/05/25 1354 04/05/25 1900   97.7 °F (36.5 °C) (!) 43 16 121/59 95 % No Pain     Weight (last 2 days)       Date/Time Weight    04/05/25 1855 78.3 (172.62)            Vital Signs (last 3 days)       Date/Time Temp Pulse Resp BP MAP (mmHg) SpO2 O2 Device O2 Interface Device Patient Position - Orthostatic VS Pain    04/06/25 0700 98 °F (36.7 °C) 56 18 132/68 94 96 % CPAP -- Lying --    04/06/25 0530 -- 58 16 128/64 -- 96 % CPAP -- Lying --    04/06/25 0400 -- 62 14 138/68 -- 96 % CPAP -- Lying --    04/06/25 0327 -- -- -- -- -- 96 % CPAP Face mask -- --    04/06/25 0200 -- 61 16 127/61 -- 96 % Nasal cannula -- Lying --    04/06/25 0000 -- 74 20 151/78 -- 95 % CPAP -- Lying --    04/05/25 2241 97.6 °F (36.4 °C) 63 20 124/74 -- 99 % CPAP -- Lying --    04/05/25 2235 -- -- -- -- -- 97 % CPAP Face mask -- --    04/05/25 2000 -- -- -- 124/74 -- -- -- -- -- --    04/05/25 1900 -- -- -- -- -- -- -- -- -- No Pain    04/05/25 1855 97.6 °F (36.4 °C) 88 16 164/78 -- 94 % None (Room  air) -- Lying --    04/05/25 1800 -- 56 16 148/72 104 97 % -- -- -- --    04/05/25 1745 -- 54 16 140/73 96 97 % -- -- -- --    04/05/25 1354 97.7 °F (36.5 °C) 43 16 121/59 84 95 % None (Room air) -- -- --              Pertinent Labs/Diagnostic Test Results:   Radiology:  CTA head and neck with and without contrast   Final Interpretation by Aniceto Rush MD (04/05 1714)      CT Brain:      1. No acute intracranial hemorrhage, mass effect, or edema.   2. Mild, chronic microangiopathy.      CT Angiography:      3. No hemodynamically significant stenosis in the major arteries of the neck.   4. No intracranial aneurysm or major intracranial arterial stenosis.      Resident: IVAN RONQUILLO I, the attending radiologist, have reviewed the images and agree with the final report above.      Workstation performed: [id number]         MRI brain w wo contrast    (Results Pending)     Cardiology:  ECG 12 lead    by Interface, Ris Results In (04/05 1358)        GI:  No orders to display           Results from last 7 days   Lab Units 04/05/25  1520   WBC Thousand/uL 8.07   HEMOGLOBIN g/dL 13.9   HEMATOCRIT % 42.9   PLATELETS Thousands/uL 196   TOTAL NEUT ABS Thousands/µL 5.32         Results from last 7 days   Lab Units 04/05/25  1520   SODIUM mmol/L 142   POTASSIUM mmol/L 4.2   CHLORIDE mmol/L 107   CO2 mmol/L 27   ANION GAP mmol/L 8   BUN mg/dL 19   CREATININE mg/dL 1.08   EGFR ml/min/1.73sq m 49   CALCIUM mg/dL 9.6     Results from last 7 days   Lab Units 04/05/25  1520   AST U/L 26   ALT U/L 20   ALK PHOS U/L 94   TOTAL PROTEIN g/dL 7.7   ALBUMIN g/dL 4.2   TOTAL BILIRUBIN mg/dL 0.43         Results from last 7 days   Lab Units 04/05/25  1520   GLUCOSE RANDOM mg/dL 78         Results from last 7 days   Lab Units 04/05/25  1520   HEMOGLOBIN A1C % 5.2   EAG mg/dl 103           Results from last 7 days   Lab Units 04/05/25  1520   PROTIME seconds 15.0   INR  1.11   PTT seconds 30                             Past  Medical History:   Diagnosis Date    Anxiety     Arthritis     Atopic dermatitis     Breast CA (HCC) 1999    left  age 49    CAD (coronary artery disease) 4/5/2025    Depression     Fracture     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HCC)     2013    History of chemotherapy     breast cancer    Hypertensive urgency 10/27/2024    Infective polyneuritis, acute (HCC)     Osteoporosis     PE (pulmonary thromboembolism) (HCC)     Peptic ulcer disease     Primary hypertension 09/04/2024    Psychiatric disorder     Sjogren's syndrome (HCC)     Sleep apnea     does not use cpap     Present on Admission:   Obstructive sleep apnea treated with continuous positive airway pressure (CPAP)   Bipolar 2 disorder (HCC)   Primary hypertension   Sjogren's syndrome (HCC)   History of pulmonary embolism      Admitting Diagnosis: Dizziness [R42]  Nausea [R11.0]  Age/Sex: 75 y.o. female    Network Utilization Review Department  ATTENTION: Please call with any questions or concerns to [phone] and carefully listen to the prompts so that you are directed to the right person. All voicemails are confidential.   For Discharge needs, contact Care Management DC Support Team at [phone] opt. 2  Send all requests for admission clinical reviews, approved or denied determinations and any other requests to dedicated fax number below belonging to the campus where the patient is receiving treatment. List of dedicated fax numbers for the Facilities:  FACILITY NAME UR FAX NUMBER   ADMISSION DENIALS (Administrative/Medical Necessity) [phone]   DISCHARGE SUPPORT TEAM (NETWORK) [phone]   PARENT CHILD HEALTH (Maternity/NICU/Pediatrics) [phone]   General acute hospital [phone]   [facility] [phone]   [facility] [phone]   [facility] [phone]   [facility] [phone]   [facility]  [phone]   [facility] [phone]   [facility] [phone]   [facility] [phone]   Critical access hospital [phone]   [facility] [phone]   [facility] [phone]

## 2025-04-06 NOTE — PHYSICAL THERAPY NOTE
"                                                                                  PHYSICAL THERAPY NOTE    Patient Name: Flower Nunn  Today's Date: 4/6/2025 04/06/25 4975   Note Type   Note type Screen   Cancel Reasons Other   Additional Comments Chart reviewed and noted Pt was written as DC and this writer reached out to attending. Atending messaged that pt  \"is back to her baseline and is ambulating without any problems\" . .Will remove from IPPT census.      Kirsten Fleming, PT    "

## 2025-04-06 NOTE — ASSESSMENT & PLAN NOTE
PTA medication: Ezetimibe-simvastatin 10-20 mg/tab    Plan:  Ezetimibe-pravastatin 10-40 mg tablets while hospitalized

## 2025-04-06 NOTE — ASSESSMENT & PLAN NOTE
"Endorsed feeling the \"room spinning,\" which worsened since it started on Thursday  She notes that she was diagnosed with Guillain-Barré about 10 years ago, after which she was unable to continue her hobby of running, and has had gait dysfunction since  Over the last few months, she endorses increasing weakness in her bilateral lower extremities, previously being able to walk 5 miles per day, and now only being able to walk 0.8 miles per day despite walking on the treadmill daily to try to increase her endurance  She endorsed diarrhea (though she has a history of IBS), weakness, fatigue, and dysphagia  However, she denied any changes in her weight, nausea, vomiting, visual changes, or hearing changes  On exam, there is mild horizontal nystagmus and mild dysmetria. Otherwise, she did not have any focal neurologic deficits  She notes improvement in her symptoms since receiving 25 mg of meclizine in the ED at rest, though she still is experiencing progressive weakness    Plan:  Neurology consulted, appreciate recommendations  Meclizine 25 mg every 12 hours  Monitor on telemetry  Follow-up brain MRI  Follow-up updated echocardiogram  Follow-up lipid panel, A1c, and B12 levels  Admit under stroke pathway with neuro checks q1h x 4, q2h x 4, and q4h x 18  PT/OT consulted, appreciate recommendations  Speech language eval and treatment, follow-up  "

## 2025-04-06 NOTE — ASSESSMENT & PLAN NOTE
Continue PTA medications: Escitalopram 15 mg daily, olanzapine 2.5 mg daily, zolpidem 10 mg daily at bedtime, and alprazolam 0.5 mg 4 times daily as needed for anxiety

## 2025-04-06 NOTE — DISCHARGE INSTR - AVS FIRST PAGE
Dear Flower Nunn,     It was our pleasure to care for you here at [facility].  It is our hope that we were always able to exceed the expected standards for your care during your stay.  You were hospitalized due to dizziness.  You were cared for on the first  floor by Ariella Mandujano MD under the service of Roger Garcia MD with the [facility] who covers for your primary care physician (PCP), Halima King DO, while you were hospitalized.  If you have any questions or concerns related to this hospitalization, you may contact us at .  For follow up as well as any medication refills, we recommend that you follow up with your primary care physician.  A registered nurse will reach out to you by phone within a few days after your discharge to answer any additional questions that you may have after going home.  However, at this time we provide for you here, the most important instructions / recommendations at discharge:     Notable Medication Adjustments -   Meclizine 25 mg has been added. Please take one tablet every 8 hours for the next two days. Afterwards, you can take one tablet every 8 hours as needed for dizziness.  Testing Required after Discharge -   Vestibular PT  ** Please contact your PCP to request testing orders for any of the testing recommended here **  Important follow up information -   Please follow up with your PCP within one week of discharge  Please follow up with physical therapy for vestibular PT. A referral has been provided for you  Other Instructions -   Please stay hydrated  If you dizziness gets worse please contact your PCP or return the ER  Please review this entire after visit summary as additional general instructions including medication list, appointments, activity, diet, any pertinent wound care, and other additional recommendations from your care team that may be provided for  you.      Sincerely,     Ariella Mandujano MD

## 2025-04-06 NOTE — ASSESSMENT & PLAN NOTE
75 y.o. 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  Patient reported she first noticed spinning sensation on 4/3 when she woke up. She reports dizziness worsens with positional changes or head movement.      BP on arrival 121/59.    Initial CT Imaging unremarkable for acute intracranial abnormalities.  Per documentation of H&P, patient exhibited mild horizontal nystagmus and mild dysmetria during exam.   She received meclizine in the ED and noticed her symptoms improved at rest.      Imaging:  - CBC/CMP on arrival unremarkable.  - CTA head/neck: CTA head/neck negative for acute intracranial abnormality, significant stenosis or aneurysm.    - MRI brain w/wo negative for acute ischemia on attending neurologist read, however final report pending   - Lipid panel: Cholesterol 171,   - Hemoglobin A1c 5.2  - Orthostatic VS: Lying 110/53, Sitting 105/55, Standing 90/53    She reports she is dizzy today, however symptoms are milder and she is feeling closer to baseline. Workup negative for acute ischemia, no concern for TIA as symptoms persistent > 24 hours.    Plan:  - Discontinue stroke pathway  - Continue with home Eliquis 5 mg twice daily and home aspirin 81 mg daily  - No need for high intensity statin from neuro standpoint   - Continue with meclizine as patient reports effectiveness. Recommend q 8 hrs as needed for 3-5 days   - Maintain normotension  - Consider outpatient vestibular therapy if symptoms persist   - Medical management and supportive care per primary team. Correction of any metabolic or infectious disturbances.     Plan discussed with Attending Neurologist, please see attestation for further input/recommendations.

## 2025-04-07 ENCOUNTER — TRANSITIONAL CARE MANAGEMENT (OUTPATIENT)
Dept: FAMILY MEDICINE CLINIC | Facility: CLINIC | Age: 75
End: 2025-04-07

## 2025-04-07 ENCOUNTER — TELEPHONE (OUTPATIENT)
Age: 75
End: 2025-04-07

## 2025-04-07 NOTE — TELEPHONE ENCOUNTER
1ST ATTEMPT,     Called pt ,  ANSWERED, AND STATED PT IS SLEEPING TO CALL BACK LATER IN DAY...     Thank you,     Milagro HERRERA/ MILLER HERNANDEZ/ VERTIGO    DC- 4/6/2025-HOME    ----- Message from SHAZIA Perez sent at 4/6/2025 10:47 AM EDT -----  Regarding: SHARON Cordero     Flower Nunn will need follow-up in in 6 weeks with general neurology team for Peripheral vertigo in 60 minute appointment. They will not require outpatient neurological testing.     Thank you,   Roseanna MCCRAY

## 2025-04-09 ENCOUNTER — OFFICE VISIT (OUTPATIENT)
Dept: FAMILY MEDICINE CLINIC | Facility: CLINIC | Age: 75
End: 2025-04-09
Payer: COMMERCIAL

## 2025-04-09 VITALS
HEIGHT: 61 IN | DIASTOLIC BLOOD PRESSURE: 72 MMHG | BODY MASS INDEX: 33.46 KG/M2 | TEMPERATURE: 97.1 F | OXYGEN SATURATION: 94 % | SYSTOLIC BLOOD PRESSURE: 110 MMHG | WEIGHT: 177.2 LBS | HEART RATE: 45 BPM

## 2025-04-09 DIAGNOSIS — F51.01 PRIMARY INSOMNIA: ICD-10-CM

## 2025-04-09 DIAGNOSIS — E78.5 HYPERLIPIDEMIA, UNSPECIFIED HYPERLIPIDEMIA TYPE: ICD-10-CM

## 2025-04-09 DIAGNOSIS — I49.5 SICK SINUS SYNDROME (HCC): ICD-10-CM

## 2025-04-09 DIAGNOSIS — H81.93 VESTIBULAR DYSFUNCTION OF BOTH EARS: Primary | ICD-10-CM

## 2025-04-09 DIAGNOSIS — R26.81 GAIT INSTABILITY: ICD-10-CM

## 2025-04-09 DIAGNOSIS — D69.3 IDIOPATHIC THROMBOCYTOPENIC PURPURA (HCC): ICD-10-CM

## 2025-04-09 DIAGNOSIS — M54.16 LUMBAR RADICULOPATHY: ICD-10-CM

## 2025-04-09 PROCEDURE — 99214 OFFICE O/P EST MOD 30 MIN: CPT | Performed by: FAMILY MEDICINE

## 2025-04-09 PROCEDURE — G2211 COMPLEX E/M VISIT ADD ON: HCPCS | Performed by: FAMILY MEDICINE

## 2025-04-09 RX ORDER — ESZOPICLONE 3 MG/1
3 TABLET, FILM COATED ORAL
Qty: 10 TABLET | Refills: 0 | Status: SHIPPED | OUTPATIENT
Start: 2025-04-09

## 2025-04-09 NOTE — TELEPHONE ENCOUNTER
Scheduled: September 16th at 10 am in [facility] with Dr. Short and placed on wait list.

## 2025-04-10 ENCOUNTER — OFFICE VISIT (OUTPATIENT)
Dept: SLEEP CENTER | Facility: CLINIC | Age: 75
End: 2025-04-10
Payer: COMMERCIAL

## 2025-04-10 VITALS
SYSTOLIC BLOOD PRESSURE: 110 MMHG | WEIGHT: 177 LBS | HEIGHT: 61 IN | BODY MASS INDEX: 33.42 KG/M2 | DIASTOLIC BLOOD PRESSURE: 80 MMHG

## 2025-04-10 DIAGNOSIS — G47.33 OBSTRUCTIVE SLEEP APNEA TREATED WITH CONTINUOUS POSITIVE AIRWAY PRESSURE (CPAP): Primary | ICD-10-CM

## 2025-04-10 DIAGNOSIS — I10 PRIMARY HYPERTENSION: ICD-10-CM

## 2025-04-10 DIAGNOSIS — G47.20 CIRCADIAN RHYTHM SLEEP DISORDER: ICD-10-CM

## 2025-04-10 DIAGNOSIS — F51.04 PSYCHOPHYSIOLOGICAL INSOMNIA: ICD-10-CM

## 2025-04-10 PROCEDURE — 99214 OFFICE O/P EST MOD 30 MIN: CPT | Performed by: NURSE PRACTITIONER

## 2025-04-10 PROCEDURE — G2211 COMPLEX E/M VISIT ADD ON: HCPCS | Performed by: NURSE PRACTITIONER

## 2025-04-10 NOTE — PATIENT INSTRUCTIONS
Patient Instructions:    1. Schedule an appointment for set up of PAP equipment  2.  Begin using your equipment every night  3.  Begin cleaning your equipment, as recommended   4.  Contact your medical equipment distributor regarding any questions concerning your PAP equipment  5.  Contact the [facility] with any other questions, prior to next visit, if needed  6.  Schedule compliance follow-up visit in 31-91 days, as required by insurance   7.  Monitor for any complex sleep behaviors (sleep walking, sleep eating, etc )  with use of Lunesta.  If you have any of these, stop using Lunesta and report it to your family doctor.    Thank you for trusting me with your care!    I know we often  cover a lot of information at the visit, so if you have follow-up questions, are unclear about the plan, or feel there were important items that we did not discuss or you did not receive clarity on, please don't hesitate to reach out to me.     [employer] messages are preferred for routine matters.  Please make sure to call for urgent matters as there can be a delay in responding to questions over [employer].    IMPORTANT- Prior to a sleep study (at home or in the sleep lab), I strongly recommend contacting your medical insurance first to understand your benefits (including deductible if applicable), coverage for this test, and out of pocket costs.  Even if the test is approved by medical insurance, the cost to you is determined by your medical benefits.   If you have concerns about your out of pocket costs for sleep testing, please contact me/the office before you complete the test and we can discuss if there are alternate options.     I recommend following this advice in general before any lab test, imaging test, doctor visit, surgery, or ordering CPAP supplies as it is best to understand your coverage to avoid unexpected bills after the fact.       Nursing Support:  When: Monday through Friday 7:30A-4:30PM except  holidays  Where: Our direct line is [phone]  *3  *1.      If you are having a true emergency please call 911.  In the event that the line is busy or it is after hours please leave a voice message and we will return your call.  Please speak clearly, leaving your full name, birth date, best number to reach you and the reason for your call.   Medication refills: We will need the name of the medication, the dosage, the ordering provider, whether you get a 30 or 90 day refill, and the pharmacy name and address.  Medications will be ordered by the provider only.  Nurses cannot call in prescriptions.  Please allow 7 days for medication refills.  Physician requested updates: If your provider requested that you call with an update after starting medication, please be ready to provide us the medication and dosage, what time you take your medication, the time you attempt to fall asleep, time you fall asleep, when you wake up, and what time you get out of bed.  Sleep Study Results: We will contact you with sleep study results and/or next steps after the physician has reviewed your testing.

## 2025-04-11 ENCOUNTER — TELEPHONE (OUTPATIENT)
Dept: SLEEP CENTER | Facility: CLINIC | Age: 75
End: 2025-04-11

## 2025-04-11 ENCOUNTER — APPOINTMENT (OUTPATIENT)
Dept: RADIOLOGY | Age: 75
End: 2025-04-11
Payer: COMMERCIAL

## 2025-04-11 DIAGNOSIS — M54.16 LUMBAR RADICULOPATHY: ICD-10-CM

## 2025-04-11 PROCEDURE — 72110 X-RAY EXAM L-2 SPINE 4/>VWS: CPT

## 2025-04-11 NOTE — ASSESSMENT & PLAN NOTE
She feels that sleep has improved.  There is a component of sleep initiation insomnia, however, she has perception of insomnia, related to delayed phase circadian rhythm sleep disorder.  After a long discussion, she seemed to understand this concept and began working on making sleep/wake schedule changes.  I again stressed the importance of maintaining a consistent wake up time.    She states that she falls asleep around 1:30am and wakes up around 9:30am.  She has not been napping anymore.

## 2025-04-11 NOTE — ASSESSMENT & PLAN NOTE
Patient reports that she has been using her CPAP equipment every night.  Unfortunately, the equipment does not have a modem and a compliance report is not available for review.  She was recently hospitalized and used CPAP equipment while there.  She states that she used it at a setting of 9cm of water pressure, which was comfortable for her.    We discussed that she is eligible to receive new CPAP equipment.  A prescription for new equipment was provided at a setting of APAP 5-9cm (will be increased from last setting of 4-7cm).    She will be scheduled for set up of equipment with compliance follow up 31-91 days after set up.  Orders:    PAP DME Resupply/Reorder    CPAP Auto New DME

## 2025-04-11 NOTE — ASSESSMENT & PLAN NOTE
Hypertension - Blood pressure is controlled today.  We reviewed the association between untreated obstructive sleep apnea and the increased risk for hypertension. Patient to continue on prescribed anti-hypertensive therapy (amlodipine, atenolol) and follow up with PCP for continuity of care.     Orders:    CPAP Auto New DME

## 2025-04-11 NOTE — PROGRESS NOTES
Name: Flower Nunn      : 1950      MRN: [id number]  Encounter Provider: SHAZIA Rios  Encounter Date: 4/10/2025   Encounter department: [facility] SLEEP [facility] [place]  :  Assessment & Plan  Obstructive sleep apnea treated with continuous positive airway pressure (CPAP)  Patient reports that she has been using her CPAP equipment every night.  Unfortunately, the equipment does not have a modem and a compliance report is not available for review.  She was recently hospitalized and used CPAP equipment while there.  She states that she used it at a setting of 9cm of water pressure, which was comfortable for her.    We discussed that she is eligible to receive new CPAP equipment.  A prescription for new equipment was provided at a setting of APAP 5-9cm (will be increased from last setting of 4-7cm).    She will be scheduled for set up of equipment with compliance follow up 31-91 days after set up.  Orders:    PAP DME Resupply/Reorder    CPAP Auto New DME    Circadian rhythm sleep disorder  She feels that sleep has improved.  There is a component of sleep initiation insomnia, however, she has perception of insomnia, related to delayed phase circadian rhythm sleep disorder.  After a long discussion, she seemed to understand this concept and began working on making sleep/wake schedule changes.  I again stressed the importance of maintaining a consistent wake up time.    She states that she falls asleep around 1:30am and wakes up around 9:30am.  She has not been napping anymore.       Psychophysiological insomnia  There is a component of sleep initiation insomnia, however, she has perception of insomnia, related to delayed phase circadian rhythm sleep disorder.  After a long discussion, she seemed to understand this concept and began working on making sleep/wake schedule changes.  She felt that ambien was not helping her to initiate sleep anymore.  She recently changed sleep aid to Lunesta and feels it  worked well for her.  She will continue to work with her PCP and/or psychiatry regarding use of Lunesta and use of the lowest effective dose is recommended.  Orders:    CPAP Auto New DME    Primary hypertension  Hypertension - Blood pressure is controlled today.  We reviewed the association between untreated obstructive sleep apnea and the increased risk for hypertension. Patient to continue on prescribed anti-hypertensive therapy (amlodipine, atenolol) and follow up with PCP for continuity of care.     Orders:    CPAP Auto New DME        History of Present Illness   Flower Nunn is a 75 y.o. year old female, who presents for follow up of mild obstructive sleep apnea with history of reported sleep initiation insomnia and also hypersomnia.  She feels symptoms of hypersomnia began after having Guillian[place] in 2013 and her energy levels have never returned (She was on a vent and went through rehab - overall 4 months).  She was initially diagnosed with DENIZ in 2011.  AHI was 14.0, worsening to 42 in REM sleep.  She has been treated with CPAP equipment, using APAP 4-20cm of water pressure.  Additional adjustments were made in follow up.  She has a history of anxiety and bipolar depression, followed by psychiatry.    She was treated with ropinirole 0.5mg for restless legs syndrome in the past, which was discontinued. She denies any restless leg symptoms at bedtime or in the evening.  Past sleep study results indicated minimal PLMs, rarely associated with arousal and none during the titration study.    She reports a long history of insomnia.  She reports that she has been taking ambien 10mg for at least 20 years.  Medication was decreased to 5mg, however, she was unable to sleep once decreased.  She recently restarted use of Lunesta 3mg.  She denies any complex sleep behaviors with use of ambien or Lunesta.  She was treated with wake promoting medications in the past, including modafinil (developed intolerable metallic  taste in her mouth), armodafinil (caused severe diarrhea), adderall (caused mouth swelling).           Sitting and reading: Would never doze  Watching TV: Would never doze  Sitting, inactive in a public place (e.g. a theatre or a meeting): Would never doze  As a passenger in a car for an hour without a break: Would never doze  Lying down to rest in the afternoon when circumstances permit: Would never doze  Sitting and talking to someone: Would never doze  Sitting quietly after a lunch without alcohol: Would never doze  In a car, while stopped for a few minutes in traffic: Would never doze  Total score: 0     Review of Systems  Pertinent positives/negatives included in HPI and also as noted: mild intermittent dizziness, but improved    Medical History Reviewed by provider this encounter:  Tobacco  Allergies  Meds  Problems  Med Hx  Surg Hx  Fam Hx     .  Current Outpatient Medications on File Prior to Visit   Medication Sig Dispense Refill    ALPRAZolam (XANAX) 0.5 mg tablet Take 1 tablet (0.5 mg total) by mouth 4 (four) times a day as needed for anxiety 120 tablet 1    amLODIPine (NORVASC) 5 mg tablet Take 1 tablet (5 mg total) by mouth 2 (two) times a day 180 tablet 3    aspirin 81 mg chewable tablet Chew 81 mg daily      atenolol (TENORMIN) 25 mg tablet Take 12.5 mg by mouth daily      escitalopram (LEXAPRO) 10 mg tablet Take 1.5 tablets (15 mg total) by mouth daily 135 tablet 1    eszopiclone (LUNESTA) 3 MG tablet Take 1 tablet (3 mg total) by mouth daily at bedtime as needed for sleep Take immediately before bedtime 10 tablet 0    ezetimibe-simvastatin (VYTORIN) 10-20 mg per tablet       hydroxychloroquine (PLAQUENIL) 200 mg tablet Take 200 mg by mouth daily with breakfast      meclizine (ANTIVERT) 25 mg tablet Take 1 tablet (25 mg total) by mouth 3 (three) times a day as needed for dizziness for up to 14 days 30 tablet 0    multivitamin (THERAGRAN) TABS Take 1 tablet by mouth daily      OLANZapine  "(ZyPREXA) 2.5 mg tablet Take 1 tablet (2.5 mg total) by mouth daily at bedtime 90 tablet 1    pilocarpine (SALAGEN) 5 mg tablet 2 (two) times a day      [DISCONTINUED] zolpidem (AMBIEN) 10 mg tablet Take 1 tablet (10 mg total) by mouth daily at bedtime 90 tablet 0    meclizine (ANTIVERT) 25 mg tablet Take 1 tablet (25 mg total) by mouth every 8 (eight) hours for 2 days 30 tablet 0     No current facility-administered medications on file prior to visit.      Objective   /80   Ht 5' 1\" (1.549 m)   Wt 80.3 kg (177 lb)   LMP  (LMP Unknown)   BMI 33.44 kg/m²        Physical Exam    Constitutional: Alert, cooperative, no distress, appears stated age, obese  Skin: Warm, dry, no rashes noted  Lungs: Clear to auscultation bilaterally, respirations unlabored  Heart: normal rate and regular rhythm, S1/2 normal, no murmur noted, no rub or gallop  Extremities: Normal, no digital clubbing, no pedal edema  Neuro: slow gait with mild balance disturbance with changing position sit to stand    Data  Lab Results   Component Value Date    HGB 13.9 04/05/2025    HCT 42.9 04/05/2025    MCV 97 04/05/2025      Lab Results   Component Value Date    GLUCOSE 98 07/20/2015    CALCIUM 9.6 04/05/2025     07/20/2015    K 4.2 04/05/2025    CO2 27 04/05/2025     04/05/2025    BUN 19 04/05/2025    CREATININE 1.08 04/05/2025     No results found for: \"IRON\", \"TIBC\", \"FERRITIN\"  Lab Results   Component Value Date    AST 26 04/05/2025    ALT 20 04/05/2025       Administrative Statements   I have spent a total time of 30 minutes in caring for this patient on the day of the visit/encounter including Risks and benefits of tx options, Instructions for management, Patient and family education, Importance of tx compliance, Risk factor reductions, Impressions, Counseling / Coordination of care, Documenting in the medical record, and Reviewing/placing orders in the medical record (including tests, medications, and/or procedures).  "

## 2025-04-11 NOTE — ASSESSMENT & PLAN NOTE
There is a component of sleep initiation insomnia, however, she has perception of insomnia, related to delayed phase circadian rhythm sleep disorder.  After a long discussion, she seemed to understand this concept and began working on making sleep/wake schedule changes.  She felt that ambien was not helping her to initiate sleep anymore.  She recently changed sleep aid to Lunesta and feels it worked well for her.  She will continue to work with her PCP and/or psychiatry regarding use of Lunesta and use of the lowest effective dose is recommended.  Orders:    CPAP Auto New DME

## 2025-04-14 ENCOUNTER — TELEPHONE (OUTPATIENT)
Dept: PSYCHIATRY | Facility: CLINIC | Age: 75
End: 2025-04-14

## 2025-04-14 ENCOUNTER — EVALUATION (OUTPATIENT)
Dept: PHYSICAL THERAPY | Facility: CLINIC | Age: 75
End: 2025-04-14
Payer: COMMERCIAL

## 2025-04-14 ENCOUNTER — TELEPHONE (OUTPATIENT)
Age: 75
End: 2025-04-14

## 2025-04-14 DIAGNOSIS — H81.93 VESTIBULAR DYSFUNCTION OF BOTH EARS: ICD-10-CM

## 2025-04-14 DIAGNOSIS — R26.81 GAIT INSTABILITY: ICD-10-CM

## 2025-04-14 LAB

## 2025-04-14 PROCEDURE — 97162 PT EVAL MOD COMPLEX 30 MIN: CPT | Performed by: PHYSICAL THERAPIST

## 2025-04-14 PROCEDURE — 97112 NEUROMUSCULAR REEDUCATION: CPT | Performed by: PHYSICAL THERAPIST

## 2025-04-14 NOTE — PROGRESS NOTES
PT EVALUATION    Today's date: 25  Patient name: Flower Nunn  : 1950  MRN: [id number]  Referring provider: Hannah Vyas*  Dx:   1. Vestibular dysfunction of both ears    2. Gait instability        ASSESSMENT:  Flower Nunn is a 75 y.o. female who presents with signs and symptoms consistent of acute vertigo. Patient reports 1+ week of spinning dizziness with head turns and position changes. Patient presented to the ED and was admitted on stroke alert. Patient underwent MRI of the brain that was negative for acute infarction or any other significant abnormalities. Patient received meclizine with significant improvement and discharged medically stable. Upon PT evaluation this date, clinical examination clinical examination demonstrated intact cranial nerves, negative neurological testing, and asymptomatic VBI testing. BPPV testing demonstrated negative testing with both bilateral [place] and Roll Test. Upon vestibular testing patient demonstrated control decreased gaze control. Based on both subjective and objective testing, patients symptoms seem to be consistent with a vestibular hypofunction and weakness of the inner ear. Due to these impairments, Patient has difficulty performing a/iadls. Patient would benefit from skilled physical therapy to address the impairments, improve their level of function, and to improve their overall quality of life.      Impairments:    imbalance   Positional dizziness  VOR impairment   Dizziness with head turns     Prognosis:  Good  Positive and negative prognostic indicator(s):  multiple comorbidities    Goals:    Short Term Goals: to be achieved by 4 weeks  1) Patient to be independent with basic HEP.  2) Decrease dizziness to 1/10 at its worst.    Long Term Goals: to be achieved by discharge  1) FOTO equal to or greater than target score indicating improvements with overall function.  2) Patient will demonstrate normalized BPPV testing in order to  return to normal daily activities.   3) Patient will have little to no dizziness with positional changes and fast head movements in order to participate in daily activities.      Planned interventions:  home exercise program, patient education, balance and weight bearing training, canalith repositioning techniques, and vestibulo-ocular motor training    Duration in visits:  4-8  Frequency: 1-2 visits per week  Duration in weeks:  4-6    History of Current Injury: Patient notes that about a week ago she sat up out of bed and the room started to spinning and it lasted 3 days. Patient notes that since it was not going away she went to the ED. Patient notes that she had some nausea but no vomiting associated with it. Patient notes that the only time she wasn't spinning was when she was flat on her back. Patient notes that if she up up and about that's when everything would spin. Patient notes that she it would occur with head and body positions. Patient notes that visual simulation and busy patterns bother her. Patient denies recent infections or viruses. Patient notes that since being in the hospital and getting the meclizine she just feels like she's swaying. Patient notes that the dizziness feels well controlled at home but when she goes out that's when it feels worse.     Pain location: no pain currently     Imaging:   IMPRESSION:     1. No acute intracranial hemorrhage, mass effect, or edema.     2.  Moderate chronic microangiopathy.    Special Questions:   Dizziness: No  Dysphagia: No  Dysarthria: No  Diploplia: No  Drop Attacks: No  Numbness: No  Nausea: Yes   Nystagmus: No        Hobbies/Interests: reading, knits, walking on treadmill,   Occupation: n/a   Patient goals: Patient reports goals for physical therapy would be to get rid of the dizziness and improve gait and mobility.       Objective     Concurrent Complaints  Positive for nausea/motion sickness. Negative for headaches, tinnitus, visual change,  hearing loss, memory loss, aural fullness, poor concentration and peripheral neuropathy  Neuro Exam:     Dizziness  Positive for vertigo and motion sickness.   Negative for disequilibrium, oscillopsia, light-headedness, rocking or swaying, diplopia and floating or swimming.     Exacerbating factors  Positive for bending over, rolling in bed, looking up, walking, turning head, supine to/from sitting, optokinetic movement and walking in busy environment.     Symptoms   Duration: hours  Frequency: everday  Intensity at best: 2/10 and 3/10  Intensity at worst: 7/10 and 8/10  Average intensity: 4/10 and 5/10    Headaches   Patient reports headaches: No.     Cervical exam   Ligament Laxity Testing   Alar ligament: WNL  Sharp Marino: WNL  Modified VBI   Left: asymptomatic  Right: asymptomatic  Seated posture: forward head posture and internally rotated shoulders    Oculomotor exam   Oculomotor ROM: WNL  Resting nystagmus: not present   Gaze holding nystagmus: not present left  and not present right  Smooth pursuits: within normal limits and mild dizziness that resolved with rest  Vertical saccades: normal and mild dizziness with vertical gaze> horizontal  Horizontal saccades: normal and no symptoms  Convergence: normal and symmetrical convergence; normal distance  Head thrust: left normal and right normal          Precautions: Guillian [place], anxiety, depression, sjogren's, history of breast cancer       Manuals                                                                 Neuro Re-Ed             CRT                                                                                            Ther Ex                                                                                                                     Ther Activity                                       Gait Training                                       Modalities

## 2025-04-14 NOTE — TELEPHONE ENCOUNTER
Nurse left a message for Flower Nunn [phone] - awaiting return call to get more information.

## 2025-04-14 NOTE — TELEPHONE ENCOUNTER
Flower was transferred to this writer from the [facility]. She was prescribed Lunesta 3 mg by her PCP. Floewr said her PCP told her to reach out to psychiatry (she did in telephone encounter from today). She said she goes to bed at 10:30 and can't fall asleep for a couple hours. She has been taking NyQuil to help her sleep. She said once she falls asleep she is able to stay asleep, but she has racing thoughts that make it difficult to fall asleep. Forwarding to provider for review. Clinical will follow up as advised.

## 2025-04-14 NOTE — TELEPHONE ENCOUNTER
Patient calling to report that the sleeping pills that were given is not working. Please call patient back with other recommendation.  Oksana Everett

## 2025-04-15 PROBLEM — C80.1 MALIGNANT NEOPLASTIC DISEASE (HCC): Status: RESOLVED | Noted: 2024-09-04 | Resolved: 2025-04-15

## 2025-04-15 LAB

## 2025-04-15 NOTE — TELEPHONE ENCOUNTER
Called Flower [phone] and left  requesting a call back. Upon return call will tell her not to take NyQuil as it has a number of ingredients that interact with Lunesta. Will advise she continue on current dose of Lunesta and Dr. Evangelista will discuss at next appointment. Will encourage proper sleep hygiene.

## 2025-04-15 NOTE — TELEPHONE ENCOUNTER
"Nurse spoke with Flower Nunn - reviewed response from clinician. Flwoer Nunn verbalized understanding of same. Nurse reviewed good sleep hygiene with Flower.     Per Flower, she stopped taking Lunesta last week - \"it does not help me at all\" -  will try to fall asleep at night without any medicine - toss and turn for 2+ hours and will then take NyQuil and will be asleep in 1/2 hour. Does not nap during the day, follows good sleep hygiene.     Asking for another sleep medication, \"so I do not have to keep taking NyQuil.\"     Please advise.   "

## 2025-04-15 NOTE — ASSESSMENT & PLAN NOTE
Orders:    eszopiclone (LUNESTA) 3 MG tablet; Take 1 tablet (3 mg total) by mouth daily at bedtime as needed for sleep Take immediately before bedtime     no

## 2025-04-15 NOTE — PROGRESS NOTES
Transition of Care Visit:  Name: Flower Nunn      : 1950      MRN: [id number]  Encounter Provider: Halima King DO  Encounter Date: 2025   Encounter department: [facility]    Assessment & Plan  Vestibular dysfunction of both ears    Orders:    Ambulatory Referral to Physical Therapy; Future    Gait instability    Orders:    Ambulatory Referral to Physical Therapy; Future    Primary insomnia    Orders:    eszopiclone (LUNESTA) 3 MG tablet; Take 1 tablet (3 mg total) by mouth daily at bedtime as needed for sleep Take immediately before bedtime    Idiopathic thrombocytopenic purpura (HCC)  Will continue to monitor platelet levels closely.       Sick sinus syndrome (HCC)  Continue with follow-up as per cardiology       Lumbar radiculopathy  Await x-ray results and continue with meds as per rheumatology.  Orders:    XR spine lumbar minimum 4 views non injury; Future    Hyperlipidemia, unspecified hyperlipidemia type  Continue Vytorin therapy.            History of Present Illness     Transitional Care Management Review:   Flower Nunn is a 75 y.o. female here for TCM follow up.     During the TCM phone call patient stated:  TCM Call (since 2025)       Date and time call was made  2025  4:22 PM    Hospital care reviewed  Discussed with Inpatient Physician    Patient was hospitialized at  [facility]    Date of Admission  25    Date of discharge  25    Diagnosis  Vertigo    Disposition  Home    Were the patients medications reviewed and updated  Yes    Current Symptoms  Dizziness          TCM Call (since 2025)       Post hospital issues  None    Scheduled for follow up?  Yes    Patients specialists  Neurologist    Referrals needed  none    Did you obtain your prescribed medications  Yes    Do you need help managing your prescriptions or medications  No    Is transportation to your appointment needed  No    I have advised the patient to call  PCP with any new or worsening symptoms  Ele Putnam MA    Living Arrangements  Spouse or Significiant other    Support System  Spouse    The type of support provided  Emotional; Physical; Financial    Do you have social support  Yes, as much as I need    Are you recieving home care services  No          The patient presents with ongoing vertigo.  We spoke about her going for vestibular therapy and she is willing to try this at this time.  She continues to have issues with gait instability and is willing to go to physical therapy.  She complains of insomnia at this time and we are going to try a trial of Lunesta therapy.  Her ITP is is stable at this time.  She complains of lumbar back pain at this time with occasional radiation into the legs but no weakness or paresthesia of the legs.    Stable at this time  Review of Systems   Constitutional:  Negative for appetite change, chills and fever.   HENT:  Negative for ear pain, facial swelling, rhinorrhea, sinus pain, sore throat and trouble swallowing.    Eyes:  Negative for discharge and redness.   Respiratory:  Negative for chest tightness, shortness of breath and wheezing.    Cardiovascular:  Negative for chest pain and palpitations.   Gastrointestinal:  Negative for abdominal pain, diarrhea, nausea and vomiting.   Endocrine: Negative for polyuria.   Genitourinary:  Negative for dysuria and urgency.   Musculoskeletal:  Positive for back pain and gait problem. Negative for arthralgias.        Lumbar back pain with radiation into the legs intermittently.   Skin:  Negative for rash.   Neurological:  Positive for light-headedness. Negative for dizziness, weakness and headaches.   Hematological:  Negative for adenopathy.   Psychiatric/Behavioral:  Positive for suicidal ideas. Negative for behavioral problems, confusion and sleep disturbance.    All other systems reviewed and are negative.    Objective   /72   Pulse (!) 45   Temp (!) 97.1 °F (36.2 °C)   Ht  "5' 1\" (1.549 m)   Wt 80.4 kg (177 lb 3.2 oz)   LMP  (LMP Unknown)   SpO2 94%   BMI 33.48 kg/m²     Physical Exam  Vitals and nursing note reviewed.   Constitutional:       General: She is not in acute distress.     Appearance: Normal appearance. She is well-developed. She is not ill-appearing or diaphoretic.   HENT:      Head: Normocephalic and atraumatic.      Right Ear: Tympanic membrane, ear canal and external ear normal.      Left Ear: Tympanic membrane, ear canal and external ear normal.      Nose: Nose normal. No congestion or rhinorrhea.      Mouth/Throat:      Mouth: Mucous membranes are moist.      Pharynx: Oropharynx is clear. No oropharyngeal exudate or posterior oropharyngeal erythema.   Eyes:      General: No scleral icterus.        Right eye: No discharge.         Left eye: No discharge.      Extraocular Movements: Extraocular movements intact.      Conjunctiva/sclera: Conjunctivae normal.      Pupils: Pupils are equal, round, and reactive to light.   Neck:      Thyroid: No thyromegaly.      Vascular: No carotid bruit or JVD.      Trachea: No tracheal deviation.   Cardiovascular:      Rate and Rhythm: Normal rate and regular rhythm.      Pulses: Normal pulses.      Heart sounds: Normal heart sounds. No murmur heard.  Pulmonary:      Effort: Pulmonary effort is normal. No respiratory distress.      Breath sounds: Normal breath sounds. No stridor. No wheezing, rhonchi or rales.   Abdominal:      General: Abdomen is flat. Bowel sounds are normal. There is no distension.      Palpations: Abdomen is soft. There is no mass.      Tenderness: There is no abdominal tenderness. There is no guarding or rebound.   Musculoskeletal:         General: No swelling, tenderness or deformity. Normal range of motion.      Cervical back: Normal range of motion and neck supple. No rigidity.      Right lower leg: No edema.      Left lower leg: No edema.      Comments: Positive increased lumbar paraspinal musculature with " tenderness on palpation.   Lymphadenopathy:      Cervical: No cervical adenopathy.   Skin:     General: Skin is warm and dry.      Capillary Refill: Capillary refill takes less than 2 seconds.      Coloration: Skin is not jaundiced.      Findings: No bruising, erythema or rash.   Neurological:      General: No focal deficit present.      Mental Status: She is alert and oriented to person, place, and time.      Cranial Nerves: No cranial nerve deficit.      Sensory: No sensory deficit.      Motor: No abnormal muscle tone.      Coordination: Coordination normal.      Gait: Gait normal.      Deep Tendon Reflexes: Reflexes are normal and symmetric. Reflexes normal.   Psychiatric:         Mood and Affect: Mood normal.         Behavior: Behavior normal.         Thought Content: Thought content normal.         Judgment: Judgment normal.         Administrative Statements   I have spent a total time of 20 minutes in caring for this patient on the day of the visit/encounter including Diagnostic results, Prognosis, Risks and benefits of tx options, Instructions for management, Patient and family education, Importance of tx compliance, Risk factor reductions, and Impressions.

## 2025-04-16 ENCOUNTER — TELEPHONE (OUTPATIENT)
Dept: FAMILY MEDICINE CLINIC | Facility: CLINIC | Age: 75
End: 2025-04-16

## 2025-04-16 ENCOUNTER — RESULTS FOLLOW-UP (OUTPATIENT)
Dept: FAMILY MEDICINE CLINIC | Facility: CLINIC | Age: 75
End: 2025-04-16

## 2025-04-16 DIAGNOSIS — M51.26 DISC DISPLACEMENT, LUMBAR: Primary | ICD-10-CM

## 2025-04-18 ENCOUNTER — EVALUATION (OUTPATIENT)
Dept: PHYSICAL THERAPY | Facility: CLINIC | Age: 75
End: 2025-04-18
Attending: FAMILY MEDICINE
Payer: COMMERCIAL

## 2025-04-18 DIAGNOSIS — R26.81 GAIT INSTABILITY: ICD-10-CM

## 2025-04-18 DIAGNOSIS — H81.93 VESTIBULAR DYSFUNCTION OF BOTH EARS: Primary | ICD-10-CM

## 2025-04-18 PROCEDURE — 97112 NEUROMUSCULAR REEDUCATION: CPT | Performed by: PHYSICAL THERAPIST

## 2025-04-18 NOTE — HOME EXERCISE EDUCATION
Program_ID:[id number]   Access Code: [email or web address]  URL: [email or web address]  Date: 04-  Prepared By: Yovana Brizuela    Program Notes      Exercises      - Romberg Stance - 1 x daily - 7 x weekly - 1 sets - 3 reps - 30 seconds hold      - Standing with Head Nod - 1 x daily - 7 x weekly - 2 sets - 10 reps      - Standing with Head Rotation - 1 x daily - 7 x weekly - 2 sets - 10 reps

## 2025-04-18 NOTE — PROGRESS NOTES
Daily Note     Today's date: 2025  Patient name: Flower Nunn  : 1950  MRN: [id number]  Referring provider: Hannah Vyas*  Dx:   Encounter Diagnosis     ICD-10-CM    1. Vestibular dysfunction of both ears  H81.93       2. Gait instability  R26.81           Start Time: 0315  Stop Time: 1600  Total time in clinic (min): 765 minutes    Subjective: Patient reports she is feeling better today. Patient notes a few mild dizziness episodes the last few days.       Objective: See treatment diary below    TU seconds     5XSTS: 17 seconds     MCTSIB:  Firm Eyes Open: 1.47  Firm Eyes Closed: 2.48  Foam Eyes Open: 3.51  Foam Eyes Closed: 4.52  Composite Score:  2.99    MCDONNELL/56      Assessment: Tolerated treatment well. Patient challenged when one or more of her balance systems are taken away creating increased severity of sway. Patient demonstrates significant delayed balance reactions requiring stepping balance reaction or therapist correction in order to prevent LOB. Patient is below age matched norms with all dynamic balance outcome measures placing her at risk for increased falls. Updated HEP this date. Patient would benefit from skilled physical therapy to address the impairments, improve their level of function, and to improve their overall quality of life.    Plan: Continue per plan of care.      Precautions: Guillian [place], anxiety, depression, sjogren's, history of breast cancer       Manuals                                                                 Neuro Re-Ed             CRT                                                                                            Ther Ex                                                                                                                     Ther Activity                                       Gait Training                                       Modalities

## 2025-04-21 ENCOUNTER — OFFICE VISIT (OUTPATIENT)
Dept: PHYSICAL THERAPY | Facility: CLINIC | Age: 75
End: 2025-04-21
Attending: FAMILY MEDICINE
Payer: COMMERCIAL

## 2025-04-21 DIAGNOSIS — H81.93 VESTIBULAR DYSFUNCTION OF BOTH EARS: ICD-10-CM

## 2025-04-21 DIAGNOSIS — R26.81 GAIT INSTABILITY: Primary | ICD-10-CM

## 2025-04-21 PROCEDURE — 97112 NEUROMUSCULAR REEDUCATION: CPT | Performed by: PHYSICAL THERAPIST

## 2025-04-21 PROCEDURE — 97110 THERAPEUTIC EXERCISES: CPT | Performed by: PHYSICAL THERAPIST

## 2025-04-21 PROCEDURE — 97116 GAIT TRAINING THERAPY: CPT | Performed by: PHYSICAL THERAPIST

## 2025-04-21 NOTE — PROGRESS NOTES
Daily Note     Today's date: 2025  Patient name: Flower Nunn  : 1950  MRN: [id number]  Referring provider: Hannah Vyas*  Dx:   Encounter Diagnosis     ICD-10-CM    1. Gait instability  R26.81       2. Vestibular dysfunction of both ears  H81.93             Start Time: 1215  Stop Time: 1300  Total time in clinic (min): 45 minutes    Subjective: Patient reports she has not had any significant dizziness since last session. Patient notes that she was at her sons over the weekend and she felt off balance but thinks its from being in a different place and increased anxiety.       Objective: See treatment diary below      Assessment: Tolerated treatment well. Patient responded well to the introduction of exercise program this date. Mild dizziness with performing turns during torrie walking. Relieved with rest. Discussed and educated patient in expectations of post exercise soreness over the next 24-48 hours after initiating new exercise program. Patient demonstrated understanding.  Patient would benefit from continued skilled physical therapy to address the impairments, improve their level of function, and to improve their overall quality of life.    Plan: Continue per plan of care.      Precautions: Guillian [place], anxiety, depression, sjogren's, history of breast cancer       Neuro Re-Ed             Heel raises no UE             Tandem stance             SLS             EC on foam Gr foam     :30x3                          EC head turns Feet together :30x2             EC head tilts             Alternating toe taps to cone             Blaze pods  Marla shape clap challenge stanidn angelita gr foam 3x                                                                              Ther Ex             Knee flex/ext             Standing hip abduction with weights 2# 2x10             Standing hip flexion weights 2# 2x10             Standing hip extension  2# 2x10             Lateral side stepping with  resistance  2# 2'                                      Nustep             Ther Activity             Mini squats              Low step ups              Gait Training             Retro walking              Forward walking on foam beams              Lateral stepping on foam beam              Hurdles  Collapsible level 1     6x                                                    Modalities

## 2025-05-02 DIAGNOSIS — F51.04 PSYCHOPHYSIOLOGICAL INSOMNIA: ICD-10-CM

## 2025-05-02 DIAGNOSIS — F41.0 PANIC DISORDER WITHOUT AGORAPHOBIA WITH MODERATE PANIC ATTACKS: ICD-10-CM

## 2025-05-02 LAB

## 2025-05-02 NOTE — TELEPHONE ENCOUNTER
Reason for call:   [x] Refill   [] Prior Auth  [] Other:     Office:   [] PCP/Provider -   [x] Specialty/Provider - [facility] Evangelista MD     Medication: ALPRAZolam (XANAX) 0.5 mg tablet    Dose/Frequency: Take 1 tablet (0.5 mg total) by mouth 4 (four) times a day as needed for anxiet     Quantity: 120 tablet    Pharmacy: [address] [phone]    Local Pharmacy   Does the patient have enough for 3 days?   [] Yes   [] No - Send as HP to POD    Mail Away Pharmacy   Does the patient have enough for 10 days?   [x] Yes   [] No - Send as HP to POD

## 2025-05-03 ENCOUNTER — HOSPITAL ENCOUNTER (OUTPATIENT)
Dept: MRI IMAGING | Facility: HOSPITAL | Age: 75
Discharge: HOME | End: 2025-05-03
Attending: FAMILY MEDICINE
Payer: COMMERCIAL

## 2025-05-03 DIAGNOSIS — M51.26 DISC DISPLACEMENT, LUMBAR: ICD-10-CM

## 2025-05-03 PROCEDURE — 72148 MRI LUMBAR SPINE W/O DYE: CPT

## 2025-05-05 ENCOUNTER — TELEPHONE (OUTPATIENT)
Age: 75
End: 2025-05-05

## 2025-05-05 ENCOUNTER — RESULTS FOLLOW-UP (OUTPATIENT)
Dept: FAMILY MEDICINE CLINIC | Facility: CLINIC | Age: 75
End: 2025-05-05

## 2025-05-05 DIAGNOSIS — M48.061 LUMBAR FORAMINAL STENOSIS: Primary | ICD-10-CM

## 2025-05-05 RX ORDER — ALPRAZOLAM 0.5 MG
0.5 TABLET ORAL 4 TIMES DAILY PRN
Qty: 120 TABLET | Refills: 1 | Status: SHIPPED | OUTPATIENT
Start: 2025-05-05

## 2025-05-05 NOTE — TELEPHONE ENCOUNTER
Spoke to Flower [phone] - she said she has had really bad anxiety. She was out of town and had bad diarrhea for 5 days. She wasn't sure if the diarrhea caused anxiety, or anxiety brought on the diarrhea. She hasn't had any the last few days but her anxiety has lingered and she has been getting panic attacks. She said she hasn't been taking her xanax 4 times a day which is how its prescribed. I recommended she utilize this script and informed her there is a refill on file at the pharmacy. She verbalized understanding. Nothing further needed at this time.

## 2025-05-05 NOTE — TELEPHONE ENCOUNTER
Flower Nunn and/or Parent/Guardian contacted the office with a medication concern. Pt needs a call back. Pt was last seen in March and awaiting a SHRUTHI that's not until 6/11      Name of Medication XANAX       Concerns/ Side effects bad anxiety/panic for the last week with Diarrhea      Please Review. Thank you.

## 2025-05-06 ENCOUNTER — OFFICE VISIT (OUTPATIENT)
Dept: PHYSICAL THERAPY | Facility: CLINIC | Age: 75
End: 2025-05-06
Attending: FAMILY MEDICINE
Payer: COMMERCIAL

## 2025-05-06 ENCOUNTER — OFFICE VISIT (OUTPATIENT)
Dept: FAMILY MEDICINE CLINIC | Facility: CLINIC | Age: 75
End: 2025-05-06
Payer: COMMERCIAL

## 2025-05-06 VITALS
DIASTOLIC BLOOD PRESSURE: 70 MMHG | WEIGHT: 175.8 LBS | HEART RATE: 55 BPM | HEIGHT: 61 IN | SYSTOLIC BLOOD PRESSURE: 118 MMHG | BODY MASS INDEX: 33.19 KG/M2 | OXYGEN SATURATION: 94 % | TEMPERATURE: 97.2 F

## 2025-05-06 DIAGNOSIS — M54.50 LUMBAR BACK PAIN: ICD-10-CM

## 2025-05-06 DIAGNOSIS — R26.81 GAIT INSTABILITY: ICD-10-CM

## 2025-05-06 DIAGNOSIS — H81.93 VESTIBULAR DYSFUNCTION OF BOTH EARS: Primary | ICD-10-CM

## 2025-05-06 DIAGNOSIS — R19.7 DIARRHEA, UNSPECIFIED TYPE: Primary | ICD-10-CM

## 2025-05-06 PROCEDURE — 97110 THERAPEUTIC EXERCISES: CPT

## 2025-05-06 PROCEDURE — G2211 COMPLEX E/M VISIT ADD ON: HCPCS | Performed by: FAMILY MEDICINE

## 2025-05-06 PROCEDURE — 97112 NEUROMUSCULAR REEDUCATION: CPT

## 2025-05-06 PROCEDURE — 97116 GAIT TRAINING THERAPY: CPT

## 2025-05-06 PROCEDURE — 99214 OFFICE O/P EST MOD 30 MIN: CPT | Performed by: FAMILY MEDICINE

## 2025-05-06 RX ORDER — HYDROCODONE BITARTRATE AND ACETAMINOPHEN 5; 325 MG/1; MG/1
1 TABLET ORAL EVERY 6 HOURS PRN
Qty: 40 TABLET | Refills: 0 | Status: SHIPPED | OUTPATIENT
Start: 2025-05-06

## 2025-05-06 RX ORDER — DIPHENOXYLATE HYDROCHLORIDE AND ATROPINE SULFATE 2.5; .025 MG/1; MG/1
1 TABLET ORAL 4 TIMES DAILY PRN
Qty: 30 TABLET | Refills: 0 | Status: SHIPPED | OUTPATIENT
Start: 2025-05-06

## 2025-05-06 NOTE — PROGRESS NOTES
Daily Note     Today's date: 2025  Patient name: Flower Nunn  : 1950  MRN: [id number]  Referring provider: Hannah Vyas*  Dx:   Encounter Diagnosis     ICD-10-CM    1. Vestibular dysfunction of both ears  H81.93       2. Gait instability  R26.81           Start Time: 1358  Stop Time: 1440  Total time in clinic (min): 42 minutes    Subjective: Pt reports last week was a bad week for her gait, dizziness, and anxiety levels.  Denies any soreness following her LV.  Feels like her strength is slowly improving.  Always has some degree of dizziness.  Does not feel like dizziness is getting worse and some days it is better.        Objective: See treatment diary below      Assessment: Tolerated treatment well. Continued with program as outlined below.  Some slight dizziness with side stepping at mirror and during turns after each round of fwd torrie navigation.  Symptoms resolved quickly both times with short seated rest break. Progressed with EC FT head tilts in effort to further challenged balance and vestibular system.  Patient demonstrated fatigue post treatment and would benefit from continued PT.      Plan: Continue per plan of care.      Precautions: Guillian [place], anxiety, depression, sjogren's, history of breast cancer       Neuro Re-Ed  56           Heel raises no UE             Tandem stance             SLS             EC on foam Gr foam     :30x3  Gr foam     :30x3                         EC head turns Feet together :30x2  Feet together :30x3           EC head tilts  Feet together :30x2           Alternating toe taps to cone             Blaze pods  Marla shape clap challenge stanidn angelita gr foam 3x  Marla shape clap challenge stanidn angelita gr foam 3x                                                                             Ther Ex             Knee flex/ext             Standing hip abduction with weights 2# 2x10  2# 2x10            Standing hip flexion weights 2# 2x10  2# 2x10             Standing hip extension  2# 2x10  2# 2x10            Lateral side stepping with resistance  2# 2' 2# 2'                                     Nustep             Ther Activity             Mini squats              Low step ups              Gait Training             Retro walking              Forward walking on foam beams              Lateral stepping on foam beam              Hurdles  Collapsible level 1     6x  Collapsible level 1     6x                                                   Modalities

## 2025-05-06 NOTE — PROGRESS NOTES
Name: Flower Nunn      : 1950      MRN: [id number]  Encounter Provider: Halima King DO  Encounter Date: 2025   Encounter department: [facility]    Assessment & Plan  Diarrhea, unspecified type  Pt to follow a low residue diet for the next week.    Orders:    Ambulatory Referral to Gastroenterology; Future    diphenoxylate-atropine (LOMOTIL) 2.5-0.025 mg per tablet; Take 1 tablet by mouth 4 (four) times a day as needed for diarrhea    Lumbar back pain  Pt to see pain management; and pain meds were given so patient can ride in the car to see her grandsons graduate.    Orders:    HYDROcodone-acetaminophen (NORCO) 5-325 mg per tablet; Take 1 tablet by mouth every 6 (six) hours as needed for pain Max Daily Amount: 4 tablets         History of Present Illness     The patient presents for evaluation after again experiencing 5 days of diarrhea.  She states that she was unable to eat during the time and had passed nothing but liquid.  At the current time, she is now starting to have solid stools and is doing much better than previously.  She denies any melena or hematochezia as well as any epigastric pain.  She had a panic attack the day that she experienced the loose stools and has not had one since.  I explained to the patient that most likely her physical symptoms triggered her anxiety and she will continue to follow-up with her psychiatrist closely.  She is worried about having diarrhea occur while she is visiting her grandsons for their graduation so I gave her prescription for Lomotil to take as needed until she comes home and is fully treated for these diarrheal episodes.  She complains of lumbar back pain and a recent MRI of the lumbosacral spine shows multiple bulging disks in the lower lumbar spine area that are encroaching on foraminal nerve roots.  She will be seeing pain management in the near future.  She denies any issues with trouble getting off of chairs  and instability of her legs or any numbness of her legs noted.    Diarrhea   This is a new problem. The current episode started 1 to 4 weeks ago. The problem occurs 5 to 10 times per day. The problem has been waxing and waning. The stool consistency is described as Blood tinged, mucous, bloody and watery. The patient states that diarrhea does not awaken her from sleep. Associated symptoms include weight loss. Pertinent negatives include no abdominal pain, arthralgias, bloating, chills, coughing, fever, headaches, increased  flatus, myalgias, sweats, URI or vomiting. Nothing aggravates the symptoms. There are no known risk factors. She has tried analgesics, electrolyte solution and increased fluids for the symptoms. The treatment provided mild relief. There is no history of bowel resection, inflammatory bowel disease, irritable bowel syndrome, malabsorption, a recent abdominal surgery or short gut syndrome.     Review of Systems   Constitutional:  Positive for weight loss. Negative for appetite change, chills and fever.   HENT:  Negative for ear pain, facial swelling, rhinorrhea, sinus pain, sore throat and trouble swallowing.    Eyes:  Negative for discharge and redness.   Respiratory:  Negative for cough, chest tightness, shortness of breath and wheezing.    Cardiovascular:  Negative for chest pain and palpitations.   Gastrointestinal:  Positive for diarrhea. Negative for abdominal pain, bloating, flatus, nausea and vomiting.   Endocrine: Negative for polyuria.   Genitourinary:  Negative for dysuria and urgency.   Musculoskeletal:  Positive for back pain. Negative for arthralgias and myalgias.   Skin:  Negative for rash.   Neurological:  Negative for dizziness, weakness and headaches.   Hematological:  Negative for adenopathy.   Psychiatric/Behavioral:  Positive for agitation. Negative for behavioral problems, confusion, decreased concentration, dysphoric mood, hallucinations, self-injury, sleep disturbance and  suicidal ideas. The patient is nervous/anxious. The patient is not hyperactive.    All other systems reviewed and are negative.    Past Medical History:   Diagnosis Date    Anxiety     Arthritis     Atopic dermatitis     Breast CA (HCC) 1999    left  age 49    CAD (coronary artery disease) 04/05/2025    Depression     Fracture     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HCC)     2013    History of chemotherapy     breast cancer    Hypertensive urgency 10/27/2024    Infective polyneuritis, acute (HCC)     Malignant neoplastic disease (HCC) 09/04/2024    Osteoporosis     PE (pulmonary thromboembolism) (HCC)     Peptic ulcer disease     Primary hypertension 09/04/2024    Psychiatric disorder     Sjogren's syndrome (HCC)     Sleep apnea     does not use cpap     Past Surgical History:   Procedure Laterality Date    ABDOMINAL SURGERY      APPENDECTOMY      BREAST BIOPSY Right     prior to breast cancer    CHOLECYSTECTOMY      COLONOSCOPY W/ ENDOSCOPIC US N/A 02/24/2017    Procedure: ANAL ENDOSCOPIC U/S;  Surgeon: Chang Arellano MD;  Location: [facility];  Service:     HYSTERECTOMY      age 33; ovaries intact    MASTECTOMY Left 1999    age 49    AL COLONOSCOPY FLX DX W/COLLJ SPEC WHEN PFRMD N/A 02/24/2017    Procedure: COLONOSCOPY;  Surgeon: Neo Baxter MD;  Location: [facility];  Service: Gastroenterology     Family History   Problem Relation Age of Onset    Hypertension Mother     Heart failure Father     Esophageal cancer Father 78    Breast cancer Paternal Aunt 48    Breast cancer Cousin 50        paternal    Breast cancer Cousin 50        paternal    Ovarian cancer Maternal Aunt 58    No Known Problems Sister     No Known Problems Maternal Grandmother     No Known Problems Maternal Grandfather     No Known Problems Paternal Grandmother     No Known Problems Paternal Grandfather     No Known Problems Son     No Known Problems Son     No Known Problems Maternal Aunt     No Known Problems Maternal Aunt     No Known Problems Paternal  Aunt      Social History     Tobacco Use    Smoking status: Never     Passive exposure: Past    Smokeless tobacco: Never   Vaping Use    Vaping status: Never Used   Substance and Sexual Activity    Alcohol use: Yes     Comment: socially    Drug use: No    Sexual activity: Not Currently     Current Outpatient Medications on File Prior to Visit   Medication Sig    ALPRAZolam (XANAX) 0.5 mg tablet Take 1 tablet (0.5 mg total) by mouth 4 (four) times a day as needed for anxiety    amLODIPine (NORVASC) 5 mg tablet Take 1 tablet (5 mg total) by mouth 2 (two) times a day    aspirin 81 mg chewable tablet Chew 81 mg daily    atenolol (TENORMIN) 25 mg tablet Take 12.5 mg by mouth daily    escitalopram (LEXAPRO) 10 mg tablet Take 1.5 tablets (15 mg total) by mouth daily    eszopiclone (LUNESTA) 3 MG tablet Take 1 tablet (3 mg total) by mouth daily at bedtime as needed for sleep Take immediately before bedtime    ezetimibe-simvastatin (VYTORIN) 10-20 mg per tablet     hydroxychloroquine (PLAQUENIL) 200 mg tablet Take 200 mg by mouth daily with breakfast    multivitamin (THERAGRAN) TABS Take 1 tablet by mouth daily    OLANZapine (ZyPREXA) 2.5 mg tablet Take 1 tablet (2.5 mg total) by mouth daily at bedtime    pilocarpine (SALAGEN) 5 mg tablet 2 (two) times a day    meclizine (ANTIVERT) 25 mg tablet Take 1 tablet (25 mg total) by mouth every 8 (eight) hours for 2 days (Patient not taking: Reported on 5/6/2025)    meclizine (ANTIVERT) 25 mg tablet Take 1 tablet (25 mg total) by mouth 3 (three) times a day as needed for dizziness for up to 14 days (Patient not taking: Reported on 5/6/2025)     Allergies   Allergen Reactions    Adderall [Amphetamine-Dextroamphetamine] Tongue Swelling     Tongue swelling    Lamotrigine Rash     Other reaction(s): Possible rash, mouth sores (questionable reaction)     Immunization History   Administered Date(s) Administered    COVID-19 PFIZER VACCINE 0.3 ML IM 03/05/2021, 03/24/2021, 11/01/2021     "COVID-19 Pfizer Vac BIVALENT Vern-sucrose 12 Yr+ IM 10/25/2022    COVID-19 Pfizer mRNA vacc PF vern-sucrose 12 yr and older (Comirnaty) 10/23/2023    H1N1, All Formulations 01/07/2010    Zoster 07/08/2013     Objective   /70   Pulse 55   Temp (!) 97.2 °F (36.2 °C)   Ht 5' 1\" (1.549 m)   Wt 79.7 kg (175 lb 12.8 oz)   LMP  (LMP Unknown)   SpO2 94%   BMI 33.22 kg/m²     Physical Exam  Vitals and nursing note reviewed.   Constitutional:       General: She is not in acute distress.     Appearance: Normal appearance. She is not ill-appearing or diaphoretic.   HENT:      Head: Normocephalic and atraumatic.      Right Ear: Tympanic membrane, ear canal and external ear normal.      Left Ear: Tympanic membrane, ear canal and external ear normal.      Nose: Nose normal. No congestion or rhinorrhea.      Mouth/Throat:      Mouth: Mucous membranes are moist.      Pharynx: Oropharynx is clear. No posterior oropharyngeal erythema.   Eyes:      General:         Right eye: No discharge.         Left eye: No discharge.      Extraocular Movements: Extraocular movements intact.      Conjunctiva/sclera: Conjunctivae normal.      Pupils: Pupils are equal, round, and reactive to light.   Neck:      Vascular: No carotid bruit.   Cardiovascular:      Rate and Rhythm: Normal rate and regular rhythm.      Pulses: Normal pulses.      Heart sounds: Normal heart sounds. No murmur heard.  Pulmonary:      Effort: Pulmonary effort is normal. No respiratory distress.      Breath sounds: Normal breath sounds. No wheezing or rhonchi.   Abdominal:      General: Abdomen is flat. Bowel sounds are normal. There is no distension.      Palpations: There is no mass.      Tenderness: There is no abdominal tenderness.   Musculoskeletal:         General: No swelling or deformity. Normal range of motion.      Cervical back: Normal range of motion and neck supple. No rigidity.      Right lower leg: No edema.      Left lower leg: No edema. "   Lymphadenopathy:      Cervical: No cervical adenopathy.   Skin:     General: Skin is warm and dry.      Capillary Refill: Capillary refill takes less than 2 seconds.      Coloration: Skin is not jaundiced.      Findings: No bruising, erythema or rash.   Neurological:      General: No focal deficit present.      Mental Status: She is alert and oriented to person, place, and time.      Cranial Nerves: No cranial nerve deficit.      Sensory: No sensory deficit.      Gait: Gait normal.      Deep Tendon Reflexes: Reflexes normal.   Psychiatric:         Mood and Affect: Mood normal.         Behavior: Behavior normal.         Thought Content: Thought content normal.         Judgment: Judgment normal.

## 2025-05-07 ENCOUNTER — OFFICE VISIT (OUTPATIENT)
Dept: GASTROENTEROLOGY | Facility: CLINIC | Age: 75
End: 2025-05-07
Attending: FAMILY MEDICINE
Payer: COMMERCIAL

## 2025-05-07 VITALS
WEIGHT: 174 LBS | HEART RATE: 47 BPM | HEIGHT: 61 IN | DIASTOLIC BLOOD PRESSURE: 61 MMHG | BODY MASS INDEX: 32.85 KG/M2 | SYSTOLIC BLOOD PRESSURE: 95 MMHG

## 2025-05-07 DIAGNOSIS — Z86.0101 HX OF ADENOMATOUS COLONIC POLYPS: Primary | ICD-10-CM

## 2025-05-07 DIAGNOSIS — R19.7 DIARRHEA, UNSPECIFIED TYPE: ICD-10-CM

## 2025-05-07 PROCEDURE — G2211 COMPLEX E/M VISIT ADD ON: HCPCS | Performed by: NURSE PRACTITIONER

## 2025-05-07 PROCEDURE — 99203 OFFICE O/P NEW LOW 30 MIN: CPT | Performed by: NURSE PRACTITIONER

## 2025-05-07 NOTE — PROGRESS NOTES
Name: Flower Nunn      : 1950      MRN: [id number]  Encounter Provider: SHAZIA Corrigan  Encounter Date: 2025   Encounter department: [facility] GASTROENTEROLOGY [place] [address]  :  Assessment & Plan  Diarrhea, unspecified type  Patient reported that she experienced diarrhea which started on  lasted for 5 days and she moved her bowels approximately 3-4 times a day.  Patient denies any blood associated with bowel movements but did have generalized abdominal pain at that time. Patient reported that diarrhea resolved on .  She had 1 day with no bowel movement afterwards and then 2 consecutive days with normal bowel movements.  On  she had 1 episode of diarrhea in the morning but no further episodes after that and she has had no bowel movement today. Patient does have underlying anxiety and she is not sure if the anxiety triggered her diarrhea or if the diarrhea triggered her anxiety.  Patient does follow with psychiatry and her Xanax was increased to 4 times a day.  Diarrhea may have secondary to anxiety, irritable bowel syndrome diarrhea, viral/enteritis, or secondary to microscopic colitis.  Orders:    Ambulatory Referral to Gastroenterology  Start fiber supplement Metamucil or Benefiber 2 heaping teaspoons daily  High-fiber diet  If no improvement in bowel pattern can consider colonoscopy on follow-up for further evaluation.    Start probiotic daily Align or equivalent    Hx of adenomatous colonic polyps  Patient has history of colon polyps.  Colonoscopy done 2010 did show 1 colon polyp.  Biopsy showed tubular adenoma negative for high-grade dysplasia.  Can consider colonoscopy for further evaluation of colon polyps patient's last colonoscopy was 15 years ago this will be discussed further on follow-up visit.       Follow up 4-6 months    History of Present Illness   HPI  Flower Nunn is a 75 y.o. female with past medical history of anxiety, depression, bipolar  2 disorder, sick sinus syndrome, HTN, obstructive sleep apnea, Sjogren's, vertigo, PE, and HLD who presents to office as new patient.  Patient reported that she experienced diarrhea which started on 4/28 lasted for 5 days and she moved her bowels approximately 3-4 times a day.  Patient denies any blood associated with bowel movements but did have generalized abdominal pain at that time. Patient reported that diarrhea resolved on 5/2.  She had 1 day with no bowel movement afterwards and then 2 consecutive days with normal bowel movements.  On 5/6 she had 1 episode of diarrhea in the morning but no further episodes after that and she has had no bowel movement today. Patient does have underlying anxiety and she is not sure if the anxiety triggered her diarrhea or if the diarrhea triggered her anxiety.  Patient reported that her anxiety was so bad that she was experience some difficulties with swallowing.  Patient did follow-up with psychiatry who increased her Xanax dose.  Patient reports her difficulty reswallowing resolved after her anxiety improved with increased doses of Xanax.  Patient denies acid reflux, heartburn, nausea, vomiting, epigastric or abdominal pain.Patient does report some mild abdominal tenderness throughout with palpation but denies any any current abdominal pain.    EGD done 8/2/2010.  Biopsy of stomach showed showed mild erythremia in the antrum and esophagus with some erosions noted benign gastric mucosa with mild chronic inflammation.  Negative for H. pylori.  Esophagus biopsy showed chronic active inflammatory changes suggestive of reflux esophagitis.    Review of Systems   Constitutional:  Negative for chills and fever.   HENT:  Negative for ear pain and sore throat.    Eyes:  Negative for pain and visual disturbance.   Respiratory:  Negative for cough and shortness of breath.    Cardiovascular:  Negative for chest pain and palpitations.   Gastrointestinal:  Negative for abdominal pain and  vomiting.   Genitourinary:  Negative for dysuria and hematuria.   Musculoskeletal:  Negative for arthralgias and back pain.   Skin:  Negative for color change and rash.   Neurological:  Negative for seizures and syncope.   All other systems reviewed and are negative.    Medical History Reviewed by provider this encounter:  Tobacco  Allergies  Meds  Problems  Med Hx  Surg Hx  Fam Hx     .  Past Medical History   Past Medical History:   Diagnosis Date    Anxiety     Arthritis     Atopic dermatitis     Breast CA (HCC) 1999    left  age 49    CAD (coronary artery disease) 04/05/2025    Depression     Fracture     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HCC)     2013    History of chemotherapy     breast cancer    Hypertensive urgency 10/27/2024    Infective polyneuritis, acute (HCC)     Malignant neoplastic disease (HCC) 09/04/2024    Osteoporosis     PE (pulmonary thromboembolism) (HCC)     Peptic ulcer disease     Primary hypertension 09/04/2024    Psychiatric disorder     Sjogren's syndrome (HCC)     Sleep apnea     does not use cpap     Past Surgical History:   Procedure Laterality Date    ABDOMINAL SURGERY      APPENDECTOMY      BREAST BIOPSY Right     prior to breast cancer    CHOLECYSTECTOMY      COLONOSCOPY W/ ENDOSCOPIC US N/A 02/24/2017    Procedure: ANAL ENDOSCOPIC U/S;  Surgeon: Chang Arellano MD;  Location: [facility];  Service:     HYSTERECTOMY      age 33; ovaries intact    MASTECTOMY Left 1999    age 49    ME COLONOSCOPY FLX DX W/COLLJ SPEC WHEN PFRMD N/A 02/24/2017    Procedure: COLONOSCOPY;  Surgeon: Neo Baxter MD;  Location: [facility];  Service: Gastroenterology     Family History   Problem Relation Age of Onset    Hypertension Mother     Heart failure Father     Esophageal cancer Father 78    Breast cancer Paternal Aunt 48    Breast cancer Cousin 50        paternal    Breast cancer Cousin 50        paternal    Ovarian cancer Maternal Aunt 58    No Known Problems Sister     No Known Problems Maternal  Grandmother     No Known Problems Maternal Grandfather     No Known Problems Paternal Grandmother     No Known Problems Paternal Grandfather     No Known Problems Son     No Known Problems Son     No Known Problems Maternal Aunt     No Known Problems Maternal Aunt     No Known Problems Paternal Aunt       reports that she has never smoked. She has been exposed to tobacco smoke. She has never used smokeless tobacco. She reports current alcohol use. She reports that she does not use drugs.  Current Outpatient Medications   Medication Instructions    ALPRAZolam (XANAX) 0.5 mg, Oral, 4 times daily PRN    amLODIPine (NORVASC) 5 mg, Oral, 2 times daily    aspirin 81 mg, Daily    atenolol (TENORMIN) 12.5 mg, Daily    diphenoxylate-atropine (LOMOTIL) 2.5-0.025 mg per tablet 1 tablet, Oral, 4 times daily PRN    escitalopram (LEXAPRO) 15 mg, Oral, Daily    eszopiclone (LUNESTA) 3 mg, Oral, Daily at bedtime PRN, Take immediately before bedtime    ezetimibe-simvastatin (VYTORIN) 10-20 mg per tablet No dose, route, or frequency recorded.    HYDROcodone-acetaminophen (NORCO) 5-325 mg per tablet 1 tablet, Oral, Every 6 hours PRN    hydroxychloroquine (PLAQUENIL) 200 mg, Daily with breakfast    meclizine (ANTIVERT) 25 mg, Oral, Every 8 hours scheduled    meclizine (ANTIVERT) 25 mg, Oral, 3 times daily PRN    multivitamin (THERAGRAN) TABS 1 tablet, Daily    OLANZapine (ZYPREXA) 2.5 mg, Oral, Daily at bedtime    pilocarpine (SALAGEN) 5 mg tablet 2 times daily     Allergies   Allergen Reactions    Adderall [Amphetamine-Dextroamphetamine] Tongue Swelling     Tongue swelling    Lamotrigine Rash     Other reaction(s): Possible rash, mouth sores (questionable reaction)      Current Outpatient Medications on File Prior to Visit   Medication Sig Dispense Refill    ALPRAZolam (XANAX) 0.5 mg tablet Take 1 tablet (0.5 mg total) by mouth 4 (four) times a day as needed for anxiety 120 tablet 1    amLODIPine (NORVASC) 5 mg tablet Take 1 tablet (5  "mg total) by mouth 2 (two) times a day 180 tablet 3    aspirin 81 mg chewable tablet Chew 81 mg daily      atenolol (TENORMIN) 25 mg tablet Take 12.5 mg by mouth daily      diphenoxylate-atropine (LOMOTIL) 2.5-0.025 mg per tablet Take 1 tablet by mouth 4 (four) times a day as needed for diarrhea 30 tablet 0    escitalopram (LEXAPRO) 10 mg tablet Take 1.5 tablets (15 mg total) by mouth daily 135 tablet 1    eszopiclone (LUNESTA) 3 MG tablet Take 1 tablet (3 mg total) by mouth daily at bedtime as needed for sleep Take immediately before bedtime 10 tablet 0    ezetimibe-simvastatin (VYTORIN) 10-20 mg per tablet       HYDROcodone-acetaminophen (NORCO) 5-325 mg per tablet Take 1 tablet by mouth every 6 (six) hours as needed for pain Max Daily Amount: 4 tablets 40 tablet 0    hydroxychloroquine (PLAQUENIL) 200 mg tablet Take 200 mg by mouth daily with breakfast      multivitamin (THERAGRAN) TABS Take 1 tablet by mouth daily      OLANZapine (ZyPREXA) 2.5 mg tablet Take 1 tablet (2.5 mg total) by mouth daily at bedtime 90 tablet 1    pilocarpine (SALAGEN) 5 mg tablet 2 (two) times a day      meclizine (ANTIVERT) 25 mg tablet Take 1 tablet (25 mg total) by mouth every 8 (eight) hours for 2 days (Patient not taking: Reported on 5/6/2025) 30 tablet 0    meclizine (ANTIVERT) 25 mg tablet Take 1 tablet (25 mg total) by mouth 3 (three) times a day as needed for dizziness for up to 14 days (Patient not taking: Reported on 5/6/2025) 30 tablet 0     No current facility-administered medications on file prior to visit.      Social History     Tobacco Use    Smoking status: Never     Passive exposure: Past    Smokeless tobacco: Never   Vaping Use    Vaping status: Never Used   Substance and Sexual Activity    Alcohol use: Yes     Comment: socially    Drug use: No    Sexual activity: Not Currently        Objective   BP 95/61 (BP Location: Left arm, Patient Position: Sitting, Cuff Size: Adult)   Pulse (!) 47   Ht 5' 1\" (1.549 m)   Wt " 78.9 kg (174 lb)   LMP  (LMP Unknown)   BMI 32.88 kg/m²      Physical Exam  Vitals and nursing note reviewed.   Constitutional:       General: She is not in acute distress.     Appearance: She is well-developed.   HENT:      Head: Normocephalic and atraumatic.   Eyes:      Conjunctiva/sclera: Conjunctivae normal.   Cardiovascular:      Rate and Rhythm: Normal rate and regular rhythm.      Pulses: Normal pulses.      Heart sounds: Normal heart sounds. No murmur heard.  Pulmonary:      Effort: Pulmonary effort is normal. No respiratory distress.      Breath sounds: Normal breath sounds. No stridor. No wheezing, rhonchi or rales.   Abdominal:      General: Bowel sounds are normal. There is no distension.      Palpations: Abdomen is soft. There is no mass.      Tenderness: There is no abdominal tenderness. There is no guarding or rebound.   Musculoskeletal:         General: No swelling.      Cervical back: Neck supple.      Right lower leg: No edema.      Left lower leg: No edema.   Skin:     General: Skin is warm and dry.      Capillary Refill: Capillary refill takes less than 2 seconds.      Coloration: Skin is not jaundiced or pale.   Neurological:      Mental Status: She is alert and oriented to person, place, and time.   Psychiatric:         Mood and Affect: Mood normal.

## 2025-05-07 NOTE — PATIENT INSTRUCTIONS
Control anxiety try relaxation techniques, continue Xanax as per psychiatry.  Continue following up with the psychiatrist.   Fiber supplement daily Metamucil or Benefiber 2 heaping teaspoons  High fiber diet  Start probiotic daily Align or something that is equivalent.

## 2025-05-09 ENCOUNTER — APPOINTMENT (OUTPATIENT)
Dept: PHYSICAL THERAPY | Facility: CLINIC | Age: 75
End: 2025-05-09
Attending: FAMILY MEDICINE
Payer: COMMERCIAL

## 2025-05-13 ENCOUNTER — OFFICE VISIT (OUTPATIENT)
Dept: PHYSICAL THERAPY | Facility: CLINIC | Age: 75
End: 2025-05-13
Attending: FAMILY MEDICINE
Payer: COMMERCIAL

## 2025-05-13 DIAGNOSIS — R26.81 GAIT INSTABILITY: ICD-10-CM

## 2025-05-13 DIAGNOSIS — H81.93 VESTIBULAR DYSFUNCTION OF BOTH EARS: Primary | ICD-10-CM

## 2025-05-13 PROCEDURE — 97116 GAIT TRAINING THERAPY: CPT | Performed by: PHYSICAL THERAPIST

## 2025-05-13 PROCEDURE — 97110 THERAPEUTIC EXERCISES: CPT | Performed by: PHYSICAL THERAPIST

## 2025-05-13 PROCEDURE — 97112 NEUROMUSCULAR REEDUCATION: CPT | Performed by: PHYSICAL THERAPIST

## 2025-05-13 NOTE — PROGRESS NOTES
Daily Note     Today's date: 2025  Patient name: Flower Nunn  : 1950  MRN: [id number]  Referring provider: Hannah Vyas*  Dx:   Encounter Diagnosis     ICD-10-CM    1. Vestibular dysfunction of both ears  H81.93       2. Gait instability  R26.81             Start Time: 1400  Stop Time: 1445  Total time in clinic (min): 45 minutes    Subjective: Patient reports that tahira got her MRI back and it said she had bulging discs. Patient notes that her back has been bothering her more. Patient notes that she was doing a lot of walking while she was away and had to take the pain medication. Patient notes that the vertigo is gone and she has been feeling good but continues to feel shakey with walking.       Objective: See treatment diary below      Assessment: Tolerated treatment well. Continued with established POC with good response to progressions this date. No exacerbation of dizziness throughout. Will continue to progress as able.  Patient demonstrated fatigue post treatment and would benefit from continued PT.      Plan: Continue per plan of care.      Precautions: Guillian [place], anxiety, depression, sjogren's, history of breast cancer       Neuro Re-Ed           Heel raises no UE             Tandem stance             SLS             EC on foam Gr foam     :30x3  Gr foam     :30x3  Yel foam    :30x3                        EC head turns Feet together :30x2  Feet together :30x3 Yel foam :30x2          EC head tilts  Feet together :30x2 Yel foam :30x2          Alternating toe taps to cone             Blaze pods  Marla shape clap challenge stanidn angelita gr foam 3x  Marla shape clap challenge stanidn angelita gr foam 3x  Marla shape clap challenge stanidn angelita gr foam 3x                                                                            Ther Ex             Knee flex/ext             Standing hip abduction with weights 2# 2x10  2# 2x10  3# 2x10           Standing hip flexion weights  2# 2x10  2# 2x10  3# 2x10           Standing hip extension  2# 2x10  2# 2x10  3# 2x10           Lateral side stepping with resistance  2# 2' 2# 2' 3# 2'                                    Nustep             Ther Activity             Mini squats              Low step ups              Gait Training             Retro walking              Forward walking on foam beams              Lateral stepping on foam beam              Hurdles  Collapsible level 1     6x  Collapsible level 1     6x  Collapsible level 1     6x                                                  Modalities

## 2025-05-16 ENCOUNTER — OFFICE VISIT (OUTPATIENT)
Dept: PHYSICAL THERAPY | Facility: CLINIC | Age: 75
End: 2025-05-16
Attending: FAMILY MEDICINE
Payer: COMMERCIAL

## 2025-05-16 DIAGNOSIS — R26.81 GAIT INSTABILITY: ICD-10-CM

## 2025-05-16 DIAGNOSIS — H81.93 VESTIBULAR DYSFUNCTION OF BOTH EARS: Primary | ICD-10-CM

## 2025-05-16 PROCEDURE — 97110 THERAPEUTIC EXERCISES: CPT | Performed by: PHYSICAL THERAPIST

## 2025-05-16 PROCEDURE — 97112 NEUROMUSCULAR REEDUCATION: CPT | Performed by: PHYSICAL THERAPIST

## 2025-05-16 PROCEDURE — 97116 GAIT TRAINING THERAPY: CPT | Performed by: PHYSICAL THERAPIST

## 2025-05-16 NOTE — PROGRESS NOTES
Daily Note     Today's date: 2025  Patient name: Flower Nunn  : 1950  MRN: [id number]  Referring provider: Hannah Vyas*  Dx:   Encounter Diagnosis     ICD-10-CM    1. Vestibular dysfunction of both ears  H81.93       2. Gait instability  R26.81               Start Time: 1400  Stop Time: 1445  Total time in clinic (min): 45 minutes    Subjective: Patient reports she had an episode of dizziness last night when she laid down in bed. Patient notes that at home her balance feels fine but as soon as she leaves her front door she feels off.       Objective: See treatment diary below      Assessment: Tolerated treatment well. Continued with established POC with good response to progressions this date. No exacerbation of dizziness throughout. Improved stability with hurdles this date.  Will continue to progress as able.  Patient demonstrated fatigue post treatment and would benefit from continued PT.      Plan: Continue per plan of care.      Precautions: Guillian [place], anxiety, depression, sjogren's, history of breast cancer       Neuro Re-Ed          Heel raises no UE             Tandem stance             SLS             EC on foam Gr foam     :30x3  Gr foam     :30x3  Yel foam    :30x3  Yel foam    :30x3                      EC head turns Feet together :30x2  Feet together :30x3 Yel foam :30x2 Yel foam :30x2         EC head tilts  Feet together :30x2 Yel foam :30x2 Yel foam :30x2         Alternating toe taps to cone             Blaze pods  Marla shape clap challenge stanidn angelita gr foam 3x  Marla shape clap challenge stanidn angelita gr foam 3x  Marla shape clap challenge stanidn angelita gr foam 3x  Marla shape clap challenge stanidn angelita gr foam 3x                                                                           Ther Ex             Knee flex/ext             Standing hip abduction with weights 2# 2x10  2# 2x10  3# 2x10  3# 2x10          Standing hip flexion weights 2#  "2x10  2# 2x10  3# 2x10  3# 2x10          Standing hip extension  2# 2x10  2# 2x10  3# 2x10  3# 2x10          Lateral side stepping with resistance  2# 2' 2# 2' 3# 2' 3# 2'                                   Nustep             Ther Activity             Mini squats              Low step ups     4\" 5x ea          Gait Training             Retro walking              Forward walking on foam beams              Lateral stepping on foam beam              Hurdles  Collapsible level 1     6x  Collapsible level 1     6x  Collapsible level 1     6x  Collapsible level 1     6x                                                 Modalities                                              "

## 2025-05-20 ENCOUNTER — OFFICE VISIT (OUTPATIENT)
Dept: PHYSICAL THERAPY | Facility: CLINIC | Age: 75
End: 2025-05-20
Attending: FAMILY MEDICINE
Payer: COMMERCIAL

## 2025-05-20 DIAGNOSIS — H81.93 VESTIBULAR DYSFUNCTION OF BOTH EARS: Primary | ICD-10-CM

## 2025-05-20 DIAGNOSIS — R26.81 GAIT INSTABILITY: ICD-10-CM

## 2025-05-20 PROCEDURE — 97112 NEUROMUSCULAR REEDUCATION: CPT | Performed by: PHYSICAL THERAPIST

## 2025-05-20 PROCEDURE — 97110 THERAPEUTIC EXERCISES: CPT | Performed by: PHYSICAL THERAPIST

## 2025-05-20 NOTE — PROGRESS NOTES
Daily Note     Today's date: 2025  Patient name: Flower Nunn  : 1950  MRN: [id number]  Referring provider: Hannah Vyas*  Dx:   Encounter Diagnosis     ICD-10-CM    1. Vestibular dysfunction of both ears  H81.93       2. Gait instability  R26.81           Start Time: 1400  Stop Time: 1438  Total time in clinic (min): 38 minutes    Subjective: Patient reports she continues to have dizziness when getting into bed at night. Patient notes that she puts her CPAP and lays down on the right side and gets the dizziness for a few seconds and then it passes.       Objective: See treatment diary below    R sidelying test: up beating torsional fatiguing nystagmus, symptoms  L sidelying testing: mild symptoms no nystagmus     Assessment: Tolerated treatment well. Re-assessed BPPV secondary continues complaints when laying down in bed. Performed side-lying BPPV test with positive response. Patient demonstrated mild up beating torsional that fatigued within 10 seconds. CRT 1x with good response. Will continue to monitor next session. Will continue to progress as able.  Patient demonstrated fatigue post treatment and would benefit from continued PT.      Plan: Continue per plan of care.      Precautions: Guillian [place], anxiety, depression, sjogren's, history of breast cancer       Neuro Re-Ed  5        Heel raises no UE             Tandem stance             SLS             EC on foam Gr foam     :30x3  Gr foam     :30x3  Yel foam    :30x3  Yel foam    :30x3 Yel foam    :30x3                     EC head turns Feet together :30x2  Feet together :30x3 Yel foam :30x2 Yel foam :30x2 NP        EC head tilts  Feet together :30x2 Yel foam :30x2 Yel foam :30x2 NP         Alternating toe taps to cone             Blaze pods  Marla shape clap challenge stanidn angelita gr foam 3x  Marla shape clap challenge stanidn angelita gr foam 3x  Marla shape clap challenge stanidn angelita gr foam 3x  Marla shape  "clap challenge stanidn angelita gr foam 3x          Li maneuver BPPV      2x R PC                                                             Ther Ex             Knee flex/ext             Standing hip abduction with weights 2# 2x10  2# 2x10  3# 2x10  3# 2x10  3# 2x10         Standing hip flexion weights 2# 2x10  2# 2x10  3# 2x10  3# 2x10  3# 2x10         Standing hip extension  2# 2x10  2# 2x10  3# 2x10  3# 2x10  3# 2x10         Lateral side stepping with resistance  2# 2' 2# 2' 3# 2' 3# 2' 3# 2'                                  Nustep             Ther Activity             Mini squats              Low step ups     4\" 5x ea          Gait Training             Retro walking              Forward walking on foam beams              Lateral stepping on foam beam              Hurdles  Collapsible level 1     6x  Collapsible level 1     6x  Collapsible level 1     6x  Collapsible level 1     6x                                                 Modalities                                              "

## 2025-05-21 ENCOUNTER — APPOINTMENT (OUTPATIENT)
Dept: LAB | Age: 75
End: 2025-05-21
Attending: INTERNAL MEDICINE
Payer: COMMERCIAL

## 2025-05-21 DIAGNOSIS — M81.0 SENILE OSTEOPOROSIS: ICD-10-CM

## 2025-05-21 LAB — CALCIUM SERPL-MCNC: 9.8 MG/DL (ref 8.4–10.2)

## 2025-05-21 PROCEDURE — 36415 COLL VENOUS BLD VENIPUNCTURE: CPT

## 2025-05-23 ENCOUNTER — OFFICE VISIT (OUTPATIENT)
Dept: PHYSICAL THERAPY | Facility: CLINIC | Age: 75
End: 2025-05-23
Attending: FAMILY MEDICINE
Payer: COMMERCIAL

## 2025-05-23 DIAGNOSIS — H81.93 VESTIBULAR DYSFUNCTION OF BOTH EARS: Primary | ICD-10-CM

## 2025-05-23 DIAGNOSIS — R26.81 GAIT INSTABILITY: ICD-10-CM

## 2025-05-23 PROCEDURE — 97110 THERAPEUTIC EXERCISES: CPT | Performed by: PHYSICAL THERAPIST

## 2025-05-23 PROCEDURE — 97112 NEUROMUSCULAR REEDUCATION: CPT | Performed by: PHYSICAL THERAPIST

## 2025-05-23 NOTE — PROGRESS NOTES
Daily Note     Today's date: 2025  Patient name: Flower Nunn  : 1950  MRN: [id number]  Referring provider: Hannah Vyas*  Dx:   Encounter Diagnosis     ICD-10-CM    1. Vestibular dysfunction of both ears  H81.93       2. Gait instability  R26.81             Start Time: 1400  Stop Time: 1438  Total time in clinic (min): 38 minutes    Subjective: Patient reports she had 1 episode of dizziness when getting into bed.       Objective: See treatment diary below    R sidelying test: up beating torsional fatiguing nystagmus, symptoms  L sidelying testing: negative     Assessment: Tolerated treatment well. Side-lying BPPV test continued to be positive to the R with mild nystagmus and symptoms. CRT 2x with good response. Patient notes she would like to be done with therapy today due to following up with pain management for her back. Patient notes she will return after.       Plan: Continue per plan of care.      Precautions: Guillian [place], anxiety, depression, sjogren's, history of breast cancer       Neuro Re-Ed        Heel raises no UE             Tandem stance             SLS             EC on foam Gr foam     :30x3  Gr foam     :30x3  Yel foam    :30x3  Yel foam    :30x3 Yel foam    :30x3 Yel foam    :30x3                    EC head turns Feet together :30x2  Feet together :30x3 Yel foam :30x2 Yel foam :30x2 NP        EC head tilts  Feet together :30x2 Yel foam :30x2 Yel foam :30x2 NP         Alternating toe taps to cone             Blaze pods  Marla shape clap challenge stanidn angelita gr foam 3x  Marla shape clap challenge stanidn angelita gr foam 3x  Marla shape clap challenge stanidn angelita gr foam 3x  Marla shape clap challenge stanidn angelita gr foam 3x          Li maneuver BPPV      2x R PC  2x R PC                                                            Ther Ex             Knee flex/ext             Standing hip abduction with weights 2# 2x10  2# 2x10  3# 2x10  3#  "2x10  3# 2x10  3# 2x10        Standing hip flexion weights 2# 2x10  2# 2x10  3# 2x10  3# 2x10  3# 2x10  3# 2x10        Standing hip extension  2# 2x10  2# 2x10  3# 2x10  3# 2x10  3# 2x10  3# 2x10        Lateral side stepping with resistance  2# 2' 2# 2' 3# 2' 3# 2' 3# 2' 3# 2'                                 Nustep             Ther Activity             Mini squats              Low step ups     4\" 5x ea          Gait Training             Retro walking              Forward walking on foam beams              Lateral stepping on foam beam              Hurdles  Collapsible level 1     6x  Collapsible level 1     6x  Collapsible level 1     6x  Collapsible level 1     6x                                                 Modalities                                              "

## 2025-05-30 ENCOUNTER — CONSULT (OUTPATIENT)
Dept: PAIN MEDICINE | Facility: CLINIC | Age: 75
End: 2025-05-30
Attending: FAMILY MEDICINE
Payer: COMMERCIAL

## 2025-05-30 ENCOUNTER — TELEPHONE (OUTPATIENT)
Dept: PAIN MEDICINE | Facility: CLINIC | Age: 75
End: 2025-05-30

## 2025-05-30 VITALS
HEIGHT: 61 IN | WEIGHT: 176 LBS | BODY MASS INDEX: 33.23 KG/M2 | SYSTOLIC BLOOD PRESSURE: 99 MMHG | HEART RATE: 47 BPM | DIASTOLIC BLOOD PRESSURE: 60 MMHG

## 2025-05-30 DIAGNOSIS — M51.16 LUMBAR DISC DISEASE WITH RADICULOPATHY: Primary | ICD-10-CM

## 2025-05-30 DIAGNOSIS — M48.061 LUMBAR FORAMINAL STENOSIS: ICD-10-CM

## 2025-05-30 PROCEDURE — 99204 OFFICE O/P NEW MOD 45 MIN: CPT | Performed by: PHYSICAL MEDICINE & REHABILITATION

## 2025-05-30 NOTE — PROGRESS NOTES
Assessment  1. Lumbar disc disease with radiculopathy    2. Lumbar foraminal stenosis        Plan  Ms. Nunn is a pleasant 75-year-old female significant past medical history of DENIZ, hypertension, sick sinus syndrome, bipolar 2 disorder presents to [facility] for initial evaluation regarding low back pain with rating symptoms in the bilateral lower extremities.  During today's evaluation she is demonstrating clinical and diagnostic evidence of lumbar radiculopathy along the L4 and L5 dermatomal distribution of the left worse than right lower extremity with most recent MRI demonstrating mild foraminal narrowing abutting the exiting nerve most notable at L5.  At this time interventional approaches will be beneficial and warranted.  As such we will plan for an L5-S1 LESI under fluoroscopy guidance.  All questions answered, patient is agreeable plan.    Complete risks and benefits including bleeding, infection, tissue reaction, nerve injury and allergic reaction were discussed. The approach was demonstrated using models and literature was provided. Verbal and written consent was obtained.    My impressions and treatment recommendations were discussed in detail with the patient who verbalized understanding and had no further questions.  Discharge instructions were provided. I personally saw and examined the patient and I agree with the above discussed plan of care.    Orders Placed This Encounter   Procedures    FL spine and pain procedure     Standing Status:   Future     Expected Date:   5/30/2025     Expiration Date:   5/30/2029     Reason for Exam::   L5-S1 LESI     Is an anticoagulant hold needed?:   Yes    Ambulatory referral to Physical Therapy     Standing Status:   Future     Expiration Date:   5/30/2026     Referral Priority:   Routine     Referral Type:   Physical Therapy     Referral Reason:   Specialty Services Required     Requested Specialty:   Physical Therapy     Number of Visits  Requested:   1     Expiration Date:   5/30/2026     New Medications Ordered This Visit   Medications    apixaban (ELIQUIS) 2.5 mg     Sig: Take 2.5 mg by mouth 2 (two) times a day       History of Present Illness    Flower Nunn is a 75 y.o. female presents to [facility] for initial evaluation regarding low back pain with rating symptoms in the bilateral lower extremities.  Denies any significant inciting event or recent trauma.  States symptoms have been present for 6 months and described as a severe nearly constant sharp, throbbing, aching sensation rated 3-8 out of 10.  Does not report any significant lower extremity weakness or falls.  Requires a cane for ambulation at times.  Symptoms are worse with standing, bending, sitting, walking.  Currently using NSAIDs and opiates which provides minimal relief.  Presents today for initial evaluation.    I have personally reviewed and/or updated the patient's past medical history, past surgical history, family history, social history, current medications, allergies, and vital signs today.     Review of Systems   Constitutional:  Negative for fever and unexpected weight change.   HENT:  Negative for trouble swallowing.    Eyes:  Negative for visual disturbance.   Respiratory:  Negative for shortness of breath and wheezing.    Cardiovascular:  Negative for chest pain and palpitations.   Gastrointestinal:  Negative for constipation, diarrhea, nausea and vomiting.   Endocrine: Negative for cold intolerance, heat intolerance and polydipsia.   Genitourinary:  Negative for difficulty urinating and frequency.   Musculoskeletal:  Positive for back pain and gait problem. Negative for arthralgias, joint swelling and myalgias.   Skin:  Negative for rash.   Neurological:  Negative for dizziness, seizures, syncope, weakness and headaches.   Hematological:  Does not bruise/bleed easily.   Psychiatric/Behavioral:  Negative for dysphoric mood.    All other  systems reviewed and are negative.      Problem List[1]    Past Medical History[2]    Past Surgical History[3]    Family History[4]    Social History     Occupational History    Not on file   Tobacco Use    Smoking status: Never     Passive exposure: Past    Smokeless tobacco: Never   Vaping Use    Vaping status: Never Used   Substance and Sexual Activity    Alcohol use: Yes     Comment: socially    Drug use: No    Sexual activity: Not Currently       Current Outpatient Medications on File Prior to Visit   Medication Sig    ALPRAZolam (XANAX) 0.5 mg tablet Take 1 tablet (0.5 mg total) by mouth 4 (four) times a day as needed for anxiety    amLODIPine (NORVASC) 5 mg tablet Take 1 tablet (5 mg total) by mouth 2 (two) times a day    apixaban (ELIQUIS) 2.5 mg Take 2.5 mg by mouth 2 (two) times a day    aspirin 81 mg chewable tablet Chew 81 mg in the morning.    atenolol (TENORMIN) 25 mg tablet Take 12.5 mg by mouth in the morning.    diphenoxylate-atropine (LOMOTIL) 2.5-0.025 mg per tablet Take 1 tablet by mouth 4 (four) times a day as needed for diarrhea    escitalopram (LEXAPRO) 10 mg tablet Take 1.5 tablets (15 mg total) by mouth daily    eszopiclone (LUNESTA) 3 MG tablet Take 1 tablet (3 mg total) by mouth daily at bedtime as needed for sleep Take immediately before bedtime    ezetimibe-simvastatin (VYTORIN) 10-20 mg per tablet     HYDROcodone-acetaminophen (NORCO) 5-325 mg per tablet Take 1 tablet by mouth every 6 (six) hours as needed for pain Max Daily Amount: 4 tablets    hydroxychloroquine (PLAQUENIL) 200 mg tablet Take 200 mg by mouth daily with breakfast    multivitamin (THERAGRAN) TABS Take 1 tablet by mouth in the morning.    OLANZapine (ZyPREXA) 2.5 mg tablet Take 1 tablet (2.5 mg total) by mouth daily at bedtime    pilocarpine (SALAGEN) 5 mg tablet in the morning and in the evening.    meclizine (ANTIVERT) 25 mg tablet Take 1 tablet (25 mg total) by mouth every 8 (eight) hours for 2 days (Patient not  "taking: Reported on 5/6/2025)    meclizine (ANTIVERT) 25 mg tablet Take 1 tablet (25 mg total) by mouth 3 (three) times a day as needed for dizziness for up to 14 days (Patient not taking: Reported on 5/6/2025)     No current facility-administered medications on file prior to visit.       Allergies[5]    Physical Exam    BP 99/60 (BP Location: Right arm, Patient Position: Sitting, Cuff Size: Standard)   Pulse (!) 47   Ht 5' 1\" (1.549 m)   Wt 79.8 kg (176 lb)   LMP  (LMP Unknown)   BMI 33.25 kg/m²     Constitutional: normal, well developed, well nourished, alert, in no distress and non-toxic and no overt pain behavior.  Eyes: anicteric  HEENT: grossly intact  Neck: supple, symmetric, trachea midline and no masses   Pulmonary:even and unlabored  Cardiovascular:No edema or pitting edema present  Skin:Normal without rashes or lesions and well hydrated  Psychiatric:Mood and affect appropriate  Neurologic:Cranial Nerves II-XII grossly intact  Musculoskeletal:normal gait, tenderness to palpation bilateral lumbar paraspinals, decreased active and passive range of motion lumbar flexion and extension limited by pain, MMT 5 out of 5 bilateral lower extremities, sensation grossly tact to light touch, positive straight leg raise in the supine position radicular pain into the left leg    Imaging         [1]   Patient Active Problem List  Diagnosis    Light chain disease (HCC)    Personal history of malignant neoplasm of breast    Macrocytosis without anemia    Nutritional deficiency    Obstructive sleep apnea treated with continuous positive airway pressure (CPAP)    Major depressive disorder with current active episode    Insomnia    Circadian rhythm sleep disorder    Anxiety    Bipolar 2 disorder (HCC)    Cellulitis of face    Sjogren's syndrome (HCC)    Elevated blood pressure reading    Primary hypertension    Acute infective polyneuritis (HCC)    Idiopathic thrombocytopenic purpura (HCC)    Sick sinus syndrome (HCC)    " History of pulmonary embolism    Vertigo    HLD (hyperlipidemia)    Psychophysiological insomnia   [2]   Past Medical History:  Diagnosis Date    Anxiety     Arthritis     Atopic dermatitis     Breast CA (HCC) 1999    left  age 49    CAD (coronary artery disease) 04/05/2025    Depression     Fracture     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HCC)     2013    History of chemotherapy     breast cancer    Hypertensive urgency 10/27/2024    Infective polyneuritis, acute (HCC)     Malignant neoplastic disease (HCC) 09/04/2024    Osteoporosis     PE (pulmonary thromboembolism) (HCC)     Peptic ulcer disease     Primary hypertension 09/04/2024    Psychiatric disorder     Sjogren's syndrome (HCC)     Sleep apnea     does not use cpap   [3]   Past Surgical History:  Procedure Laterality Date    ABDOMINAL SURGERY      APPENDECTOMY      BREAST BIOPSY Right     prior to breast cancer    CHOLECYSTECTOMY      COLONOSCOPY W/ ENDOSCOPIC US N/A 02/24/2017    Procedure: ANAL ENDOSCOPIC U/S;  Surgeon: Chang Arellano MD;  Location: [facility];  Service:     HYSTERECTOMY      age 33; ovaries intact    MASTECTOMY Left 1999    age 49    VT COLONOSCOPY FLX DX W/COLLJ SPEC WHEN PFRMD N/A 02/24/2017    Procedure: COLONOSCOPY;  Surgeon: Neo Baxter MD;  Location: [facility];  Service: Gastroenterology   [4]   Family History  Problem Relation Name Age of Onset    Hypertension Mother      Heart failure Father      Esophageal cancer Father  78    Breast cancer Paternal Aunt  48    Breast cancer Cousin paternal 50        paternal    Breast cancer Cousin paternal 50        paternal    Ovarian cancer Maternal Aunt  58    No Known Problems Sister      No Known Problems Maternal Grandmother      No Known Problems Maternal Grandfather      No Known Problems Paternal Grandmother      No Known Problems Paternal Grandfather      No Known Problems Son      No Known Problems Son      No Known Problems Maternal Aunt      No Known Problems Maternal Aunt      No Known  Problems Paternal Aunt     [5]   Allergies  Allergen Reactions    Adderall [Amphetamine-Dextroamphetamine] Tongue Swelling     Tongue swelling    Lamotrigine Rash     Other reaction(s): Possible rash, mouth sores (questionable reaction)

## 2025-05-30 NOTE — TELEPHONE ENCOUNTER
This patient is being considered for a neuraxial injection for the management of their pain. However, the patient is currently on an anticoagulant per your orders. The [employer] Guideline on neuraxial procedures and anti-coagulation indicates that medication should be held as follows: Eliquis 3 days prior to injection.   Please advise if you ok the hold of this medication.    Thank you,    Gustavo Frausto  Procedure   [facility]

## 2025-06-03 NOTE — TELEPHONE ENCOUNTER
Patient has been scheduled with Dr. Alexander for CANDIDO on 7/9/25.  Patient is currently on Eliquis, hold approval was obtained and can be found in the patient's chart.  Patient is aware that our clinical department will be contacting them with further instructions on hold dates.    Thank you,  Gustavo

## 2025-06-03 NOTE — TELEPHONE ENCOUNTER
Patient has been scheduled for their procedure, please see Context appt message for additional details.

## 2025-06-04 NOTE — TELEPHONE ENCOUNTER
Left message for patient informing L/D Eliquis 7/5/25 for procedure on 7/9.  CB# and OH provided.

## 2025-06-10 ENCOUNTER — EVALUATION (OUTPATIENT)
Dept: PHYSICAL THERAPY | Facility: CLINIC | Age: 75
End: 2025-06-10
Payer: COMMERCIAL

## 2025-06-10 DIAGNOSIS — M54.16 LUMBAR RADICULOPATHY: Primary | ICD-10-CM

## 2025-06-10 PROCEDURE — 97164 PT RE-EVAL EST PLAN CARE: CPT | Performed by: PHYSICAL THERAPIST

## 2025-06-10 PROCEDURE — 97112 NEUROMUSCULAR REEDUCATION: CPT | Performed by: PHYSICAL THERAPIST

## 2025-06-10 NOTE — PROGRESS NOTES
PT EVALUATION    Today's date: 06/10/25  Patient name: Flower Nunn  : 1950  MRN: [id number]  Referring provider: Rudy Alexander DO  Dx:   1. Lumbar radiculopathy        ASSESSMENT:  Flower Nunn is a 75 y.o. female who presents with signs and symptoms consistent with their referring diagnosis of chronic lumbar radiculopathy. Patient had recent consultation with spine and pain on 25. Treatment options were discussed and patient decided to proceed with PT and an L5-S1 LESI under fluoroscopy guidance on 25. The primary movement problem is posterior lumbar derangement, resulting in pathoanatomical symptoms of LBP and limiting patient's ability to go shopping, perform walking program, and normal daily activities that require prolong standing.  Patient is neurologically intact and no further referral appears necessary at this time. Patient did not have a true directional preference due to limited symptoms during evaluation however, it would seem she would respond well to flexion biased movements. Patient demonstrates piriformis and SIJ tenderness with palpation. Overall, patient presents with pain, decreased lumbar and core strength, and decreased lumbar mobility. Due to these impairments, Patient has difficulty performing a/iadls, recreational activities and work-related activities. Patient would benefit from skilled physical therapy to address the impairments, improve their level of function, and to improve their overall quality of life      Impairments:    restricted ROM    decreased strength   pain with function   activity intolerance   weight bearing intolerance   abnormal gait   imbalance   abnormal movement    Prognosis:  Good  Positive and negative prognostic indicator(s):  pain >3 months and multiple comorbidities    Goals:    Short Term Goals: to be achieved by 4 weeks  1) Patient to be independent with basic HEP.  2) Decrease pain to 2/10 at its worst.  3) 3) Increase lumbar spine ROM  by 25% in all deficient planes.   4) Improve flexibility to mild restrictions  5)  Increase LE strength by 1/2 MMT grade in all deficient planes.    LTG: To be achieved at Discharge  1)Patient is independent with HEP  2)Return to pre-morbid work related activities  3)Improve tolerance to prolonged sitting, standing, and walking to prior level of function   4) FOTO equal to or greater than target score indicating improvements with overall function      Planned interventions:  home exercise program, patient education, manual therapy, graded activity, flexibility, functional range of motion exercises, strengthening, abdominal trunk stabilization, and modalities prn    Duration in visits:  4-8  Frequency: 1-2 visits per week  Duration in weeks:  4-6    History of Current Injury: Patient notes that her back was bothering her for about a month leading up to the doctors pain. Patient notes that when she saw him she was having about 4/10 pain without radicular symptoms. Patient notes that the last two weeks she hasn't left the house much and has been doing a lot of sitting and reading so it wouldn't bother her. Patient notes that since doing this she is no longer having pain. Patient notes that before it would bother her standing and walking. Patient notes that she could go to the store long cause it would start to bother her. Patient notes that in the grocery store it would be more comfortable when she leans over the cart. Patient denies sleep disturbances or waking up from the pain. Patient notes that she would have some trouble getting comfortable but she would take some tylenol and that would help it.     Pain location: central low back pain   Pain descriptors:  intense pain, sharp   Current Pain: 0/10  Pain at worst: 4/10   Pain at best: 0/10     Aggravating factors: standing, walking, lifting heavy pots in the kitchen   Easing factors: tylenol, bending forward, sitting down     Imaging:   MRI 5/3/25  IMPRESSION:  Transitional thoracolumbar anatomy.  Scoliosis, mild subluxation and spondylolisthesis with multilevel lumbar spondylosis and spondylolisthesis as described above.  Left L5-S1 foraminal protrusion abuts the exiting L5 nerve root, correlate for ipsilateral radiculitis.  Bilateral renal cysts.    Special Questions: numbness and tingling in bilateral feet; no changes with B&B, denies saddle paraesthesia.     Patient goals: Patient reports goals for skilled PT would be  decreased pain, independence with ADLs, and increased mobility    Objective     Concurrent Complaints  Negative for night pain, disturbed sleep, bladder dysfunction, bowel dysfunction and saddle (S4) numbness    Neurological Testing     Sensation     Lumbar   Left   Intact: light touch    Right   Intact: light touch    Reflexes   Left   Patellar (L4): trace (1+)  Achilles (S1): trace (1+)  Clonus sign: negative    Right   Patellar (L4): trace (1+)  Achilles (S1): trace (1+)  Clonus sign: negative    Active Range of Motion     Lumbar   Flexion:  Restriction level: minimal  Extension:  Restriction level: minimal  Left lateral flexion:  Restriction level: moderate  Right lateral flexion:  Restriction level: moderate  Left rotation:  Restriction level: moderate  Right rotation:  Restriction level: moderate    Joint Play     Hypomobile: L1, L2, L3, L4, L5 and S1     Strength/Myotome Testing     Lumbar   Left   Normal strength    Right   Normal strength    Tests     Lumbar     Left   Positive quadrant.   Negative crossed SLR and passive SLR.     Right   Positive quadrant.   Negative crossed SLR and passive SLR.     Left Hip   Negative EDENILSON and FADIR.     Right Hip   Negative EDENILSON and FADIR.     Additional Tests Details  Bilateral hamstring tightness           Precautions: Dizziness when laying flat and supine to sit (KEEP head propped), [place], anxiety, depression, sjogren's, history of breast cancer     Manuals             STM to  lateral hip             STM gapping lumbar spine                                        Neuro Re-Ed             Supine marches with TrA             Hip abd iso with belt             Hip add with bolster              Supine hip abd with TB single leg                                                                  Ther Ex             3-way hip              Bridges              LTR             pball roll ins              pball roll outs              Supine cross body stretch                          Nustep              Ther Activity             Mini squats                           Gait Training                                       Modalities

## 2025-06-10 NOTE — PSYCH
tx plan safety plan  Psychiatric Evaluation - Behavioral Health  MRN: [id number]  Visit Time  Start: 1300 (1:00 pm)   Stop: 1358  Total: ~58 minutes.    Assessment & Plan   Flower Nunn is a 75 y.o. female, /[employer]; with PPHx of panic disorder without agoraphobia, insomnia, major depressive disorder, anxiety, benzodiazepine dependency x20 years, and a brief period of being diagnosed with bipolar 2 which was shortly removed thereafter and PMHx including nutritional deficiency, obstructive sleep apnea CPAP compliant, circadian rhythm sleep disorder, Sjogren syndrome, HTN, HLD, vertigo, pulmonary embolism history, idiopathic thrombocytopenic purpura, acute infective polyneuritis, sick sinus syndrome, light chain disease; with suicide risk factors including chronic mental illness, medical problems, chronic pain, financial problems, anxiety, impulsivity, and age (50+); presenting today (06/11/25) to the [facility] outpatient clinic [facility] for Transfer of Care from Adam Evangelista MD which includes psychiatric evaluation, medication management and psychoththerapy for evaluation and determination of mental health management needs.    After lengthy conversation today about patient's mental health status, her report that she is feeling completely stable and would like to continue medications at their current dosages, and this being her first appointment all medications will be continued at the same dosages.  She was counseled extensively on the high fall risk medication she is being prescribed specifically Xanax and Lunesta both of which can contribute to falls, cognitive impairment, and sedation and she was encouraged to slowly, over time, taper off of and discontinued this medication under the guidance of note writer provider.  Patient was in agreement, explaining she had been on the medication for 20 years and is only taking it twice a day.  For a brief period 3 weeks  ago she accidentally went on a trip and forgot her Xanax and went into a withdrawal feeling highly anxious but not experience any withdrawal symptoms besides tremors.  She then restarted the Xanax medication.  She was agreeable with considering tapering down and decreasing with time due to risk for falls and concern for patient being on blood thinners.  She will be continued on Lunesta as it has been effective for her sleep but down the line once again we will consider decreasing this medication.  She will continue with Lexapro and Zyprexa but she was informed that antipsychotics increase the risk of orthostatic hypotension, and in elderly patients are only used if absolutely necessary.  We reviewed the black box warnings and patient was in agreement with slowly tapering down on these medications.  At this point in time, after discussing the risk and benefit, she would like to continue the medications and reconsider tapering down at the follow-up appointment.  She adamantly denies any desires for self-harm, states panic attacks are completely controlled and anxiety is stable.  She has a great support system in her children and  and has been happily  for 53 years she reports.  Insomnia is stable on current psychotropic medication regimen and she was counseled extensively on sleep hygiene and importance of routine laboratory test.  She has a good community and [employer] and has a deep sense of purpose and numerous protective factors including family.    Assessment & Plan  Panic disorder without agoraphobia with moderate panic attacks  Status: At goal    Continue medications as prescribed  Counseled extensively on decreasing and discontinuing Xanax down the line, has been on for 20 years  Did miss 1 week of Xanax and underwent minor withdrawal and then felt back to normal, she is agreeable with considering removing this due to fall risk, being on blood thinner, memory concerns associated with this medication  "and she is in agreement at this point in time and will reassess a follow-up appointment but would like to continue today.      Regarding PRN medications:  Reports taking as needed xanax 2 times a day for anxiety / panic / heightened anxiety episodes and panic attacks      Orders:    ALPRAZolam (XANAX) 0.5 mg tablet; Take 1 tablet (0.5 mg total) by mouth 4 (four) times a day as needed for anxiety    escitalopram (LEXAPRO) 10 mg tablet; Take 1.5 tablets (15 mg total) by mouth daily    OLANZapine (ZyPREXA) 2.5 mg tablet; Take 1 tablet (2.5 mg total) by mouth daily at bedtime    Psychophysiological insomnia  Status: At goal    Continue medications as prescribed  Was recently switched to Lunesta by previous doctor PCP due to Ambien not working and she would like to continue the Lunesta at the same dosage as it has been effective.  She was counseled extensively on never combining benzodiazepines, Lunesta, or any other sedating medications together this includes alcohol use or other substances.      Regarding sleep with PCP note:  \"There is a component of sleep initiation insomnia, however, she has perception of insomnia, related to delayed phase circadian rhythm sleep disorder. After a long discussion, she seemed to understand this concept and began working on making sleep/wake schedule changes. She felt that ambien was not helping her to initiate sleep anymore. She recently changed sleep aid to Lunesta and feels it worked well for her.       Orders:    eszopiclone (LUNESTA) 3 MG tablet; Take 1 tablet (3 mg total) by mouth daily at bedtime as needed for sleep Take immediately before bedtime    Mild episode of recurrent major depressive disorder (HCC)  Status: At goal    Continue medications as prescribed  Orders:    escitalopram (LEXAPRO) 10 mg tablet; Take 1.5 tablets (15 mg total) by mouth daily            Medical: Follow up with primary care physician and specialists for ongoing medical care.    Hx of low BP; recently " "HR of 47, BP 99/60    Labs: Reviewed.  Continue monitoring CBC/diff, CMP, lipid profile, hemoglobin A1C, TSH and free T4 every 6 months.     Further Recommendations / Precautions / Counseling:  EXERCISE / DIET: The importance of regular exercise/physical activity was discussed. Recommend exercise 3-5 times per week for at least 30 minutes. This writer recommended incorporation of a more whole foods plant-predominant diet in addition to decreasing consumption of red meats and processed food. Per AHA guidelines, this writer recommended patient participation in a moderate-vigorous intensity exercise for 30 minutes a day for 5 days a week or a total of 150 minutes/week.  Patient is receptive to recommendations regarding appropriate dietary and lifestyle modifications.  -----------------------------------    Chief Complaint: \"feeling stable\"    Regarding sleep with PCP note:  \"There is a component of sleep initiation insomnia, however, she has perception of insomnia, related to delayed phase circadian rhythm sleep disorder. After a long discussion, she seemed to understand this concept and began working on making sleep/wake schedule changes. She felt that ambien was not helping her to initiate sleep anymore. She recently changed sleep aid to Lunesta and feels it worked well for her. \"      Regarding PRN medications:  Reports taking as needed xanax 2 times a day for anxiety / panic / heightened anxiety episodes and panic attacks    History of Present Illness  and ENIO Nunn reports reporting feeling stable and stating she would like to taper off of the Xanax with time but at this point in time being her first appointment she would not like to make this medication change.    Stressors: stable and manageable  Medication Side Effects: denies any side effects from medications.    Sleep:    normal sleep; adequate number of sleep hours, meds and CPAP helps  Depression:   Timeline: started experiencing symptoms around " age 20-29 yo; slowly worsening over time currently IMPROVING; currently STABLE on medications symptoms present for >12 months;  Current depression: 3/10 (10 worst).  See PHQ-9 depression scale below, if applicable  DENIES neurovegetative symptomatology suggestive of major depressive disorder or dysthymia. Patient denies fragmented or non-restorative sleep. Patient endorses robust appetite, baseline energy, and no impairment of motivation. Patient reports adequate concentration/memory and denies new-onset forgetfulness or inattentiveness. Patient does not experience daily crying spells or limited pleasure in activities previously found pleasurable. Patient adamantly denies acute thoughts of suicide or self-harm. Patient has no plans to harm others. There is no documented history of prior suicidal gestures or suicidal attempts. Patient denies historical non-suicidal self injurious behavior. Patient is future-oriented and demonstrates self preservation as evidenced by today's evaluation in which Patient is seeking psychiatric intervention to improve overall mental health and outlook on life. Patient denies a pervasive history of worthlessness, hopelessness, or guilt.    Anxiety:    Timeline: started experiencing symptoms around age 20-29 yo; slowly worsening over time currently IMPROVING; currently STABLE on medications symptoms present for >12 months;  Current anxiety: 4/10 (10 worst).  See MONTEZ-7 anxiety scale below, if applicable  ENDORSES both acute and chronic anxiety that is pathologic in nature and suggestive of a formal diagnosis of generalized anxiety disorder. Patient reports excessive nervousness, irrational worry, and overt anxiousness. Patient is pervasively restless, tense, keyed-up, and chronically on-edge. Patient experiences periodic disruption in energy and concentration secondary to anxiety. There is evidence to suggest that Patient experiences irritability, inability to relax, and disruption in sleep  secondary to pathologic anxiety. At times, Patient  is overwhelmed/consumed by irrational fear. Patient denies new-onset panic symptomatology or maladaptive behaviors. Throughout today's session, Patient appears visibly perturbed.   Panic attacks:   ENDORSES diagnosis of panic attacks  Timeline: started experiencing symptoms around age 20-27 yo; currently STABLE on medications symptoms present for >12 months;  Typical symptoms: Endorses a history of panic attacks, lasts 10-20 minutes;, Occurs at random;, significant related worry or behavioral changes for 1+ months (PANIC DISORDER), palpitations/racing heart, sweating, trembling, shortness of breath, choking sensation, chest pain/pressure, nausea/GI distress, dizzy/light headed, chills or overheating, paresthesias.   PTSD:   Exposure to trauma involving: patient denies  Timeline: N/A  DENIES historical symptomatology suggestive of PTSD  Bipolar:   Vehemently DENIES any acute or chronic history suggestive of an underlying affective (bipolar) organization. Patient denies previous episodes of elevated/expansive mood, lengthy periods without sleep, grandiosity, or intense and prolonged irritability. Patient denies atypical periods of increased goal-directed behavior, excessive spending, or sexual promiscuity. The patient has no history of pathologic impulsivity or extreme mood lability. During today's evaluation, patient does not exhibit objective evidence of hypomania/jean claude. Patient is mostly organized in thought without flight of ideas or loosening of associations. Speech does not appear to be pressured or rapid and patient responds well to verbal redirecting.   OCD Symptoms:   No significant symptoms suggestive of OCD diagnosis  Timeline: N/A  Psychotic symptoms:   the patient reports DENIES historical symptomatology suggestive of an underlying psychotic process. DENIES any current acute perceptual disturbances such as A/V hallucinations, paranoia, referential  ideation, or delusions  Social Anxiety   symptoms: no symptoms suggestive of social anxiety  ADHD:   DENIES historical symptomatology suggestive of ADHD  Eating Disorder:   no historical or current eating disorder. no anorexia nervosa; no symptoms of bulimia; no binge eating disorder  Personality Disorder history:   No reported history of personality disorders.    Rating Scales  Current MONTEZ-7 is   MONTEZ-7 Flowsheet Screening      Flowsheet Row Most Recent Value   Over the last two weeks, how often have you been bothered by the following problems?     Feeling nervous, anxious, or on edge 2   Not being able to stop or control worrying 2   Worrying too much about different things 3   Trouble relaxing  2   Being so restless that it's hard to sit still 0   Becoming easily annoyed or irritable  0   Feeling afraid as if something awful might happen 0   How difficult have these problems made it for you to do your work, take care of things at home, or get along with other people?  Not difficult at all   MONTEZ Score  9           Current PHQ-9   PHQ-2/9 Depression Screening    Little interest or pleasure in doing things: 2 - more than half the days  Feeling down, depressed, or hopeless: 0 - not at all  Trouble falling or staying asleep, or sleeping too much: 0 - not at all  Feeling tired or having little energy: 0 - not at all  Poor appetite or overeatin - not at all  Feeling bad about yourself - or that you are a failure or have let yourself or your family down: 0 - not at all  Trouble concentrating on things, such as reading the newspaper or watching television: 1 - several days  Moving or speaking so slowly that other people could have noticed. Or the opposite - being so fidgety or restless that you have been moving around a lot more than usual: 0 - not at all  Thoughts that you would be better off dead, or of hurting yourself in some way: 0 - not at all  PHQ-9 Score: 3  PHQ-9 Interpretation: No or Minimal depression          Psychiatric Review Of Systems:  Flower reports Symptoms as described in HPI.  Flower denies Current suicidal thoughts, plan, or intent, Current thoughts of self-harm, or Current homicidal thoughts, plan, or intent.    Medical Review Of Systems:  Complete review of systems is negative except as noted above.    ---------------------------------------------------  History gathered via chart review and through patient interview.    Psychiatric History:     Past Inpatient / Other Psychiatric Treatment:   No history of past inpatient psychiatric admissions  PHP lasted one day then stopped  Past Outpatient Psychiatric Treatment:   Prior psychiatry and therapy:   Most recently, following with psychiatry with Adam Evangelista MD at [facility]   Has had prior therapy, last was 15 years ago  Current therapy:    No, would not like to see a therapist at this point in time, but is open to reconsideration  Past Suicide Attempts / self harm behaviors:  Self harm: Denies hx   Suicide attempt: Denies hx   Past Violent Behavior: patient denies  Past Psychiatric Medication Trials: Multiple medication trials, does not remember  Gabapentin  Clonazepam  Zyprexa  Effexor  Trazodone  Paxil  Mirtazapine  Lexapro  Lorazepam  Xanax    Substance Abuse History:    I have assessed this patient for substance use within the past 12 months    Tobacco use: denies use in any form.  Alcohol use: occasional alcohol  Cannabis use: denies history of cannabis use in any form.  Other illicit substance use: patient denies  History of Inpatient/Outpatient rehabilitation / detox program: patient denies    Family Psychiatric History:   Patient denies any known family history of psychiatric illness, suicide attempt, or substance abuse outside of the below reported:  Psychiatric Illness:  unknown  Suicide attempts:  unknown  Substance abuse:   unknown    Social History  Uneventful childhood. Second oldest of 4   Marital history:    Children: yes, 2 children, once in [place] and one in [facility]  Living arrangement: currently lives with  for 56 (very supportive marriage).  Support system: good support system  Education: high school diploma / GED  Learning Disabilities: none  Occupational History: retired at age 20 to raise her 2 boys, did work as a  at [employer]  Legal History: none   History: None  Access to firearms: patient denies      Traumatic History:   Abuse / Traumatic events: NO emotional, verbal, physical, or sexual abuse hx, NO traumatic events    Past Medical History:  Eating Disorder: no historical or current eating disorder.   Seizures: patient denies  Head injury / Concussion: no history of head injury or concussions  DENIZ: Endorses a history of DENIZ/sleep apnea diagnosis and CPAP compliant    EKG, Pathology, and Other Studies: Reviewed.    --------------------------------------  Past Medical History[1]   Past Surgical History[2]  Family History[3]    The following portions of the patient's history were reviewed and updated as appropriate: allergies, current medications, past family history, past medical history, past social history, past surgical history, and problem list.    Visit Vitals  LMP  (LMP Unknown)   OB Status Hysterectomy   Smoking Status Never      Wt Readings from Last 6 Encounters:   05/30/25 79.8 kg (176 lb)   05/07/25 78.9 kg (174 lb)   05/06/25 79.7 kg (175 lb 12.8 oz)   04/10/25 80.3 kg (177 lb)   04/09/25 80.4 kg (177 lb 3.2 oz)   04/05/25 78.3 kg (172 lb 9.9 oz)        Mental Status Exam:  Appearance:  alert, good eye contact, appears stated age, casually dressed, and appropriate grooming and hygiene   Behavior:  calm and cooperative   Motor: no abnormal movements and normal gait and balance   Speech:  spontaneous and coherent   Mood:  euthymic   Affect:  constricted   Thought Process:  Organized, logical, goal-directed   Thought Content: no verbalized delusions or  overt paranoia   Perceptual disturbances: no reported hallucinations and does not appear to be responding to internal stimuli at this time   Risk Potential: No active or passive suicidal or homicidal ideation was verbalized during interview   Cognition: oriented to self and situation, appears to be of average intelligence, and cognition not formally tested   Insight:  Good   Judgment: Good     Meds and Allergies  Allergies[4]  Current Outpatient Medications   Medication Instructions    ALPRAZolam (XANAX) 0.5 mg, Oral, 4 times daily PRN    amLODIPine (NORVASC) 5 mg, Oral, 2 times daily    apixaban (ELIQUIS) 2.5 mg, 2 times daily    aspirin 81 mg, Daily    atenolol (TENORMIN) 12.5 mg, Daily    diphenoxylate-atropine (LOMOTIL) 2.5-0.025 mg per tablet 1 tablet, Oral, 4 times daily PRN    escitalopram (LEXAPRO) 15 mg, Oral, Daily    eszopiclone (LUNESTA) 3 mg, Oral, Daily at bedtime PRN, Take immediately before bedtime    ezetimibe-simvastatin (VYTORIN) 10-20 mg per tablet No dose, route, or frequency recorded.    HYDROcodone-acetaminophen (NORCO) 5-325 mg per tablet 1 tablet, Oral, Every 6 hours PRN    hydroxychloroquine (PLAQUENIL) 200 mg, Daily with breakfast    meclizine (ANTIVERT) 25 mg, Oral, Every 8 hours scheduled    meclizine (ANTIVERT) 25 mg, Oral, 3 times daily PRN    multivitamin (THERAGRAN) TABS 1 tablet, Daily    OLANZapine (ZYPREXA) 2.5 mg, Oral, Daily at bedtime    pilocarpine (SALAGEN) 5 mg tablet 2 times daily        Labs & Imaging:  I have personally reviewed all pertinent laboratory tests and imaging results.  Appointment on 05/21/2025   Component Date Value Ref Range Status    Calcium 05/21/2025 9.8  8.4 - 10.2 mg/dL Final   Telephone on 04/11/2025   Component Date Value Ref Range Status    Supplier Name 04/11/2025 [employer] Resupply   In process    Supplier Phone Number 04/11/2025 [phone]   In process    Order Status 04/11/2025 Processing   In process    Delivery Request Date 04/11/2025  04/11/2025   In process    Item Description 04/11/2025 CPAP and BiLevel Resupply Package, Full Face   In process    Qty: 1    Item Description 04/11/2025 Disposable PAP Filter, 2 per 1 month   In process    Qty: 1    Item Description 04/11/2025 Non-Disposable PAP Filter, 1 per 6 months   In process    Qty: 1    Item Description 04/11/2025 PAP Machine Tubing, Heated, 1 per 3 months   In process    Qty: 1    Item Description 04/11/2025 Humidifier Water Chamber, 1 per 6 months   In process    Qty: 1    Item Description 04/11/2025 PAP Headgear, 1 per 6 months   In process    Qty: 1    Item Description 04/11/2025 PAP Mask, Fit to Comfort, Full Face, 1 per 3 months   In process    Qty: 1    Item Description 04/11/2025 PAP Mask Interface Cushion, Full Face, 1 per 1 month   In process    Qty: 1    Item Description 04/11/2025 PAP Humidifier, Heated   In process    Qty: 1   Telephone on 04/11/2025   Component Date Value Ref Range Status    Supplier Name 04/11/2025 [employer] [employer]   Final-Edited    Supplier Phone Number 04/11/2025 [phone]   Final-Edited    Order Status 04/11/2025 Delivery Successful   Final-Edited    Delivery Request Date 04/11/2025 04/11/2025   Final-Edited    Date Delivered  04/11/2025 04/29/2025   Final-Edited    Supplier Name 04/11/2025 04/24/2025   Final-Edited    Item Description 04/11/2025 CPAP Machine, Resmed (5+ year Replacement)   Final-Edited    Comment: Qty: 1  Auto Min Pressure: 5 cm  Auto Max Pressure: 9 cm  Oxygen Usage: None  Expiratory Pressure Relief: 1 cm      Item Description 04/11/2025 PAP Mask, Full Face, Generic, Fit Upon Setup, 1 per 3 months   Final-Edited    Qty: 1    Item Description 04/11/2025 PAP Mask Interface Cushion, Full Face, 1 per 1 month   Final-Edited    Qty: 1    Item Description 04/11/2025 PAP Headgear, 1 per 6 months   Final-Edited    Qty: 1    Item Description 04/11/2025 PAP Humidifier, Heated   Final-Edited    Qty: 1    Item Description  04/11/2025 Disposable PAP Filter, 2 per 1 month   Final-Edited    Qty: 1    Item Description 04/11/2025 Non-Disposable PAP Filter, 1 per 6 months   Final-Edited    Qty: 1    Item Description 04/11/2025 PAP Machine Tubing, Fit to Comfort, 1 per 3 months   Final-Edited    Qty: 1    Item Description 04/11/2025 Humidifier Water Chamber, 1 per 6 months   Final-Edited    Qty: 1    Item Description 04/11/2025 Heated PAP Tubing, 1 per 3 months   Final-Edited    Qty: 1    Item Description 04/11/2025 PAP Monitoring Modem   Final-Edited    Qty: 1    Item Description 04/11/2025 PAP Chinstrap, 1 per 6 months   Final-Edited    Qty: 1   Admission on 04/05/2025, Discharged on 04/06/2025   Component Date Value Ref Range Status    Ventricular Rate 04/05/2025 44  BPM Final    Atrial Rate 04/05/2025 44  BPM Final    ND Interval 04/05/2025 178  ms Final    QRSD Interval 04/05/2025 90  ms Final    QT Interval 04/05/2025 532  ms Final    QTC Interval 04/05/2025 454  ms Final    P Axis 04/05/2025 43  degrees Final    QRS Axis 04/05/2025 36  degrees Final    T Wave Axis 04/05/2025 23  degrees Final    WBC 04/05/2025 8.07  4.31 - 10.16 Thousand/uL Final    RBC 04/05/2025 4.42  3.81 - 5.12 Million/uL Final    Hemoglobin 04/05/2025 13.9  11.5 - 15.4 g/dL Final    Hematocrit 04/05/2025 42.9  34.8 - 46.1 % Final    MCV 04/05/2025 97  82 - 98 fL Final    MCH 04/05/2025 31.4  26.8 - 34.3 pg Final    MCHC 04/05/2025 32.4  31.4 - 37.4 g/dL Final    RDW 04/05/2025 13.2  11.6 - 15.1 % Final    MPV 04/05/2025 9.9  8.9 - 12.7 fL Final    Platelets 04/05/2025 196  149 - 390 Thousands/uL Final    nRBC 04/05/2025 0  /100 WBCs Final    Segmented % 04/05/2025 66  43 - 75 % Final    Immature Grans % 04/05/2025 0  0 - 2 % Final    Lymphocytes % 04/05/2025 24  14 - 44 % Final    Monocytes % 04/05/2025 7  4 - 12 % Final    Eosinophils Relative 04/05/2025 2  0 - 6 % Final    Basophils Relative 04/05/2025 1  0 - 1 % Final    Absolute Neutrophils 04/05/2025 5.32   1.85 - 7.62 Thousands/µL Final    Absolute Immature Grans 04/05/2025 0.02  0.00 - 0.20 Thousand/uL Final    Absolute Lymphocytes 04/05/2025 1.94  0.60 - 4.47 Thousands/µL Final    Absolute Monocytes 04/05/2025 0.58  0.17 - 1.22 Thousand/µL Final    Eosinophils Absolute 04/05/2025 0.17  0.00 - 0.61 Thousand/µL Final    Basophils Absolute 04/05/2025 0.04  0.00 - 0.10 Thousands/µL Final    Sodium 04/05/2025 142  135 - 147 mmol/L Final    Potassium 04/05/2025 4.2  3.5 - 5.3 mmol/L Final    Chloride 04/05/2025 107  96 - 108 mmol/L Final    CO2 04/05/2025 27  21 - 32 mmol/L Final    ANION GAP 04/05/2025 8  4 - 13 mmol/L Final    BUN 04/05/2025 19  5 - 25 mg/dL Final    Creatinine 04/05/2025 1.08  0.60 - 1.30 mg/dL Final    Standardized to IDMS reference method    Glucose 04/05/2025 78  65 - 140 mg/dL Final    If the patient is fasting, the ADA then defines impaired fasting glucose as > 100 mg/dL and diabetes as > or equal to 123 mg/dL.    Calcium 04/05/2025 9.6  8.4 - 10.2 mg/dL Final    AST 04/05/2025 26  13 - 39 U/L Final    ALT 04/05/2025 20  7 - 52 U/L Final    Specimen collection should occur prior to Sulfasalazine administration due to the potential for falsely depressed results.     Alkaline Phosphatase 04/05/2025 94  34 - 104 U/L Final    Total Protein 04/05/2025 7.7  6.4 - 8.4 g/dL Final    Albumin 04/05/2025 4.2  3.5 - 5.0 g/dL Final    Total Bilirubin 04/05/2025 0.43  0.20 - 1.00 mg/dL Final    Use of this assay is not recommended for patients undergoing treatment with eltrombopag due to the potential for falsely elevated results.  N-acetyl-p-benzoquinone imine (metabolite of Acetaminophen) will generate erroneously low results in samples for patients that have taken an overdose of Acetaminophen.    eGFR 04/05/2025 49  ml/min/1.73sq m Final    Protime 04/05/2025 15.0  12.3 - 15.0 seconds Final    INR 04/05/2025 1.11  0.85 - 1.19 Final    PTT 04/05/2025 30  23 - 34 seconds Final    Therapeutic Heparin Range =   60-90 seconds    Hemoglobin A1C 04/05/2025 5.2  Normal 4.0-5.6%; PreDiabetic 5.7-6.4%; Diabetic >=6.5%; Glycemic control for adults with diabetes <7.0% % Final    EAG 04/05/2025 103  mg/dl Final    Vitamin B-12 04/05/2025 302  180 - 914 pg/mL Final    Cholesterol 04/06/2025 171  See Comment mg/dL Final    Cholesterol:         Pediatric <18 Years        Desirable          <170 mg/dL      Borderline High    170-199 mg/dL      High               >=200 mg/dL        Adult >=18 Years            Desirable         <200 mg/dL      Borderline High   200-239 mg/dL      High              >239 mg/dL      Triglycerides 04/06/2025 77  See Comment mg/dL Final    Triglyceride:     0-9Y            <75mg/dL     10Y-17Y         <90 mg/dL       >=18Y     Normal          <150 mg/dL     Borderline High 150-199 mg/dL     High            200-499 mg/dL        Very High       >499 mg/dL    Specimen collection should occur prior to Metamizole administration due to the potential for falsely depressed results.    HDL, Direct 04/06/2025 56  >=50 mg/dL Final    LDL Calculated 04/06/2025 100  0 - 100 mg/dL Final    LDL Cholesterol:     Optimal           <100 mg/dl     Near Optimal      100-129 mg/dl     Above Optimal       Borderline High 130-159 mg/dl       High            160-189 mg/dl       Very High       >189 mg/dl         This screening LDL is a calculated result.   It does not have the accuracy of the Direct Measured LDL in the monitoring of patients with hyperlipidemia and/or statin therapy.   Direct Measure LDL (XTG640) must be ordered separately in these patients.   Appointment on 03/11/2025   Component Date Value Ref Range Status    WBC 03/11/2025 7.44  4.31 - 10.16 Thousand/uL Final    RBC 03/11/2025 4.33  3.81 - 5.12 Million/uL Final    Hemoglobin 03/11/2025 13.7  11.5 - 15.4 g/dL Final    Hematocrit 03/11/2025 42.4  34.8 - 46.1 % Final    MCV 03/11/2025 98  82 - 98 fL Final    MCH 03/11/2025 31.6  26.8 - 34.3 pg Final    MCHC  03/11/2025 32.3  31.4 - 37.4 g/dL Final    RDW 03/11/2025 12.9  11.6 - 15.1 % Final    MPV 03/11/2025 10.9  8.9 - 12.7 fL Final    Platelets 03/11/2025 195  149 - 390 Thousands/uL Final    nRBC 03/11/2025 0  /100 WBCs Final    Segmented % 03/11/2025 62  43 - 75 % Final    Immature Grans % 03/11/2025 0  0 - 2 % Final    Lymphocytes % 03/11/2025 27  14 - 44 % Final    Monocytes % 03/11/2025 8  4 - 12 % Final    Eosinophils Relative 03/11/2025 3  0 - 6 % Final    Basophils Relative 03/11/2025 0  0 - 1 % Final    Absolute Neutrophils 03/11/2025 4.56  1.85 - 7.62 Thousands/µL Final    Absolute Immature Grans 03/11/2025 0.03  0.00 - 0.20 Thousand/uL Final    Absolute Lymphocytes 03/11/2025 1.99  0.60 - 4.47 Thousands/µL Final    Absolute Monocytes 03/11/2025 0.59  0.17 - 1.22 Thousand/µL Final    Eosinophils Absolute 03/11/2025 0.24  0.00 - 0.61 Thousand/µL Final    Basophils Absolute 03/11/2025 0.03  0.00 - 0.10 Thousands/µL Final    Creatinine 03/11/2025 1.07  0.60 - 1.30 mg/dL Final    Standardized to IDMS reference method    eGFR 03/11/2025 50  ml/min/1.73sq m Final    Sed Rate 03/11/2025 46 (H)  0 - 29 mm/hour Final    CRP 03/11/2025 1.7  <3.0 mg/L Final    Anti-Ro (SS-A) Ab 03/11/2025 <0.5  See Comment U/mL Final    Negative: <7                          Equivocal: 7-10                       Positive:>10    Anti-La (SS-B) Ab 03/11/2025 <0.6  See Comment U/mL Final    Negative: <7                         Equivocal: 7-10                       Positive:>10    C3 Complement 03/11/2025 162  87 - 200 mg/dL Final    C4, COMPLEMENT 03/11/2025 36  19 - 52 mg/dL Final    Anti Nuclear Antibodies 03/11/2025 Positive (A)  Negative Final    Cyclic Citrullinated Peptide Ab  03/11/2025 2.4  See comment Final    RHEUMATOID FACTOR 03/11/2025 <10.00  <=14.00 IU/mL Final    New methodology, it is recommended to establish new patient Rheumatoid Factor baseline for your patients. Note: In very rare cases gammopathy, especially monoclonal  IgM(Waldenstrom’s macroglobulinemia), may interfere and cause unreliable results.    CHANCE Screen 03/11/2025 0.30  See Comment Ratio Final    Anti-dsDNA Ab 03/11/2025 <0.9  See Comment IU/mL Final    OLVIN Titer 1 03/11/2025 Titer of 40   Final    OLVIN Pattern 1 03/11/2025 Cytoplasmic (non-nuclear)   Final    There is lack of consensus regarding the clinical significance of cytoplasmic (non-nuclear) staining. Antibodies to Ribosomal P and Marlys-1 have been associated with cytoplasmic staining characteristics observed by IFA methodology.   Appointment on 01/06/2025   Component Date Value Ref Range Status    Cholesterol 01/06/2025 215 (H)  See Comment mg/dL Final    Cholesterol:         Pediatric <18 Years        Desirable          <170 mg/dL      Borderline High    170-199 mg/dL      High               >=200 mg/dL        Adult >=18 Years            Desirable         <200 mg/dL      Borderline High   200-239 mg/dL      High              >239 mg/dL      Triglycerides 01/06/2025 90  See Comment mg/dL Final    Triglyceride:     0-9Y            <75mg/dL     10Y-17Y         <90 mg/dL       >=18Y     Normal          <150 mg/dL     Borderline High 150-199 mg/dL     High            200-499 mg/dL        Very High       >499 mg/dL    Specimen collection should occur prior to Metamizole administration due to the potential for falsely depressed results.    HDL, Direct 01/06/2025 80  >=50 mg/dL Final    LDL Calculated 01/06/2025 117 (H)  0 - 100 mg/dL Final    LDL Cholesterol:     Optimal           <100 mg/dl     Near Optimal      100-129 mg/dl     Above Optimal       Borderline High 130-159 mg/dl       High            160-189 mg/dl       Very High       >189 mg/dl         This screening LDL is a calculated result.   It does not have the accuracy of the Direct Measured LDL in the monitoring of patients with hyperlipidemia and/or statin therapy.   Direct Measure LDL (AVK425) must be ordered separately in these patients.    Non-HDL-Chol  (CHOL-HDL) 01/06/2025 135  mg/dl Final    Sodium 01/06/2025 139  135 - 147 mmol/L Final    Potassium 01/06/2025 4.3  3.5 - 5.3 mmol/L Final    Chloride 01/06/2025 106  96 - 108 mmol/L Final    CO2 01/06/2025 23  21 - 32 mmol/L Final    ANION GAP 01/06/2025 10  4 - 13 mmol/L Final    BUN 01/06/2025 21  5 - 25 mg/dL Final    Creatinine 01/06/2025 1.05  0.60 - 1.30 mg/dL Final    Standardized to IDMS reference method    Glucose, Fasting 01/06/2025 79  65 - 99 mg/dL Final    Calcium 01/06/2025 9.0  8.4 - 10.2 mg/dL Final    AST 01/06/2025 26  13 - 39 U/L Final    ALT 01/06/2025 23  7 - 52 U/L Final    Specimen collection should occur prior to Sulfasalazine administration due to the potential for falsely depressed results.     Alkaline Phosphatase 01/06/2025 88  34 - 104 U/L Final    Total Protein 01/06/2025 7.7  6.4 - 8.4 g/dL Final    Albumin 01/06/2025 3.9  3.5 - 5.0 g/dL Final    Total Bilirubin 01/06/2025 0.45  0.20 - 1.00 mg/dL Final    Use of this assay is not recommended for patients undergoing treatment with eltrombopag due to the potential for falsely elevated results.  N-acetyl-p-benzoquinone imine (metabolite of Acetaminophen) will generate erroneously low results in samples for patients that have taken an overdose of Acetaminophen.    eGFR 01/06/2025 51  ml/min/1.73sq m Final    WBC 01/06/2025 9.49  4.31 - 10.16 Thousand/uL Final    RBC 01/06/2025 4.32  3.81 - 5.12 Million/uL Final    Hemoglobin 01/06/2025 13.7  11.5 - 15.4 g/dL Final    Hematocrit 01/06/2025 42.7  34.8 - 46.1 % Final    MCV 01/06/2025 99 (H)  82 - 98 fL Final    MCH 01/06/2025 31.7  26.8 - 34.3 pg Final    MCHC 01/06/2025 32.1  31.4 - 37.4 g/dL Final    RDW 01/06/2025 13.1  11.6 - 15.1 % Final    MPV 01/06/2025 9.9  8.9 - 12.7 fL Final    Platelets 01/06/2025 238  149 - 390 Thousands/uL Final    nRBC 01/06/2025 0  /100 WBCs Final    Segmented % 01/06/2025 61  43 - 75 % Final    Immature Grans % 01/06/2025 1  0 - 2 % Final    Lymphocytes %  01/06/2025 29  14 - 44 % Final    Monocytes % 01/06/2025 9  4 - 12 % Final    Eosinophils Relative 01/06/2025 0  0 - 6 % Final    Basophils Relative 01/06/2025 0  0 - 1 % Final    Absolute Neutrophils 01/06/2025 5.78  1.85 - 7.62 Thousands/µL Final    Absolute Immature Grans 01/06/2025 0.05  0.00 - 0.20 Thousand/uL Final    Absolute Lymphocytes 01/06/2025 2.76  0.60 - 4.47 Thousands/µL Final    Absolute Monocytes 01/06/2025 0.85  0.17 - 1.22 Thousand/µL Final    Eosinophils Absolute 01/06/2025 0.03  0.00 - 0.61 Thousand/µL Final    Basophils Absolute 01/06/2025 0.02  0.00 - 0.10 Thousands/µL Final       -----------------------------------    Medications Risks/Benefits:      Risks, Benefits And Possible Side Effects Of Medications:    Risks, benefits, and alternatives to treatment were discussed and the importance of medication and treatment compliance was reviewed with the patient and they verbalize understanding and agreement for treatment.     Treatment Plan:  The Treatment Plan was completed and signed.. If done today, the next plan will be due December 8, 2025.     The following interventions are recommended: follow-up for regularly scheduled psychiatry appointments (and if needed, agreeable to move appointment sooner), if patient is in psychotherapy, continue with regularly scheduled individual psychotherapy appointments, and contracts for safety at present - agrees to call Crisis Intervention Service or go to ED if feeling unsafe. Although patient's acute lethality risk is LOW, long-term/chronic lethality risk is mildly elevated given the suicide risk factors noted above. However, at the current moment, Flower is future-oriented, forward-thinking, and demonstrates ability to act in a self-preserving manner as evidenced by volitionally seeking psychiatric evaluation and treatment today. To mitigate future risk, patient should adhere to treatment recommendations, avoid alcohol/illicit substance use, utilize  community-based resources and familiar support, and prioritize mental health treatment.          Controlled Medication Discussion:   BENZODIAZEPINES and/or OPIOIDS: Flower has been filling controlled prescriptions on time as prescribed according to [place] Prescription Drug Monitoring Program. Discussed with Flower the risks of sedation, respiratory depression, impairment of ability to drive and potential for abuse and addiction related to treatment with benzodiazepine medications. She understands risk of treatment with benzodiazepine medications, agrees to not drive if feels impaired and agrees to take medications as prescribed. Discussed with Flower Black Box warning on concurrent use of benzodiazepines and opioid medications including sedation, respiratory depression, coma and death. She understands the risk of treatment with benzodiazepines in addition to opioids and wants to continue taking those medications. Discussed with Flower increased risk of impairment related to concurrent use of benzodiazepines and hypnotic medications including excessive sedation, psychomotor impairment and respiratory depression. She understands the risk of treatment with benzodiazepines in addition to hypnotic medications and wants to continue taking those medications. Discussed with Flower need to slowly taper off benzodiazepines as recommended by [place] Prescription Drug Monitoring Program, due to concurrent use of opioid medications and increased risk of sedation, respiratory depression, coma and death with that combination    PDMP Review         Value Time User    PDMP Reviewed  Yes 6/11/2025  1:56 PM Bill Decker MD            Suicide/Homicide Risk Assessment:    The following interventions are recommended: continue medication management, continue psychotherapy (if patient is regularly seeing a therapist), contracts for safety at present - agrees to go to ED if feeling unsafe, contracts for safety at present -  agrees to call Crisis Intervention Service if feeling unsafe. Although patient's acute lethality risk is low, long-term/chronic lethality risk is mildly elevated in the presence of the above mentioned psychiatric and psychosocial concerns. At the current moment, Flower is future-oriented, forward-thinking, and demonstrates ability to act in a self-preserving manner as evidenced by volitionally presenting to the clinic today, seeking treatment. At this juncture, inpatient hospitalization is not currently warranted. To mitigate future risk, patient should adhere to the recommendations of this writer, avoid alcohol/illicit substance use, utilize community-based resources and familiar support and prioritize mental health treatment. Based on today's assessment and clinical criteria, Flower Nunn contracts for safety and is not an imminent risk of harm to self or others. Outpatient level of care is deemed appropriate at this present time. Flower understands that if they are no longer able to contract for safety, they need to call/contact the outpatient office including this writer, call/contact crisis and/orattend to the nearest Emergency Department for immediate evaluation.    Presently, patient patient denies suicidal/homicidal ideation in addition to thoughts of self-injury; contracts for safety, see below for risk assessment.  At conclusion of evaluation, patient is amenable to the recommendations of this writer including: starting/continuing/adjusting psychotropic medications as prescribed.  Also, patient is amenable to calling/contacting the outpatient office including this writer if any acute adverse effects of their medication regimen arise in addition to any comments or concerns pertaining to their psychiatric management.  Patient is amenable to calling/contacting crisis and/or attending to the nearest emergency department if their clinical condition deteriorates to assure their safety and stability, stating  that they are able to appropriately confide in their supports regarding their psychiatric state.    -----------------------------------    Medical Decision Making / Counseling / Coordination of Care:  Recent stressors were discussed with the patient. The diagnosis and treatment plan were reviewed with the patient. Risks, benefits, and alternativies to treatment were discussed, including a myriad of potential adverse medication side effects, to which Flower voiced understanding and consented fully to treatment. The importance of medication and treatment compliance was reviewed with the patient. Individual psychotherapy provided: Supportive psychotherapy.     Note: This chart was completed utilizing speech software.  Grammatical errors, random word insertions, pronoun errors, and incomplete sentences are an occasional consequence of the system due to software limitation, ambient noise, and hardware issues.  Any formal questions or concerns about the context, text, or information contained within the body of this dictation should be directly addressed to the physician for clarification.    Bill Decker MD    This note was not shared with the patient due to reasonable likelihood of causing patient harm          [1]   Past Medical History:  Diagnosis Date    Anxiety     Arthritis     Atopic dermatitis     Breast CA (HCC) 1999    left  age 49    CAD (coronary artery disease) 04/05/2025    Depression     Fracture     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HCC)     2013    History of chemotherapy     breast cancer    Hypertensive urgency 10/27/2024    Infective polyneuritis, acute (HCC)     Malignant neoplastic disease (HCC) 09/04/2024    Osteoporosis     PE (pulmonary thromboembolism) (HCC)     Peptic ulcer disease     Primary hypertension 09/04/2024    Psychiatric disorder     Sjogren's syndrome (HCC)     Sleep apnea     does not use cpap   [2]   Past Surgical History:  Procedure Laterality Date    ABDOMINAL SURGERY       APPENDECTOMY      BREAST BIOPSY Right     prior to breast cancer    CHOLECYSTECTOMY      COLONOSCOPY W/ ENDOSCOPIC US N/A 02/24/2017    Procedure: ANAL ENDOSCOPIC U/S;  Surgeon: Chang Arellano MD;  Location: [facility];  Service:     HYSTERECTOMY      age 33; ovaries intact    MASTECTOMY Left 1999    age 49    OH COLONOSCOPY FLX DX W/COLLJ SPEC WHEN PFRMD N/A 02/24/2017    Procedure: COLONOSCOPY;  Surgeon: Neo Baxter MD;  Location: [facility];  Service: Gastroenterology   [3]   Family History  Problem Relation Name Age of Onset    Hypertension Mother      Heart failure Father      Esophageal cancer Father  78    Breast cancer Paternal Aunt  48    Breast cancer Cousin paternal 50        paternal    Breast cancer Cousin paternal 50        paternal    Ovarian cancer Maternal Aunt  58    No Known Problems Sister      No Known Problems Maternal Grandmother      No Known Problems Maternal Grandfather      No Known Problems Paternal Grandmother      No Known Problems Paternal Grandfather      No Known Problems Son      No Known Problems Son      No Known Problems Maternal Aunt      No Known Problems Maternal Aunt      No Known Problems Paternal Aunt     [4]   Allergies  Allergen Reactions    Adderall [Amphetamine-Dextroamphetamine] Tongue Swelling     Tongue swelling    Lamotrigine Rash     Other reaction(s): Possible rash, mouth sores (questionable reaction)

## 2025-06-10 NOTE — HOME EXERCISE EDUCATION
Program_ID:[id number]   Access Code: [email or web address]  URL: [email or web address]  Date: 06-  Prepared By: Yovana Brizuela    Program Notes      Exercises      - Supine Posterior Pelvic Tilt - 2 x daily - 7 x weekly - 1 sets - 10 reps - 5 hold      - Seated Lumbar Flexion Stretch - 3 x daily - 7 x weekly - 1 sets - 10 reps - 5 hold      - Supine Lower Trunk Rotation - 2 x daily - 7 x weekly - 1 sets - 10 reps - 5 hold      - Hooklying Single Knee to Chest Stretch with Towel - 1 x daily - 7 x weekly - 1 sets - 10 reps - 10 seconds  hold

## 2025-06-10 NOTE — HOME EXERCISE EDUCATION
Program_ID:[id number]   Access Code: [email or web address]  URL: [email or web address]  Date: 06-  Prepared By: Yovana Brizuela    Program Notes      Exercises      - Supine Posterior Pelvic Tilt - 2 x daily - 7 x weekly - 1 sets - 10 reps - 5 hold      - Seated Lumbar Flexion Stretch - 3 x daily - 7 x weekly - 1 sets - 10 reps - 5 hold      - Supine Lower Trunk Rotation - 2 x daily - 7 x weekly - 1 sets - 10 reps - 5 hold      - Hooklying Single Knee to Chest Stretch with Towel - 1 x daily - 7 x weekly - 1 sets - 10 reps - 10 seconds  hold

## 2025-06-11 ENCOUNTER — OFFICE VISIT (OUTPATIENT)
Dept: PSYCHIATRY | Facility: CLINIC | Age: 75
End: 2025-06-11
Payer: COMMERCIAL

## 2025-06-11 DIAGNOSIS — F41.0 PANIC DISORDER WITHOUT AGORAPHOBIA WITH MODERATE PANIC ATTACKS: Primary | Chronic | ICD-10-CM

## 2025-06-11 DIAGNOSIS — F51.04 PSYCHOPHYSIOLOGICAL INSOMNIA: ICD-10-CM

## 2025-06-11 DIAGNOSIS — F33.0 MILD EPISODE OF RECURRENT MAJOR DEPRESSIVE DISORDER (HCC): ICD-10-CM

## 2025-06-11 PROCEDURE — 90792 PSYCH DIAG EVAL W/MED SRVCS: CPT | Performed by: PSYCHIATRY & NEUROLOGY

## 2025-06-11 RX ORDER — ESZOPICLONE 3 MG/1
3 TABLET, FILM COATED ORAL
Qty: 30 TABLET | Refills: 0 | Status: SHIPPED | OUTPATIENT
Start: 2025-06-11

## 2025-06-11 RX ORDER — ALPRAZOLAM 0.5 MG
0.5 TABLET ORAL 4 TIMES DAILY PRN
Qty: 120 TABLET | Refills: 1 | Status: SHIPPED | OUTPATIENT
Start: 2025-06-11

## 2025-06-11 RX ORDER — ESCITALOPRAM OXALATE 10 MG/1
15 TABLET ORAL DAILY
Qty: 135 TABLET | Refills: 1 | Status: SHIPPED | OUTPATIENT
Start: 2025-06-11

## 2025-06-11 RX ORDER — OLANZAPINE 2.5 MG/1
2.5 TABLET, FILM COATED ORAL
Qty: 90 TABLET | Refills: 1 | Status: SHIPPED | OUTPATIENT
Start: 2025-06-11

## 2025-06-11 NOTE — BH TREATMENT PLAN
TREATMENT PLAN        [facility]    Name and Date of Birth:  Flower Nunn 75 y.o. 1950  Date of Treatment Plan: June 11, 2025  Diagnosis/Diagnoses:    1. Panic disorder without agoraphobia with moderate panic attacks    2. Psychophysiological insomnia    3. Mild episode of recurrent major depressive disorder (HCC)        Strengths/Personal Resources for Self-Care: supportive family, supportive friends, work skills, ability to listen, ability to reason, ability to communicate needs, ability to negotiate basic needs, willingness to work on problems, ability to understand psychiatric illness    Area/Areas of need: anxiety, depression, mood swings, sleep problems    Long Term Goal: improve anxiety, maintain improvement in depression, maintain improvement in mood stability, improve sleep, maintain improvement in impulse control, Feel better about self, Feel calmer, Feel happier, and Feel more positive about the future  Target Date: 6 months - December 11, 2025  Person/Persons responsible for completion of goal: Danny Decker MD     Short Term Objective (s) - How will we reach this goal?:   Take medications as prescribed  Attend psychiatry appointments regularly  Get blood work drawn  Continue psychotherapy regularly  Practice coping skills  Try sleep hygiene techniques  Avoid alcohol   Avoid drugs   Take walks regularly  Try breathing exercises  Try relaxation techniques  Target Date: 6 months - December 11, 2025  Person/Persons Responsible for Completion of Goal: Flower     Progress Towards Goals: Continuing treatment    Treatment Modality: medication management every 1-3 months as needed, continue psychotherapy (if patient is currently involved in therapy), and follow up with PCP regularly  Review due 180 days from date of this plan: December 8, 2025   Expected length of service: Ongoing treatment    My physician and I have developed this plan  together, and I agree to work on the goals and objectives. I understand the treatment goals that were developed for my treatment.    The treatment plan was created between Bill Decker MD and Flower Nunn on 06/11/25 at 4:46 PM but not signed at the time of the visit due to COVID social distancing. The plan was reviewed, and verbal consent was given.

## 2025-06-11 NOTE — PATIENT INSTRUCTIONS
"    Please call the office nursing staff for medication issues including refills, problems getting medications, bothersome side effects, etc., at [phone].     Please return for a follow up appointment as discussed and arrive approximately 15 minutes prior to your appointment time. If you are running late or are unable to attend your appointment, please call our [place] office at [phone] (fax: [phone]), or if you were seen in the [facility] office, please call [phone] (fax [phone]).    National Suicide Prevention Hotline: 1-445.442.5874 or call 388.    To improve sleep:  - Keep a consistent sleep schedule. Get up at the same time every day, even on weekends or during vacations.  - Set a bedtime that is early enough for you to get at least 7-8 hours of sleep.  - Don’t go to bed unless you are sleepy.  - If you don’t fall asleep after 20 minutes, get out of bed and take a brief \"break\". Go to a quiet activity without a lot of light exposure. It is especially important to not get on electronics. During this \"break,\" you might consider sitting in a chair and reading a boring book or listening to soft music, until your eyelids start to feel heavy.  Then, would go back to sleep and try again.    - Establish a relaxing bedtime routine.  - Make your bedroom quiet and relaxing. Keep the room at a comfortable, cool temperature.  - Limit exposure to bright light in the evenings.  - Turn off electronic devices at least 30 minutes before bedtime.  - Avoid watching stressful or suspenseful TV programs right before bedtime.  - Don’t eat a large meal before bedtime. If you are hungry at night, eat a light, healthy snack.  - Exercise regularly and maintain a healthy diet.  Participate in regular physical activities like exercise, although avoid approximately 3-4 hours prior to bed.  Morning exercise is ideal.  - Avoid consuming caffeine in the afternoon or evening.  - Avoid consuming alcohol " "before bedtime.  - Reduce your fluid intake before bedtime.  - Avoid daytime napping.  - Consider reading \"No More Sleepless nights\" by Obie Denney, Ph.D.  - Consider use of online resources including:  - http://geolad/cbt-online-insomnia-treatment.html  - http://www.Kaymu.pk  - CBT-I  Jose Roberto on your Smart Phone.  - Go! To Sleep by the [facility].    Look up \"grounding techniques\" and/or \"anchoring demonstration\" online and try a few to see what may work for you. Practice these skills before you need them, when you are not feeling too anxious or triggered. You can also search for free guided meditation videos online to help improve your head space when you are feeling very anxious or triggered.      Healthy Diet   The American Heart Association and the American College of Cardiology have long recommended a healthy diet for not only patients who are at risk for atherosclerotic cardiovascular disease (ASCVD) but also the general public. In keeping with this evidence-based recommendation, the \"2018 Guideline on the Management of Blood Cholesterol\" stresses that a healthy diet should include adequate intake of these essentials:   Vegetables, fruits, and whole grains   Legumes and nuts   Low-fat dairy products   Low-fat poultry (without the skin)   Fish and seafood   Nontropical vegetable oils     The recent guidelines do provide room for cultural food preferences in a healthy diet, but in general, all patients should limit their intake of saturated and avoid all trans fats, sweets, sugar-sweetened beverages, and red meats.  Please maintain adequate hydration of at least 2 Liters of water per day, and improve nutrition by decreasing portion sizes, avoiding fried foods, fast foods, inappropriate snacking and overly processed and packaged items with 'added sugars' (whether in drinks or foods), and observing nutritional facts information on any items that are packaged to reduce intake of saturated fats and " AVOID trans fats as mentioned above. Again, consume whole foods such as vegetables, higher lean protein intake, and using healthier lifestyle plans such as the Mediterranean diet with healthier fats such as those from seeds, nuts, and using organic extra virgin olive oil or avocado oil in lieu of processed vegetable oils or margarine.    Physical Activity   Recommended DAILY exercise for at least 150 minutes of moderate exercise weekly!  In addition to a healthy diet, all patients should include regular physical activity in their weekly routines, at moderate to vigorous intensity. Any activity is better than nothing, so if your patients can't meet the recommendation of vigorous activity, moderate-intensity activity can still help them reduce their risk of ASCVD.     Below are the American Heart Association's recommendations for physical activity per week (preferably spread throughout the week):     For Overall Cardiovascular Health and Lowering Cholesterol   At least 150 minutes of moderate-intensity physical activity (for example, 30 minutes, 5 days a week), or   At least 75 minutes of vigorous-intensity physical activity (for example, 25 minutes, 3 days a week); or   A combination of moderate- and vigorous-intensity aerobic activity, and   At least 2 days of moderate- to high-intensity muscle-strengthening activities (such as resistance weight training) for additional health benefits    If you have thoughts of harming yourself or are otherwise in psychological crisis, do not hesitate to contact your County Crisis hotline, or 911 or go to the nearest emergency room.  Adult Crisis Numbers  Suicide Prevention Hotline - Dial 9-8-8  [facility]: [phone] or [phone]  [facility]: [phone]  [facility]: [phone]  [facility]: [phone]  [place]: [phone] or [phone]  [facility]: [phone]  [facility]: [phone] or [facility]:  [phone]  [facility]: 1-270.161.1989 (daytime).       [phone] (after hours, weekends, holidays)     Child/Adolescent Crisis Numbers   [facility]: [phone]   [facility]: [phone]   [place]: [phone]   [place]: [phone]  Please note: Some [facility] do not have a separate number for Child/Adolescent specific crisis. If your county is not listed under Child/Adolescent, please call the adult number for your county     National Talk to Text Line   [employer] - [phone]    National Suicide Prevention Hotline: 1-740.675.8072 or call 694.

## 2025-06-12 ENCOUNTER — TELEPHONE (OUTPATIENT)
Dept: SLEEP CENTER | Facility: CLINIC | Age: 75
End: 2025-06-12

## 2025-06-12 ENCOUNTER — OFFICE VISIT (OUTPATIENT)
Dept: SLEEP CENTER | Facility: CLINIC | Age: 75
End: 2025-06-12
Payer: COMMERCIAL

## 2025-06-12 VITALS
WEIGHT: 174.8 LBS | SYSTOLIC BLOOD PRESSURE: 96 MMHG | BODY MASS INDEX: 33 KG/M2 | DIASTOLIC BLOOD PRESSURE: 50 MMHG | HEIGHT: 61 IN

## 2025-06-12 DIAGNOSIS — I10 PRIMARY HYPERTENSION: ICD-10-CM

## 2025-06-12 DIAGNOSIS — G47.20 CIRCADIAN RHYTHM SLEEP DISORDER: ICD-10-CM

## 2025-06-12 DIAGNOSIS — G47.33 OBSTRUCTIVE SLEEP APNEA TREATED WITH CONTINUOUS POSITIVE AIRWAY PRESSURE (CPAP): Primary | ICD-10-CM

## 2025-06-12 DIAGNOSIS — G47.00 INSOMNIA, UNSPECIFIED TYPE: ICD-10-CM

## 2025-06-12 PROCEDURE — 99214 OFFICE O/P EST MOD 30 MIN: CPT | Performed by: NURSE PRACTITIONER

## 2025-06-12 PROCEDURE — G2211 COMPLEX E/M VISIT ADD ON: HCPCS | Performed by: NURSE PRACTITIONER

## 2025-06-12 NOTE — ASSESSMENT & PLAN NOTE
"She had significant delayed phase circadian rhythm symptoms.  After explanation and counseling on how to correct this, she was able to significantly improve her sleep/wake schedule over the past 2 years.  She is going to bed at a consistent time.  She takes ambien at her consistent bedtime (prescription was recently changed by her new psychiatry provider, to Lunesta, but she hasn't started it yet).  It still takes about one hour to fall asleep.  She admits that she is not really sleepy when she gets into bed, but reports \"it's a habit my  and I have formed\".  She reports that her mind is thinking and \"it takes an hour to get calm enough to fall asleep\".  She only wakes up one time during the night for urination and returns to sleep without difficulty.  She wakes up on her own around 9:00am.  She reports that it takes awhile for her to wake up.  By 10:00am she feels awake.  She has not been napping at all during the day and she has energy to perform her activities during the day.  We discussed that if she wakes up a little earlier on a regular basis, she may be able to fall asleep more easily.  The delay in feeling awake over the first hour may be residual effects of ambien.  This may change with Lunesta.  She denies any complex sleep behaviors with use of ambien.       "

## 2025-06-12 NOTE — ASSESSMENT & PLAN NOTE
Hypertension - She reports that her blood pressure has been running low lately.  She has had some dizziness, but attributes to vertigo.  I advised her to contact her cardiologist to discuss.    We reviewed the association between untreated obstructive sleep apnea and the increased risk for hypertension.

## 2025-06-12 NOTE — PATIENT INSTRUCTIONS
Patient Instructions:    1.  Continue use of PAP equipment nightly  2.  Continue to clean your equipment, as discussed  3.  Contact the [facility] with any questions or concerns prior to your next visit, as needed  4.  Schedule visit for follow-up in 1 year  5.  Recommend calling your cardiology office if your blood pressure continues to be low.  It was 96/50 today in the office.  6.  Continue to follow with psychiatry regarding insomnia symptoms  7.  Keep your wake up time the same every day.  Avoid sleeping late and avoid taking naps.  Also consider waking up a little earlier and you may fall asleep more easily at night.  You are getting 10 hours of sleep, which is likely more than you need.        Suggested PAP supply Replacement Frequency  Disposable filters....................................every 2 weeks  Replaceable nasal mask cushions.........every 2 weeks  Replaceable full face cushions...............every 1 month  Mask.......................................................every 3 months  Tubing.....................................................every 3 months  Head Gear..............................................every 6 months  Water chamber.......................................every 6 months         Thank you for trusting me with your care!    I know we often  cover a lot of information at the visit, so if you have follow-up questions, are unclear about the plan, or feel there were important items that we did not discuss or you did not receive clarity on, please don't hesitate to reach out to me.     MyChart messages are preferred for routine matters.  Please make sure to call for urgent matters as there can be a delay in responding to questions over [employer].    IMPORTANT- Prior to a sleep study (at home or in the sleep lab), I strongly recommend contacting your medical insurance first to understand your benefits (including deductible if applicable), coverage for this test, and out of  pocket costs.  Even if the test is approved by medical insurance, the cost to you is determined by your medical benefits.   If you have concerns about your out of pocket costs for sleep testing, please contact me/the office before you complete the test and we can discuss if there are alternate options.     I recommend following this advice in general before any lab test, imaging test, doctor visit, surgery, or ordering CPAP supplies as it is best to understand your coverage to avoid unexpected bills after the fact.       Nursing Support:  When: Monday through Friday 7:30A-4:30PM except holidays  Where: Our direct line is [phone]  *3  *1.      If you are having a true emergency please call 911.  In the event that the line is busy or it is after hours please leave a voice message and we will return your call.  Please speak clearly, leaving your full name, birth date, best number to reach you and the reason for your call.   Medication refills: We will need the name of the medication, the dosage, the ordering provider, whether you get a 30 or 90 day refill, and the pharmacy name and address.  Medications will be ordered by the provider only.  Nurses cannot call in prescriptions.  Please allow 7 days for medication refills.  Physician requested updates: If your provider requested that you call with an update after starting medication, please be ready to provide us the medication and dosage, what time you take your medication, the time you attempt to fall asleep, time you fall asleep, when you wake up, and what time you get out of bed.  Sleep Study Results: We will contact you with sleep study results and/or next steps after the physician has reviewed your testing.

## 2025-06-12 NOTE — ASSESSMENT & PLAN NOTE
"She has some symptoms of insomnia, likely due to anxiety, however, much of her difficulty initiating sleep was related to delayed phase circadian rhythm and poor sleep hygiene.  After explanation and counseling on how to correct this, she was able to significantly improve her sleep/wake schedule over the past 2 years.  She is going to bed at a consistent time.  She takes ambien at her consistent bedtime (prescription was recently changed by her new psychiatry provider, to Lunesta, but she hasn't started it yet).  It still takes about one hour to fall asleep.  She admits that she is not really sleepy when she gets into bed, but reports \"it's a habit my  and I have formed\".  She reports that her mind is thinking and \"it takes an hour to get calm enough to fall asleep\".  She only wakes up one time during the night for urination and returns to sleep without difficulty.  She wakes up on her own around 9:00am.  She reports that it takes awhile for her to wake up.  By 10:00am she feels awake.  She has not been napping at all during the day and she has energy to perform her activities during the day.  We discussed that if she wakes up a little earlier on a regular basis, she may be able to fall asleep more easily.  The delay in feeling awake over the first hour may be residual effects of ambien.  This may change with Lunesta.  She denies any complex sleep behaviors with use of ambien or in the past when using Lunesta.       "

## 2025-06-12 NOTE — ASSESSMENT & PLAN NOTE
Flower received new CPAP equipment 4/29/25.  She reports that she likes the new equipment and sleeps well at the current settings.  She likes the nasal cradle mask.  She is aware that her mouth opens at times and becomes dry when she does so.  She does not want to try any other mask at this time, as she feels this is working well for her.  Once she falls asleep, she sleeps through the night.  She feels she is sleeping the best she has in a long time.  She no longer sleeps late and doesn't feel the need to take naps during the day.        Current PAP Compliance Data:  Average usage:  She has been able to use the equipment 97% of all days recorded.  Average usage was 4 or more hours 87% of all days recorded.  Average hours used:  7 hours and 52 minutes  Average time in an air leak:  moderate   Average pressure:  5-9 cm of water pressure  Residual AHI:  2.0/hour, including 0.5 OA, 0.1 CA, 0.3 hypopneas, 1.1 unknown    The patient feels they benefit from the use of PAP equipment and would like to continue PAP therapy.    Response to treatment has been good.    The patient is at goal for hours of PAP use and at goal for effectiveness of treatment.  No changes are needed at this time.  A prescription for supplies has been provided to last for the next year.  The patient was advised to continue to clean the equipment appropriately, as discussed and to change supplies regularly.    She will continue using this equipment at the settings noted above for the next 12 months.  At that timeshe will then return for a routine follow-up evaluation. I have asked the patient to contact the [facility] if she encounters any difficulties prior to that time.  Orders:    PAP DME Resupply/Reorder

## 2025-06-12 NOTE — PROGRESS NOTES
Name: Flower Nunn      : 1950      MRN: [id number]  Encounter Provider: SHAZIA Rios  Encounter Date: 2025   Encounter department: [facility] [place]  :  Assessment & Plan  Obstructive sleep apnea treated with continuous positive airway pressure (CPAP)  Flower received new CPAP equipment 25.  She reports that she likes the new equipment and sleeps well at the current settings.  She likes the nasal cradle mask.  She is aware that her mouth opens at times and becomes dry when she does so.  She does not want to try any other mask at this time, as she feels this is working well for her.  Once she falls asleep, she sleeps through the night.  She feels she is sleeping the best she has in a long time.  She no longer sleeps late and doesn't feel the need to take naps during the day.        Current PAP Compliance Data:  Average usage:  She has been able to use the equipment 97% of all days recorded.  Average usage was 4 or more hours 87% of all days recorded.  Average hours used:  7 hours and 52 minutes  Average time in an air leak:  moderate   Average pressure:  5-9 cm of water pressure  Residual AHI:  2.0/hour, including 0.5 OA, 0.1 CA, 0.3 hypopneas, 1.1 unknown    The patient feels they benefit from the use of PAP equipment and would like to continue PAP therapy.    Response to treatment has been good.    The patient is at goal for hours of PAP use and at goal for effectiveness of treatment.  No changes are needed at this time.  A prescription for supplies has been provided to last for the next year.  The patient was advised to continue to clean the equipment appropriately, as discussed and to change supplies regularly.    She will continue using this equipment at the settings noted above for the next 12 months.  At that timeshe will then return for a routine follow-up evaluation. I have asked the patient to contact the [facility] if she encounters any difficulties  "prior to that time.  Orders:    PAP DME Resupply/Reorder    Primary hypertension  Hypertension - She reports that her blood pressure has been running low lately.  She has had some dizziness, but attributes to vertigo.  I advised her to contact her cardiologist to discuss.    We reviewed the association between untreated obstructive sleep apnea and the increased risk for hypertension.          Circadian rhythm sleep disorder  She had significant delayed phase circadian rhythm symptoms.  After explanation and counseling on how to correct this, she was able to significantly improve her sleep/wake schedule over the past 2 years.  She is going to bed at a consistent time.  She takes ambien at her consistent bedtime (prescription was recently changed by her new psychiatry provider, to Lunesta, but she hasn't started it yet).  It still takes about one hour to fall asleep.  She admits that she is not really sleepy when she gets into bed, but reports \"it's a habit my  and I have formed\".  She reports that her mind is thinking and \"it takes an hour to get calm enough to fall asleep\".  She only wakes up one time during the night for urination and returns to sleep without difficulty.  She wakes up on her own around 9:00am.  She reports that it takes awhile for her to wake up.  By 10:00am she feels awake.  She has not been napping at all during the day and she has energy to perform her activities during the day.  We discussed that if she wakes up a little earlier on a regular basis, she may be able to fall asleep more easily.  The delay in feeling awake over the first hour may be residual effects of ambien.  This may change with Lunesta.  She denies any complex sleep behaviors with use of ambien.       Insomnia, unspecified type  She has some symptoms of insomnia, likely due to anxiety, however, much of her difficulty initiating sleep was related to delayed phase circadian rhythm and poor sleep hygiene.  After explanation " "and counseling on how to correct this, she was able to significantly improve her sleep/wake schedule over the past 2 years.  She is going to bed at a consistent time.  She takes ambien at her consistent bedtime (prescription was recently changed by her new psychiatry provider, to Lunesta, but she hasn't started it yet).  It still takes about one hour to fall asleep.  She admits that she is not really sleepy when she gets into bed, but reports \"it's a habit my  and I have formed\".  She reports that her mind is thinking and \"it takes an hour to get calm enough to fall asleep\".  She only wakes up one time during the night for urination and returns to sleep without difficulty.  She wakes up on her own around 9:00am.  She reports that it takes awhile for her to wake up.  By 10:00am she feels awake.  She has not been napping at all during the day and she has energy to perform her activities during the day.  We discussed that if she wakes up a little earlier on a regular basis, she may be able to fall asleep more easily.  The delay in feeling awake over the first hour may be residual effects of ambien.  This may change with Lunesta.  She denies any complex sleep behaviors with use of ambien or in the past when using Lunesta.           History of Present Illness   Flower Nunn is a 75 y.o. year old female, who presents for follow up of mild obstructive sleep apnea with history of reported sleep initiation insomnia and also hypersomnia.  She felt that symptoms of hypersomnia began after having Guillian[place] in 2013 and her energy levels were not improving.  With changes in her sleep/wake schedule and treatment of DENIZ, she has had improvement in daytime wakefulness and energy levels.  She was initially diagnosed with DENIZ in 2011.  AHI was 14.0, worsening to 42 in REM sleep.  She has been treated with CPAP equipment, using APAP 4-20cm of water pressure.  Additional adjustments were made in follow up.  She has a " "history of anxiety and bipolar depression, followed by psychiatry.    She was treated with ropinirole 0.5mg for restless legs syndrome in the past, which was discontinued. She denies any restless leg symptoms at bedtime or in the evening.  Past sleep study results indicated minimal PLMs, rarely associated with arousal and none during the titration study.    She reports a long history of insomnia.  She reports that she has been taking ambien 10mg for at least 20 years.  Medication was decreased to 5mg, however, she was unable to sleep once decreased.  She recently restarted use of Lunesta 3mg.  She denies any complex sleep behaviors with use of ambien or Lunesta.  She was treated with wake promoting medications in the past, including modafinil (developed intolerable metallic taste in her mouth), armodafinil (caused severe diarrhea), adderall (caused mouth swelling).  These are no longer needed.            Sitting and reading: Would never doze  Watching TV: Would never doze  Sitting, inactive in a public place (e.g. a theatre or a meeting): Would never doze  As a passenger in a car for an hour without a break: Would never doze  Lying down to rest in the afternoon when circumstances permit: Would never doze  Sitting and talking to someone: Would never doze  Sitting quietly after a lunch without alcohol: Would never doze  In a car, while stopped for a few minutes in traffic: Would never doze  Total score: 0     Review of Systems  Pertinent positives/negatives included in HPI and also as noted: intermittent vertigo symptoms, reports low blood pressure and heart rate lately    Medical History Reviewed by provider this encounter:  Tobacco  Allergies  Meds  Problems  Med Hx  Surg Hx  Fam Hx     .  Medications Ordered Prior to Encounter[1]   Objective   BP 96/50   Ht 5' 1\" (1.549 m)   Wt 79.3 kg (174 lb 12.8 oz)   LMP  (LMP Unknown)   BMI 33.03 kg/m²        Physical Exam  Visit Vitals  BP 96/50   Ht 5' 1\" (1.549 " "m)   Wt 79.3 kg (174 lb 12.8 oz)   LMP  (LMP Unknown)   BMI 33.03 kg/m²   OB Status Hysterectomy   Smoking Status Never   BSA 1.78 m²         Constitutional: Alert, cooperative, no distress, appears stated age, obese  Skin: Warm, dry, no rashes noted  Lungs: Clear to auscultation bilaterally, respirations unlabored  Heart: Bradycardic rate and regular rhythm, S1/2 normal, no murmur noted, no rub or gallop  Extremities: Normal, no digital clubbing, no pedal edema  Neuro: No focal deficits noted      Data  Lab Results   Component Value Date    HGB 13.9 04/05/2025    HCT 42.9 04/05/2025    MCV 97 04/05/2025      Lab Results   Component Value Date    GLUCOSE 98 07/20/2015    CALCIUM 9.8 05/21/2025     07/20/2015    K 4.2 04/05/2025    CO2 27 04/05/2025     04/05/2025    BUN 19 04/05/2025    CREATININE 1.08 04/05/2025     No results found for: \"IRON\", \"TIBC\", \"FERRITIN\"  Lab Results   Component Value Date    AST 26 04/05/2025    ALT 20 04/05/2025       Administrative Statements   I have spent a total time of 30 minutes in caring for this patient on the day of the visit/encounter including Risks and benefits of tx options, Instructions for management, Patient and family education, Importance of tx compliance, Risk factor reductions, Impressions, Counseling / Coordination of care, Documenting in the medical record, and Reviewing/placing orders in the medical record (including tests, medications, and/or procedures).         [1]   Current Outpatient Medications on File Prior to Visit   Medication Sig Dispense Refill    ALPRAZolam (XANAX) 0.5 mg tablet Take 1 tablet (0.5 mg total) by mouth 4 (four) times a day as needed for anxiety 120 tablet 1    amLODIPine (NORVASC) 5 mg tablet Take 1 tablet (5 mg total) by mouth 2 (two) times a day 180 tablet 3    apixaban (ELIQUIS) 2.5 mg Take 2.5 mg by mouth 2 (two) times a day      aspirin 81 mg chewable tablet Chew 81 mg in the morning.      atenolol (TENORMIN) 25 mg tablet " Take 12.5 mg by mouth in the morning.      diphenoxylate-atropine (LOMOTIL) 2.5-0.025 mg per tablet Take 1 tablet by mouth 4 (four) times a day as needed for diarrhea 30 tablet 0    escitalopram (LEXAPRO) 10 mg tablet Take 1.5 tablets (15 mg total) by mouth daily 135 tablet 1    eszopiclone (LUNESTA) 3 MG tablet Take 1 tablet (3 mg total) by mouth daily at bedtime as needed for sleep Take immediately before bedtime 30 tablet 0    ezetimibe-simvastatin (VYTORIN) 10-20 mg per tablet       HYDROcodone-acetaminophen (NORCO) 5-325 mg per tablet Take 1 tablet by mouth every 6 (six) hours as needed for pain Max Daily Amount: 4 tablets 40 tablet 0    hydroxychloroquine (PLAQUENIL) 200 mg tablet Take 200 mg by mouth daily with breakfast      multivitamin (THERAGRAN) TABS Take 1 tablet by mouth in the morning.      OLANZapine (ZyPREXA) 2.5 mg tablet Take 1 tablet (2.5 mg total) by mouth daily at bedtime 90 tablet 1    pilocarpine (SALAGEN) 5 mg tablet in the morning and in the evening.      meclizine (ANTIVERT) 25 mg tablet Take 1 tablet (25 mg total) by mouth every 8 (eight) hours for 2 days (Patient not taking: Reported on 5/6/2025) 30 tablet 0    meclizine (ANTIVERT) 25 mg tablet Take 1 tablet (25 mg total) by mouth 3 (three) times a day as needed for dizziness for up to 14 days (Patient not taking: Reported on 5/6/2025) 30 tablet 0     No current facility-administered medications on file prior to visit.

## 2025-06-17 ENCOUNTER — OFFICE VISIT (OUTPATIENT)
Dept: PHYSICAL THERAPY | Facility: CLINIC | Age: 75
End: 2025-06-17
Attending: PHYSICAL MEDICINE & REHABILITATION
Payer: COMMERCIAL

## 2025-06-17 DIAGNOSIS — M54.16 LUMBAR RADICULOPATHY: Primary | ICD-10-CM

## 2025-06-17 PROCEDURE — 97112 NEUROMUSCULAR REEDUCATION: CPT

## 2025-06-17 PROCEDURE — 97110 THERAPEUTIC EXERCISES: CPT

## 2025-06-17 PROCEDURE — 97140 MANUAL THERAPY 1/> REGIONS: CPT

## 2025-06-17 NOTE — PROGRESS NOTES
Daily Note     Today's date: 2025  Patient name: Flower Nunn  : 1950  MRN: [id number]  Referring provider: Rudy Alexander DO  Dx:   Encounter Diagnosis     ICD-10-CM    1. Lumbar radiculopathy  M54.16           Start Time: 1615  Stop Time: 1658  Total time in clinic (min): 43 minutes    Subjective: Pt reports her LB has been feeling very good since her IE and performance of HEP.  Has not really had the sharp pain that brought her to physical therapy.  Is considering holding on the future epidural injection she has scheduled.       Objective: See treatment diary below      Assessment: Tolerated treatment well. Initiated POC as outlined below with focus on lumbar mobility and core stability.  Trialled beginning program with NuStep today, but increased stress at LB noted after first few minutes, as well as after reducing resistance to lowest setting; additional time held.  Symptoms began to resolve with table exercises.  Responded well to flexion based mobility interventions.  Some tenderness along L distal QL that began to resolve with manual STM.  Patient would benefit from continued PT to further improve cores stability and LE strength, in effort to reduce stress to lumbar spine.        Plan: Continue per plan of care.   Monitor response to initial treatment NV.  Progress as able.       Precautions: Dizziness when laying flat and supine to sit (KEEP head propped), Guillian [place], anxiety, depression, sjogren's, history of breast cancer     Manuals             STM to lateral hip In sittingL distal QL  AFB            STM gapping lumbar spine                                        Neuro Re-Ed             Supine marches with TrA :03 x10 B            Hip abd iso with belt :05  x15            Hip add with bolster  :05  2x10            Supine hip abd with TB single leg                                                                  Ther Ex             3-way hip  1x10 ea  bilat            Bridges               LTR :05  x10ea            pball roll ins              pball roll outs  Fwd  :05  x10            Supine cross body stretch                          Nustep  Attempted  L3 2'            Ther Activity             Mini squats                           Gait Training                                       Modalities

## 2025-06-18 ENCOUNTER — TELEPHONE (OUTPATIENT)
Age: 75
End: 2025-06-18

## 2025-07-01 ENCOUNTER — APPOINTMENT (OUTPATIENT)
Dept: PHYSICAL THERAPY | Facility: CLINIC | Age: 75
End: 2025-07-01
Attending: PHYSICAL MEDICINE & REHABILITATION
Payer: COMMERCIAL

## 2025-07-02 ENCOUNTER — RA CDI HCC (OUTPATIENT)
Dept: OTHER | Facility: HOSPITAL | Age: 75
End: 2025-07-02

## 2025-07-03 ENCOUNTER — OFFICE VISIT (OUTPATIENT)
Dept: PHYSICAL THERAPY | Facility: CLINIC | Age: 75
End: 2025-07-03
Attending: PHYSICAL MEDICINE & REHABILITATION
Payer: COMMERCIAL

## 2025-07-03 DIAGNOSIS — M54.16 LUMBAR RADICULOPATHY: Primary | ICD-10-CM

## 2025-07-03 PROCEDURE — 97110 THERAPEUTIC EXERCISES: CPT

## 2025-07-03 PROCEDURE — 97112 NEUROMUSCULAR REEDUCATION: CPT

## 2025-07-03 PROCEDURE — 97140 MANUAL THERAPY 1/> REGIONS: CPT

## 2025-07-03 NOTE — PROGRESS NOTES
"Daily Note     Today's date: 7/3/2025  Patient name: Flower Nunn  : 1950  MRN: [id number]  Referring provider: Rudy Alexander DO  Dx:   Encounter Diagnosis     ICD-10-CM    1. Lumbar radiculopathy  M54.16           Start Time: 1400  Stop Time: 1445  Total time in clinic (min): 45 minutes    Subjective: Patient reports she feels good today. She states HEP has been going well and she feels stronger every time she finishes her exercises. She reports she feels good when walking on even ground but if she has to walk on uneven ground she feels unsteady.      Objective: See treatment diary below      Assessment: Tolerated treatment well. Patient demonstrated fatigue post treatment, exhibited good technique with therapeutic exercises, and would benefit from continued PT. Patient felt especially challenged today by hip abduction in standing. She tolerated introduction of newer exercises well. STM to lateral hips today as these were feeling most tender, pt feeling L hip tenderness > R. Patient felt good by end of session.      Plan: Continue per plan of care.  Progress treatment as tolerated.       Precautions: Dizziness when laying flat and supine to sit (KEEP head propped), Guillian [place], anxiety, depression, sjogren's, history of breast cancer     Manuals  7/3           STM to lateral hip In sittingL distal QL  AFB Lateral hip  KG           STM gapping lumbar spine                                        Neuro Re-Ed             Supine marches with TrA :03 x10 B 3\" 2x10 B           Hip abd iso with belt :05  x15 5\" x15           Hip add with bolster  :05  2x10 5\" 2x10           Supine hip abd with TB single leg                                                                  Ther Ex             3-way hip  1x10 ea  bilat 1x10 ea           Bridges   2x10           LTR :05  x10ea 5\" x20           pball roll ins              pball roll outs  Fwd  :05  x10 Fwd  10x5\"           Supine cross body stretch         "     [place]             Stir the pot  YTB  10x ea CW/CCW                        Nustep  Attempted  L3 2' L1 5'    Seat 5, arms 7           Ther Activity             Mini squats   nv                        Gait Training                                       Modalities

## 2025-07-06 DIAGNOSIS — I26.99 PULMONARY EMBOLISM (HCC): ICD-10-CM

## 2025-07-06 DIAGNOSIS — F51.04 PSYCHOPHYSIOLOGICAL INSOMNIA: ICD-10-CM

## 2025-07-06 DIAGNOSIS — F41.0 PANIC DISORDER WITHOUT AGORAPHOBIA WITH MODERATE PANIC ATTACKS: Chronic | ICD-10-CM

## 2025-07-06 DIAGNOSIS — I10 PRIMARY HYPERTENSION: ICD-10-CM

## 2025-07-06 RX ORDER — AMLODIPINE BESYLATE 5 MG/1
5 TABLET ORAL 2 TIMES DAILY
Qty: 180 TABLET | Refills: 3 | Status: SHIPPED | OUTPATIENT
Start: 2025-07-06

## 2025-07-07 NOTE — TELEPHONE ENCOUNTER
Patient called to request a refill for their eszopiclone (LUNESTA) 3 MG tablet advised a refill was requested on 7/6/2025 and is pending approval. Patient verbalized understanding and is in agreement.     Does the patient have enough for 3 days?   [x] Yes   [] No - Send as HP to POD

## 2025-07-08 ENCOUNTER — OFFICE VISIT (OUTPATIENT)
Dept: PHYSICAL THERAPY | Facility: CLINIC | Age: 75
End: 2025-07-08
Attending: PHYSICAL MEDICINE & REHABILITATION
Payer: COMMERCIAL

## 2025-07-08 DIAGNOSIS — M54.16 LUMBAR RADICULOPATHY: Primary | ICD-10-CM

## 2025-07-08 PROCEDURE — 97112 NEUROMUSCULAR REEDUCATION: CPT | Performed by: PHYSICAL THERAPIST

## 2025-07-08 PROCEDURE — 97140 MANUAL THERAPY 1/> REGIONS: CPT | Performed by: PHYSICAL THERAPIST

## 2025-07-08 PROCEDURE — 97110 THERAPEUTIC EXERCISES: CPT | Performed by: PHYSICAL THERAPIST

## 2025-07-08 RX ORDER — ALPRAZOLAM 0.5 MG
0.5 TABLET ORAL 4 TIMES DAILY PRN
Qty: 120 TABLET | Refills: 0 | Status: SHIPPED | OUTPATIENT
Start: 2025-07-08

## 2025-07-08 RX ORDER — ESZOPICLONE 3 MG/1
TABLET, FILM COATED ORAL
Qty: 30 TABLET | Refills: 0 | Status: SHIPPED | OUTPATIENT
Start: 2025-07-08

## 2025-07-08 NOTE — PROGRESS NOTES
"Daily Note     Today's date: 2025  Patient name: Flower Nunn  : 1950  MRN: [id number]  Referring provider: Rudy Alexander DO  Dx:   Encounter Diagnosis     ICD-10-CM    1. Lumbar radiculopathy  M54.16             Start Time: 1445  Stop Time: 1530  Total time in clinic (min): 45 minutes    Subjective: Patient reports on starting she was cooking in the kitchen and she stated to get both neck and back pain. Patient notes that she had to go lay down and let it rest which helped. Patient notes that it is lingering into her hips today as well.       Objective: See treatment diary below      Assessment: Tolerated treatment well. Focused on supine mobility exercises this date with good response.  Patient noted decreased pain and discomfort post manual and active  treatment. Will have patient follow up in two weeks with completing HEP at home. Patient demonstrated fatigue post treatment, exhibited good technique with therapeutic exercises, and would benefit from continued PT.       Plan: Continue per plan of care.  Progress treatment as tolerated.       Precautions: Dizziness when laying flat and supine to sit (KEEP head propped), Guillian [place], anxiety, depression, sjogren's, history of breast cancer     Manuals  7/3 7/8          STM to lateral hip In sittingL distal QL  AFB Lateral hip  KG           STM gapping lumbar spine    L  focus       ACP                                     Neuro Re-Ed             Supine marches with TrA :03 x10 B 3\" 2x10 B 2'          Hip abd iso with belt :05  x15 5\" x15 :05x2'          Hip add with bolster  :05  2x10 5\" 2x10 :05x2'          Supine hip abd with TB single leg                                                                  Ther Ex             3-way hip  1x10 ea  bilat 1x10 ea           Bridges   2x10           LTR :05  x10ea 5\" x20 On pball :05x2'          pball roll ins    :05x2'          pball roll outs  Fwd  :05  x10 Fwd  10x5\" FWD :10x2'        "   Clamshells    10x           P[place]of             Stir the pot  YTB  10x ea CW/CCW                        Nustep  Attempted  L3 2' L1 5'    Seat 5, arms 7 L1 5'    Seat 5, arms 7          Ther Activity             Mini squats   nv                        Gait Training                                       Modalities

## 2025-07-08 NOTE — TELEPHONE ENCOUNTER
Refill must be reviewed and completed by the office or provider. The refill is unable to be approved or denied by the medication management team.      06/11/2025 06/11/2025 06/11/2025 Eszopiclone (Tablet) 30.0 30 3 MG NA ALONDRA PERRIN [facility] Medicare 0 / 0 PA   1 [id number] 05/29/2025 05/29/2025 05/05/2025 ALPRAZolam (Tablet) 120.0 30 0.5 MG NA RAGINI [facility] Medicare 0 / 1 PA   1 [id number] ** 05/06/2025 05/06/2025 HYDROcodone BITARTRATE 5 MG ORAL TABLET/ACETAMINOPHEN 325 MG (Tablet) 40.0 10 325 MG-5 MG 20.0 [facility]RAVAN RATLIFF [facility] Private Pay 0 /

## 2025-07-08 NOTE — TELEPHONE ENCOUNTER
Patient requested refill for:  Requested Prescriptions     Pending Prescriptions Disp Refills    eszopiclone (LUNESTA) 3 MG tablet [Pharmacy Med Name: ESZOPICLONE  3MG  TAB] 30 tablet      Sig: TAKE 1 TABLET BY MOUTH DAILY  IMMEDIATELY BEFORE BEDTIME AS  NEEDED FOR SLEEP       PDMP reviewed on 07/08/25. Flower has been appropriately adherent to their controlled psychotropic medication regimen in the absence of apparent abuse or misuse. Therefore, this writer will send a 30-day refill to their pharmacy of choice including recommendation for patient to adhere to their outpatient appointment(s) with this writer to assure appropriate continuity of care.      Bill Decker MD

## 2025-07-08 NOTE — TELEPHONE ENCOUNTER
Refill must be reviewed and completed by the office or provider. The refill is unable to be approved or denied by the medication management team.      06/11/2025 06/11/2025 06/11/2025 Eszopiclone (Tablet) 30.0 30 3 MG NA ALONDRA PERRIN [facility] Medicare 0 / 0 PA   1 [id number] 05/29/2025 05/29/2025 05/05/2025 ALPRAZolam (Tablet) 120.0 30 0.5 MG NA RAGINI [facility] Medicare 0 / 1 PA   1 [id number] ** 05/06/2025 05/06/2025 HYDROcodone BITARTRATE 5 MG ORAL TABLET/ACETAMINOPHEN 325 MG (Tablet) 40.0 10 325 MG-5 MG 20.0 [facility]RAVAN RATLIFF [facility] Private Pay 0 / 0

## 2025-07-08 NOTE — TELEPHONE ENCOUNTER
Patient requested refill for:  Requested Prescriptions     Pending Prescriptions Disp Refills    ALPRAZolam (XANAX) 0.5 mg tablet [Pharmacy Med Name: ALPRAZolam 0.5 MG Oral Tablet] 120 tablet      Sig: TAKE 1 TABLET BY MOUTH 4 TIMES  DAILY AS NEEDED FOR ANXIETY       PDMP reviewed on 07/08/25. Flower has been appropriately adherent to their controlled psychotropic medication regimen in the absence of apparent abuse or misuse. Therefore, this writer will send a 30-day refill to their pharmacy of choice including recommendation for patient to adhere to their outpatient appointment(s) with this writer to assure appropriate continuity of care.      Bill Decker MD

## 2025-07-11 ENCOUNTER — OFFICE VISIT (OUTPATIENT)
Dept: FAMILY MEDICINE CLINIC | Facility: CLINIC | Age: 75
End: 2025-07-11
Payer: COMMERCIAL

## 2025-07-11 VITALS
SYSTOLIC BLOOD PRESSURE: 112 MMHG | OXYGEN SATURATION: 95 % | TEMPERATURE: 97.6 F | RESPIRATION RATE: 20 BRPM | HEART RATE: 42 BPM | WEIGHT: 175.2 LBS | HEIGHT: 61 IN | DIASTOLIC BLOOD PRESSURE: 62 MMHG | BODY MASS INDEX: 33.08 KG/M2

## 2025-07-11 DIAGNOSIS — F51.04 PSYCHOPHYSIOLOGICAL INSOMNIA: ICD-10-CM

## 2025-07-11 DIAGNOSIS — D69.3 IDIOPATHIC THROMBOCYTOPENIC PURPURA (HCC): ICD-10-CM

## 2025-07-11 DIAGNOSIS — M35.00 SJOGREN'S SYNDROME, WITH UNSPECIFIED ORGAN INVOLVEMENT (HCC): ICD-10-CM

## 2025-07-11 DIAGNOSIS — F31.81 BIPOLAR 2 DISORDER (HCC): ICD-10-CM

## 2025-07-11 DIAGNOSIS — Z00.00 MEDICARE ANNUAL WELLNESS VISIT, SUBSEQUENT: Primary | ICD-10-CM

## 2025-07-11 DIAGNOSIS — I10 PRIMARY HYPERTENSION: ICD-10-CM

## 2025-07-11 DIAGNOSIS — Z23 ENCOUNTER FOR IMMUNIZATION: ICD-10-CM

## 2025-07-11 DIAGNOSIS — M54.50 LUMBAR BACK PAIN: ICD-10-CM

## 2025-07-11 PROCEDURE — 99214 OFFICE O/P EST MOD 30 MIN: CPT | Performed by: FAMILY MEDICINE

## 2025-07-11 PROCEDURE — G2211 COMPLEX E/M VISIT ADD ON: HCPCS | Performed by: FAMILY MEDICINE

## 2025-07-11 PROCEDURE — 90677 PCV20 VACCINE IM: CPT | Performed by: FAMILY MEDICINE

## 2025-07-11 PROCEDURE — G0439 PPPS, SUBSEQ VISIT: HCPCS | Performed by: FAMILY MEDICINE

## 2025-07-11 PROCEDURE — G0009 ADMIN PNEUMOCOCCAL VACCINE: HCPCS | Performed by: FAMILY MEDICINE

## 2025-07-11 RX ORDER — HYDROCODONE BITARTRATE AND ACETAMINOPHEN 5; 325 MG/1; MG/1
1 TABLET ORAL EVERY 6 HOURS PRN
Qty: 40 TABLET | Refills: 0 | Status: SHIPPED | OUTPATIENT
Start: 2025-07-11

## 2025-07-11 NOTE — PROGRESS NOTES
Name: Flower Nunn      : 1950      MRN: [id number]  Encounter Provider: Halima King DO  Encounter Date: 2025   Encounter department: [facility] [place]  :  Assessment & Plan  Medicare annual wellness visit, subsequent         Lumbar back pain  Continue with chronic pain control for lumbar degenerative disc disease.  Orders:    HYDROcodone-acetaminophen (NORCO) 5-325 mg per tablet; Take 1 tablet by mouth every 6 (six) hours as needed for pain Max Daily Amount: 4 tablets    Encounter for immunization    Orders:    Pneumococcal Conjugate Vaccine 20-valent (Pcv20)    Bipolar 2 disorder (HCC)  Patient stable on Lexapro and Zyprexa therapy as per psychiatry       Primary hypertension  Stable on Norvasc therapy       Psychophysiological insomnia  Continue with Lunesta therapy       Sjogren's syndrome, with unspecified organ involvement (HCC)  Continue with rheumatology treatment as well as pilocarpine       Idiopathic thrombocytopenic purpura (HCC)  Will continue with platelet surveillance on a regular basis          Preventive health issues were discussed with patient, and age appropriate screening tests were ordered as noted in patient's After Visit Summary. Personalized health advice and appropriate referrals for health education or preventive services given if needed, as noted in patient's After Visit Summary.    History of Present Illness     The patient presents for her Medicare wellness visit and is due for a Prevnar 20 booster.  Has known Sjogren's disease and chronic lumbar back degenerative disc disease which is controlled with current pain meds.  Her hypertension, insomnia, ITP are all well-controlled at this time as is her bipolar 2 disorder.  She denies any chest pain, shortness of breath or palpitations.       Patient Care Team:  Halima King DO as PCP - General (Family Medicine)  SAMUEL Zarate MD Subhash Proothi, MD Amy  SAMUEL Acuna MD Ayaz Matin, MD as Endoscopist    Review of Systems   Constitutional:  Negative for appetite change, chills and fever.   HENT:  Negative for ear pain, facial swelling, rhinorrhea, sinus pain, sore throat and trouble swallowing.         Positive dry eye and dry mouth   Eyes:  Negative for discharge and redness.   Respiratory:  Negative for chest tightness, shortness of breath and wheezing.    Cardiovascular:  Negative for chest pain and palpitations.   Gastrointestinal:  Negative for abdominal pain, diarrhea, nausea and vomiting.   Endocrine: Negative for polyuria.   Genitourinary:  Negative for dysuria and urgency.   Musculoskeletal:  Positive for arthralgias. Negative for back pain.        Positive chronic lumbar back pain   Skin:  Negative for rash.   Neurological:  Negative for dizziness, weakness and headaches.   Hematological:  Negative for adenopathy.   Psychiatric/Behavioral:  Negative for behavioral problems, confusion and sleep disturbance.    All other systems reviewed and are negative.    Medical History Reviewed by provider this encounter:       Annual Wellness Visit Questionnaire   Flower is here for her Subsequent Wellness visit. Last Medicare Wellness visit information reviewed, patient interviewed, no change since last AWV.     Health Risk Assessment:   Patient rates overall health as good. Patient feels that their physical health rating is slightly worse. Patient is very satisfied with their life. Eyesight was rated as same. Hearing was rated as same. Patient feels that their emotional and mental health rating is slightly better. Patients states they are never, rarely angry. Patient states they are always unusually tired/fatigued. Pain experienced in the last 7 days has been some. Patient's pain rating has been 3/10. Patient states that she has experienced no weight loss or gain in last 6 months.     Depression Screening:   PHQ-9 Score: 4      Fall Risk Screening:    In the past year, patient has experienced: no history of falling in past year      Urinary Incontinence Screening:   Patient has leaked urine accidently in the last six months.     Home Safety:  Patient has trouble with stairs inside or outside of their home. Patient has working smoke alarms and has no working carbon monoxide detector. Home safety hazards include: none.     Nutrition:   Current diet is Regular.     Medications:   Patient is currently taking over-the-counter supplements. OTC medications include: see medication list. Patient is able to manage medications.     Activities of Daily Living (ADLs)/Instrumental Activities of Daily Living (IADLs):   Walk and transfer into and out of bed and chair?: Yes  Dress and groom yourself?: Yes    Bathe or shower yourself?: Yes    Feed yourself? Yes  Do your laundry/housekeeping?: No  Manage your money, pay your bills and track your expenses?: No  Make your own meals?: Yes    Do your own shopping?: Yes    Previous Hospitalizations:   Any hospitalizations or ED visits within the last 12 months?: Yes    How many hospitalizations have you had in the last year?: 1-2    Advance Care Planning:   Living will: Yes    Durable POA for healthcare: Yes    Advanced directive: Yes    Advanced directive counseling given: Yes    ACP document given: Yes    Patient declined ACP directive: No    End of Life Decisions reviewed with patient: Yes    Provider agrees with end of life decisions: Yes      Cognitive Screening:   Provider or family/friend/caregiver concerned regarding cognition?: No    Preventive Screenings      Cardiovascular Screening:    General: Screening Not Indicated and History Lipid Disorder      Diabetes Screening:     General: Screening Current      Colorectal Cancer Screening:     General: Screening Current      Breast Cancer Screening:     General: History Breast Cancer      Cervical Cancer Screening:    General: Screening Not Indicated      Osteoporosis  "Screening:    General: Screening Not Indicated and History Osteoporosis      Abdominal Aortic Aneurysm (AAA) Screening:        General: Screening Current and Screening Not Indicated      Lung Cancer Screening:     General: Screening Not Indicated      Hepatitis C Screening:    General: Screening Current    Immunizations:  - Immunizations due: Zoster (Shingrix)    Screening, Brief Intervention, and Referral to Treatment (SBIRT)     Screening    Typical number of drinks in a week: 0    Single Item Drug Screening:  How often have you used an illegal drug (including marijuana) or a prescription medication for non-medical reasons in the past year? never    Single Item Drug Screen Score: 0  Interpretation: Negative screen for possible drug use disorder    Social Drivers of Health     Food Insecurity: No Food Insecurity (7/11/2025)    Nursing - Inadequate Food Risk Classification     Worried About Running Out of Food in the Last Year: Never true     Ran Out of Food in the Last Year: Never true     Ran Out of Food in the Last Year: Never true   Transportation Needs: No Transportation Needs (7/11/2025)    PRAPARE - Transportation     Lack of Transportation (Medical): No     Lack of Transportation (Non-Medical): No   Housing Stability: Low Risk  (7/11/2025)    Housing Stability Vital Sign     Unable to Pay for Housing in the Last Year: No     Number of Times Moved in the Last Year: 0     Homeless in the Last Year: No   Utilities: Not At Risk (7/11/2025)    [facility] Utilities     Threatened with loss of utilities: No     No results found.    Objective   /62   Pulse (!) 42   Temp 97.6 °F (36.4 °C)   Resp 20   Ht 5' 1\" (1.549 m)   Wt 79.5 kg (175 lb 3.2 oz)   LMP  (LMP Unknown)   SpO2 95%   BMI 33.10 kg/m²     Physical Exam  Vitals and nursing note reviewed.   Constitutional:       General: She is not in acute distress.     Appearance: Normal appearance. She is well-developed. She is not ill-appearing or diaphoretic. "   HENT:      Head: Normocephalic and atraumatic.      Right Ear: Tympanic membrane, ear canal and external ear normal.      Left Ear: Tympanic membrane, ear canal and external ear normal.      Nose: Nose normal. No congestion or rhinorrhea.      Mouth/Throat:      Mouth: Mucous membranes are moist.      Pharynx: Oropharynx is clear. No oropharyngeal exudate or posterior oropharyngeal erythema.     Eyes:      General: No scleral icterus.        Right eye: No discharge.         Left eye: No discharge.      Extraocular Movements: Extraocular movements intact.      Conjunctiva/sclera: Conjunctivae normal.      Pupils: Pupils are equal, round, and reactive to light.     Neck:      Thyroid: No thyromegaly.      Vascular: No carotid bruit or JVD.      Trachea: No tracheal deviation.     Cardiovascular:      Rate and Rhythm: Normal rate and regular rhythm.      Pulses: Normal pulses.      Heart sounds: Normal heart sounds. No murmur heard.  Pulmonary:      Effort: Pulmonary effort is normal. No respiratory distress.      Breath sounds: Normal breath sounds. No stridor. No wheezing, rhonchi or rales.   Abdominal:      General: Abdomen is flat. Bowel sounds are normal. There is no distension.      Palpations: Abdomen is soft. There is no mass.      Tenderness: There is no abdominal tenderness. There is no guarding or rebound.     Musculoskeletal:         General: No swelling, tenderness or deformity. Normal range of motion.      Cervical back: Normal range of motion and neck supple. No rigidity.      Right lower leg: No edema.      Left lower leg: No edema.   Lymphadenopathy:      Cervical: No cervical adenopathy.     Skin:     General: Skin is warm and dry.      Capillary Refill: Capillary refill takes less than 2 seconds.      Coloration: Skin is not jaundiced.      Findings: No bruising, erythema or rash.     Neurological:      General: No focal deficit present.      Mental Status: She is alert and oriented to person,  place, and time.      Cranial Nerves: No cranial nerve deficit.      Sensory: No sensory deficit.      Motor: No abnormal muscle tone.      Coordination: Coordination normal.      Gait: Gait normal.      Deep Tendon Reflexes: Reflexes are normal and symmetric. Reflexes normal.     Psychiatric:         Mood and Affect: Mood normal.         Behavior: Behavior normal.         Thought Content: Thought content normal.         Judgment: Judgment normal.

## 2025-07-11 NOTE — PATIENT INSTRUCTIONS
Medicare Preventive Visit Patient Instructions  Thank you for completing your Welcome to Medicare Visit or Medicare Annual Wellness Visit today. Your next wellness visit will be due in one year (7/12/2026).  The screening/preventive services that you may require over the next 5-10 years are detailed below. Some tests may not apply to you based off risk factors and/or age. Screening tests ordered at today's visit but not completed yet may show as past due. Also, please note that scanned in results may not display below.  Preventive Screenings:  Service Recommendations Previous Testing/Comments   Colorectal Cancer Screening  * Colonoscopy    * Fecal Occult Blood Test (FOBT)/Fecal Immunochemical Test (FIT)  * Fecal DNA/Cologuard Test  * Flexible Sigmoidoscopy Age: 45-75 years old   Colonoscopy: every 10 years (may be performed more frequently if at higher risk)  OR  FOBT/FIT: every 1 year  OR  Cologuard: every 3 years  OR  Sigmoidoscopy: every 5 years  Screening may be recommended earlier than age 45 if at higher risk for colorectal cancer. Also, an individualized decision between you and your healthcare provider will decide whether screening between the ages of 76-85 would be appropriate. Colonoscopy: 02/24/2017  FOBT/FIT: Not on file  Cologuard: 09/15/2024  Sigmoidoscopy: Not on file          Breast Cancer Screening Age: 40+ years old  Frequency: every 1-2 years  Not required if history of left and right mastectomy Mammogram: 04/23/2024    History Breast Cancer   Cervical Cancer Screening Between the ages of 21-29, pap smear recommended once every 3 years.   Between the ages of 30-65, can perform pap smear with HPV co-testing every 5 years.   Recommendations may differ for women with a history of total hysterectomy, cervical cancer, or abnormal pap smears in past. Pap Smear: Not on file    Screening Not Indicated   Hepatitis C Screening Once for adults born between 1945 and 1965  More frequently in patients at high  risk for Hepatitis C Hep C Antibody: Not on file        Diabetes Screening 1-2 times per year if you're at risk for diabetes or have pre-diabetes Fasting glucose: 79 mg/dL (1/6/2025)  A1C: 5.2 % (4/5/2025)  Screening Current   Cholesterol Screening Once every 5 years if you don't have a lipid disorder. May order more often based on risk factors. Lipid panel: 04/06/2025    Screening Not Indicated  History Lipid Disorder     Other Preventive Screenings Covered by Medicare:  Abdominal Aortic Aneurysm (AAA) Screening: covered once if your at risk. You're considered to be at risk if you have a family history of AAA.  Lung Cancer Screening: covers low dose CT scan once per year if you meet all of the following conditions: (1) Age 55-77; (2) No signs or symptoms of lung cancer; (3) Current smoker or have quit smoking within the last 15 years; (4) You have a tobacco smoking history of at least 20 pack years (packs per day multiplied by number of years you smoked); (5) You get a written order from a healthcare provider.  Glaucoma Screening: covered annually if you're considered high risk: (1) You have diabetes OR (2) Family history of glaucoma OR (3)  aged 50 and older OR (4)  American aged 65 and older  Osteoporosis Screening: covered every 2 years if you meet one of the following conditions: (1) You're estrogen deficient and at risk for osteoporosis based off medical history and other findings; (2) Have a vertebral abnormality; (3) On glucocorticoid therapy for more than 3 months; (4) Have primary hyperparathyroidism; (5) On osteoporosis medications and need to assess response to drug therapy.   Last bone density test (DXA Scan): 12/20/2024.  HIV Screening: covered annually if you're between the age of 15-65. Also covered annually if you are younger than 15 and older than 65 with risk factors for HIV infection. For pregnant patients, it is covered up to 3 times per  pregnancy.    Immunizations:  Immunization Recommendations   Influenza Vaccine Annual influenza vaccination during flu season is recommended for all persons aged >= 6 months who do not have contraindications   Pneumococcal Vaccine   * Pneumococcal conjugate vaccine = PCV13 (Prevnar 13), PCV15 (Vaxneuvance), PCV20 (Prevnar 20)  * Pneumococcal polysaccharide vaccine = PPSV23 (Pneumovax) Adults 19-63 yo with certain risk factors or if 65+ yo  If never received any pneumonia vaccine: recommend Prevnar 20 (PCV20)  Give PCV20 if previously received 1 dose of PCV13 or PPSV23   Hepatitis B Vaccine 3 dose series if at intermediate or high risk (ex: diabetes, end stage renal disease, liver disease)   Respiratory syncytial virus (RSV) Vaccine - COVERED BY MEDICARE PART D  * RSVPreF3 (Arexvy) CDC recommends that adults 60 years of age and older may receive a single dose of RSV vaccine using shared clinical decision-making (SCDM)   Tetanus (Td) Vaccine - COST NOT COVERED BY MEDICARE PART B Following completion of primary series, a booster dose should be given every 10 years to maintain immunity against tetanus. Td may also be given as tetanus wound prophylaxis.   Tdap Vaccine - COST NOT COVERED BY MEDICARE PART B Recommended at least once for all adults. For pregnant patients, recommended with each pregnancy.   Shingles Vaccine (Shingrix) - COST NOT COVERED BY MEDICARE PART B  2 shot series recommended in those 19 years and older who have or will have weakened immune systems or those 50 years and older     Health Maintenance Due:      Topic Date Due   • Hepatitis C Screening  Never done   • Colorectal Cancer Screening  Discontinued     Immunizations Due:      Topic Date Due   • Pneumococcal Vaccine: 50+ Years (1 of 1 - PCV) Never done   • COVID-19 Vaccine (6 - 2024-25 season) 09/01/2024   • Influenza Vaccine (1) 09/01/2025     Advance Directives   What are advance directives?  Advance directives are legal documents that state  your wishes and plans for medical care. These plans are made ahead of time in case you lose your ability to make decisions for yourself. Advance directives can apply to any medical decision, such as the treatments you want, and if you want to donate organs.   What are the types of advance directives?  There are many types of advance directives, and each state has rules about how to use them. You may choose a combination of any of the following:  Living will:  This is a written record of the treatment you want. You can also choose which treatments you do not want, which to limit, and which to stop at a certain time. This includes surgery, medicine, IV fluid, and tube feedings.   Durable power of  for healthcare (DPAHC):  This is a written record that states who you want to make healthcare choices for you when you are unable to make them for yourself. This person, called a proxy, is usually a family member or a friend. You may choose more than 1 proxy.  Do not resuscitate (DNR) order:  A DNR order is used in case your heart stops beating or you stop breathing. It is a request not to have certain forms of treatment, such as CPR. A DNR order may be included in other types of advance directives.  Medical directive:  This covers the care that you want if you are in a coma, near death, or unable to make decisions for yourself. You can list the treatments you want for each condition. Treatment may include pain medicine, surgery, blood transfusions, dialysis, IV or tube feedings, and a ventilator (breathing machine).  Values history:  This document has questions about your views, beliefs, and how you feel and think about life. This information can help others choose the care that you would choose.  Why are advance directives important?  An advance directive helps you control your care. Although spoken wishes may be used, it is better to have your wishes written down. Spoken wishes can be misunderstood, or not  followed. Treatments may be given even if you do not want them. An advance directive may make it easier for your family to make difficult choices about your care.   Urinary Incontinence   Urinary incontinence (UI)  is when you lose control of your bladder. UI develops because your bladder cannot store or empty urine properly. The 3 most common types of UI are stress incontinence, urge incontinence, or both.  Medicines:   May be given to help strengthen your bladder control. Report any side effects of medication to your healthcare provider.  Do pelvic muscle exercises often:  Your pelvic muscles help you stop urinating. Squeeze these muscles tight for 5 seconds, then relax for 5 seconds. Gradually work up to squeezing for 10 seconds. Do 3 sets of 15 repetitions a day, or as directed. This will help strengthen your pelvic muscles and improve bladder control.  Train your bladder:  Go to the bathroom at set times, such as every 2 hours, even if you do not feel the urge to go. You can also try to hold your urine when you feel the urge to go. For example, hold your urine for 5 minutes when you feel the urge to go. As that becomes easier, hold your urine for 10 minutes.   Self-care:   Keep a UI record.  Write down how often you leak urine and how much you leak. Make a note of what you were doing when you leaked urine.  Drink liquids as directed. You may need to limit the amount of liquid you drink to help control your urine leakage. Do not drink any liquid right before you go to bed. Limit or do not have drinks that contain caffeine or alcohol.   Prevent constipation.  Eat a variety of high-fiber foods. Good examples are high-fiber cereals, beans, vegetables, and whole-grain breads. Walking is the best way to trigger your intestines to have a bowel movement.  Exercise regularly and maintain a healthy weight.  Weight loss and exercise will decrease pressure on your bladder and help you control your leakage.   Use a catheter  as directed  to help empty your bladder. A catheter is a tiny, plastic tube that is put into your bladder to drain your urine.   Go to behavior therapy as directed.  Behavior therapy may be used to help you learn to control your urge to urinate.    Weight Management   Why it is important to manage your weight:  Being overweight increases your risk of health conditions such as heart disease, high blood pressure, type 2 diabetes, and certain types of cancer. It can also increase your risk for osteoarthritis, sleep apnea, and other respiratory problems. Aim for a slow, steady weight loss. Even a small amount of weight loss can lower your risk of health problems.  How to lose weight safely:  A safe and healthy way to lose weight is to eat fewer calories and get regular exercise. You can lose up about 1 pound a week by decreasing the number of calories you eat by 500 calories each day.   Healthy meal plan for weight management:  A healthy meal plan includes a variety of foods, contains fewer calories, and helps you stay healthy. A healthy meal plan includes the following:  Eat whole-grain foods more often.  A healthy meal plan should contain fiber. Fiber is the part of grains, fruits, and vegetables that is not broken down by your body. Whole-grain foods are healthy and provide extra fiber in your diet. Some examples of whole-grain foods are whole-wheat breads and pastas, oatmeal, brown rice, and bulgur.  Eat a variety of vegetables every day.  Include dark, leafy greens such as spinach, kale, marivel greens, and mustard greens. Eat yellow and orange vegetables such as carrots, sweet potatoes, and winter squash.   Eat a variety of fruits every day.  Choose fresh or canned fruit (canned in its own juice or light syrup) instead of juice. Fruit juice has very little or no fiber.  Eat low-fat dairy foods.  Drink fat-free (skim) milk or 1% milk. Eat fat-free yogurt and low-fat cottage cheese. Try low-fat cheeses such as  mozzarella and other reduced-fat cheeses.  Choose meat and other protein foods that are low in fat.  Choose beans or other legumes such as split peas or lentils. Choose fish, skinless poultry (chicken or turkey), or lean cuts of red meat (beef or pork). Before you cook meat or poultry, cut off any visible fat.   Use less fat and oil.  Try baking foods instead of frying them. Add less fat, such as margarine, sour cream, regular salad dressing and mayonnaise to foods. Eat fewer high-fat foods. Some examples of high-fat foods include french fries, doughnuts, ice cream, and cakes.  Eat fewer sweets.  Limit foods and drinks that are high in sugar. This includes candy, cookies, regular soda, and sweetened drinks.  Exercise:  Exercise at least 30 minutes per day on most days of the week. Some examples of exercise include walking, biking, dancing, and swimming. You can also fit in more physical activity by taking the stairs instead of the elevator or parking farther away from stores. Ask your healthcare provider about the best exercise plan for you.    © Copyright [employer] 2018 Information is for End User's use only and may not be sold, redistributed or otherwise used for commercial purposes. All illustrations and images included in CareNotes® are the copyrighted property of [employer] or [employer]

## 2025-07-11 NOTE — TELEPHONE ENCOUNTER
Reason for call: NOT A DUPLICATE. Patient is requesting a different pharmacy because Mail order pharmacy told her they never received the refill from 07/08/25  [x] Refill   [] Prior Auth  [] Other:     Office:   [] PCP/Provider -   [x] Specialty/Provider - PSYCH    Medication: eszopiclone (LUNESTA) 3 MG tablet      Dose/Frequency: TAKE 1 TABLET BY MOUTH DAILY IMMEDIATELY BEFORE BEDTIME AS NEEDED FOR SLEEP     Quantity: 30    Pharmacy: [facility]/pharmacy #[id number] - [place], [place] - [address]      Local Pharmacy   Does the patient have enough for 3 days?   [x] Yes   [] No - Send as HP to POD    Mail Away Pharmacy   Does the patient have enough for 10 days?   [] Yes   [] No - Send as HP to POD

## 2025-07-14 PROBLEM — I49.5 SICK SINUS SYNDROME (HCC): Status: RESOLVED | Noted: 2024-09-04 | Resolved: 2025-07-14

## 2025-07-15 RX ORDER — ESZOPICLONE 3 MG/1
TABLET, FILM COATED ORAL
Qty: 30 TABLET | Refills: 0 | OUTPATIENT
Start: 2025-07-15

## 2025-07-22 ENCOUNTER — OFFICE VISIT (OUTPATIENT)
Dept: PHYSICAL THERAPY | Facility: CLINIC | Age: 75
End: 2025-07-22
Attending: PHYSICAL MEDICINE & REHABILITATION
Payer: COMMERCIAL

## 2025-07-22 DIAGNOSIS — M54.16 LUMBAR RADICULOPATHY: Primary | ICD-10-CM

## 2025-07-22 PROCEDURE — 97112 NEUROMUSCULAR REEDUCATION: CPT | Performed by: PHYSICAL THERAPIST

## 2025-07-22 PROCEDURE — 97110 THERAPEUTIC EXERCISES: CPT | Performed by: PHYSICAL THERAPIST

## 2025-07-22 NOTE — HOME EXERCISE EDUCATION
Program_ID:[id number]   Access Code: [email or web address]  URL: [email or web address]  Date: 07-  Prepared By: Yovana Brizuela    Program Notes      Exercises      - Supine Posterior Pelvic Tilt - 2 x daily - 7 x weekly - 1 sets - 10 reps - 5 hold      - Seated Lumbar Flexion Stretch - 3 x daily - 7 x weekly - 1 sets - 10 reps - 5 hold      - Supine Lower Trunk Rotation - 2 x daily - 7 x weekly - 1 sets - 10 reps - 5 hold      - Hooklying Single Knee to Chest Stretch with Towel - 1 x daily - 7 x weekly - 1 sets - 10 reps - 10 seconds  hold      - Supine Bridge - 1 x daily - 7 x weekly - 2 sets - 10 reps      - Clamshell - 1 x daily - 7 x weekly - 2 sets - 10 reps      
Program_ID:[id number]   Access Code: [email or web address]  URL: [email or web address]  Date: 07-  Prepared By: Yovana Brizuela    Program Notes      Exercises      - Supine Posterior Pelvic Tilt - 2 x daily - 7 x weekly - 1 sets - 10 reps - 5 hold      - Seated Lumbar Flexion Stretch - 3 x daily - 7 x weekly - 1 sets - 10 reps - 5 hold      - Supine Lower Trunk Rotation - 2 x daily - 7 x weekly - 1 sets - 10 reps - 5 hold      - Hooklying Single Knee to Chest Stretch with Towel - 1 x daily - 7 x weekly - 1 sets - 10 reps - 10 seconds  hold      - Supine Bridge - 1 x daily - 7 x weekly - 2 sets - 10 reps      - Clamshell - 1 x daily - 7 x weekly - 2 sets - 10 reps      
13

## 2025-07-22 NOTE — PROGRESS NOTES
"Daily Note     Today's date: 2025  Patient name: Flwoer Nunn  : 1950  MRN: [id number]  Referring provider: Rudy Alexander DO  Dx:   Encounter Diagnosis     ICD-10-CM    1. Lumbar radiculopathy  M54.16               Start Time: 1615  Stop Time: 1653  Total time in clinic (min): 38 minutes    Subjective: Patient reports that she has been doing well over the last 2 weeks since last therapy session. Patient notes that she went shopping with grand-daughter and she was afraid she would hold her back but she did well. Patient notes that she feels good to discharge to hep this date.       Objective: See treatment diary below      Assessment: Tolerated treatment well. Continued with established POC. Patient is competent in comprehensive HEP and had no further questions. Patient appropriate for DC this date.         Plan: Continue per plan of care.  Progress treatment as tolerated.       Precautions: Dizziness when laying flat and supine to sit (KEEP head propped), Guillian Mullan, anxiety, depression, sjogren's, history of breast cancer     Manuals 6/17 7/3 7/8 7/22         STM to lateral hip In sittingL distal QL  AFB Lateral hip  KG           STM gapping lumbar spine    L  focus       ACP                                     Neuro Re-Ed             Supine marches with TrA :03 x10 B 3\" 2x10 B 2' 2'         Hip abd iso with belt :05  x15 5\" x15 :05x2' :05x2'         Hip add with bolster  :05  2x10 5\" 2x10 :05x2' :05x2'         Supine hip abd with TB single leg                                                                  Ther Ex             3-way hip  1x10 ea  bilat 1x10 ea           Bridges   2x10           LTR :05  x10ea 5\" x20 On pball :05x2' On pball :05x2'         pball roll ins    :05x2' :05x2'         pball roll outs  Fwd  :05  x10 Fwd  10x5\" FWD :10x2' FWD :10x2'         Clamshells    10x  10x          Pallof             Stir the pot  YTB  10x ea CW/CCW                        Nustep  " Attempted  L3 2' L1 5'    Seat 5, arms 7 L1 5'    Seat 5, arms 7 L1 5'    Seat 5, arms 7         Ther Activity             Mini squats   nv                        Gait Training                                       Modalities

## 2025-07-30 ENCOUNTER — TELEPHONE (OUTPATIENT)
Dept: NEUROLOGY | Facility: CLINIC | Age: 75
End: 2025-07-30

## 2025-08-04 DIAGNOSIS — F51.04 PSYCHOPHYSIOLOGICAL INSOMNIA: ICD-10-CM

## 2025-08-04 RX ORDER — ESZOPICLONE 3 MG/1
TABLET, FILM COATED ORAL
Qty: 30 TABLET | Refills: 0 | Status: SHIPPED | OUTPATIENT
Start: 2025-08-04

## 2025-08-05 RX ORDER — ESZOPICLONE 3 MG/1
TABLET, FILM COATED ORAL
Qty: 30 TABLET | Refills: 0 | OUTPATIENT
Start: 2025-08-05

## 2025-08-06 ENCOUNTER — HOSPITAL ENCOUNTER (OUTPATIENT)
Dept: RADIOLOGY | Age: 75
Discharge: HOME | End: 2025-08-06
Attending: SURGERY
Payer: COMMERCIAL

## 2025-08-06 DIAGNOSIS — Z12.31 VISIT FOR SCREENING MAMMOGRAM: ICD-10-CM

## 2025-08-06 PROCEDURE — 77063 BREAST TOMOSYNTHESIS BI: CPT

## 2025-08-06 PROCEDURE — 77067 SCR MAMMO BI INCL CAD: CPT
